# Patient Record
Sex: FEMALE | Race: WHITE | NOT HISPANIC OR LATINO | Employment: OTHER | URBAN - METROPOLITAN AREA
[De-identification: names, ages, dates, MRNs, and addresses within clinical notes are randomized per-mention and may not be internally consistent; named-entity substitution may affect disease eponyms.]

---

## 2017-03-30 ENCOUNTER — ALLSCRIPTS OFFICE VISIT (OUTPATIENT)
Dept: OTHER | Facility: OTHER | Age: 82
End: 2017-03-30

## 2017-07-20 ENCOUNTER — HOSPITAL ENCOUNTER (OUTPATIENT)
Dept: RADIOLOGY | Facility: HOSPITAL | Age: 82
Discharge: HOME | End: 2017-07-20
Attending: ORTHOPAEDIC SURGERY
Payer: MEDICARE

## 2017-07-20 ENCOUNTER — ALLSCRIPTS OFFICE VISIT (OUTPATIENT)
Dept: OTHER | Facility: OTHER | Age: 82
End: 2017-07-20

## 2017-07-20 DIAGNOSIS — M25.562 PAIN IN LEFT KNEE: ICD-10-CM

## 2017-07-20 DIAGNOSIS — I10 ESSENTIAL (PRIMARY) HYPERTENSION: ICD-10-CM

## 2017-07-20 PROCEDURE — 73562 X-RAY EXAM OF KNEE 3: CPT

## 2017-08-03 ENCOUNTER — ALLSCRIPTS OFFICE VISIT (OUTPATIENT)
Dept: OTHER | Facility: OTHER | Age: 82
End: 2017-08-03

## 2017-10-24 ENCOUNTER — ALLSCRIPTS OFFICE VISIT (OUTPATIENT)
Dept: OTHER | Facility: OTHER | Age: 82
End: 2017-10-24

## 2017-10-25 NOTE — PROGRESS NOTES
Assessment  1  Primary osteoarthritis of left knee (715 16) (M17 12)    Osteoarthritis left knee with instability  This patient has been managed quite well with a hinged knee brace allows for flexion-extension, but gives her medial lateral or do  She'll continue use of the brace for ambulation  I did welcome the opportunity see back in the office should problems arise     Plan  Primary osteoarthritis of left knee    · Follow-up PRN Evaluation and Treatment  Follow-up  Status: Complete  Done:  [id number] 09:56AM    History of Present Illness  HPI: 20-year-old female who is undergone bracing for a valgus left knee with soft tissue compromise medially and arthritis  She hasn't fallen see she had a new brace on, she has increased ability to a stand and descend stairs  She continues to walk utilizing a single-point cane for support  She and her son are happy with the outcome of bracing for her left knee      Review of Systems    Constitutional: No fever, no chills, feels well, no tiredness, no recent weight gain or loss  Eyes: No complaints of eyesight problems, no red eyes  ENT: no loss of hearing, no nosebleeds, no sore throat  Cardiovascular: No complaints of chest pain, no palpitations, no leg claudication or lower extremity edema  Respiratory: no compliants of shortness of breath, no wheezing, no cough  Gastrointestinal: no complaints of abdominal pain, no constipation, no nausea or diarrhea, no vomiting, no bloody stools  Genitourinary: no complaints of dysuria, no incontinence  Musculoskeletal: as noted in HPI  Integumentary: no complaints of skin rash or lesion, no itching or dry skin, no skin wounds  Neurological: no complaints of headache, no confusion, no numbness or tingling, no dizziness  Endocrine: No complaints of muscle weakness, no feelings of weakness, no frequent urination, no excessive thirst    Psychiatric: No suicidal thoughts, no anxiety, no feelings of depression        Active Problems  1  Bursitis (727 3) (M71 9)   2  Cataract (366 9) (H26 9)   3  Chest pain (786 50) (R07 9)   4  Closed fracture of nasal bone, sequela (905 0) (S02 2XXS)   5  Cough (786 2) (R05)   6  Fracture Of Sacrum (805 6)   7  Generalized osteoarthritis of multiple sites (715 09) (M15 9)   8  Hyperlipidemia (272 4) (E78 5)   9  Hypertension (401 9) (I10)   10  Iliotibial band syndrome (728 89) (M76 30)   11  Left knee pain (719 46) (M25 562)   12  Limb pain (729 5) (M79 609)   13  Lower back pain (724 2) (M54 5)   14  Lumbago With Sciatica (724 3)   15  Lumbar canal stenosis (724 02) (M48 061)   16  Lumbar radiculopathy (724 4) (M54 16)   17  Need for pneumococcal vaccination (V03 82) (Z23)   18  Need for prophylactic vaccination and inoculation against influenza (V04 81) (Z23)   19  Orthostatic hypotension (458 0) (I95 1)   20  Osteoarthrosis (715 90) (M19 90)   21  Osteoporosis (733 00) (M81 0)   22  Primary osteoarthritis of left knee (715 16) (M17 12)   23  PVC's (premature ventricular contractions) (427 69) (I49 3)   24  Syncope (780 2) (R55)   25  Unstable knee, left (718 86) (M25 362)   26  URI (upper respiratory infection) (465 9) (J06 9)   27  Vasovagal syncope (780 2) (R55)   28  Visit for screening mammogram (V76 12) (Z12 31)    Past Medical History   · History of Coronary Artery Disease (V12 59)   · History of hypertension (V12 59) (Z86 79)   · Need for prophylactic vaccination and inoculation against influenza (V04 81) (Z23)   · History of Osteoarthritis (V13 4)   · History of Osteoporosis (733 00) (M81 0)   · History of Vitamin D deficiency (268 9) (E55 9)    The active problems and past medical history were reviewed and updated today  Surgical History   · History of Appendectomy   · History of Hysterectomy   · History of Rotator Cuff Repair    The surgical history was reviewed and updated today         Family History  Father    · Family history of Type 2 Diabetes Mellitus  Brother    · Family history of Heart Disease (V17 49)    Social History   · Daily Coffee Consumption (4  Cups/Day)   · Former smoker (X93 63) (T53 756)    Current Meds   1  Aspirin 81 MG TABS; Take 1 tablet daily Recorded   2  Calcitonin ([place]) 200 UNIT/ACT Nasal Solution; USE 1 SPRAY DAILY ALTERNATING   NOSTRILS AS DIRECTED; Therapy: 43WBB9338 to (Evaluate:14Nov2017)  Requested for: 63FMC4135; Last   Rx:11Lvh5098 Ordered   3  Daily Multi Oral Tablet; TAKE 1 TABLET DAILY; Therapy: (Recorded:19Oct2015) to Recorded   4  Dorzolamide HCl SOLN; INSTILL 1 DROP INTO BOTH EYES 2 TIMES DAILY Recorded   5  Fenofibrate 160 MG Oral Tablet; TAKE 1 TABLET DAILY; Therapy: 11RZB7866 to (Amrik Riojas)  Requested for: 12Qba6833; Last   Rx:11Knr5517 Ordered   6  Glucosamine CAPS; take 1 capsule daily; Therapy: (Recorded:19Oct2015) to Recorded   7  Latanoprost 0 005 % Ophthalmic Solution; one drop each eye daily  Requested for:   28BED5705; Last Rx:09Rvu0001 Ordered   8  Metoprolol Tartrate 25 MG Oral Tablet; take 1 tablet by mouth twice a day; Therapy: 77SRA2532 to (Evaluate:07Jan2018)  Requested for: 07XKO2400; Last   Rx:44Cgy2318 Ordered   9  Metoprolol Tartrate 50 MG Oral Tablet; Take 1 tablet twice daily; Therapy: 88Gua8390 to (Evaluate:30Jan2018)  Requested for: 24Hhg9901; Last   Rx:43Tro8062 Ordered   10  One Day At A Time Pill Remind MISC; USE AS DIRECTED Recorded   11  Vitamin B12 TABS; Take 1 tablet daily as directed Recorded   12  Vitamin B6 TABS; Take 1 tablet daily as directed Recorded    Allergies  1  No Known Drug Allergies    Vitals  Signs   Heart Rate: 62  Systolic: 127  Diastolic: 67  Height: 5 ft   Weight: 122 lb 6 oz  BMI Calculated: 23 9  BSA Calculated: 1 51    Physical Exam  Gait pattern is antalgic despite use of a brace and a cane  Left knee is in valgus  She is wearing her brace allows for flexion extension  There is no deficit skin integrity of the proximal distal limb of the brace    The foot toes strongly hemanth          Future Appointments    Date/Time Provider Specialty Site   12/05/2017 01:30 PM Lg Sahu DO Internal Medicine [facility] INTERNAL MED     Signatures   Electronically signed by : TRACIE Anthony ; Oct 24 2017  9:56AM EST                       (Author)

## 2017-11-24 ENCOUNTER — GENERIC CONVERSION - ENCOUNTER (OUTPATIENT)
Dept: OTHER | Facility: OTHER | Age: 82
End: 2017-11-24

## 2017-11-24 LAB
A/G RATIO (HISTORICAL): 2 (ref 1.2–2.2)
ALBUMIN SERPL BCP-MCNC: 4.4 G/DL (ref 3.2–4.6)
ALP SERPL-CCNC: 61 IU/L (ref 39–117)
ALT SERPL W P-5'-P-CCNC: 16 IU/L (ref 0–32)
AST SERPL W P-5'-P-CCNC: 30 IU/L (ref 0–40)
BASOPHILS # BLD AUTO: 0.1 X10E3/UL (ref 0–0.2)
BASOPHILS # BLD AUTO: 1 %
BILIRUB SERPL-MCNC: 0.2 MG/DL (ref 0–1.2)
BUN SERPL-MCNC: 16 MG/DL (ref 10–36)
BUN/CREA RATIO (HISTORICAL): 17 (ref 12–28)
CALCIUM SERPL-MCNC: 9.4 MG/DL (ref 8.7–10.3)
CHLORIDE SERPL-SCNC: 98 MMOL/L (ref 96–106)
CHOLEST SERPL-MCNC: 153 MG/DL (ref 100–199)
CO2 SERPL-SCNC: 21 MMOL/L (ref 18–29)
CREAT SERPL-MCNC: 0.93 MG/DL (ref 0.57–1)
DEPRECATED RDW RBC AUTO: 13.7 % (ref 12.3–15.4)
EGFR AFRICAN AMERICAN (HISTORICAL): 62 ML/MIN/1.73
EGFR-AMERICAN CALC (HISTORICAL): 54 ML/MIN/1.73
EOSINOPHIL # BLD AUTO: 0.5 X10E3/UL (ref 0–0.4)
EOSINOPHIL # BLD AUTO: 5 %
GLUCOSE SERPL-MCNC: 98 MG/DL (ref 65–99)
HCT VFR BLD AUTO: 37.1 % (ref 34–46.6)
HDLC SERPL-MCNC: 65 MG/DL
HGB BLD-MCNC: 12.3 G/DL (ref 11.1–15.9)
IMM.GRANULOCYTES (CD4/8) (HISTORICAL): 0 %
IMM.GRANULOCYTES (CD4/8) (HISTORICAL): 0 X10E3/UL (ref 0–0.1)
LDLC SERPL CALC-MCNC: 72 MG/DL (ref 0–99)
LYMPHOCYTES # BLD AUTO: 2.5 X10E3/UL (ref 0.7–3.1)
LYMPHOCYTES # BLD AUTO: 29 %
MCH RBC QN AUTO: 30.1 PG (ref 26.6–33)
MCHC RBC AUTO-ENTMCNC: 33.2 G/DL (ref 31.5–35.7)
MCV RBC AUTO: 91 FL (ref 79–97)
MONOCYTES # BLD AUTO: 1.1 X10E3/UL (ref 0.1–0.9)
MONOCYTES (HISTORICAL): 13 %
NEUTROPHILS # BLD AUTO: 4.5 X10E3/UL (ref 1.4–7)
NEUTROPHILS # BLD AUTO: 52 %
PLATELET # BLD AUTO: 269 X10E3/UL (ref 150–379)
POTASSIUM SERPL-SCNC: 4 MMOL/L (ref 3.5–5.2)
RBC (HISTORICAL): 4.08 X10E6/UL (ref 3.77–5.28)
SODIUM SERPL-SCNC: 135 MMOL/L (ref 134–144)
TOT. GLOBULIN, SERUM (HISTORICAL): 2.2 G/DL (ref 1.5–4.5)
TOTAL PROTEIN (HISTORICAL): 6.6 G/DL (ref 6–8.5)
TRIGL SERPL-MCNC: 82 MG/DL (ref 0–149)
VLDLC SERPL CALC-MCNC: 16 MG/DL (ref 5–40)
WBC # BLD AUTO: 8.7 X10E3/UL (ref 3.4–10.8)

## 2017-11-26 LAB
BACTERIA UR QL AUTO: ABNORMAL
BILIRUB UR QL STRIP: NEGATIVE
COLOR UR: YELLOW
COMMENT (HISTORICAL): CLEAR
CULTURE RESULT (HISTORICAL): ABNORMAL
FECAL OCCULT BLOOD DIAGNOSTIC (HISTORICAL): NEGATIVE
GLUCOSE (HISTORICAL): NEGATIVE
KETONES UR STRIP-MCNC: NEGATIVE MG/DL
LEUKOCYTE ESTERASE UR QL STRIP: ABNORMAL
MICROSCOPIC EXAMINATION (HISTORICAL): ABNORMAL
MISCELLANEOUS LAB TEST RESULT (HISTORICAL): ABNORMAL
MUCUS THREADS (HISTORICAL): PRESENT
NITRITE UR QL STRIP: NEGATIVE
NON-SQ EPI CELLS URNS QL MICRO: ABNORMAL /HPF
PH UR STRIP.AUTO: 6 [PH] (ref 5–7.5)
PROT UR STRIP-MCNC: NEGATIVE MG/DL
RBC (HISTORICAL): ABNORMAL /HPF
SP GR UR STRIP.AUTO: 1.01 (ref 1–1.03)
T4 FREE SERPL-MCNC: 1.29 NG/DL (ref 0.82–1.77)
TSH SERPL DL<=0.05 MIU/L-ACNC: 5.99 UIU/ML (ref 0.45–4.5)
URINALYSIS (UA) (HISTORICAL): ABNORMAL
UROBILINOGEN UR QL STRIP.AUTO: 0.2 EU/DL (ref 0.2–1)
WBC # BLD AUTO: ABNORMAL /HPF

## 2017-12-05 ENCOUNTER — ALLSCRIPTS OFFICE VISIT (OUTPATIENT)
Dept: OTHER | Facility: OTHER | Age: 82
End: 2017-12-05

## 2017-12-05 DIAGNOSIS — M81.0 AGE-RELATED OSTEOPOROSIS WITHOUT CURRENT PATHOLOGICAL FRACTURE: ICD-10-CM

## 2017-12-07 ENCOUNTER — GENERIC CONVERSION - ENCOUNTER (OUTPATIENT)
Dept: INTERNAL MEDICINE CLINIC | Facility: CLINIC | Age: 82
End: 2017-12-07

## 2018-01-12 VITALS
SYSTOLIC BLOOD PRESSURE: 137 MMHG | HEART RATE: 62 BPM | HEIGHT: 60 IN | BODY MASS INDEX: 24.03 KG/M2 | WEIGHT: 122.38 LBS | DIASTOLIC BLOOD PRESSURE: 67 MMHG

## 2018-01-13 VITALS — WEIGHT: 123.38 LBS | HEIGHT: 60 IN | BODY MASS INDEX: 24.22 KG/M2

## 2018-01-13 VITALS
SYSTOLIC BLOOD PRESSURE: 180 MMHG | HEIGHT: 60 IN | BODY MASS INDEX: 23.75 KG/M2 | HEART RATE: 66 BPM | DIASTOLIC BLOOD PRESSURE: 76 MMHG | TEMPERATURE: 99 F | OXYGEN SATURATION: 98 % | WEIGHT: 121 LBS

## 2018-01-14 VITALS
DIASTOLIC BLOOD PRESSURE: 80 MMHG | HEART RATE: 63 BPM | TEMPERATURE: 98.2 F | HEIGHT: 60 IN | WEIGHT: 124.5 LBS | BODY MASS INDEX: 24.44 KG/M2 | OXYGEN SATURATION: 94 % | SYSTOLIC BLOOD PRESSURE: 140 MMHG

## 2018-01-23 VITALS
WEIGHT: 120 LBS | HEIGHT: 60 IN | HEART RATE: 75 BPM | BODY MASS INDEX: 23.56 KG/M2 | TEMPERATURE: 98.7 F | OXYGEN SATURATION: 98 % | SYSTOLIC BLOOD PRESSURE: 160 MMHG | DIASTOLIC BLOOD PRESSURE: 72 MMHG

## 2018-01-23 NOTE — PROGRESS NOTES
Assessment    1  Osteoporosis (733 00) (M81 0)   2  Generalized osteoarthritis of multiple sites (715 09) (M15 9)   3  Hypertension (401 9) (I10)   4  Hyperlipidemia (272 4) (E78 5)   5  Encounter for preventive health examination (V70 0) (Z00 00)    Plan  Health Maintenance    · Follow-up visit in 4 Months Evaluation and Treatment  Follow-up  Status: Hold For -  Scheduling  Requested for: 84GOG8425  Osteoporosis    · * DXA BONE DENSITY SPINE HIP AND PELVIS; Status:Hold For - Scheduling;  Requested for:78Lrb6140;     Discussion/Summary    1  Hypertension  As noted patient's blood pressure is mildly elevated today and we do have a problem with this patient where she has had episodes of orthostasis and syncope and collapse with accidental injury  At this point I would prefer not to change her medication and we will continue surveillance as well as her cardiologist     2  Osteoporosis  We have checked the records and she has not recently had a DEXA scan and we have ordered this to have done in the near future and we will call and discuss results  3  Generalized DJD  Patient states that she is doing well with brace to the left knee and his walking now with a wheeled walker which helps for stability and no recent falls or injuries  Patient is watching very closely by her son  4  Hyperlipidemia  We did tell the patient that her cholesterol is under good control and we will continue to monitor this  5  Health maintenance  Unless patient was having any GI abnormality she no longer will be going for colon rectal screening because of her age  Patient also is no longer getting gynecologic care but she is getting routine mammograms  Patient otherwise is up-to-date with all studies and evaluations and she will be seen again in the office in 4 months but she was told if there is any problems in between to please call     Impression: Subsequent Annual Wellness Visit, with preventive exam as well as age and risk appropriate counseling completed  Cardiovascular screening and counseling: the risks and benefits of screening were discussed and screening is current  Diabetes screening and counseling: the risks and benefits of screening were discussed and screening is current  Colorectal cancer screening and counseling: the risks and benefits of screening were discussed, the patient declines screening and screening not indicated  Breast cancer screening and counseling: the risks and benefits of screening were discussed and screening is current  Cervical cancer screening and counseling: the risks and benefits of screening were discussed, the patient declines screening and screening not indicated  Osteoporosis screening and counseling: the risks and benefits of screening were discussed, counseling was given on obtaining adequate amounts of calcium and vitamin D on a daily basis, counseling was given on the importance of regular weightbearing exercise and due for DXA axial skeleton  Abdominal aortic aneurysm screening and counseling: the risks and benefits of screening were discussed and screening not indicated  Glaucoma screening and counseling: the risks and benefits of screening were discussed and screening is current  HIV screening and counseling: screening not indicated  Hepatitis C Screening: the patient was counseled on Hepatitis C screening  The patient declines Hepatitis C screening   Immunizations: the risks and benefits of influenza vaccination were discussed with the patient, influenza vaccine is up to date this year, the risks and benefits of pneumococcal vaccination were discussed with the patient, the lifetime pneumococcal vaccine has been completed, hepatitis B vaccination series is not indicated at this time due to the patient's low risk of mallory the disease, the risks and benefits of the Zostavax vaccine were discussed with the patient, the patient declines the Zostavax vaccine, the risks and benefits of the Td vaccine were discussed with the patient and Td vaccination status is unknown  Advance Directive Planning: complete and up to date  Patient Discussion: plan discussed with the patient, follow-up visit needed in Four months  Possible side effects of new medications were reviewed with the patient/guardian today  The treatment plan was reviewed with the patient/guardian  The patient/guardian understands and agrees with the treatment plan      Chief Complaint  Patient is here today for an annual wellness exam      History of Present Illness  HPI: Patient is a 70-year-old female with a history of hypertension, hyperlipidemia, diffuse DJD  Patient is here today for Medicare wellness visit  Patient states that she is feeling well and she has no new complaints  She is wearing a brace on her left knee which she states helps with stability and she reports that she has had no recent falls or injuries  She did have lab tests performed prior to the visit today and we did discuss the results  GFR is stable at 54 and the rest of her comprehensive metabolic profile was normal, her cholesterol showing good control and her CBC was essentially normal also  She continues with a slight elevation in her TSH but states she is asymptomatic and with her concerns over her weight loss we have decided not to treat this time but continue to follow  Welcome to [facility] and Wellness Visits: The patient is being seen for the subsequent annual wellness visit  Co-Managers and Medical Equipment/Suppliers: See Patient Care Team   Reviewed Updated [facility]:   Last Medicare Wellness Visit Information was reviewed, patient interviewed, no change since last AWV  Preventive Quality Program 65 and Older: Falls Risk: The patient fell 0 times in the past 12 months  Symptoms Include: impaired balance  Associated symptoms:  impaired mobility and impaired ability to live independently   The patient is currently experiencing urinary symptoms  Urinary Incontinence Symptoms includes: urinary urgency and nocturia    Date of last glaucoma screen was Patient states she has glaucoma screening every 6 months      Patient Care Team    Care Team Member Role Specialty Office Number   Meenakshi Paredes   Orthopedic Surgery [phone]   Joanne LOVE  Cardiology [phone]     Review of Systems    Constitutional: negative  Head and Face: negative  Eyes: negative  ENT: negative  Cardiovascular: no chest pain, no palpitations, the heart is not racing, no lightheadedness and no lower extremity edema  Respiratory: negative  Gastrointestinal: negative  Musculoskeletal: diffuse joint pain, joint stiffness and limping, but no generalized muscle aches, no back pain, no joint swelling, no back muscle spasm and no pain in other joints  Integumentary and Breasts: negative  Neurological: Syncope, but no headache, no confusion, no dizziness, no fainting, no paresthesias, no saddle paresthesia, no leg numbness, no leg weakness, no tingling and no difficulty walking  Psychiatric: negative  Endocrine: negative  Hematologic and Lymphatic: negative  Active Problems    1  Bursitis (727 3) (M71 9)   2  Cataract (366 9) (H26 9)   3  Chest pain (786 50) (R07 9)   4  Closed fracture of nasal bone, sequela (905 0) (S02 2XXS)   5  Cough (786 2) (R05)   6  Fracture Of Sacrum (805 6)   7  Generalized osteoarthritis of multiple sites (715 09) (M15 9)   8  Hyperlipidemia (272 4) (E78 5)   9  Hypertension (401 9) (I10)   10  Iliotibial band syndrome (728 89) (M76 30)   11  Left knee pain (719 46) (M25 562)   12  Limb pain (729 5) (M79 609)   13  Lower back pain (724 2) (M54 5)   14  Lumbago With Sciatica (724 3)   15  Lumbar canal stenosis (724 02) (M48 061)   16  Lumbar radiculopathy (724 4) (M54 16)   17  Need for pneumococcal vaccination (V03 82) (Z23)   18   Need for prophylactic vaccination and inoculation against influenza (V04 81) (Z23)   19  Orthostatic hypotension (458 0) (I95 1)   20  Osteoarthrosis (715 90) (M19 90)   21  Osteoporosis (733 00) (M81 0)   22  Primary osteoarthritis of left knee (715 16) (M17 12)   23  PVC's (premature ventricular contractions) (427 69) (I49 3)   24  Syncope (780 2) (R55)   25  Unstable knee, left (718 86) (M25 362)   26  URI (upper respiratory infection) (465 9) (J06 9)   27  Vasovagal syncope (780 2) (R55)   28  Visit for screening mammogram (V76 12) (Z12 31)    Past Medical History    · History of Coronary Artery Disease (V12 59)   · History of hypertension (V12 59) (Z86 79)   · Need for prophylactic vaccination and inoculation against influenza (V04 81) (Z23)   · History of Osteoarthritis (V13 4)   · History of Osteoporosis (733 00) (M81 0)   · History of Vitamin D deficiency (268 9) (E55 9)    The active problems and past medical history were reviewed and updated today  Surgical History    · History of Appendectomy   · History of Hysterectomy   · History of Rotator Cuff Repair    The surgical history was reviewed and updated today  Family History  Father    · Family history of Type 2 Diabetes Mellitus  Brother    · Family history of Heart Disease (V17 49)    The family history was reviewed and updated today  Social History    · Daily Coffee Consumption (4  Cups/Day)   · Former smoker (N15 84) (U94 236)  The social history was reviewed and updated today  The social history was reviewed and is unchanged  Current Meds   1  Aspirin 81 MG TABS; Take 1 tablet daily Recorded   2  Calcitonin ([place]) 200 UNIT/ACT Nasal Solution; USE 1 SPRAY DAILY ALTERNATING   NOSTRILS AS DIRECTED; Therapy: 43UBY0676 to (Evaluate:80Uao1033)  Requested for: 54WNU2768; Last   Rx:27Nov2017 Ordered   3  Daily Multi Oral Tablet; TAKE 1 TABLET DAILY; Therapy: (Recorded:19Oct2015) to Recorded   4  Dorzolamide HCl SOLN; INSTILL 1 DROP INTO BOTH EYES 2 TIMES DAILY Recorded   5   Fenofibrate 160 MG Oral Tablet; TAKE 1 TABLET DAILY; Therapy: 42JHM8708 to (Dinora Rivas)  Requested for: 92Sgf8056; Last   Rx:12Lsa4645 Ordered   6  Glucosamine CAPS; take 1 capsule daily; Therapy: (Recorded:19Oct2015) to Recorded   7  Latanoprost 0 005 % Ophthalmic Solution; one drop each eye daily  Requested for:   61JDS2972; Last Rx:35Vws9640 Ordered   8  Metoprolol Tartrate 25 MG Oral Tablet; take 1 tablet by mouth twice a day; Therapy: 36FEY8941 to (Evaluate:07Jan2018)  Requested for: 28KDP6416; Last   Rx:84Nyl9059 Ordered   9  Metoprolol Tartrate 50 MG Oral Tablet; Take 1 tablet twice daily; Therapy: 11Pqv1153 to (Evaluate:30Jan2018)  Requested for: 51Hlp9587; Last   Rx:29Nun2906 Ordered   10  One Day At A Time Pill Remind MISC; USE AS DIRECTED Recorded   11  Vitamin B12 TABS; Take 1 tablet daily as directed Recorded   12  Vitamin B6 TABS; Take 1 tablet daily as directed Recorded    The medication list was reviewed and updated today  Allergies    1  No Known Drug Allergies    Immunizations   ** Printed in Appendix #1 below  Vitals  Signs    Temperature: 98 7 F  Heart Rate: 75  Systolic: 948  Diastolic: 72  Height: 5 ft   Weight: 120 lb   BMI Calculated: 23 44  BSA Calculated: 1 5  O2 Saturation: 98    Physical Exam    Constitutional Frail cheerful 80year-old female who is awake alert in no acute distress oriented x3  Head and Face   Head and face: Normal     Palpation of the face and sinuses: No sinus tenderness  Eyes   Conjunctiva and lids: No swelling, erythema or discharge  Pupils and irises: Equal, round, reactive to light  Ophthalmoscopic examination: Normal fundi and optic discs  Unable to see fundi clearly to do a thorough examination  Patient sees her ophthalmologist every 3-4 months and is begun to develop some macular degeneration  Ears, Nose, Mouth, and Throat   External inspection of ears and nose: Normal     Otoscopic examination: Tympanic membranes translucent with normal light reflex  Canals patent without erythema  Hearing: Normal     Nasal mucosa, septum, and turbinates: Normal without edema or erythema  Lips, teeth, and gums: Abnormal   Full upper and lower dentures  Oropharynx: Normal with no erythema, edema, exudate or lesions  Neck   Neck: Supple, symmetric, trachea midline, no masses  Thyroid: Normal, no thyromegaly  Pulmonary   Respiratory effort: No increased work of breathing or signs of respiratory distress  Percussion of chest: Normal     Palpation of chest: Normal     Auscultation of lungs: Clear to auscultation  Cardiovascular   Palpation of heart: Normal PMI, no thrills  Auscultation of heart: Normal rate and rhythm, normal S1 and S2, no murmurs  Carotid pulses: 2+ bilaterally  Abdominal aorta: Normal     Femoral pulses: 2+ bilaterally  Pedal pulses: 2+ bilaterally  Peripheral vascular exam: Normal     Examination of extremities for edema and/or varicosities: Normal     Chest   Breasts: Abnormal   Patient defers examination and I told her this is all right secondary to a age and that we do not have to get regular mammograms anymore  Abdomen   Abdomen: Non-tender, no masses  Liver and spleen: No hepatomegaly or splenomegaly  Examination for hernias: No hernia appreciated  Patient deferred rectal examination and states she still needs to complete the Hemoccult slides  Genitourinary Patient defers examination  Lymphatic   Palpation of lymph nodes in neck: No lymphadenopathy  Palpation of lymph nodes in axillae: No lymphadenopathy  Palpation of lymph nodes in groin: No lymphadenopathy  Palpation of lymph nodes in other areas: No lymphadenopathy  Musculoskeletal   Gait and station: Abnormal   Slightly wide-based gait and patient walks with a cane for stability and help prevent falls  Digits and nails: Abnormal   Diffuse arthritic changes to the hands and digit  Diffuse degenerative changes to the hands feet and ankles  Range of motion: Normal     Stability: Normal   Patient has better stability with her knee brace on the left  Muscle strength/tone: Normal     Skin   Skin and subcutaneous tissue: Normal without rashes or lesions  Palpation of skin and subcutaneous tissue: Normal turgor  Neurologic   Cranial nerves: Cranial nerves II-XII intact  Cortical function: Normal mental status  Reflexes: 2+ and symmetric  Sensation: No sensory loss  Coordination: Normal finger to nose and heel to shin  Psychiatric   Judgment and insight: Normal     Orientation to person, place, and time: Normal     Recent and remote memory: Intact  Mood and affect: Normal        Health Management  Visit for screening mammogram   Digital Bilateral Screening Mammogram With CAD; every 1 year; Last 09Xxq3797; Next  Due: 2015;  Overdue    Future Appointments    Date/Time Provider Specialty Site   04/10/2018 09:30 AM Porfirio Holley DO Internal Medicine [facility] INTERNAL MED     Signatures   Electronically signed by : Sloane Phoenix DO; Dec  5 2017  2:43PM EST                       (Author)    Appendix #1     Patient: Lisa Whalen ; : 1925; MRN: [id number]      1 2 3 4 5 6    Influenza  09-Sep-2011 23-Aug-2012 29-Oct-2013 29-Oct-2013 10-Nov-2014 10-Sep-2015    PCV  10-Nov-2014         PPSV  2000

## 2018-01-24 NOTE — PROGRESS NOTES
Assessment   1  Hypertension (401 9) (I10)  2  Hyperlipidemia (272 4) (E78 5)  3  Osteoporosis (733 00) (M81 0)  4  Osteoarthrosis (715 90) (M19 90)    Plan  Osteoarthrosis    · Follow-up visit in 4 Months Evaluation and Treatment  Follow-up  Status: Hold For -  Scheduling  Requested for: [id number]    Discussion/Summary    #1  Hypertension  Patient's blood pressure showing adequate control and I would not increase her medication or change medication and lesser specific indication being as that she has had episodes of orthostasis in the past  Patient was told to call if any difficulties with lightheadedness, dizziness  #2  Hyperlipidemia  Patient's cholesterol is showing adequate control with present treatment  Patient states that she watches her diet is not always perfect  #3  History of osteoporosis  Patient will continue with present treatment  We will discuss with the patient when she returns to the office getting her up-to-date with a DEXA scan to make sure she has some stability  #4  Diffuse DJD  Patient is having problems with worsening arthritis to both knees  Patient does walk with a cane for stability and safety  We may consider changing her to a walker to continue to make sure she is safe  Patient does not wish to have any procedure such as a total knee replacement which would be indicated  Impression: Subsequent Annual Wellness Visit, with preventive exam as well as age and risk appropriate counseling completed  Cardiovascular screening and counseling: the risks and benefits of screening were discussed and screening is current  Diabetes screening and counseling: the risks and benefits of screening were discussed and screening is current  Colorectal cancer screening and counseling: the patient declines screening and screening not indicated  Breast cancer screening and counseling: the risks and benefits of screening were discussed and screening is current     Cervical cancer screening and counseling: screening not indicated  Osteoporosis screening and counseling: the risks and benefits of screening were discussed and screening is current  Abdominal aortic aneurysm screening and counseling: screening not indicated  Glaucoma screening and counseling: the risks and benefits of screening were discussed and screening is current  HIV screening and counseling: screening not indicated  Immunizations: the risks and benefits of influenza vaccination were discussed with the patient, influenza vaccine is up to date this year, influenza vaccine is due today, the risks and benefits of pneumococcal vaccination were discussed with the patient, the lifetime pneumococcal vaccine has been completed, hepatitis B vaccination series is not indicated at this time due to the patient's low risk of mallory the disease, the risks and benefits of the Zostavax vaccine were discussed with the patient, the patient declines the Zostavax vaccine, the risks and benefits of the Td vaccine were discussed with the patient and Td vaccination status is unknown  Advance Directive Planning: not complete, she was encouraged to follow-up with me to discuss her questions and/or decisions  Patient Discussion: plan discussed with the patient, follow-up visit needed in 4 months  Chief Complaint  Patient is here today for an annual wellness exam with labs      Advance Directives  Advance Directive [place]:   NO - Patient does not have an advance health care directive  Capacity/Competence: This patient has full decision making capacity for discussion of advance care planning and This patient has full decision making competency for discussion of advance care planning  Summary of Advance Directive Conversation  Patient states she may have filled out a living will in the past but does not recall where it may be   I did suggest that one of her family members download information and she fill out a new living will as to her specific desires and directives  The provider spent minutes discussing Advance Directives  History of Present Illness  HPI: Patient is a 70-year-old female with a history of hypertension, hyperlipidemia, DJD, osteoporosis  Patient is here today for a routine Medicare wellness exam  She did have routine labs completed prior to the visit today and we're happy to report to the patient that they were all normal and acceptable  Patient states that she is moving a little bit slower than she used to secondary to her severe arthritis to her lower extremities  She denies any chest pain or pressure and no increasing shortness of breath with exertion  Patient states she still gets [employer] on Sunday  As noted patient's lost a total of 1 pounds since March of this year  Welcome to [employer] and Wellness Visits: The patient is being seen for the subsequent annual wellness visit  Medicare Screening and Risk Factors   Hospitalizations: she has been previously hospitalizied  Once per lifetime medicare screening tests: ECG  Medicare Screening Tests Risk Questions   Abdominal aortic aneurysm risk assessment: none indicated  Osteoporosis risk assessment: , female gender and over 48years of age  HIV risk assessment: none indicated  Drug and Alcohol Use: The patient is a former cigarette smoker  She has smoked for Patient states approximately 10 pack years year(s)  The patient reports never drinking alcohol  She has never used illicit drugs  Diet and Physical Activity: Current diet includes well balanced meals and limited junk food  She exercises infrequently  Exercise: walking  Mood Disorder and Cognitive Impairment Screening: Geriatric Depression Scale   Depression screening score was Total of 3 on the geriatric depression scale   no significant symptoms  She denies feeling down, depressed, or hopeless over the past two weeks   She denies feeling little interest or pleasure in doing things over the past two weeks  Cognitive impairment screening: denies difficulty learning/retaining new information, denies difficulty handling complex tasks, denies difficulty with reasoning, denies difficulty with spatial ability and orientation, denies difficulty with language, denies difficulty with behavior and Total of 28 on the Mini-Mental Status Examination  Patient is to some short-term memory loss  Functional Ability/Level of Safety: Hearing is normal bilaterally  The patient is currently able to do activities of daily living with limitations, able to do instrumental activities of daily living with limitations, able to participate in social activities with limitations and unable to drive  Activities of daily living details: transportation help needed, but does not need help using the phone, does not need help shopping, no meal preparation help needed, does not need help doing housework, does not need help managing medications and does not need help managing money  Fall risk factors:  mobility impairment, antihypertensive use and previous fall, but The patient fell 1 times in the past 12 months , no polypharmacy, no alcohol use, no antidepressant use, no deconditioning, no postural hypotension, no sedative use, no visual impairment, no urinary incontinence and no cognitive impairment  Home safety risk factors:  no unfamiliar surroundings, no loose rugs, no poor household lighting, no uneven floors, no household clutter, grab bars in the bathroom and handrails on the stairs  Advance Directives: Advance directives: no living will, no durable power of  for health care directives and no advance directives  end of life decisions were reviewed with the patient and I agree with the patient's decisions  Co-Managers and Medical Equipment/Suppliers: See Patient Care Team   Reviewed Updated Geraline Dire:   Last Medicare Wellness Visit Information was reviewed, patient interviewed and updates made to the record today  Preventive Quality Program 65 and Older: Falls Risk: The patient fell 1 times in the past 12 months  Symptoms Include: impaired balance and leg weakness, but no confusion, no lightheadedness, no vertigo, no dizziness, no syncope, no visual problems, no recurring falls and no recent fall  Associated symptoms:  impaired mobility, but no impaired ability to live independently  The patient currently has no urinary incontinence symptoms  Date of last glaucoma screen was Patient states she sees the ophthalmologist every 4 months and gets glaucoma testing at that time      Patient Care Team    Care Team Member Role Specialty Office Number   Rogelio Paul [facility]  Orthopedic Surgery [phone]   Suresh LOVE  Cardiology [phone]     Review of Systems    Constitutional: negative  Head and Face: negative  Eyes: negative  ENT: negative  Cardiovascular: lower extremity edema and Trace chronic edema bilaterally which has not changed, but no chest pain, no palpitations, the heart is not racing and no lightheadedness  Respiratory: negative  Gastrointestinal: negative  Musculoskeletal: diffuse joint pain, joint stiffness and limping, but no generalized muscle aches, no back pain, no joint swelling, no back muscle spasm and no pain in other joints  Integumentary and Breasts: negative  Neurological: Syncope, but no headache, no confusion, no dizziness, no fainting, no paresthesias, no saddle paresthesia, no leg numbness, no leg weakness, no tingling and no difficulty walking  Psychiatric: negative  Endocrine: negative  Hematologic and Lymphatic: negative  Score 3   Normal 0 - 9, Mild Depression 10 - 19, Severe Depression 20 - 30      Active Problems   1  Bursitis (727 3) (M71 9)  2  Cataract (366 9) (H26 9)  3  Chest pain (786 50) (R07 9)  4  Closed fracture of nasal bone, sequela (802 0) (S02 2XXS)  5  Cough (786 2) (R05)  6   Fracture Of Sacrum (805 6)  7  Generalized osteoarthritis of multiple sites (715 09) (M15 9)  8  Hyperlipidemia (272 4) (E78 5)  9  Hypertension (401 9) (I10)  10  Iliotibial band syndrome (728 89) (M76 30)  11  Limb pain (729 5) (M79 609)  12  Lower back pain (724 2) (M54 5)  13  Lumbago With Sciatica (724 3)  14  Lumbar canal stenosis (724 02) (M48 06)  15  Lumbar radiculopathy (724 4) (M54 16)  16  Need for pneumococcal vaccination (V03 82) (Z23)  17  Need for prophylactic vaccination and inoculation against influenza (V04 81) (Z23)  18  Orthostatic hypotension (458 0) (I95 1)  19  Osteoarthrosis (715 90) (M19 90)  20  Osteoporosis (733 00) (M81 0)  21  PVC's (premature ventricular contractions) (427 69) (I49 3)  22  Syncope (780 2) (R55)  23  URI (upper respiratory infection) (465 9) (J06 9)  24  Vasovagal syncope (780 2) (R55)  25  Visit for screening mammogram (V76 12) (Z12 31)    Past Medical History    · History of Coronary Artery Disease (V12 59)   · History of hypertension (V12 59) (Z86 79)   · Need for prophylactic vaccination and inoculation against influenza (V04 81) (Z23)   · History of Osteoarthritis (V13 4)   · History of Osteoporosis (733 00) (M81 0)   · History of Vitamin D deficiency (268 9) (E55 9)    The active problems and past medical history were reviewed and updated today  Surgical History    · History of Appendectomy   · History of Hysterectomy   · History of Rotator Cuff Repair    The surgical history was reviewed and updated today  Family History  Father    · Family history of Type 2 Diabetes Mellitus  Brother    · Family history of Heart Disease (V17 49)    The family history was reviewed and updated today  Social History    · Daily Coffee Consumption (4  Cups/Day)   · Former smoker (C57 62) (D86 222)  The social history was reviewed and updated today  The social history was reviewed and is unchanged  Current Meds  1  Aspirin 81 MG TABS; Take 1 tablet daily Recorded  2   Calcitonin ([place]) 200 UNIT/ACT Nasal Solution; USE 1 SPRAY DAILY ALTERNATING   NOSTRILS AS DIRECTED; Therapy: 32EHZ5212 to (Evaluate:15Jan2017)  Requested for: 50POP1546; Last   Rx:[id number] Ordered  3  Daily Multi Oral Tablet; TAKE 1 TABLET DAILY; Therapy: (Recorded:19Oct2015) to Recorded  4  Fenofibrate 160 MG Oral Tablet; TAKE 1 TABLET DAILY; Therapy: 57ZKJ6898 to (Evaluate:04Apr2017)  Requested for: 65MSO2663; Last   Rx:06Oct2016 Ordered  5  Glucosamine CAPS; take 1 capsule daily; Therapy: (Recorded:19Oct2015) to Recorded  6  Latanoprost 0 005 % Ophthalmic Solution; one drop each eye daily  Requested for:   89QYI3870; Last Rx:[id number] Ordered  7  Metoprolol Tartrate 25 MG Oral Tablet; take 1 tablet by mouth twice a day; Therapy: 27HMA0051 to (Evaluate:07Sep2016)  Requested for: 62ZJW2428; Last   Rx:11Mar2016 Ordered  8  One Day At A Time Pill Remind MISC; USE AS DIRECTED Recorded  9  Vitamin B12 TABS; Take 1 tablet daily as directed Recorded  10  Vitamin B6 TABS; Take 1 tablet daily as directed Recorded    Allergies   1  No Known Drug Allergies    Immunizations   ** Printed in Appendix #1 below  Vitals  Signs    Temperature: 97 6 F  Heart Rate: 76  Systolic: 807  Diastolic: 74  Weight: 866 lb 6 08 oz  O2 Saturation: 98    Physical Exam    Constitutional Frail cheerful 80year-old female who is awake alert in no acute distress oriented x3  Head and Face   Head and face: Normal     Palpation of the face and sinuses: No sinus tenderness  Eyes   Conjunctiva and lids: No swelling, erythema or discharge  Pupils and irises: Equal, round, reactive to light  Ophthalmoscopic examination: Normal fundi and optic discs  Unable to see fundi clearly to do a thorough examination  Patient sees her ophthalmologist every 3-4 months and is begun to develop some macular degeneration     Ears, Nose, Mouth, and Throat   External inspection of ears and nose: Normal     Otoscopic examination: Tympanic membranes translucent with normal light reflex  Canals patent without erythema  Hearing: Normal     Nasal mucosa, septum, and turbinates: Normal without edema or erythema  Lips, teeth, and gums: Abnormal   Full upper and lower dentures  Oropharynx: Normal with no erythema, edema, exudate or lesions  Neck   Neck: Supple, symmetric, trachea midline, no masses  Thyroid: Normal, no thyromegaly  Pulmonary   Respiratory effort: No increased work of breathing or signs of respiratory distress  Percussion of chest: Normal     Palpation of chest: Normal     Auscultation of lungs: Clear to auscultation  Cardiovascular   Palpation of heart: Normal PMI, no thrills  Auscultation of heart: Normal rate and rhythm, normal S1 and S2, no murmurs  Carotid pulses: 2+ bilaterally  Abdominal aorta: Normal     Femoral pulses: 2+ bilaterally  Pedal pulses: 2+ bilaterally  Peripheral vascular exam: Normal   Trace edema bilateral lower extremity  Chest   Breasts: Abnormal   Patient defers examination and I told her this is all right secondary to a age and that we do not have to get regular mammograms anymore  Abdomen   Abdomen: Non-tender, no masses  Liver and spleen: No hepatomegaly or splenomegaly  Examination for hernias: No hernia appreciated  Patient deferred rectal examination and states she still needs to complete the Hemoccult slides  Genitourinary Patient defers examination  Lymphatic   Palpation of lymph nodes in neck: No lymphadenopathy  Palpation of lymph nodes in axillae: No lymphadenopathy  Palpation of lymph nodes in groin: No lymphadenopathy  Palpation of lymph nodes in other areas: No lymphadenopathy  Musculoskeletal   Gait and station: Abnormal   Slightly wide-based gait and patient walks with a cane for stability and help prevent falls  Digits and nails: Abnormal   Diffuse arthritic changes to the hands and digit   Diffuse degenerative changes to the hands feet and ankles  Range of motion: Normal     Stability: Normal     Muscle strength/tone: Normal     Skin   Skin and subcutaneous tissue: Normal without rashes or lesions  Palpation of skin and subcutaneous tissue: Normal turgor  Neurologic   Cranial nerves: Cranial nerves II-XII intact  Cortical function: Normal mental status  Reflexes: 2+ and symmetric  Sensation: No sensory loss  Coordination: Normal finger to nose and heel to shin  Psychiatric   Judgment and insight: Normal     Orientation to person, place, and time: Normal     Recent and remote memory: Intact  Mood and affect: Normal        Health Management  Visit for screening mammogram   Digital Bilateral Screening Mammogram With CAD; every 1 year; Last 2014; Next  Due: 2015;  Overdue    Signatures   Electronically signed by : Kenia Wallace DO; 2016  3:02PM EST                       (Author)    Appendix #1     Patient: Lila Otto ; : 1925; MRN: [id number]      1 2 3 4 5 6    Influenza  09-Sep-2011 23-Aug-2012 29-Oct-2013 29-Oct-2013 10-Nov-2014 10-Sep-2015    [facility]  10-Nov-2014         [facility]  2000

## 2018-03-27 DIAGNOSIS — E78.00 PURE HYPERCHOLESTEROLEMIA: Primary | ICD-10-CM

## 2018-03-27 RX ORDER — FENOFIBRATE 160 MG/1
TABLET ORAL
Qty: 30 TABLET | Refills: 5 | Status: SHIPPED | OUTPATIENT
Start: 2018-03-27 | End: 2018-09-09 | Stop reason: SDUPTHER

## 2018-04-10 ENCOUNTER — OFFICE VISIT (OUTPATIENT)
Dept: INTERNAL MEDICINE CLINIC | Facility: CLINIC | Age: 83
End: 2018-04-10
Payer: MEDICARE

## 2018-04-10 VITALS
HEART RATE: 68 BPM | WEIGHT: 118 LBS | TEMPERATURE: 97.7 F | DIASTOLIC BLOOD PRESSURE: 70 MMHG | SYSTOLIC BLOOD PRESSURE: 132 MMHG | OXYGEN SATURATION: 98 % | HEIGHT: 60 IN | BODY MASS INDEX: 23.16 KG/M2

## 2018-04-10 DIAGNOSIS — H61.23 IMPACTED CERUMEN OF BOTH EARS: ICD-10-CM

## 2018-04-10 DIAGNOSIS — M81.0 AGE-RELATED OSTEOPOROSIS WITHOUT CURRENT PATHOLOGICAL FRACTURE: ICD-10-CM

## 2018-04-10 DIAGNOSIS — E03.9 ACQUIRED HYPOTHYROIDISM: Primary | ICD-10-CM

## 2018-04-10 DIAGNOSIS — M19.91 PRIMARY OSTEOARTHRITIS, UNSPECIFIED SITE: ICD-10-CM

## 2018-04-10 DIAGNOSIS — I10 ESSENTIAL HYPERTENSION: ICD-10-CM

## 2018-04-10 DIAGNOSIS — E78.01 FAMILIAL HYPERCHOLESTEROLEMIA: ICD-10-CM

## 2018-04-10 DIAGNOSIS — M15.9 GENERALIZED OSTEOARTHRITIS OF MULTIPLE SITES: ICD-10-CM

## 2018-04-10 PROCEDURE — 99214 OFFICE O/P EST MOD 30 MIN: CPT | Performed by: INTERNAL MEDICINE

## 2018-04-10 RX ORDER — LATANOPROST 50 UG/ML
1 SOLUTION/ DROPS OPHTHALMIC
Refills: 1 | COMMUNITY
Start: 2018-02-19

## 2018-04-10 RX ORDER — LATANOPROST 50 UG/ML
1 SOLUTION/ DROPS OPHTHALMIC DAILY
COMMUNITY
End: 2018-07-30 | Stop reason: SDUPTHER

## 2018-04-10 RX ORDER — METOPROLOL TARTRATE 50 MG/1
1 TABLET, FILM COATED ORAL 2 TIMES DAILY
COMMUNITY
Start: 2017-08-03 | End: 2018-07-04 | Stop reason: SDUPTHER

## 2018-04-10 NOTE — ASSESSMENT & PLAN NOTE
Hypertension  Patient's blood pressure as noted showing good control  Patient states occasionally when she gets up quickly from a sitting position she feels a little lightheaded for a very brief moment and she was told to keep moving slowly and to take a moment to equilibrate prior to moving  Patient will continue present medication for her blood pressure and we will check a reading with her next visit

## 2018-04-10 NOTE — ASSESSMENT & PLAN NOTE
Hyperlipidemia with the patient having of history of coronary artery disease it is extremely important that we keep her cholesterol under control  Patient is unable to tolerate statin drugs and therefore is on fenofibrate  Patient will continue with present treatment and we will continue to monitor her cholesterol profile

## 2018-04-10 NOTE — ASSESSMENT & PLAN NOTE
Generalized arthritis  Patient's biggest problem at this point time is her left knee  Patient sees the orthopedic specialist and is wearing a brace to the knee  Patient will continue to follow up with Orthopedics  She is a candidate except for her age for total knee replacement

## 2018-04-10 NOTE — PROGRESS NOTES
Assessment/Plan:    Hypertension  Hypertension  Patient's blood pressure as noted showing good control  Patient states occasionally when she gets up quickly from a sitting position she feels a little lightheaded for a very brief moment and she was told to keep moving slowly and to take a moment to equilibrate prior to moving  Patient will continue present medication for her blood pressure and we will check a reading with her next visit  Impacted cerumen of both ears  Impacted cerumen bilaterally  Patient states that she is having problems with order auditory acuity  With valuation today she was found to have impacted cerumen  We attempted to use a curette to remove the cerumen but this was very difficult and she has very small canals  She was given a card to see in ears nose and throat specialist for removal of impacted cerumen and also patient will need a hearing evaluation  Generalized osteoarthritis of multiple sites  Generalized arthritis  Patient's biggest problem at this point time is her left knee  Patient sees the orthopedic specialist and is wearing a brace to the knee  Patient will continue to follow up with Orthopedics  She is a candidate except for her age for total knee replacement  Hyperlipidemia  Hyperlipidemia with the patient having of history of coronary artery disease it is extremely important that we keep her cholesterol under control  Patient is unable to tolerate statin drugs and therefore is on fenofibrate  Patient will continue with present treatment and we will continue to monitor her cholesterol profile  Diagnoses and all orders for this visit:    Acquired hypothyroidism  -     TSH, 3rd generation with T4 reflex;  Future    Generalized osteoarthritis of multiple sites    Primary osteoarthritis, unspecified site    Age-related osteoporosis without current pathological fracture    Familial hypercholesterolemia    Essential hypertension    Impacted cerumen of both ears    Other orders  -     aspirin 81 MG tablet; Take 1 tablet by mouth daily  -     Multiple Vitamins-Minerals (DAILY MULTI PO); Take 1 tablet by mouth daily  -     Pyridoxine HCl (VITAMIN B6 PO); Take 1 tablet by mouth daily  -     Cyanocobalamin (VITAMIN B12 PO); Take 1 tablet by mouth daily  -     metoprolol tartrate (LOPRESSOR) 50 mg tablet; Take 1 tablet by mouth 2 (two) times a day  -     latanoprost (XALATAN) 0 005 % ophthalmic solution; INSTILL 1 DROP IN THE AFFECTED EYE(S) IN THE EVENING  -     Glucosamine HCl (GLUCOSAMINE PO); Take 1 capsule by mouth daily  -     latanoprost (XALATAN) 0 005 % ophthalmic solution; Apply 1 drop to eye daily          Subjective:      Patient ID: Inder Landers is a 80 y o  female  Patient is a 80-year-old female with a history of hypertension, hyperlipidemia, coronary artery disease, diffuse DJD  Patient is here today for routine follow-up  Patient states that she is feeling well and she has no new complaints or problems  She states that she is having some difficulty with her hearing  She denies chest pain or pressure and no increasing shortness of breath with exertion  She states she moves very slowly secondary to her diffuse arthritis  The following portions of the patient's history were reviewed and updated as appropriate:   She  has a past medical history of CAD (coronary artery disease); Hypertension; Osteoarthritis; Osteoporosis; and Vitamin D deficiency  She   Patient Active Problem List    Diagnosis Date Noted    Impacted cerumen of both ears 04/10/2018    Generalized osteoarthritis of multiple sites 07/13/2015    Hyperlipidemia 08/23/2012    Hypertension 08/20/2012    Osteoarthrosis 08/20/2012    Osteoporosis 08/20/2012     She  has a past surgical history that includes Appendectomy; Hysterectomy; and Rotator cuff repair  Her family history includes Diabetes type II in her father; Heart disease in her brother    She  reports that she has quit smoking  She does not have any smokeless tobacco history on file  Her alcohol and drug histories are not on file  Current Outpatient Prescriptions   Medication Sig Dispense Refill    aspirin 81 MG tablet Take 1 tablet by mouth daily      Cyanocobalamin (VITAMIN B12 PO) Take 1 tablet by mouth daily      fenofibrate (TRIGLIDE) 160 MG tablet TAKE 1 TABLET DAILY  30 tablet 5    Glucosamine HCl (GLUCOSAMINE PO) Take 1 capsule by mouth daily      latanoprost (XALATAN) 0 005 % ophthalmic solution INSTILL 1 DROP IN THE AFFECTED EYE(S) IN THE EVENING  1    latanoprost (XALATAN) 0 005 % ophthalmic solution Apply 1 drop to eye daily      metoprolol tartrate (LOPRESSOR) 50 mg tablet Take 1 tablet by mouth 2 (two) times a day      Multiple Vitamins-Minerals (DAILY MULTI PO) Take 1 tablet by mouth daily      Pyridoxine HCl (VITAMIN B6 PO) Take 1 tablet by mouth daily       No current facility-administered medications for this visit  Current Outpatient Prescriptions on File Prior to Visit   Medication Sig    fenofibrate (TRIGLIDE) 160 MG tablet TAKE 1 TABLET DAILY  No current facility-administered medications on file prior to visit  She has No Known Allergies       Review of Systems   Constitutional: Positive for activity change (Patient states that she has reduction in her activity level because of her arthritis  She still does work around the house and states that she does cook most meals  )  Negative for appetite change, chills, diaphoresis, fatigue, fever and unexpected weight change  HENT: Positive for hearing loss  Negative for congestion, dental problem, drooling, ear discharge, ear pain, facial swelling, mouth sores, nosebleeds, postnasal drip, rhinorrhea, sinus pain, sinus pressure, sneezing, sore throat, tinnitus, trouble swallowing and voice change  Eyes: Negative for photophobia, pain, discharge, redness, itching and visual disturbance     Respiratory: Negative for apnea, cough, choking, chest tightness, shortness of breath, wheezing and stridor  Cardiovascular: Negative for chest pain, palpitations and leg swelling  Gastrointestinal: Negative for abdominal distention, abdominal pain, anal bleeding, blood in stool, constipation, diarrhea, nausea, rectal pain and vomiting  Endocrine: Negative for cold intolerance, heat intolerance, polydipsia, polyphagia and polyuria  Genitourinary: Negative for decreased urine volume, difficulty urinating, dyspareunia, dysuria, enuresis, flank pain, frequency, genital sores, hematuria, menstrual problem, pelvic pain, urgency, vaginal bleeding, vaginal discharge and vaginal pain  Musculoskeletal: Positive for arthralgias and gait problem  Negative for back pain, joint swelling, myalgias, neck pain and neck stiffness  Skin: Negative for color change, pallor and rash  Allergic/Immunologic: Negative for environmental allergies, food allergies and immunocompromised state  Neurological: Positive for light-headedness  Negative for dizziness, tremors, seizures, syncope, facial asymmetry, speech difficulty, weakness, numbness and headaches  Hematological: Negative for adenopathy  Does not bruise/bleed easily  Psychiatric/Behavioral: Negative for agitation, behavioral problems, confusion, decreased concentration, dysphoric mood, hallucinations, self-injury, sleep disturbance and suicidal ideas  The patient is not nervous/anxious and is not hyperactive  Objective:      /70   Pulse 68   Temp 97 7 °F (36 5 °C)   Ht 5' (1 524 m)   Wt 53 5 kg (118 lb)   SpO2 98%   BMI 23 05 kg/m²          Physical Exam   Constitutional: She is oriented to person, place, and time  She appears well-developed and well-nourished  No distress  Very pleasant 80-year-old female who is awake alert in no acute distress, walking with a walker, brace to the left knee   HENT:   Head: Normocephalic and atraumatic     Nose: Nose normal    Mouth/Throat: Oropharynx is clear and moist  No oropharyngeal exudate  Impacted cerumen bilaterally and unable to see the tympanic membranes  We attempted to remove the cerumen with curette and were unsuccessful  Patient will be seen by in ears nose and throat physician for further evaluation   Eyes: Conjunctivae and EOM are normal  Pupils are equal, round, and reactive to light  Right eye exhibits no discharge  Left eye exhibits no discharge  No scleral icterus  Neck: Normal range of motion  Neck supple  No JVD present  No tracheal deviation present  No thyromegaly present  Cardiovascular: Normal rate, regular rhythm, normal heart sounds and intact distal pulses  Exam reveals no gallop and no friction rub  No murmur heard  Pulmonary/Chest: Effort normal and breath sounds normal  No stridor  No respiratory distress  She has no wheezes  She has no rales  She exhibits no tenderness  Abdominal: Soft  Bowel sounds are normal  She exhibits no distension and no mass  There is no tenderness  There is no rebound and no guarding  Musculoskeletal: She exhibits deformity  She exhibits no edema or tenderness  Patient does have diffuse degenerative arthritis, some increased kyphosis to the dorsal spine, degenerative changes to the hands and digits  Patient has degenerative changes especially noted to the left knee which is in a brace for stability with valgus deformity   Lymphadenopathy:     She has no cervical adenopathy  Neurological: She is alert and oriented to person, place, and time  She has normal reflexes  She displays normal reflexes  No cranial nerve deficit  She exhibits normal muscle tone  Coordination normal    Skin: Skin is warm and dry  No rash noted  She is not diaphoretic  No erythema  No pallor  Psychiatric: She has a normal mood and affect  Her behavior is normal  Thought content normal    Nursing note and vitals reviewed

## 2018-04-10 NOTE — ASSESSMENT & PLAN NOTE
Impacted cerumen bilaterally  Patient states that she is having problems with order auditory acuity  With valuation today she was found to have impacted cerumen  We attempted to use a curette to remove the cerumen but this was very difficult and she has very small canals  She was given a card to see in ears nose and throat specialist for removal of impacted cerumen and also patient will need a hearing evaluation

## 2018-04-16 DIAGNOSIS — M81.0 AGE-RELATED OSTEOPOROSIS WITHOUT CURRENT PATHOLOGICAL FRACTURE: Primary | ICD-10-CM

## 2018-04-16 RX ORDER — CALCITONIN SALMON 200 [IU]/.09ML
1 SPRAY, METERED NASAL DAILY
Qty: 3.7 ML | Refills: 0 | Status: SHIPPED | OUTPATIENT
Start: 2018-04-16 | End: 2018-05-13 | Stop reason: SDUPTHER

## 2018-05-13 DIAGNOSIS — M81.0 AGE-RELATED OSTEOPOROSIS WITHOUT CURRENT PATHOLOGICAL FRACTURE: ICD-10-CM

## 2018-05-14 RX ORDER — CALCITONIN SALMON 200 [IU]/.09ML
1 SPRAY, METERED NASAL DAILY
Qty: 1 ACT | Refills: 5 | Status: SHIPPED | OUTPATIENT
Start: 2018-05-14 | End: 2018-05-29 | Stop reason: SDUPTHER

## 2018-05-15 ENCOUNTER — OFFICE VISIT (OUTPATIENT)
Dept: INTERNAL MEDICINE CLINIC | Facility: CLINIC | Age: 83
End: 2018-05-15
Payer: MEDICARE

## 2018-05-15 VITALS
HEART RATE: 71 BPM | DIASTOLIC BLOOD PRESSURE: 72 MMHG | TEMPERATURE: 99.9 F | RESPIRATION RATE: 12 BRPM | BODY MASS INDEX: 23.01 KG/M2 | HEIGHT: 60 IN | WEIGHT: 117.2 LBS | OXYGEN SATURATION: 97 % | SYSTOLIC BLOOD PRESSURE: 120 MMHG

## 2018-05-15 DIAGNOSIS — N30.01 ACUTE CYSTITIS WITH HEMATURIA: Primary | ICD-10-CM

## 2018-05-15 LAB
SL AMB  POCT GLUCOSE, UA: NORMAL
SL AMB LEUKOCYTE ESTERASE,UA: POSITIVE
SL AMB POCT BILIRUBIN,UA: NEGATIVE
SL AMB POCT BLOOD,UA: ABNORMAL
SL AMB POCT CLARITY,UA: ABNORMAL
SL AMB POCT COLOR,UA: YELLOW
SL AMB POCT KETONES,UA: NEGATIVE
SL AMB POCT NITRITE,UA: POSITIVE
SL AMB POCT PH,UA: 7
SL AMB POCT SPECIFIC GRAVITY,UA: 1
SL AMB POCT URINE PROTEIN: ABNORMAL
SL AMB POCT UROBILINOGEN: NORMAL

## 2018-05-15 PROCEDURE — 81002 URINALYSIS NONAUTO W/O SCOPE: CPT | Performed by: NURSE PRACTITIONER

## 2018-05-15 PROCEDURE — 99213 OFFICE O/P EST LOW 20 MIN: CPT | Performed by: NURSE PRACTITIONER

## 2018-05-15 RX ORDER — SULFAMETHOXAZOLE AND TRIMETHOPRIM 800; 160 MG/1; MG/1
1 TABLET ORAL EVERY 12 HOURS SCHEDULED
Qty: 6 TABLET | Refills: 0 | Status: SHIPPED | OUTPATIENT
Start: 2018-05-15 | End: 2018-05-18

## 2018-05-15 RX ORDER — PHENAZOPYRIDINE HYDROCHLORIDE 100 MG/1
100 TABLET, FILM COATED ORAL 3 TIMES DAILY PRN
Qty: 10 TABLET | Refills: 0 | Status: SHIPPED | OUTPATIENT
Start: 2018-05-15 | End: 2018-08-12

## 2018-05-15 NOTE — PROGRESS NOTES
Assessment/Plan:    Acute cystitis with hematuria  Urine dip in office ++ for leukocytes and 250 blood  Pt is prescribed a 3 day course of bactrim BID and pyridium PRN for symptom relief  Pt encouraged to stay hydrated  CrCl 32, no dosage adjustment indicated with bactrim  Pt instructed to call for reevaluation if sx worsen or persist          Diagnoses and all orders for this visit:    Acute cystitis with hematuria  -     sulfamethoxazole-trimethoprim (BACTRIM DS) 800-160 mg per tablet; Take 1 tablet by mouth every 12 (twelve) hours for 3 days  -     phenazopyridine (PYRIDIUM) 100 mg tablet; Take 1 tablet (100 mg total) by mouth 3 (three) times a day as needed for bladder spasms          Subjective:      Patient ID: Brooklynn Espinosa is a 80 y o  female  Pt is a 80 y o  y/o female who is seen today for evaluation of dysuria x 1 week with bladder pressure and burning  She reports that she is waking every 1 5 hours overnight to urinate  Pt denies abnormal odor to her urine, no abnormal discharge  She denies flank pain, fever/chills, and hematuria  Appetite is normal, no N/V/D  Urinary Tract Infection    This is a new problem  The current episode started in the past 7 days  The quality of the pain is described as burning  There has been no fever  There is no history of pyelonephritis  Associated symptoms include frequency and urgency  Pertinent negatives include no chills, discharge, flank pain, hematuria, nausea, sweats or vomiting  She has tried increased fluids for the symptoms  The treatment provided no relief  There is no history of recurrent UTIs  The following portions of the patient's history were reviewed and updated as appropriate: allergies, current medications, past family history, past medical history, past social history, past surgical history and problem list     Review of Systems   Constitutional: Negative for appetite change, chills and fever     Respiratory: Negative for shortness of breath  Cardiovascular: Negative for chest pain and palpitations  Gastrointestinal: Negative for abdominal pain, diarrhea, nausea and vomiting  Genitourinary: Positive for dysuria, frequency and urgency  Negative for decreased urine volume, difficulty urinating, flank pain, hematuria, pelvic pain and vaginal discharge  Musculoskeletal: Negative for back pain  Skin: Negative for rash  Neurological: Negative for dizziness and headaches  Psychiatric/Behavioral: Positive for sleep disturbance  Negative for confusion  Objective:      /72 (BP Location: Left arm, Patient Position: Sitting, Cuff Size: Standard)   Pulse 71   Temp 99 9 °F (37 7 °C)   Resp 12   Ht 5' (1 524 m)   Wt 53 2 kg (117 lb 3 2 oz)   SpO2 97%   BMI 22 89 kg/m²          Physical Exam   Constitutional: She is oriented to person, place, and time  Vital signs are normal  She appears well-developed and well-nourished  She is cooperative  HENT:   Right Ear: External ear normal    Left Ear: External ear normal    Cardiovascular: Normal rate, regular rhythm, normal heart sounds and intact distal pulses  Pulmonary/Chest: Effort normal and breath sounds normal    Abdominal: Soft  Normal appearance and bowel sounds are normal  There is no tenderness  There is no CVA tenderness  Genitourinary:   Genitourinary Comments: Urine dip in office with ++ leukocytes and 250 blood  Musculoskeletal: She exhibits no edema  Neurological: She is alert and oriented to person, place, and time  Skin: Skin is warm, dry and intact  Psychiatric: She has a normal mood and affect  Her speech is normal and behavior is normal  Judgment and thought content normal  Cognition and memory are normal    Vitals reviewed

## 2018-05-15 NOTE — ASSESSMENT & PLAN NOTE
Urine dip in office ++ for leukocytes and 250 blood  Pt is prescribed a 3 day course of bactrim BID and pyridium PRN for symptom relief  Pt encouraged to stay hydrated  CrCl 32, no dosage adjustment indicated with bactrim    Pt instructed to call for reevaluation if sx worsen or persist

## 2018-05-16 ENCOUNTER — APPOINTMENT (OUTPATIENT)
Dept: LAB | Facility: HOSPITAL | Age: 83
End: 2018-05-16
Payer: MEDICARE

## 2018-05-16 LAB
BACTERIA UR QL AUTO: ABNORMAL /HPF
BILIRUB UR QL STRIP: NEGATIVE
CLARITY UR: ABNORMAL
COLOR UR: YELLOW
GLUCOSE UR STRIP-MCNC: NEGATIVE MG/DL
HGB UR QL STRIP.AUTO: ABNORMAL
KETONES UR STRIP-MCNC: NEGATIVE MG/DL
LEUKOCYTE ESTERASE UR QL STRIP: ABNORMAL
NITRITE UR QL STRIP: POSITIVE
NON-SQ EPI CELLS URNS QL MICRO: ABNORMAL /HPF
PH UR STRIP.AUTO: 7 [PH] (ref 4.5–8)
PROT UR STRIP-MCNC: ABNORMAL MG/DL
RBC #/AREA URNS AUTO: ABNORMAL /HPF
SP GR UR STRIP.AUTO: 1.01 (ref 1–1.03)
UROBILINOGEN UR QL STRIP.AUTO: 0.2 E.U./DL
WBC #/AREA URNS AUTO: ABNORMAL /HPF

## 2018-05-16 PROCEDURE — 87077 CULTURE AEROBIC IDENTIFY: CPT | Performed by: NURSE PRACTITIONER

## 2018-05-16 PROCEDURE — 87086 URINE CULTURE/COLONY COUNT: CPT | Performed by: NURSE PRACTITIONER

## 2018-05-16 PROCEDURE — 81001 URINALYSIS AUTO W/SCOPE: CPT | Performed by: NURSE PRACTITIONER

## 2018-05-16 PROCEDURE — 87186 SC STD MICRODIL/AGAR DIL: CPT | Performed by: NURSE PRACTITIONER

## 2018-05-17 ENCOUNTER — TELEPHONE (OUTPATIENT)
Dept: INTERNAL MEDICINE CLINIC | Facility: CLINIC | Age: 83
End: 2018-05-17

## 2018-05-17 NOTE — TELEPHONE ENCOUNTER
Spoke with pt and her son  She is no longer having dysuria symptoms but is still getting up at night to urinate  She is taking pyridium  I suggested that she stop taking the pyridium and complete abx treatment tomorrow morning and see if her symptoms continue to improve    Pt instructed to call for reevaluation if sx worsen or persist

## 2018-05-18 ENCOUNTER — TELEPHONE (OUTPATIENT)
Dept: INTERNAL MEDICINE CLINIC | Facility: CLINIC | Age: 83
End: 2018-05-18

## 2018-05-18 LAB — BACTERIA UR CULT: ABNORMAL

## 2018-05-18 NOTE — TELEPHONE ENCOUNTER
Spoke with pt's son  Pt reporting that she feels better after completing bactrim x 3 days  Pt concerned that if symptoms recur over the weekend he will not have anything to treat her with  He is requesting another 3 day rx for bactrim  I did explain that if her syt'toms recur it would not be advisable to repeat treatment with bactrim and they will need to call over the weekend for instructions

## 2018-05-18 NOTE — TELEPHONE ENCOUNTER
Earnest Bermudez asked for a call back before the end of day regarding update on Pt as well as to discuss antibiotic/urine results [phone]

## 2018-05-29 DIAGNOSIS — M81.0 AGE-RELATED OSTEOPOROSIS WITHOUT CURRENT PATHOLOGICAL FRACTURE: ICD-10-CM

## 2018-05-29 RX ORDER — CALCITONIN SALMON 200 [IU]/.09ML
1 SPRAY, METERED NASAL DAILY
Qty: 3 ACT | Refills: 3 | Status: SHIPPED | OUTPATIENT
Start: 2018-05-29 | End: 2018-08-26 | Stop reason: SDUPTHER

## 2018-07-04 DIAGNOSIS — I10 ESSENTIAL HYPERTENSION: Primary | ICD-10-CM

## 2018-07-05 RX ORDER — METOPROLOL TARTRATE 50 MG/1
TABLET, FILM COATED ORAL
Qty: 60 TABLET | Refills: 5 | Status: SHIPPED | OUTPATIENT
Start: 2018-07-05 | End: 2019-01-12 | Stop reason: SDUPTHER

## 2018-07-26 LAB — TSH SERPL DL<=0.005 MIU/L-ACNC: 4.21 UIU/ML (ref 0.45–4.5)

## 2018-07-30 ENCOUNTER — OFFICE VISIT (OUTPATIENT)
Dept: INTERNAL MEDICINE CLINIC | Facility: CLINIC | Age: 83
End: 2018-07-30
Payer: MEDICARE

## 2018-07-30 VITALS
OXYGEN SATURATION: 99 % | TEMPERATURE: 98.8 F | DIASTOLIC BLOOD PRESSURE: 82 MMHG | BODY MASS INDEX: 23.56 KG/M2 | SYSTOLIC BLOOD PRESSURE: 158 MMHG | WEIGHT: 120 LBS | HEIGHT: 60 IN | HEART RATE: 61 BPM

## 2018-07-30 DIAGNOSIS — R09.89 BILATERAL CAROTID BRUITS: Primary | ICD-10-CM

## 2018-07-30 DIAGNOSIS — E78.01 FAMILIAL HYPERCHOLESTEROLEMIA: ICD-10-CM

## 2018-07-30 DIAGNOSIS — M81.0 AGE-RELATED OSTEOPOROSIS WITHOUT CURRENT PATHOLOGICAL FRACTURE: ICD-10-CM

## 2018-07-30 DIAGNOSIS — I10 ESSENTIAL HYPERTENSION: ICD-10-CM

## 2018-07-30 DIAGNOSIS — I25.83 CORONARY ARTERY DISEASE DUE TO LIPID RICH PLAQUE: ICD-10-CM

## 2018-07-30 DIAGNOSIS — M19.91 PRIMARY OSTEOARTHRITIS, UNSPECIFIED SITE: ICD-10-CM

## 2018-07-30 DIAGNOSIS — N30.01 ACUTE CYSTITIS WITH HEMATURIA: ICD-10-CM

## 2018-07-30 DIAGNOSIS — H61.23 IMPACTED CERUMEN OF BOTH EARS: ICD-10-CM

## 2018-07-30 DIAGNOSIS — H93.293 AUDITORY COMPLAINTS OF BOTH EARS: ICD-10-CM

## 2018-07-30 DIAGNOSIS — I25.10 CORONARY ARTERY DISEASE DUE TO LIPID RICH PLAQUE: ICD-10-CM

## 2018-07-30 DIAGNOSIS — M15.9 GENERALIZED OSTEOARTHRITIS OF MULTIPLE SITES: ICD-10-CM

## 2018-07-30 PROCEDURE — 99214 OFFICE O/P EST MOD 30 MIN: CPT | Performed by: INTERNAL MEDICINE

## 2018-07-30 RX ORDER — LOSARTAN POTASSIUM 25 MG/1
25 TABLET ORAL DAILY
Qty: 30 TABLET | Refills: 1 | Status: SHIPPED | OUTPATIENT
Start: 2018-07-30 | End: 2018-09-23 | Stop reason: SDUPTHER

## 2018-07-30 RX ORDER — DORZOLAMIDE HYDROCHLORIDE AND TIMOLOL MALEATE 20; 5 MG/ML; MG/ML
1 SOLUTION/ DROPS OPHTHALMIC DAILY
Refills: 2 | COMMUNITY
Start: 2018-07-02

## 2018-07-30 NOTE — ASSESSMENT & PLAN NOTE
Auditory complaints  Patient states that she is having sensation occasionally with her hearing or she feels as if it sounds as if order is running  She states that it coincides with her heart beat  She was seen by in ears nose and throat physician and no abnormalities were found on examination  Patient on examination today has a very soft bruit to bilateral carotids home and this may be the etiology of her auditory abnormality  Patient will be going for carotid studies for further evaluate the with  She states the sound is an irritating sound but not disabling  Patient will be seen in the office again after she has the procedures performed

## 2018-07-30 NOTE — ASSESSMENT & PLAN NOTE
Hypertension  As noted patient's blood pressure is moderately elevated today  Patient was placed on Cozaar 25 mg p o  daily in order to have and maintain better control of her blood pressure  We will check a blood pressure approximately 3 weeks when patient returns to the office to discuss the results of the carotid Dopplers

## 2018-07-30 NOTE — PROGRESS NOTES
Assessment/Plan:    Auditory complaints of both ears  Auditory complaints  Patient states that she is having sensation occasionally with her hearing or she feels as if it sounds as if order is running  She states that it coincides with her heart beat  She was seen by in ears nose and throat physician and no abnormalities were found on examination  Patient on examination today has a very soft bruit to bilateral carotids home and this may be the etiology of her auditory abnormality  Patient will be going for carotid studies for further evaluate the with  She states the sound is an irritating sound but not disabling  Patient will be seen in the office again after she has the procedures performed  Generalized osteoarthritis of multiple sites  Generalized osteoarthritis  Patient does have a knee brace on the left and she states that this is helping her with ambulation and better stability to the knee  Patient will continue to follow up with Orthopedics    Hypertension  Hypertension  As noted patient's blood pressure is moderately elevated today  Patient was placed on Cozaar 25 mg p o  daily in order to have and maintain better control of her blood pressure  We will check a blood pressure approximately 3 weeks when patient returns to the office to discuss the results of the carotid Dopplers  Diagnoses and all orders for this visit:    Bilateral carotid bruits  -     VAS carotid complete study; Future  -     CBC and differential; Future  -     Lipid panel; Future  -     TSH, 3rd generation; Future  -     Comprehensive metabolic panel; Future    Essential hypertension  -     CBC and differential; Future  -     Lipid panel; Future  -     TSH, 3rd generation; Future  -     Comprehensive metabolic panel; Future  -     losartan (COZAAR) 25 mg tablet;  Take 1 tablet (25 mg total) by mouth daily    Age-related osteoporosis without current pathological fracture  -     CBC and differential; Future    Coronary artery disease due to lipid rich plaque  -     CBC and differential; Future  -     Lipid panel; Future  -     TSH, 3rd generation; Future  -     Comprehensive metabolic panel; Future    Impacted cerumen of both ears    Generalized osteoarthritis of multiple sites    Primary osteoarthritis, unspecified site    Acute cystitis with hematuria    Familial hypercholesterolemia    Auditory complaints of both ears    Other orders  -     dorzolamide-timolol (COSOPT) 22 3-6 8 MG/ML ophthalmic solution; INSTILL 1 DROP INTO LEFT EYE TWICE A DAY APPROXIMATELY 12 HOURS APART          Subjective:      Patient ID: Yonatan Cates is a 80 y o  female  Patient is a 77-year-old female with a history of hypertension, hyperlipidemia, diffuse osteoarthritis, osteoporosis,  Patient is here today for routine follow-up  Apparently patient is been having some difficulty with her hearing and occasionally feels as if she hears water running in her ears  She was checked by in ears nose and throat physician and did have hearing testing showing no significant changes either on exam or with testing  Patient's otherwise states she is feeling around the same with no new complaints or problems  She is wearing a brace to her knee which she feels has been helpful        The following portions of the patient's history were reviewed and updated as appropriate:   She  has a past medical history of CAD (coronary artery disease); Hypertension; Osteoarthritis; Osteoporosis; and Vitamin D deficiency  She   Patient Active Problem List    Diagnosis Date Noted    Auditory complaints of both ears 07/30/2018    Acute cystitis with hematuria 05/15/2018    Impacted cerumen of both ears 04/10/2018    Generalized osteoarthritis of multiple sites 07/13/2015    Hyperlipidemia 08/23/2012    Hypertension 08/20/2012    Osteoarthrosis 08/20/2012    Osteoporosis 08/20/2012     She  has a past surgical history that includes Appendectomy;  Hysterectomy; and Rotator cuff repair  Her family history includes Diabetes type II in her father; Heart disease in her brother  She  reports that she has quit smoking  She has never used smokeless tobacco  She reports that she does not drink alcohol or use drugs  Current Outpatient Prescriptions   Medication Sig Dispense Refill    aspirin 81 MG tablet Take 1 tablet by mouth daily      calcitonin, salmon, (MIACALCIN) 200 units/act nasal spray 1 spray into each nostril daily 3 Act 3    Cyanocobalamin (VITAMIN B12 PO) Take 1 tablet by mouth daily      dorzolamide-timolol (COSOPT) 22 3-6 8 MG/ML ophthalmic solution INSTILL 1 DROP INTO LEFT EYE TWICE A DAY APPROXIMATELY 12 HOURS APART  2    fenofibrate (TRIGLIDE) 160 MG tablet TAKE 1 TABLET DAILY  30 tablet 5    Glucosamine HCl (GLUCOSAMINE PO) Take 1 capsule by mouth daily      latanoprost (XALATAN) 0 005 % ophthalmic solution INSTILL 1 DROP IN THE AFFECTED EYE(S) IN THE EVENING  1    metoprolol tartrate (LOPRESSOR) 50 mg tablet TAKE 1 TABLET TWICE DAILY 60 tablet 5    Multiple Vitamins-Minerals (DAILY MULTI PO) Take 1 tablet by mouth daily      phenazopyridine (PYRIDIUM) 100 mg tablet Take 1 tablet (100 mg total) by mouth 3 (three) times a day as needed for bladder spasms 10 tablet 0    Pyridoxine HCl (VITAMIN B6 PO) Take 1 tablet by mouth daily      losartan (COZAAR) 25 mg tablet Take 1 tablet (25 mg total) by mouth daily 30 tablet 1     No current facility-administered medications for this visit  Current Outpatient Prescriptions on File Prior to Visit   Medication Sig    aspirin 81 MG tablet Take 1 tablet by mouth daily    calcitonin, salmon, (MIACALCIN) 200 units/act nasal spray 1 spray into each nostril daily    Cyanocobalamin (VITAMIN B12 PO) Take 1 tablet by mouth daily    fenofibrate (TRIGLIDE) 160 MG tablet TAKE 1 TABLET DAILY      Glucosamine HCl (GLUCOSAMINE PO) Take 1 capsule by mouth daily    latanoprost (XALATAN) 0 005 % ophthalmic solution INSTILL 1 DROP IN THE AFFECTED EYE(S) IN THE EVENING    metoprolol tartrate (LOPRESSOR) 50 mg tablet TAKE 1 TABLET TWICE DAILY    Multiple Vitamins-Minerals (DAILY MULTI PO) Take 1 tablet by mouth daily    phenazopyridine (PYRIDIUM) 100 mg tablet Take 1 tablet (100 mg total) by mouth 3 (three) times a day as needed for bladder spasms    Pyridoxine HCl (VITAMIN B6 PO) Take 1 tablet by mouth daily    [DISCONTINUED] latanoprost (XALATAN) 0 005 % ophthalmic solution Apply 1 drop to eye daily     No current facility-administered medications on file prior to visit  She has No Known Allergies       Review of Systems   Constitutional: Positive for activity change (Patient states that she is more active now with a brace to the knee and feels greater stability)  Negative for appetite change, chills, diaphoresis, fatigue, fever and unexpected weight change  HENT: Negative for congestion, dental problem, drooling, ear discharge, ear pain, facial swelling, hearing loss, mouth sores, nosebleeds, postnasal drip, rhinorrhea, sinus pain, sinus pressure, sneezing, sore throat, tinnitus and trouble swallowing  Some auditory changes as noted   Eyes: Negative  Respiratory: Negative  Cardiovascular: Positive for leg swelling ( some chronic swelling to lower extremities especially at night without change)  Negative for chest pain and palpitations  Gastrointestinal: Negative  Endocrine: Negative  Genitourinary: Negative  Musculoskeletal: Positive for arthralgias and gait problem  Negative for back pain, joint swelling, myalgias and neck pain  Skin: Negative  Allergic/Immunologic: Negative  Hematological: Negative  Psychiatric/Behavioral: Negative  Objective:      /82   Pulse 61   Temp 98 8 °F (37 1 °C)   Ht 5' (1 524 m)   Wt 54 4 kg (120 lb)   SpO2 99%   BMI 23 44 kg/m²          Physical Exam   Constitutional: She is oriented to person, place, and time   She appears well-developed and well-nourished  No distress  Pleasant, cheerful 63-year-old female who is awake alert, walking with a walker for stability   HENT:   Head: Normocephalic and atraumatic  Right Ear: External ear normal    Left Ear: External ear normal    Nose: Nose normal    Mouth/Throat: Oropharynx is clear and moist  No oropharyngeal exudate  Eyes: Conjunctivae and EOM are normal  Pupils are equal, round, and reactive to light  Right eye exhibits no discharge  Left eye exhibits no discharge  No scleral icterus  Neck: Normal range of motion  Neck supple  No JVD present  No tracheal deviation present  No thyromegaly present  A very soft carotid bruits were heard bilaterally on auscultation   Cardiovascular: Normal rate, regular rhythm, normal heart sounds and intact distal pulses  Exam reveals no gallop and no friction rub  No murmur heard  Pulmonary/Chest: Effort normal and breath sounds normal  No stridor  No respiratory distress  She has no wheezes  She has no rales  She exhibits no tenderness  Abdominal: Soft  Bowel sounds are normal  She exhibits no distension and no mass  There is no tenderness  There is no rebound and no guarding  Musculoskeletal: She exhibits tenderness and deformity  She exhibits no edema  Diffuse degenerative changes to the hands and digits, bilaterally knees although patient has a brace on the left knee, no acute lesions on exam today  Trace edema bilateral lower extremities   Lymphadenopathy:     She has no cervical adenopathy  Neurological: She is alert and oriented to person, place, and time  She displays normal reflexes  No cranial nerve deficit  She exhibits normal muscle tone  Coordination normal    Skin: Skin is warm and dry  No rash noted  She is not diaphoretic  No erythema  No pallor  Psychiatric: She has a normal mood and affect  Her behavior is normal  Thought content normal    Nursing note and vitals reviewed

## 2018-07-30 NOTE — ASSESSMENT & PLAN NOTE
Generalized osteoarthritis  Patient does have a knee brace on the left and she states that this is helping her with ambulation and better stability to the knee    Patient will continue to follow up with Orthopedics

## 2018-08-02 ENCOUNTER — TELEPHONE (OUTPATIENT)
Dept: INTERNAL MEDICINE CLINIC | Facility: CLINIC | Age: 83
End: 2018-08-02

## 2018-08-02 ENCOUNTER — HOSPITAL ENCOUNTER (OUTPATIENT)
Dept: NON INVASIVE DIAGNOSTICS | Facility: CLINIC | Age: 83
Discharge: HOME | End: 2018-08-02
Payer: MEDICARE

## 2018-08-02 DIAGNOSIS — R09.89 BILATERAL CAROTID BRUITS: ICD-10-CM

## 2018-08-02 PROCEDURE — 93880 EXTRACRANIAL BILAT STUDY: CPT

## 2018-08-02 NOTE — PROGRESS NOTES
Patient's son called stating that when his mother was having the carotid Doppler readings her blood pressure was slightly elevated  I did tell the son to bring the patient into the office tomorrow or early next week for to check her blood pressure    We will be looking for the results of the carotid Dopplers

## 2018-08-06 PROCEDURE — 93880 EXTRACRANIAL BILAT STUDY: CPT | Performed by: SURGERY

## 2018-08-12 ENCOUNTER — APPOINTMENT (EMERGENCY)
Dept: RADIOLOGY | Facility: HOSPITAL | Age: 83
DRG: 470 | End: 2018-08-12
Payer: MEDICARE

## 2018-08-12 ENCOUNTER — HOSPITAL ENCOUNTER (INPATIENT)
Facility: HOSPITAL | Age: 83
LOS: 3 days | Discharge: NURSING, REHAB OR LONG-TERM CARE | DRG: 470 | End: 2018-08-15
Attending: EMERGENCY MEDICINE | Admitting: ORTHOPAEDIC SURGERY
Payer: MEDICARE

## 2018-08-12 DIAGNOSIS — Z96.649 S/P HIP HEMIARTHROPLASTY: ICD-10-CM

## 2018-08-12 DIAGNOSIS — S72.002A CLOSED FRACTURE OF NECK OF LEFT FEMUR, INITIAL ENCOUNTER (HCC): Primary | ICD-10-CM

## 2018-08-12 DIAGNOSIS — Z01.818 PRE-OPERATIVE CLEARANCE: ICD-10-CM

## 2018-08-12 PROBLEM — I16.0 HYPERTENSIVE URGENCY: Status: ACTIVE | Noted: 2018-08-12

## 2018-08-12 LAB
ABO GROUP BLD: NORMAL
ANION GAP SERPL CALCULATED.3IONS-SCNC: 8 MMOL/L (ref 4–13)
APTT PPP: 27 SECONDS (ref 24–36)
ATRIAL RATE: 97 BPM
BASOPHILS # BLD AUTO: 0.05 THOUSANDS/ΜL (ref 0–0.1)
BASOPHILS NFR BLD AUTO: 0 % (ref 0–1)
BLD GP AB SCN SERPL QL: NEGATIVE
BUN SERPL-MCNC: 14 MG/DL (ref 5–25)
CALCIUM SERPL-MCNC: 9.3 MG/DL (ref 8.3–10.1)
CHLORIDE SERPL-SCNC: 105 MMOL/L (ref 100–108)
CO2 SERPL-SCNC: 23 MMOL/L (ref 21–32)
CREAT SERPL-MCNC: 0.89 MG/DL (ref 0.6–1.3)
EOSINOPHIL # BLD AUTO: 0.05 THOUSAND/ΜL (ref 0–0.61)
EOSINOPHIL NFR BLD AUTO: 0 % (ref 0–6)
ERYTHROCYTE [DISTWIDTH] IN BLOOD BY AUTOMATED COUNT: 13.4 % (ref 11.6–15.1)
GFR SERPL CREATININE-BSD FRML MDRD: 56 ML/MIN/1.73SQ M
GLUCOSE SERPL-MCNC: 104 MG/DL (ref 65–140)
HCT VFR BLD AUTO: 38.9 % (ref 34.8–46.1)
HGB BLD-MCNC: 12.8 G/DL (ref 11.5–15.4)
IMM GRANULOCYTES # BLD AUTO: 0.07 THOUSAND/UL (ref 0–0.2)
IMM GRANULOCYTES NFR BLD AUTO: 1 % (ref 0–2)
INR PPP: 1.05 (ref 0.86–1.17)
LYMPHOCYTES # BLD AUTO: 1.97 THOUSANDS/ΜL (ref 0.6–4.47)
LYMPHOCYTES NFR BLD AUTO: 17 % (ref 14–44)
MCH RBC QN AUTO: 30.3 PG (ref 26.8–34.3)
MCHC RBC AUTO-ENTMCNC: 32.9 G/DL (ref 31.4–37.4)
MCV RBC AUTO: 92 FL (ref 82–98)
MONOCYTES # BLD AUTO: 0.97 THOUSAND/ΜL (ref 0.17–1.22)
MONOCYTES NFR BLD AUTO: 9 % (ref 4–12)
NEUTROPHILS # BLD AUTO: 8.26 THOUSANDS/ΜL (ref 1.85–7.62)
NEUTS SEG NFR BLD AUTO: 73 % (ref 43–75)
NRBC BLD AUTO-RTO: 0 /100 WBCS
P AXIS: 59 DEGREES
PLATELET # BLD AUTO: 234 THOUSANDS/UL (ref 149–390)
PMV BLD AUTO: 9.9 FL (ref 8.9–12.7)
POTASSIUM SERPL-SCNC: 3.7 MMOL/L (ref 3.5–5.3)
PR INTERVAL: 176 MS
PROTHROMBIN TIME: 13.8 SECONDS (ref 11.8–14.2)
QRS AXIS: 42 DEGREES
QRSD INTERVAL: 86 MS
QT INTERVAL: 332 MS
QTC INTERVAL: 421 MS
RBC # BLD AUTO: 4.23 MILLION/UL (ref 3.81–5.12)
RH BLD: POSITIVE
SODIUM SERPL-SCNC: 136 MMOL/L (ref 136–145)
SPECIMEN EXPIRATION DATE: NORMAL
T WAVE AXIS: 50 DEGREES
VENTRICULAR RATE: 97 BPM
WBC # BLD AUTO: 11.37 THOUSAND/UL (ref 4.31–10.16)

## 2018-08-12 PROCEDURE — 80048 BASIC METABOLIC PNL TOTAL CA: CPT | Performed by: EMERGENCY MEDICINE

## 2018-08-12 PROCEDURE — 73552 X-RAY EXAM OF FEMUR 2/>: CPT

## 2018-08-12 PROCEDURE — 99285 EMERGENCY DEPT VISIT HI MDM: CPT

## 2018-08-12 PROCEDURE — 70450 CT HEAD/BRAIN W/O DYE: CPT

## 2018-08-12 PROCEDURE — 73502 X-RAY EXAM HIP UNI 2-3 VIEWS: CPT

## 2018-08-12 PROCEDURE — 93005 ELECTROCARDIOGRAM TRACING: CPT

## 2018-08-12 PROCEDURE — 86850 RBC ANTIBODY SCREEN: CPT | Performed by: EMERGENCY MEDICINE

## 2018-08-12 PROCEDURE — 85730 THROMBOPLASTIN TIME PARTIAL: CPT | Performed by: EMERGENCY MEDICINE

## 2018-08-12 PROCEDURE — 86900 BLOOD TYPING SEROLOGIC ABO: CPT | Performed by: EMERGENCY MEDICINE

## 2018-08-12 PROCEDURE — 99232 SBSQ HOSP IP/OBS MODERATE 35: CPT | Performed by: HOSPITALIST

## 2018-08-12 PROCEDURE — 86901 BLOOD TYPING SEROLOGIC RH(D): CPT | Performed by: EMERGENCY MEDICINE

## 2018-08-12 PROCEDURE — 73700 CT LOWER EXTREMITY W/O DYE: CPT

## 2018-08-12 PROCEDURE — 71045 X-RAY EXAM CHEST 1 VIEW: CPT

## 2018-08-12 PROCEDURE — 93010 ELECTROCARDIOGRAM REPORT: CPT | Performed by: INTERNAL MEDICINE

## 2018-08-12 PROCEDURE — 36415 COLL VENOUS BLD VENIPUNCTURE: CPT | Performed by: EMERGENCY MEDICINE

## 2018-08-12 PROCEDURE — 72125 CT NECK SPINE W/O DYE: CPT

## 2018-08-12 PROCEDURE — 85610 PROTHROMBIN TIME: CPT | Performed by: EMERGENCY MEDICINE

## 2018-08-12 PROCEDURE — 85025 COMPLETE CBC W/AUTO DIFF WBC: CPT | Performed by: EMERGENCY MEDICINE

## 2018-08-12 RX ORDER — CALCIUM CARBONATE 200(500)MG
1000 TABLET,CHEWABLE ORAL DAILY PRN
Status: DISCONTINUED | OUTPATIENT
Start: 2018-08-12 | End: 2018-08-15 | Stop reason: HOSPADM

## 2018-08-12 RX ORDER — GABAPENTIN 100 MG/1
100 CAPSULE ORAL 3 TIMES DAILY
Status: DISCONTINUED | OUTPATIENT
Start: 2018-08-12 | End: 2018-08-15 | Stop reason: HOSPADM

## 2018-08-12 RX ORDER — METHOCARBAMOL 500 MG/1
500 TABLET, FILM COATED ORAL EVERY 6 HOURS PRN
Status: DISCONTINUED | OUTPATIENT
Start: 2018-08-12 | End: 2018-08-15 | Stop reason: HOSPADM

## 2018-08-12 RX ORDER — HYDRALAZINE HYDROCHLORIDE 20 MG/ML
5 INJECTION INTRAMUSCULAR; INTRAVENOUS EVERY 6 HOURS PRN
Status: DISCONTINUED | OUTPATIENT
Start: 2018-08-12 | End: 2018-08-15 | Stop reason: HOSPADM

## 2018-08-12 RX ORDER — FENOFIBRATE 145 MG/1
145 TABLET, COATED ORAL DAILY
Status: DISCONTINUED | OUTPATIENT
Start: 2018-08-12 | End: 2018-08-15 | Stop reason: HOSPADM

## 2018-08-12 RX ORDER — ONDANSETRON 2 MG/ML
4 INJECTION INTRAMUSCULAR; INTRAVENOUS EVERY 6 HOURS PRN
Status: DISCONTINUED | OUTPATIENT
Start: 2018-08-12 | End: 2018-08-15 | Stop reason: HOSPADM

## 2018-08-12 RX ORDER — LOSARTAN POTASSIUM 25 MG/1
25 TABLET ORAL DAILY
Status: DISCONTINUED | OUTPATIENT
Start: 2018-08-12 | End: 2018-08-15 | Stop reason: HOSPADM

## 2018-08-12 RX ORDER — METOPROLOL TARTRATE 50 MG/1
50 TABLET, FILM COATED ORAL 2 TIMES DAILY
Status: DISCONTINUED | OUTPATIENT
Start: 2018-08-12 | End: 2018-08-15 | Stop reason: HOSPADM

## 2018-08-12 RX ORDER — ACETAMINOPHEN 325 MG/1
650 TABLET ORAL EVERY 6 HOURS PRN
Status: DISCONTINUED | OUTPATIENT
Start: 2018-08-12 | End: 2018-08-15 | Stop reason: HOSPADM

## 2018-08-12 RX ORDER — OXYCODONE HYDROCHLORIDE 5 MG/1
5 TABLET ORAL EVERY 4 HOURS PRN
Status: DISCONTINUED | OUTPATIENT
Start: 2018-08-12 | End: 2018-08-15 | Stop reason: HOSPADM

## 2018-08-12 RX ORDER — SODIUM CHLORIDE 9 MG/ML
50 INJECTION, SOLUTION INTRAVENOUS CONTINUOUS
Status: DISCONTINUED | OUTPATIENT
Start: 2018-08-13 | End: 2018-08-15 | Stop reason: HOSPADM

## 2018-08-12 RX ORDER — OXYCODONE HYDROCHLORIDE 5 MG/1
2.5 TABLET ORAL EVERY 4 HOURS PRN
Status: DISCONTINUED | OUTPATIENT
Start: 2018-08-12 | End: 2018-08-15 | Stop reason: HOSPADM

## 2018-08-12 RX ORDER — SENNOSIDES 8.6 MG
1 TABLET ORAL
Status: DISCONTINUED | OUTPATIENT
Start: 2018-08-12 | End: 2018-08-15 | Stop reason: HOSPADM

## 2018-08-12 RX ADMIN — HYDRALAZINE HYDROCHLORIDE 5 MG: 20 INJECTION INTRAMUSCULAR; INTRAVENOUS at 18:35

## 2018-08-12 RX ADMIN — SODIUM CHLORIDE 50 ML/HR: 0.9 INJECTION, SOLUTION INTRAVENOUS at 23:02

## 2018-08-12 RX ADMIN — GABAPENTIN 100 MG: 100 CAPSULE ORAL at 16:13

## 2018-08-12 RX ADMIN — FENOFIBRATE 145 MG: 145 TABLET ORAL at 18:36

## 2018-08-12 RX ADMIN — OXYCODONE HYDROCHLORIDE 5 MG: 5 TABLET ORAL at 16:14

## 2018-08-12 RX ADMIN — GABAPENTIN 100 MG: 100 CAPSULE ORAL at 23:02

## 2018-08-12 RX ADMIN — CEFAZOLIN SODIUM 2000 MG: 2 SOLUTION INTRAVENOUS at 18:36

## 2018-08-12 RX ADMIN — SODIUM CHLORIDE 50 ML/HR: 0.9 INJECTION, SOLUTION INTRAVENOUS at 13:13

## 2018-08-12 RX ADMIN — METOPROLOL TARTRATE 50 MG: 50 TABLET ORAL at 18:36

## 2018-08-12 RX ADMIN — LOSARTAN POTASSIUM 25 MG: 25 TABLET, FILM COATED ORAL at 16:15

## 2018-08-12 NOTE — ED NOTES
Ortho indicated that the pt will possibly going to the OR yet not sure on time yet        Renaldo Wang, EDU  08/12/18 [id number]

## 2018-08-12 NOTE — CONSULTS
Consult- Inder Landers 9/11/1925, 80 y o  female MRN: [id number]    Unit/Bed#: CW3 340-01 Encounter: [id number]    Primary Care Provider: Alise Patiño DO   Date and time admitted to hospital: 8/12/2018  8:12 AM      Inpatient consult to Internal Medicine  Consult performed by: Mata Crowell ordered by: Paulette Bustos          Preoperative clearance   Assessment & Plan    · Patient without any active cardiac symptoms or CP or SOB or palpitations  Pt without baseline CP or dyspena on exertion  No known cardiac history of MI/CAD or CHF, no h/o CVA, no h/o DM or CKD  previous cardio note mentioned about PVCs in holter when she presented with syncope  RCRI is low  She is unable to ambulate close to 4 METS  With her age, h/o HTN she is low to intermediate risk for cardiac events for intermediate risk surgery  · Ok to proceed for OR  · Continue perioperative metoprolol  · Echo in 2015 showed LVEF 55% with Grade 1 DD  · EKG today NSR  CXR appears stable, report pending  · Start home aspirin when ok with ortho        Hypertensive urgency   Assessment & Plan    · 2/2 pain & missing home meds today  · Restart home meds - received losartan 25 mg already, give metoprolol 50 mg BID now   Placed prn hydralazine  · control pain - currently stable  · Losartan was recently added as OP 1 5 week ago  · If despite home regimen BP high - consider increasing dosages        * Closed fracture of neck of left femur (HCC)   Assessment & Plan    · For left hip total arthroplasty tomorrow per notes  · NPO after MN            [facility]    Patient Information: Inder Landers 80 y o  female MRN: [id number]  Unit/Bed#: CW3 340-01 Encounter: [id number]  PCP: Alise Patiño DO  Date of Admission:  8/12/2018  Date of Consultation: 08/12/18  Requesting Physician: Jaziel Mcpherson MD    Reason For Consultation:   Medical Mx & pre-op clearance    VTE Prophylaxis: RX contraindicated due to: OR, per surgical team  / sequential compression device     Recommendations for Discharge:  · Pending course    Counseling / Coordination of Care Time: 45 minutes  Greater than 50% of total time spent on patient counseling and coordination of care  Collaboration of Care: Were Recommendations Directly Discussed with Primary Treatment Team? - No     History of Present Illness:    Akbar Aburto is a 80 y o  female who is originally admitted to the orthopedic service on 8/12/2018 due to fall & left hip femoral neck fracture  We are consulted for medical management & preop clearance  Patient lives alone at home with son coming daily helps with bills shopping etc, daughter comes 3 times a week to help shower etc  Pt normally uses walker to get around in house  Today am when she was trying to go around her bed, she had to go in narrow space between bed & radiator wihtout walker  When she turned to get the phone from bed she just collapsed down, no LOC, no hitting head  She was unable ot get up after  No dizziness or CP or palpitations preceding fall  H/o last fall 3 yrs ago  At baseline no CP or SOB on exertion, no dizziness or palpitations  No known cardiac history, never had stress test before  No CVA  Currently no pain until she moves left leg    Review of Systems:    Review of Systems   Constitutional: Negative for activity change, appetite change, chills and fever  HENT: Negative for congestion and rhinorrhea  Respiratory: Negative for cough and shortness of breath  Cardiovascular: Negative for chest pain, palpitations and leg swelling  Gastrointestinal: Negative for abdominal pain, diarrhea, nausea and vomiting  Neurological: Negative for seizures, syncope and light-headedness  All other systems reviewed and are negative        Past Medical and Surgical History:     Past Medical History:   Diagnosis Date    CAD (coronary artery disease)     Hypertension     Osteoarthritis     Osteoporosis     Vitamin D deficiency        Past Surgical History:   Procedure Laterality Date    APPENDECTOMY      HYSTERECTOMY      ROTATOR CUFF REPAIR         Meds/Allergies:    all medications and allergies reviewed    Allergies: No Known Allergies    Social History:     Marital Status:     Substance Use History:   History   Alcohol Use No     History   Smoking Status    Former Smoker   Smokeless Tobacco    Never Used     History   Drug Use No       Family History:    non-contributory    Physical Exam:     Vitals:   Blood Pressure: (!) 180/72 (08/12/18 1835)  Pulse: 93 (08/12/18 1620)  Temperature: 97 9 °F (36 6 °C) (08/12/18 1620)  Temp Source: Oral (08/12/18 1620)  Respirations: 16 (08/12/18 1620)  Weight - Scale: 54 4 kg (119 lb 14 9 oz) (08/12/18 0814)  SpO2: 95 % (08/12/18 1620)    Physical Exam   Constitutional: She is oriented to person, place, and time  She appears well-developed and well-nourished  HENT:   Head: Normocephalic  Eyes: Conjunctivae are normal  No scleral icterus  Cardiovascular: Normal rate, regular rhythm and normal heart sounds  Exam reveals no gallop and no friction rub  No murmur heard  Pulmonary/Chest: Effort normal and breath sounds normal  No respiratory distress  She has no wheezes  Abdominal: Soft  She exhibits no distension  There is no tenderness  Musculoskeletal: She exhibits no edema  Neurological: She is alert and oriented to person, place, and time  Vitals reviewed  Additional Data:     Lab Results: I have personally reviewed pertinent reports          Results from last 7 days  Lab Units 08/12/18  1137   WBC Thousand/uL 11 37*   HEMOGLOBIN g/dL 12 8   HEMATOCRIT % 38 9   PLATELETS Thousands/uL 234   NEUTROS PCT % 73   LYMPHS PCT % 17   MONOS PCT % 9   EOS PCT % 0       Results from last 7 days  Lab Units 08/12/18  1137   SODIUM mmol/L 136   POTASSIUM mmol/L 3 7   CHLORIDE mmol/L 105   CO2 mmol/L 23   BUN mg/dL 14   CREATININE mg/dL 0 89 CALCIUM mg/dL 9 3   GLUCOSE RANDOM mg/dL 104       Results from last 7 days  Lab Units 08/12/18  1137   INR  1 05       Imaging: I have personally reviewed pertinent reports  Xr Hip/pelv 2-3 Vws Left If Performed    Result Date: 8/12/2018  Narrative: LEFT HIP INDICATION:   Fall  COMPARISON:  None VIEWS:  XR HIP/PELV 2-3 VWS LEFT  W PELVIS IF PERFORMED FINDINGS: There is a nondisplaced femoral neck fracture  Femoral head remains normally located in the glenoid fossa  Degenerative changes within the left hip are noted  No lytic or blastic osseous lesions  Soft tissues are unremarkable  Degenerative changes are seen within the lower lumbar spine  There is loss of superior endplate height of the L3 vertebral body  Impression: Nondisplaced femoral neck fracture  Compression deformity superior endplate of L3 age of which is indeterminate  The study was marked in [facility] for immediate notification  Workstation performed: [id number]     Xr Femur 2 Views Left    Result Date: 8/12/2018  Narrative: LEFT FEMUR INDICATION:   Fall  COMPARISON:  None VIEWS:  XR FEMUR 2 VW LEFT FINDINGS: Femoral neck fracture is noted  Femoral head remains normally located in the glenoid fossa  No additional fractures are seen in the pelvis or femur  Advanced degenerative changes are seen within the lateral compartment of the knee  No lytic or blastic lesions are seen  Soft tissues are unremarkable  Impression: Nondisplaced femoral neck fracture  Workstation performed: [id number]     Ct Head Without Contrast    Result Date: 8/12/2018  Narrative: CT BRAIN - WITHOUT CONTRAST INDICATION:   Fall  COMPARISON:  CT 9/3/2015 TECHNIQUE:  CT examination of the brain was performed  In addition to axial images, coronal 2D reformatted images were created and submitted for interpretation  Radiation dose length product (DLP) for this visit:  944 67 mGy-cm     This examination, like all CT scans performed in the [facility], was performed utilizing techniques to minimize radiation dose exposure, including the use of iterative  reconstruction and automated exposure control  IMAGE QUALITY:  Diagnostic  FINDINGS: PARENCHYMA:  No intracranial mass, mass effect or midline shift  No CT signs of acute infarction  No acute parenchymal hemorrhage  Age-appropriate volume loss mild degree of chronic small vessel disease, vascular calcifications  VENTRICLES AND EXTRA-AXIAL SPACES:  Normal for the patient's age  VISUALIZED ORBITS AND PARANASAL SINUSES:  Unremarkable  CALVARIUM AND EXTRACRANIAL SOFT TISSUES:  No acute fracture     Impression: No acute intracranial abnormality  Workstation performed: [id number]     Ct Cervical Spine Without Contrast    Result Date: 8/12/2018  Narrative: CT CERVICAL SPINE - WITHOUT CONTRAST INDICATION:   fall with distracting injury  COMPARISON:  CT 9/3/2015 TECHNIQUE:  CT examination of the cervical spine was performed without intravenous contrast   Contiguous axial images were obtained  Sagittal and coronal reconstructions were performed  Radiation dose length product (DLP) for this visit:  328 1 mGy-cm   This examination, like all CT scans performed in the [facility], was performed utilizing techniques to minimize radiation dose exposure, including the use of iterative reconstruction and automated exposure control  IMAGE QUALITY:  Diagnostic  FINDINGS: ALIGNMENT:  Straightening of normal cervical lordosis, minor right convex cervical scoliosis  VERTEBRAL BODIES:  No fracture  DEGENERATIVE CHANGES:  Multilevel spondylosis and osteoarthritis, marginal osteophytes chronic endplate changes vascular calcifications  PREVERTEBRAL AND PARASPINAL SOFT TISSUES:  Vascular calcifications  THORACIC INLET:  Apical pleural thickening     Impression: No cervical spine fracture or traumatic malalignment    Workstation performed: [id number]     Ct Hip Left Without Contrast    Result Date: 8/12/2018  Narrative: CT left hip with IV contrast INDICATION: Fracture, hip COMPARISON: None plain film dated 8/12/2018  TECHNIQUE: CT examination of the left hip was performed  This examination, like all CT scans performed in the [facility], was performed utilizing techniques to minimize radiation dose exposure, including the use of iterative reconstruction and automated exposure control software  Sagittal and coronal two dimensional reconstructed images were also submitted for interpretation  IV Contrast: None Rad dose  261 3 mGy-cm FINDINGS: OSSEOUS STRUCTURES:  Comminuted impacted left femoral neck fracture, subcapital in distribution, with associated soft tissue hematoma  There is superior and anterior angulation at the fracture site and superior displacement of distal fracture fragments  VISUALIZED MUSCULATURE:  Unremarkable  SOFT TISSUES:  Unremarkable  OTHER PERTINENT FINDINGS:  None  Impression: Comminuted impacted left subcapital hip fracture with angulation and associated hematoma formation as above  No additional fracture  Workstation performed: [id number]     Vas Carotid Complete Study    Result Date: 8/6/2018  Narrative:  [facility] REPORT CLINICAL: Indications:  Bruit [R09 89]  Pt c/o thumping in Lt ear when she is resting  No prior carotid duplex preformed  Risk Factors The patient has history of HTN, hyperlipidemia and previous smoking (quit >10yrs ago)  She has no history of Obesity or Diabetes  Clinical Right Pressure:  192/72 mm Hg, Left Pressure:  180/70 mm Hg  FINDINGS:  Right        Impression  PSV  EDV (cm/s)  Direction of Flow  Dist  ICA                100          21                     Mid  ICA                  96          19                     Prox   ICA    1 - 49%      66          13                     Dist CCA                  82          13                     Mid CCA                   91          17                     Prox CCA                  82          15 Ext Carotid               83           0                     Prox Vert                 52          10  Antegrade          Subclavian               104           0                      Left         Impression  PSV  EDV (cm/s)  Direction of Flow  Dist  ICA                117          21                     Mid  ICA                 105          21                     Prox  ICA    1 - 49%      65          12                     Dist CCA                  91          14                     Mid CCA                   89          14                     Prox CCA                  83          16                     Ext Carotid              126           0                     Prox Vert                 53           8  Antegrade          Subclavian               146           0                        CONCLUSION:  Impression RIGHT: There is <50% stenosis noted in the internal carotid artery  Plaque is heterogenous and irregular  Vertebral artery flow is antegrade  There is no significant subclavian artery disease  LEFT: There is <50% stenosis noted in the internal carotid artery  Plaque is heterogenous and irregular  Vertebral artery flow is antegrade  There is no significant subclavian artery disease  Internal carotid artery stenosis determination by consensus criteria from: Cal Ariza et al  Carotid Artery Stenosis: Gray-Scale and Doppler US Diagnosis - Society of Radiologists in [address] [place] [address], Radiology 2003; [phone]  SIGNATURE: Electronically Signed by: Flaquita Fenton on 2018-08-06 05:33:08 PM      EKG, Pathology, and Other Studies Reviewed on Admission:   · EKG: NSR    ** Please Note: This note has been constructed using a voice recognition system   **

## 2018-08-12 NOTE — ED ATTENDING ATTESTATION
Jaleel Sterling DO, saw and evaluated the patient  I have discussed the patient with the resident/non-physician practitioner and agree with the resident's/non-physician practitioner's findings, Plan of Care, and MDM as documented in the resident's/non-physician practitioner's note, except where noted  All available labs and Radiology studies were reviewed  At this point I agree with the current assessment done in the Emergency Department  I have conducted an independent evaluation of this patient a history and physical is as follows:    80 yof with mechanical fall this morning  No preceding sxs  She reports left hip and lower extremity pain and could not ambulate at the time  Past Medical History:   Diagnosis Date    CAD (coronary artery disease)     Hypertension     Osteoarthritis     Osteoporosis     Vitamin D deficiency      Past Surgical History:   Procedure Laterality Date    APPENDECTOMY      HYSTERECTOMY      ROTATOR CUFF REPAIR         BP (!) 184/77 (BP Location: Left arm)   Pulse 84   Temp 98 2 °F (36 8 °C) (Oral)   Resp 18   Wt 54 4 kg (119 lb 14 9 oz)   SpO2 99%   BMI 23 42 kg/m²   Head atraumatic  C-spine NT  CTA  RRR  Ab NT  Pelvis NT, stable  Left midshaft femur and femoral head tender  Pulses intact    Ct head neg  XR + for intertroch fx   Added C-spine CT re distracting injury (inability to ambulate)    Dx  Left intertrochanteric fx      Critical Care Time  CritCare Time    Procedures

## 2018-08-12 NOTE — ED PROVIDER NOTES
History  Chief Complaint   Patient presents with    Fall     patient fell this morning  Patient slipped while attempting to get to her phone  Patient complaining of left hip pain  60-year-old woman presents for evaluation after a fall  Patient reports a mechanical fall earlier this morning while getting out of bed when she slipped on the floor  She fell onto her left side bracing herself with her left arm on the radiator  No preceding symptoms  No head trauma or LOC  She takes a baby aspirin daily without other blood thinners  Patient was not down for a prolonged period of time  On arrival, she is afebrile and hypertensive with otherwise normal vital signs  She is currently complaining only of left lower extremity pain  On exam, patient has a nonfocal neurologic exam with no external signs of trauma or midline cervical spine tenderness  She has tenderness to palpation of the left femoral head and mid shaft femur  She is neurovascularly intact without skin changes  Will perform trauma evaluation with CT head and x-rays of left hip/pelvis  Prior to Admission Medications   Prescriptions Last Dose Informant Patient Reported? Taking?    Cyanocobalamin (VITAMIN B12 PO) 2018 at Unknown time  Yes Yes   Sig: Take 1 tablet by mouth daily   Glucosamine HCl (GLUCOSAMINE PO) 2018 at Unknown time  Yes Yes   Sig: Take 1 capsule by mouth daily   Multiple Vitamins-Minerals (DAILY MULTI PO) 2018 at Unknown time  Yes Yes   Sig: Take 1 tablet by mouth daily   Pyridoxine HCl (VITAMIN B6 PO) 2018 at Unknown time  Yes Yes   Sig: Take 1 tablet by mouth daily   aspirin 81 MG tablet 2018 at Unknown time  Yes Yes   Sig: Take 1 tablet by mouth daily   calcitonin, salmon, (MIACALCIN) 200 units/act nasal spray 2018 at Unknown time  No Yes   Si spray into each nostril daily   dorzolamide-timolol (COSOPT) 22 3-6 8 MG/ML ophthalmic solution 2018 at Unknown time  Yes Yes   Sig: INSTILL 1 DROP INTO LEFT EYE TWICE A DAY APPROXIMATELY 12 HOURS APART   fenofibrate (TRIGLIDE) 160 MG tablet 8/11/2018 at Unknown time  No Yes   Sig: TAKE 1 TABLET DAILY  latanoprost (XALATAN) 0 005 % ophthalmic solution 8/11/2018 at Unknown time  Yes Yes   Sig: INSTILL 1 DROP IN THE AFFECTED EYE(S) IN THE EVENING   losartan (COZAAR) 25 mg tablet 8/11/2018 at Unknown time  No Yes   Sig: Take 1 tablet (25 mg total) by mouth daily   metoprolol tartrate (LOPRESSOR) 50 mg tablet 8/11/2018 at Unknown time  No Yes   Sig: TAKE 1 TABLET TWICE DAILY      Facility-Administered Medications: None       Past Medical History:   Diagnosis Date    CAD (coronary artery disease)     Hypertension     Osteoarthritis     Osteoporosis     Vitamin D deficiency        Past Surgical History:   Procedure Laterality Date    APPENDECTOMY      HYSTERECTOMY      ROTATOR CUFF REPAIR         Family History   Problem Relation Age of Onset    Diabetes type II Father     Heart disease Brother      I have reviewed and agree with the history as documented  Social History   Substance Use Topics    Smoking status: Former Smoker    Smokeless tobacco: Never Used    Alcohol use No        Review of Systems   Constitutional: Negative for chills and fever  HENT: Negative for rhinorrhea and sore throat  Eyes: Negative for photophobia and visual disturbance  Respiratory: Negative for cough and shortness of breath  Cardiovascular: Negative for chest pain and leg swelling  Gastrointestinal: Negative for abdominal pain, diarrhea, nausea and vomiting  Genitourinary: Negative for dysuria, frequency and hematuria  Musculoskeletal: Positive for arthralgias and gait problem  Negative for back pain and neck pain  Skin: Negative for rash and wound  Neurological: Negative for syncope, light-headedness and headaches         Physical Exam  ED Triage Vitals [08/12/18 0814]   Temperature Pulse Respirations Blood Pressure SpO2   98 2 °F (36 8 °C) 91 18 (!) 223/102 98 %      Temp Source Heart Rate Source Patient Position - Orthostatic VS BP Location FiO2 (%)   Oral Monitor Lying Left arm --      Pain Score       Worst Possible Pain           Orthostatic Vital Signs  Vitals:    08/12/18 1512 08/12/18 1620 08/12/18 1835 08/12/18 2003   BP: 149/63 (!) 177/92 (!) 180/72 123/59   Pulse: 94 93  95   Patient Position - Orthostatic VS: Lying Lying  Sitting       Physical Exam   Constitutional: She is oriented to person, place, and time  She appears well-developed and well-nourished  No distress  HENT:   Head: Normocephalic and atraumatic  No signs of basilar skull fracture   Eyes: Conjunctivae and EOM are normal  Pupils are equal, round, and reactive to light  No scleral icterus  Neck: Neck supple  No JVD present  No midline tenderness   Cardiovascular: Normal rate, regular rhythm and normal heart sounds  Exam reveals no gallop and no friction rub  No murmur heard  Pulmonary/Chest: Effort normal and breath sounds normal  No respiratory distress  She has no wheezes  She has no rales  Abdominal: Soft  She exhibits no distension  There is no tenderness  There is no rebound and no guarding  Musculoskeletal: She exhibits tenderness (Left femoral head and mid shaft femur, no abnormal rotation or shortening, extremity neurovascularly intact, no skin changes, unable to lift leg)  She exhibits no edema  Neurological: She is alert and oriented to person, place, and time  No cranial nerve deficit  No gross motor sensory deficits, no abnormalities with cerebellar testing   Skin: Skin is warm and dry  She is not diaphoretic  Psychiatric: She has a normal mood and affect  Her behavior is normal  Thought content normal    Vitals reviewed        ED Medications  Medications   sodium chloride 0 9 % infusion (50 mL/hr Intravenous New Bag 8/12/18 1313)   acetaminophen (TYLENOL) tablet 650 mg (not administered)   gabapentin (NEURONTIN) capsule 100 mg (100 mg Oral Not Given 8/12/18 1845)   HYDROmorphone (DILAUDID) injection 0 2 mg (not administered)   methocarbamol (ROBAXIN) tablet 500 mg (not administered)   oxyCODONE (ROXICODONE) IR tablet 2 5 mg (not administered)   oxyCODONE (ROXICODONE) IR tablet 5 mg (5 mg Oral Given 8/12/18 1614)   fenofibrate (TRICOR) tablet 145 mg (145 mg Oral Given 8/12/18 1836)   losartan (COZAAR) tablet 25 mg (25 mg Oral Given 8/12/18 1615)   metoprolol tartrate (LOPRESSOR) tablet 50 mg (50 mg Oral Given 8/12/18 1836)   senna (SENOKOT) tablet 8 6 mg (not administered)   ondansetron (ZOFRAN) injection 4 mg (not administered)   calcium carbonate (TUMS) chewable tablet 1,000 mg (not administered)   hydrALAZINE (APRESOLINE) injection 5 mg (5 mg Intravenous Given 8/12/18 1835)   ceFAZolin (ANCEF) IVPB (premix) 2,000 mg (2,000 mg Intravenous New Bag 8/12/18 1836)       Diagnostic Studies  Results Reviewed     Procedure Component Value Units Date/Time    Basic metabolic panel [[id number]] Collected:  08/12/18 1137    Lab Status:  Final result Specimen:  Blood from Arm, Right Updated:  08/12/18 1209     Sodium 136 mmol/L      Potassium 3 7 mmol/L      Chloride 105 mmol/L      CO2 23 mmol/L      Anion Gap 8 mmol/L      BUN 14 mg/dL      Creatinine 0 89 mg/dL      Glucose 104 mg/dL      Calcium 9 3 mg/dL      eGFR 56 ml/min/1 73sq m     Narrative:         National Kidney Disease Education Program recommendations are as follows:  GFR calculation is accurate only with a steady state creatinine  Chronic Kidney disease less than 60 ml/min/1 73 sq  meters  Kidney failure less than 15 ml/min/1 73 sq  meters      Protime-INR [[id number]]  (Normal) Collected:  08/12/18 1137    Lab Status:  Final result Specimen:  Blood from Arm, Right Updated:  08/12/18 1156     Protime 13 8 seconds      INR 1 05    APTT [[id number]]  (Normal) Collected:  08/12/18 1137    Lab Status:  Final result Specimen:  Blood from Arm, Right Updated:  08/12/18 1156     PTT 27 seconds     CBC and differential [[id number]]  (Abnormal) Collected:  08/12/18 1137    Lab Status:  Final result Specimen:  Blood from Arm, Right Updated:  08/12/18 1149     WBC 11 37 (H) Thousand/uL      RBC 4 23 Million/uL      Hemoglobin 12 8 g/dL      Hematocrit 38 9 %      MCV 92 fL      MCH 30 3 pg      MCHC 32 9 g/dL      RDW 13 4 %      MPV 9 9 fL      Platelets 978 Thousands/uL      nRBC 0 /100 WBCs      Neutrophils Relative 73 %      Immat GRANS % 1 %      Lymphocytes Relative 17 %      Monocytes Relative 9 %      Eosinophils Relative 0 %      Basophils Relative 0 %      Neutrophils Absolute 8 26 (H) Thousands/µL      Immature Grans Absolute 0 07 Thousand/uL      Lymphocytes Absolute 1 97 Thousands/µL      Monocytes Absolute 0 97 Thousand/µL      Eosinophils Absolute 0 05 Thousand/µL      Basophils Absolute 0 05 Thousands/µL                  CT cervical spine without contrast   Final Result by Jenny Aleman MD (08/12 1048)      No cervical spine fracture or traumatic malalignment  Workstation performed: [id number]         CT hip left without contrast   Final Result by Mikki Otto MD (08/12 1034)   Comminuted impacted left subcapital hip fracture with angulation and associated hematoma formation as above  No additional fracture  Workstation performed: [id number]         XR hip/pelv 2-3 vws left if performed   Final Result by Milad Casarez MD (08/12 1017)      Nondisplaced femoral neck fracture  Compression deformity superior endplate of L3 age of which is indeterminate  The study was marked in [facility] for immediate notification  Workstation performed: [id number]         XR femur 2 views LEFT   ED Interpretation by Chrissy Tan DO (08/12 0941)   Abnormal   intertroch fx      Final Result by Milad Casarez MD (08/12 1016)      Nondisplaced femoral neck fracture              Workstation performed: [id number]         CT head without contrast   Final Result by Jason Tabares Meseret Penny MD (08/12 0057)      No acute intracranial abnormality  Workstation performed: [id number]         XR chest 1 view    (Results Pending)         Procedures  Procedures      Phone Consults  ED Phone Contact    ED Course           Identification of Seniors at Risk      Most Recent Value   (ISAR) Identification of Seniors at Risk   Before the illness or injury that brought you to the Emergency, did you need someone to help you on a regular basis? 0 Filed at: 08/12/2018 0815   In the last 24 hours, have you needed more help than usual?  0 Filed at: 08/12/2018 4148   Have you been hospitalized for one or more nights during the past 6 months? 0 Filed at: 08/12/2018 0815   In general, do you see well?  0 Filed at: 08/12/2018 0815   In general, do you have serious problems with your memory? 0 Filed at: 08/12/2018 9459   Do you take more than three different medications every day? 1 Filed at: 08/12/2018 0815   ISAR Score  1 Filed at: 08/12/2018 1277                          MDM  Number of Diagnoses or Management Options  Diagnosis management comments: CT head/c-spine negative for acute traumatic injury  Left hip xray and subsequent CT showed a comminuted subcapital femoral neck fracture  Extremity neurovascularly intact and no analgesia required by patient  Orthopedic surgery was consulted and admitted the patient for operative repair      CritCare Time    Disposition  Final diagnoses:   Closed fracture of neck of left femur, initial encounter ([facility] )   Pre-operative clearance     Time reflects when diagnosis was documented in both MDM as applicable and the Disposition within this note     Time User Action Codes Description Comment    8/12/2018 10:46 AM Srini Norman Add [[id number]] Closed fracture of neck of left femur, initial encounter ([facility] )     8/12/2018  1:09 PM Srini Norman [[id number]] Pre-operative clearance       ED Disposition     None      Follow-up Information    None         Current Discharge Medication List      CONTINUE these medications which have NOT CHANGED    Details   aspirin 81 MG tablet Take 1 tablet by mouth daily      calcitonin, salmon, (MIACALCIN) 200 units/act nasal spray 1 spray into each nostril daily  Qty: 3 Act, Refills: 3    Associated Diagnoses: Age-related osteoporosis without current pathological fracture      Cyanocobalamin (VITAMIN B12 PO) Take 1 tablet by mouth daily      dorzolamide-timolol (COSOPT) 22 3-6 8 MG/ML ophthalmic solution INSTILL 1 DROP INTO LEFT EYE TWICE A DAY APPROXIMATELY 12 HOURS APART  Refills: 2      fenofibrate (TRIGLIDE) 160 MG tablet TAKE 1 TABLET DAILY  Qty: 30 tablet, Refills: 5    Associated Diagnoses: Pure hypercholesterolemia      Glucosamine HCl (GLUCOSAMINE PO) Take 1 capsule by mouth daily      latanoprost (XALATAN) 0 005 % ophthalmic solution INSTILL 1 DROP IN THE AFFECTED EYE(S) IN THE EVENING  Refills: 1      losartan (COZAAR) 25 mg tablet Take 1 tablet (25 mg total) by mouth daily  Qty: 30 tablet, Refills: 1    Associated Diagnoses: Essential hypertension      metoprolol tartrate (LOPRESSOR) 50 mg tablet TAKE 1 TABLET TWICE DAILY  Qty: 60 tablet, Refills: 5    Associated Diagnoses: Essential hypertension      Multiple Vitamins-Minerals (DAILY MULTI PO) Take 1 tablet by mouth daily      Pyridoxine HCl (VITAMIN B6 PO) Take 1 tablet by mouth daily           No discharge procedures on file  ED Provider  Attending physically available and evaluated Alayna Haro I managed the patient along with the ED Attending      Electronically Signed by         Jaden Madden MD  08/12/18 6815

## 2018-08-12 NOTE — CONSULTS
Orthopedics   Brooklynn Espinosa 80 y o  female MRN: [id number]  Unit/Bed#: Cat Scan      Chief Complaint:   left hip pain    HPI:   80 y  o female ambulates with walker status post fall complaining of left hip pain and inability to bear weight  She notes turning without her walker and losing her balance and falling  Pain is well localized to the hip and is made worse with motion or contact to the area  She lives at home alone and is independent with her ADLs  She does not have any pre-existing history of hip pain or hip injuries    Review Of Systems:   · Skin: Normal  · Neuro: See HPI  · Musculoskeletal: See HPI  · 14 point review of systems negative except as stated above     Past Medical History:   Past Medical History:   Diagnosis Date    CAD (coronary artery disease)     Hypertension     Osteoarthritis     Osteoporosis     Vitamin D deficiency        Past Surgical History:   Past Surgical History:   Procedure Laterality Date    APPENDECTOMY      HYSTERECTOMY      ROTATOR CUFF REPAIR         Family History:  Family history reviewed and non-contributory  Family History   Problem Relation Age of Onset    Diabetes type II Father     Heart disease Brother        Social History:  Social History     Social History    Marital status:       Spouse name: N/A    Number of children: N/A    Years of education: N/A     Social History Main Topics    Smoking status: Former Smoker    Smokeless tobacco: Never Used    Alcohol use No    Drug use: No    Sexual activity: Not Asked     Other Topics Concern    None     Social History Narrative    Daily coffee consumption - 4 cups       Allergies:   No Known Allergies        Labs:    0  Lab Value Date/Time   HCT 37 1 11/24/2017 0812   HCT 37 0 11/14/2016 0736   HCT 34 3 (L) 09/07/2015 0600   HGB 12 3 11/24/2017 0812   HGB 12 3 11/14/2016 0736   HGB 11 2 (L) 09/07/2015 0600   WBC 0-5 11/24/2017 0812   WBC 8 7 11/24/2017 0812   WBC 6 1 11/14/2016 0736       Meds:  No current facility-administered medications for this encounter  Current Outpatient Prescriptions:     aspirin 81 MG tablet, Take 1 tablet by mouth daily, Disp: , Rfl:     calcitonin, salmon, (MIACALCIN) 200 units/act nasal spray, 1 spray into each nostril daily, Disp: 3 Act, Rfl: 3    Cyanocobalamin (VITAMIN B12 PO), Take 1 tablet by mouth daily, Disp: , Rfl:     dorzolamide-timolol (COSOPT) 22 3-6 8 MG/ML ophthalmic solution, INSTILL 1 DROP INTO LEFT EYE TWICE A DAY APPROXIMATELY 12 HOURS APART, Disp: , Rfl: 2    fenofibrate (TRIGLIDE) 160 MG tablet, TAKE 1 TABLET DAILY  , Disp: 30 tablet, Rfl: 5    latanoprost (XALATAN) 0 005 % ophthalmic solution, INSTILL 1 DROP IN THE AFFECTED EYE(S) IN THE EVENING, Disp: , Rfl: 1    losartan (COZAAR) 25 mg tablet, Take 1 tablet (25 mg total) by mouth daily, Disp: 30 tablet, Rfl: 1    metoprolol tartrate (LOPRESSOR) 50 mg tablet, TAKE 1 TABLET TWICE DAILY, Disp: 60 tablet, Rfl: 5    Multiple Vitamins-Minerals (DAILY MULTI PO), Take 1 tablet by mouth daily, Disp: , Rfl:     Pyridoxine HCl (VITAMIN B6 PO), Take 1 tablet by mouth daily, Disp: , Rfl:     Glucosamine HCl (GLUCOSAMINE PO), Take 1 capsule by mouth daily, Disp: , Rfl:     Blood Culture:   No results found for: BLOODCX    Wound Culture:   No results found for: WOUNDCULT    Ins and Outs:  No intake/output data recorded  Physical Exam:   BP (!) 209/91 (BP Location: Left arm)   Pulse 84   Temp 98 2 °F (36 8 °C) (Oral)   Resp 18   Wt 54 4 kg (119 lb 14 9 oz)   SpO2 97%   BMI 23 42 kg/m²   Gen: Alert and oriented to person, place, time  HEENT: EOMI, eyes clear, moist mucus membranes, hearing intact  Respiratory: Bilateral chest rise   No audible wheezing found  Cardiovascular: Regular Rate and Rhythm  Abdomen: soft nontender/nondistended  Musculoskeletal: left lower extremity  · Skin intact  · Tender to palpation over hip  · Positive log roll  · Sensation intact L2-S1  · Positive ankle dorsi/plantar flexion, EHL/FHL  · 2+ DP pulse    Radiology:   I personally reviewed the films  X-rays left hip shows femoral neck fracture    Assessment:  80 y  o female status post fall with left femoral neck fracture    Plan:   · Non weight bearing left lower extremity  · Analgesics for pain  · Informed consent obtained  · Pre op labs in ED  · NPO at midnight  · Chandler Catheter insertion  · Medicine consult for all medical management and preoperative risk stratification  · To OR for Posterior total hip arthroplasty  · Dispo: Ortho will follow    Go Medellin MD

## 2018-08-12 NOTE — ASSESSMENT & PLAN NOTE
· 2/2 pain & missing home meds today  · Restart home meds - received losartan 25 mg already, give metoprolol 50 mg BID now   Placed prn hydralazine  · control pain - currently stable  · Losartan was recently added as OP 1 5 week ago  · If despite home regimen BP high - consider increasing dosages

## 2018-08-12 NOTE — ASSESSMENT & PLAN NOTE
· Patient without any active cardiac symptoms or CP or SOB or palpitations  Pt without baseline CP or dyspena on exertion  No known cardiac history of MI/CAD or CHF, no h/o CVA, no h/o DM or CKD  previous cardio note mentioned about PVCs in holter when she presented with syncope  RCRI is low  She is unable to ambulate close to 4 METS  With her age, h/o HTN she is low to intermediate risk for cardiac events for intermediate risk surgery  · Ok to proceed for OR  · Continue perioperative metoprolol  · Echo in 2015 showed LVEF 55% with Grade 1 DD  · EKG today NSR   CXR appears stable, report pending  · Start home aspirin when ok with ortho

## 2018-08-12 NOTE — ED NOTES
Updated family at the bedside regarding the pt's current plan of care status  Pt is also aware that they are both waiting to find out about the OR and for a bed upstairs        Kory Nayak RN  08/12/18 2403

## 2018-08-13 ENCOUNTER — ANESTHESIA EVENT (INPATIENT)
Dept: PERIOP | Facility: HOSPITAL | Age: 83
DRG: 470 | End: 2018-08-13
Payer: MEDICARE

## 2018-08-13 ENCOUNTER — ANESTHESIA (INPATIENT)
Dept: PERIOP | Facility: HOSPITAL | Age: 83
DRG: 470 | End: 2018-08-13
Payer: MEDICARE

## 2018-08-13 PROCEDURE — 0SRS0JA REPLACEMENT OF LEFT HIP JOINT, FEMORAL SURFACE WITH SYNTHETIC SUBSTITUTE, UNCEMENTED, OPEN APPROACH: ICD-10-PCS | Performed by: ORTHOPAEDIC SURGERY

## 2018-08-13 PROCEDURE — C1776 JOINT DEVICE (IMPLANTABLE): HCPCS | Performed by: ORTHOPAEDIC SURGERY

## 2018-08-13 PROCEDURE — 99222 1ST HOSP IP/OBS MODERATE 55: CPT | Performed by: ORTHOPAEDIC SURGERY

## 2018-08-13 PROCEDURE — 27236 TREAT THIGH FRACTURE: CPT | Performed by: ORTHOPAEDIC SURGERY

## 2018-08-13 DEVICE — ARTICUL/EZE FEMORAL HEAD DIAMETER 28MM +1.5 12/14 TAPER
Type: IMPLANTABLE DEVICE | Site: HIP | Status: FUNCTIONAL
Brand: ARTICUL/EZE

## 2018-08-13 DEVICE — SELF CENTERING BI-POLAR HEAD 28MM ID 45MM OD
Type: IMPLANTABLE DEVICE | Site: HIP | Status: FUNCTIONAL
Brand: SELF CENTERING

## 2018-08-13 DEVICE — CORAIL HIP SYSTEM CEMENTLESS FEMORAL STEM 12/14 AMT 135 DEGREES KS SIZE 11 HA COATED STANDARD NO COLLAR
Type: IMPLANTABLE DEVICE | Site: HIP | Status: FUNCTIONAL
Brand: CORAIL

## 2018-08-13 RX ORDER — PROPOFOL 10 MG/ML
INJECTION, EMULSION INTRAVENOUS AS NEEDED
Status: DISCONTINUED | OUTPATIENT
Start: 2018-08-13 | End: 2018-08-13 | Stop reason: SURG

## 2018-08-13 RX ORDER — FENTANYL CITRATE 50 UG/ML
INJECTION, SOLUTION INTRAMUSCULAR; INTRAVENOUS AS NEEDED
Status: DISCONTINUED | OUTPATIENT
Start: 2018-08-13 | End: 2018-08-13 | Stop reason: SURG

## 2018-08-13 RX ORDER — LIDOCAINE HYDROCHLORIDE 10 MG/ML
INJECTION, SOLUTION INFILTRATION; PERINEURAL AS NEEDED
Status: DISCONTINUED | OUTPATIENT
Start: 2018-08-13 | End: 2018-08-13 | Stop reason: SURG

## 2018-08-13 RX ORDER — SENNOSIDES 8.6 MG
1 TABLET ORAL DAILY
Status: DISCONTINUED | OUTPATIENT
Start: 2018-08-13 | End: 2018-08-15 | Stop reason: HOSPADM

## 2018-08-13 RX ORDER — FENTANYL CITRATE/PF 50 MCG/ML
25 SYRINGE (ML) INJECTION
Status: DISCONTINUED | OUTPATIENT
Start: 2018-08-13 | End: 2018-08-13 | Stop reason: HOSPADM

## 2018-08-13 RX ORDER — PROPOFOL 10 MG/ML
INJECTION, EMULSION INTRAVENOUS CONTINUOUS PRN
Status: DISCONTINUED | OUTPATIENT
Start: 2018-08-13 | End: 2018-08-13 | Stop reason: SURG

## 2018-08-13 RX ORDER — ONDANSETRON 2 MG/ML
4 INJECTION INTRAMUSCULAR; INTRAVENOUS ONCE AS NEEDED
Status: DISCONTINUED | OUTPATIENT
Start: 2018-08-13 | End: 2018-08-13 | Stop reason: HOSPADM

## 2018-08-13 RX ORDER — DOCUSATE SODIUM 100 MG/1
100 CAPSULE, LIQUID FILLED ORAL 2 TIMES DAILY
Status: DISCONTINUED | OUTPATIENT
Start: 2018-08-13 | End: 2018-08-15 | Stop reason: HOSPADM

## 2018-08-13 RX ORDER — ROCURONIUM BROMIDE 10 MG/ML
INJECTION, SOLUTION INTRAVENOUS AS NEEDED
Status: DISCONTINUED | OUTPATIENT
Start: 2018-08-13 | End: 2018-08-13 | Stop reason: SURG

## 2018-08-13 RX ORDER — OXYCODONE HYDROCHLORIDE 5 MG/1
TABLET ORAL
Qty: 20 TABLET | Refills: 0 | Status: SHIPPED | OUTPATIENT
Start: 2018-08-13 | End: 2018-09-12 | Stop reason: HOSPADM

## 2018-08-13 RX ORDER — GLYCOPYRROLATE 0.2 MG/ML
INJECTION INTRAMUSCULAR; INTRAVENOUS AS NEEDED
Status: DISCONTINUED | OUTPATIENT
Start: 2018-08-13 | End: 2018-08-13 | Stop reason: SURG

## 2018-08-13 RX ORDER — SODIUM CHLORIDE 9 MG/ML
INJECTION, SOLUTION INTRAVENOUS CONTINUOUS PRN
Status: DISCONTINUED | OUTPATIENT
Start: 2018-08-13 | End: 2018-08-13 | Stop reason: SURG

## 2018-08-13 RX ORDER — SODIUM CHLORIDE, SODIUM LACTATE, POTASSIUM CHLORIDE, CALCIUM CHLORIDE 600; 310; 30; 20 MG/100ML; MG/100ML; MG/100ML; MG/100ML
125 INJECTION, SOLUTION INTRAVENOUS CONTINUOUS
Status: DISCONTINUED | OUTPATIENT
Start: 2018-08-13 | End: 2018-08-15 | Stop reason: HOSPADM

## 2018-08-13 RX ORDER — METOCLOPRAMIDE HYDROCHLORIDE 5 MG/ML
10 INJECTION INTRAMUSCULAR; INTRAVENOUS ONCE AS NEEDED
Status: DISCONTINUED | OUTPATIENT
Start: 2018-08-13 | End: 2018-08-13 | Stop reason: HOSPADM

## 2018-08-13 RX ORDER — ONDANSETRON 2 MG/ML
4 INJECTION INTRAMUSCULAR; INTRAVENOUS EVERY 6 HOURS PRN
Status: DISCONTINUED | OUTPATIENT
Start: 2018-08-13 | End: 2018-08-15 | Stop reason: HOSPADM

## 2018-08-13 RX ORDER — MAGNESIUM HYDROXIDE 1200 MG/15ML
LIQUID ORAL AS NEEDED
Status: DISCONTINUED | OUTPATIENT
Start: 2018-08-13 | End: 2018-08-13 | Stop reason: HOSPADM

## 2018-08-13 RX ORDER — ONDANSETRON 2 MG/ML
INJECTION INTRAMUSCULAR; INTRAVENOUS AS NEEDED
Status: DISCONTINUED | OUTPATIENT
Start: 2018-08-13 | End: 2018-08-13 | Stop reason: SURG

## 2018-08-13 RX ORDER — ALBUMIN, HUMAN INJ 5% 5 %
SOLUTION INTRAVENOUS CONTINUOUS PRN
Status: DISCONTINUED | OUTPATIENT
Start: 2018-08-13 | End: 2018-08-13 | Stop reason: SURG

## 2018-08-13 RX ADMIN — ONDANSETRON 4 MG: 2 INJECTION, SOLUTION INTRAMUSCULAR; INTRAVENOUS at 08:43

## 2018-08-13 RX ADMIN — NEOSTIGMINE METHYLSULFATE 2 MG: 1 INJECTION, SOLUTION INTRAMUSCULAR; INTRAVENOUS; SUBCUTANEOUS at 13:00

## 2018-08-13 RX ADMIN — SODIUM CHLORIDE, SODIUM LACTATE, POTASSIUM CHLORIDE, AND CALCIUM CHLORIDE 125 ML/HR: .6; .31; .03; .02 INJECTION, SOLUTION INTRAVENOUS at 14:32

## 2018-08-13 RX ADMIN — FENOFIBRATE 145 MG: 145 TABLET ORAL at 08:29

## 2018-08-13 RX ADMIN — CEFAZOLIN SODIUM 2000 MG: 1 SOLUTION INTRAVENOUS at 11:41

## 2018-08-13 RX ADMIN — SENNOSIDES 8.6 MG: 8.6 TABLET, FILM COATED ORAL at 16:41

## 2018-08-13 RX ADMIN — NEOSTIGMINE METHYLSULFATE 1 MG: 1 INJECTION, SOLUTION INTRAMUSCULAR; INTRAVENOUS; SUBCUTANEOUS at 12:51

## 2018-08-13 RX ADMIN — FENTANYL CITRATE 50 MCG: 50 INJECTION, SOLUTION INTRAMUSCULAR; INTRAVENOUS at 12:32

## 2018-08-13 RX ADMIN — METOPROLOL TARTRATE 50 MG: 50 TABLET ORAL at 08:25

## 2018-08-13 RX ADMIN — LIDOCAINE HYDROCHLORIDE 50 MG: 10 INJECTION, SOLUTION INFILTRATION; PERINEURAL at 11:53

## 2018-08-13 RX ADMIN — OXYCODONE HYDROCHLORIDE 5 MG: 5 TABLET ORAL at 08:25

## 2018-08-13 RX ADMIN — METOPROLOL TARTRATE 50 MG: 50 TABLET ORAL at 16:43

## 2018-08-13 RX ADMIN — PROPOFOL 70 MG: 10 INJECTION, EMULSION INTRAVENOUS at 11:53

## 2018-08-13 RX ADMIN — SODIUM CHLORIDE: 0.9 INJECTION, SOLUTION INTRAVENOUS at 11:57

## 2018-08-13 RX ADMIN — CEFAZOLIN SODIUM 2000 MG: 2 SOLUTION INTRAVENOUS at 21:21

## 2018-08-13 RX ADMIN — LOSARTAN POTASSIUM 25 MG: 25 TABLET, FILM COATED ORAL at 08:25

## 2018-08-13 RX ADMIN — FENTANYL CITRATE 25 MCG: 50 INJECTION, SOLUTION INTRAMUSCULAR; INTRAVENOUS at 11:53

## 2018-08-13 RX ADMIN — ROCURONIUM BROMIDE 40 MG: 10 INJECTION INTRAVENOUS at 11:53

## 2018-08-13 RX ADMIN — GABAPENTIN 100 MG: 100 CAPSULE ORAL at 08:25

## 2018-08-13 RX ADMIN — GABAPENTIN 100 MG: 100 CAPSULE ORAL at 16:41

## 2018-08-13 RX ADMIN — GABAPENTIN 100 MG: 100 CAPSULE ORAL at 21:21

## 2018-08-13 RX ADMIN — ONDANSETRON 4 MG: 2 INJECTION INTRAMUSCULAR; INTRAVENOUS at 12:51

## 2018-08-13 RX ADMIN — GLYCOPYRROLATE 0.2 MG: 0.2 INJECTION, SOLUTION INTRAMUSCULAR; INTRAVENOUS at 12:51

## 2018-08-13 RX ADMIN — DOCUSATE SODIUM 100 MG: 100 CAPSULE, LIQUID FILLED ORAL at 17:09

## 2018-08-13 RX ADMIN — FENTANYL CITRATE 25 MCG: 50 INJECTION, SOLUTION INTRAMUSCULAR; INTRAVENOUS at 12:27

## 2018-08-13 RX ADMIN — ALBUMIN (HUMAN): 12.5 SOLUTION INTRAVENOUS at 12:43

## 2018-08-13 RX ADMIN — DEXAMETHASONE SODIUM PHOSPHATE 5 MG: 10 INJECTION INTRAMUSCULAR; INTRAVENOUS at 12:51

## 2018-08-13 RX ADMIN — OXYCODONE HYDROCHLORIDE 5 MG: 5 TABLET ORAL at 14:30

## 2018-08-13 RX ADMIN — PROPOFOL 100 MCG/KG/MIN: 10 INJECTION, EMULSION INTRAVENOUS at 11:55

## 2018-08-13 RX ADMIN — GLYCOPYRROLATE 0.4 MG: 0.2 INJECTION, SOLUTION INTRAMUSCULAR; INTRAVENOUS at 13:00

## 2018-08-13 NOTE — ANESTHESIA PREPROCEDURE EVALUATION
Review of Systems/Medical History  Patient summary reviewed  Chart reviewed      Cardiovascular  Exercise tolerance (METS): <4,  Hyperlipidemia, Hypertension , CAD ,   Comment: Echo 2015  LEFT VENTRICLE: Size was normal  Systolic function was normal  Ejection   fraction was estimated to be 55 %  There was possible hypokinesis of the basal   inferior wall(s)  Wall thickness was normal  No evidence of apical thrombus  ,  Pulmonary       GI/Hepatic  Negative GI/hepatic ROS          Negative  ROS        Endo/Other  Negative endo/other ROS      GYN  Negative gynecology ROS          Hematology   Musculoskeletal    Arthritis     Neurology  Negative neurology ROS      Psychology   Negative psychology ROS              Physical Exam    Airway    Mallampati score: I  TM Distance: >3 FB  Neck ROM: full     Dental   Comment: edentulous,     Cardiovascular      Pulmonary      Other Findings        Anesthesia Plan  ASA Score- 3     Anesthesia Type- spinal with ASA Monitors  Additional Monitors:   Airway Plan: ETT  Plan Factors-Patient not instructed to abstain from smoking on day of procedure  Patient did not smoke on day of surgery  Induction- intravenous  Postoperative Plan- Plan for postoperative opioid use  Planned trial extubation    Informed Consent- Anesthetic plan and risks discussed with patient  I personally reviewed this patient with the CRNA  Discussed and agreed on the Anesthesia Plan with the CRNA  Danisha Mazariegos

## 2018-08-13 NOTE — DISCHARGE INSTRUCTIONS
Discharge Instructions - Orthopedics  Yonatan Cates 80 y o  female MRN: [id number]  Unit/Bed#: PACU 08    Weight Bearing Status:                                           Weight Bearing as tolerated to the left lower extremity  DVT prophylaxis:  Complete course of Lovenox as directed    Pain:  Continue analgesics as directed    Showering Instructions:   Do not shower until postop day 3    Dressing Instructions:   Keep dressing clean, dry and intact until follow up appointment  Driving Instructions:  No driving until cleared by Orthopaedic Surgery  PT/OT:  Continue PT/OT on outpatient basis,  Or at your facility,   as directed    Appt Instructions:    If you do not have your appointment, please call the clinic at [phone]  Otherwise followup as scheduled below:  2 weeks post op

## 2018-08-13 NOTE — OP NOTE
OPERATIVE REPORT  PATIENT NAME: Michael Feng    :  1925  MRN: [id number]  Pt Location: [facility] OR ROOM 04    SURGERY DATE: 2018    Surgeon(s) and Role:     * Sophia Montenegro MD - Primary     * Rut Marks PA-C - Assisting     * Filiberto Trevino - Assisting    Preop Diagnosis:  Closed fracture of neck of left femur, initial encounter ([place] [id number] ) Trish Reddy    Post-Op Diagnosis Codes:     * Closed fracture of neck of left femur, initial encounter ([place] [address] ) [S72 002A]    Procedure(s) (LRB):  HEMIARTHROPLASTY HIP (BIPOLAR) (Left)    Specimen(s):  * No specimens in log *    Estimated Blood Loss:   Minimal    Drains:  Urethral Catheter Latex 16 Fr  (Active)   Number of days: 0       Anesthesia Type:   General    Operative Indications:  Closed fracture of neck of left femur, initial encounter ([place] [address] ) [S72 002A]      Operative Findings:  depuy   45mm outer shell   28 + 1 5 mm intter head   [id number]    Complications:   None    Procedure and Technique: Following induction of adequate level of general anesthesia, Chandler catheters and sterilely introduced into this patient's bladder  Antibiotics were administered  She was then placed in the right leg is, left side up position  An axillary roll was placed underneath the right axilla  The left hip and lateral thigh were then prepped draped sterilely  A posterior-lateral approach was created or gain access to the hip  Full-thickness flaps raised get the tensor fascia  This was split, expose the deep layer the hip  With the hip in internal rotation, the piriformis tendon was identified, transected, and retracted in a posterior fashion  The remainder of the short external rotators were sectioned as well  A T-type capsulotomy was used open the hip  The femoral head was then delivered posteriorly  Utilizing the femoral neck osteotomy guide, the proper femoral neck cut was then made  The femoral head was then removed the acetabulum utilize the corkscrew device    The femoral head was sized, and trialing was performed with a 44 mm outer shell, as well as 45 mm outer shell  The 45 mm outer shell gave better stability  The proximal femur was then prepared for insertion of Press-Fit component  The box osteotome was used of the proximal femur  The canal sequentially broached  With a 12 , and a 28+ 1 5 inner head and a 45 mm outer shell, the hip was located, taken through range of motion, found to be quite stable  The trial components removed if the hip was carefully dislocated  The insert components were assembled and introduced the patient's left hip in standard fashion  The hip was once again located, taken through range of motion, and found to be quite stable  Satisfied with the extent of surgery, the wounds then flushed with saline and closed  A Betadine soak was initiated  The capsule was closed number Vicryl suture  The piriformis tendon was reapproximated to the greater trochanter number Vicryl suture  The tensor fascia was closed number Vicryl suture  The subcu tissue closed with a mixture 1  For deep layer, 2 O Vicryl for the subcutaneous tissues, and skin staples the skin  Sterile dressings applied  An abduction pillow placed   She was then awakened from general anesthesia, taken recovery room in stable condition with plans to include physical therapy weight-bearing to tolerance, she will require DVT prophylaxis with Lovenox   I was present for the entire procedure    Patient Disposition:  PACU     SIGNATURE: Max Rogers MD  DATE: August 13, 2018  TIME: 1:02 PM

## 2018-08-13 NOTE — PROGRESS NOTES
Patient is a delightful 66-year-old female who ambulates with a walker  She has unsteady gait due to arthritic left knee which is in valgus and has an incompetent soft tissue sleeve medially  She presents following a fall during which she suffered a fracture of her left hip  She is admitted [facility] in anticipation of surgery for left hip problem  She complains of pain left hip, and inability ambulate  Examination just now finds her daughter at the bedside  The patient is awake alert  Her left lower extremity has intact soft tissue envelope overlying the hip  The left leg is short and externally rotated  She is unable perform straight leg raise due to groin pain  Left thighs nonswollen nontender  Left knee is in valgus  There is no effusion  Her calf compartments are soft and supple  Her toes are warm, sensate, mobile  Have reviewed imaging studies personally and my review of the x-rays left hip and CT scan is as follows:  Image of the left hip shows a fracture of the left femoral neck which is subcapital and shows modest displacement  This is confirmed with the CT scan  Assessment/plan:  66-year-old female who has an arthritic left knee into ambulates with a walker  She has a fracture of the left hip which is acute and traumatic in nature  Arthroplasty would be the treatment of choice, and I think hemiarthroplasty see this patient's needs well  After review of the risks and benefits, consent was established for hemiarthroplasty for displaced left femoral neck fracture  Consent was confirmed at the bedside    This patient can undergo left hip hemiarthroplasty today as she is medically cleared and OR time has become available

## 2018-08-13 NOTE — OCCUPATIONAL THERAPY NOTE
Occupational Therapy         Patient Name: Miki Baumgarten  ELLEN Date: 8/13/2018    OT [address]- PT PENDING OR FOR REPAIR OF HIP FX - WILL DEFER    Diamond Quinteros OT

## 2018-08-13 NOTE — PROGRESS NOTES
Subjective: amanda lea   Has continued left hip pain    Objective:  Lab Results   Component Value Date/Time    WBC 11 37 (H) 08/12/2018 11:37 AM    WBC 0-5 11/24/2017 08:12 AM    WBC 8 7 11/24/2017 08:12 AM    HGB 12 8 08/12/2018 11:37 AM    HGB 12 3 11/24/2017 08:12 AM       Vitals:    08/12/18 2343   BP: 160/70   Pulse: 77   Resp:    Temp: 98 1 °F (36 7 °C)   SpO2: 96%     Left lower extremity  LLE shortened and externally rotated  Motor & sensation grossly intact  Palpable DP pulse    Assessment: left femoral neck fracture    Plan:  NWB LLE  To OR today for left hip DEBORAH  Cleared by medicine  Pain control  DVT ppx  PT postop  Dispo

## 2018-08-13 NOTE — PHYSICAL THERAPY NOTE
PT Screen  PT eval order noted, pt currently scheduled for sx fixation of L fem neck fx; Pls reconsult post op

## 2018-08-13 NOTE — CASE MANAGEMENT
Initial Clinical Review    Admission: Date/Time/Statement: 8/12/18 @ 1313     Orders Placed This Encounter   Procedures    Inpatient Admission     Standing Status:   Standing     Number of Occurrences:   1     Order Specific Question:   Admitting Physician     Answer:   Daquan Mann     Order Specific Question:   Level of Care     Answer:   Med Surg [16]     Order Specific Question:   Estimated length of stay     Answer:   Inpatient Only Surgery       ED: Date/Time/Mode of Arrival:   ED Arrival Information     Expected Arrival Acuity Means of Arrival Escorted By Service Admission Type    - 8/12/2018 08:12 Urgent Ambulance [facility] Orthopedic Surgery Urgent    Arrival Complaint    FALL          Chief Complaint:   Chief Complaint   Patient presents with    Fall     patient fell this morning  Patient slipped while attempting to get to her phone  Patient complaining of left hip pain  History of Illness: 80 y  o female ambulates with walker status post fall complaining of left hip pain and inability to bear weight  She notes turning without her walker and losing her balance and falling  Pain is well localized to the hip and is made worse with motion or contact to the area  She lives at home alone and is independent with her ADLs   She does not have any pre-existing history of hip pain or hip injuries    ED Vital Signs:   ED Triage Vitals [08/12/18 0814]   Temperature Pulse Respirations Blood Pressure SpO2   98 2 °F (36 8 °C) 91 18 (!) 223/102 98 %      Temp Source Heart Rate Source Patient Position - Orthostatic VS BP Location FiO2 (%)   Oral Monitor Lying Left arm --      Pain Score       Worst Possible Pain        Wt Readings from Last 1 Encounters:   08/12/18 54 4 kg (119 lb 14 9 oz)       Vital Signs (abnormal):   08/12/18 1835  --  --  --   180/72  --  --  --   08/12/18 1633  --  --  --  --  --  None (Room air)  --   08/12/18 1620  97 9 °F (36 6 °C)  93  16   177/92  95 % None (Room air)  Lying     08/12/18 1127  --  98  18   235/181  99 %  None (Room air)  Lying   08/12/18 1030  --  86  18   184/81  97 %  None (Room air)  Lying   08/12/18 1004  --  84  18   209/91  97 %  None (Room air)  Lying     Abnormal Labs:    08/12/18 1137    WBC 4 31 - 10 16 Thousand/uL 11 37         Diagnostic Test Results: CT Head - No acute intracranial abnormality    CT Left Hip - Comminuted impacted left subcapital hip fracture with angulation and associated hematoma formation as above  No additional fracture      ED Treatment:   Medication Administration from 08/12/2018 0811 to 08/12/2018 1555       Date/Time Order Dose Route Action     08/12/2018 1313 sodium chloride 0 9 % infusion 50 mL/hr Intravenous New Bag          Past Medical/Surgical History: Active Ambulatory Problems     Diagnosis Date Noted    Generalized osteoarthritis of multiple sites 07/13/2015    Hyperlipidemia 08/23/2012    Hypertension 08/20/2012    Osteoarthrosis 08/20/2012    Osteoporosis 08/20/2012    Impacted cerumen of both ears 04/10/2018    Acute cystitis with hematuria 05/15/2018    Auditory complaints of both ears 07/30/2018     Resolved Ambulatory Problems     Diagnosis Date Noted    No Resolved Ambulatory Problems     Past Medical History:   Diagnosis Date    CAD (coronary artery disease)     Hypertension     Osteoarthritis     Osteoporosis     Vitamin D deficiency        Admitting Diagnosis: Injury [T14 90XA]  Pre-operative clearance [Z01 818]  Closed [facility] of left femur[facility] [S72 002A]    Age/Sex: 80 y o  female    Assessment:  80 y  o female status post fall with left femoral neck fracture     Plan:   · Non weight bearing left lower extremity  · Analgesics for pain  · Informed consent obtained  · Pre op labs in ED  · NPO at midnight  · Chandler Catheter insertion  · Medicine consult for all medical management and preoperative risk stratification  · To OR for Posterior total hip arthroplasty  · Dispo: Ortho will follow      Admission Orders:  NPO; Sips with meds  8/13 OR - HEMIARTHROPLASTY HIP (BIPOLAR) (Left)    Orthopedic Surgery cons  Internal Medicine cons  PT/OT eval and treat    Scheduled Meds:   Current Facility-Administered Medications:  fenofibrate 145 mg Oral Daily   gabapentin 100 mg Oral TID   losartan 25 mg Oral Daily   metoprolol tartrate 50 mg Oral BID     Continuous Infusions:   Lactated Ringers 125 mL/hr      PRN Meds:   acetaminophen    calcium carbonate    hydrALAZINE    HYDROmorphone    methocarbamol    ondansetron    oxyCODONE po x2    senna

## 2018-08-13 NOTE — ANESTHESIA POSTPROCEDURE EVALUATION
Post-Op Assessment Note      CV Status:  Stable    Mental Status:  Awake and somnolent    Hydration Status:  Euvolemic and stable    PONV Controlled:  None    Airway Patency:  Patent    Post Op Vitals Reviewed: Yes          Staff: CRNA           /59 (08/13/18 1315)    Temp 98 1 °F (36 7 °C) (08/13/18 1315)    Pulse 61 (08/13/18 1315)   Resp 13 (08/13/18 1315)    SpO2 100 % (08/13/18 1315)

## 2018-08-14 ENCOUNTER — TELEPHONE (OUTPATIENT)
Dept: INTERNAL MEDICINE CLINIC | Facility: CLINIC | Age: 83
End: 2018-08-14

## 2018-08-14 PROBLEM — Z96.649 S/P HIP HEMIARTHROPLASTY: Status: ACTIVE | Noted: 2018-08-14

## 2018-08-14 PROBLEM — S72.002A CLOSED FRACTURE OF NECK OF LEFT FEMUR (HCC): Status: RESOLVED | Noted: 2018-08-12 | Resolved: 2018-08-14

## 2018-08-14 PROBLEM — D62 ACUTE BLOOD LOSS ANEMIA: Status: ACTIVE | Noted: 2018-08-14

## 2018-08-14 LAB
ANION GAP SERPL CALCULATED.3IONS-SCNC: 8 MMOL/L (ref 4–13)
BUN SERPL-MCNC: 9 MG/DL (ref 5–25)
CALCIUM SERPL-MCNC: 8 MG/DL (ref 8.3–10.1)
CHLORIDE SERPL-SCNC: 101 MMOL/L (ref 100–108)
CO2 SERPL-SCNC: 24 MMOL/L (ref 21–32)
CREAT SERPL-MCNC: 0.6 MG/DL (ref 0.6–1.3)
ERYTHROCYTE [DISTWIDTH] IN BLOOD BY AUTOMATED COUNT: 13.6 % (ref 11.6–15.1)
GFR SERPL CREATININE-BSD FRML MDRD: 79 ML/MIN/1.73SQ M
GLUCOSE SERPL-MCNC: 95 MG/DL (ref 65–140)
HCT VFR BLD AUTO: 22.9 % (ref 34.8–46.1)
HGB BLD-MCNC: 7.3 G/DL (ref 11.5–15.4)
MCH RBC QN AUTO: 30 PG (ref 26.8–34.3)
MCHC RBC AUTO-ENTMCNC: 31.9 G/DL (ref 31.4–37.4)
MCV RBC AUTO: 94 FL (ref 82–98)
PLATELET # BLD AUTO: 116 THOUSANDS/UL (ref 149–390)
PMV BLD AUTO: 11 FL (ref 8.9–12.7)
POTASSIUM SERPL-SCNC: 4 MMOL/L (ref 3.5–5.3)
RBC # BLD AUTO: 2.43 MILLION/UL (ref 3.81–5.12)
SODIUM SERPL-SCNC: 133 MMOL/L (ref 136–145)
WBC # BLD AUTO: 10.15 THOUSAND/UL (ref 4.31–10.16)

## 2018-08-14 PROCEDURE — G8988 SELF CARE GOAL STATUS: HCPCS

## 2018-08-14 PROCEDURE — G8979 MOBILITY GOAL STATUS: HCPCS

## 2018-08-14 PROCEDURE — G8987 SELF CARE CURRENT STATUS: HCPCS

## 2018-08-14 PROCEDURE — 99231 SBSQ HOSP IP/OBS SF/LOW 25: CPT | Performed by: NURSE PRACTITIONER

## 2018-08-14 PROCEDURE — 85027 COMPLETE CBC AUTOMATED: CPT | Performed by: ORTHOPAEDIC SURGERY

## 2018-08-14 PROCEDURE — 97167 OT EVAL HIGH COMPLEX 60 MIN: CPT

## 2018-08-14 PROCEDURE — 80048 BASIC METABOLIC PNL TOTAL CA: CPT | Performed by: ORTHOPAEDIC SURGERY

## 2018-08-14 PROCEDURE — G8978 MOBILITY CURRENT STATUS: HCPCS

## 2018-08-14 PROCEDURE — 97163 PT EVAL HIGH COMPLEX 45 MIN: CPT

## 2018-08-14 RX ADMIN — CEFAZOLIN SODIUM 2000 MG: 2 SOLUTION INTRAVENOUS at 05:19

## 2018-08-14 RX ADMIN — GABAPENTIN 100 MG: 100 CAPSULE ORAL at 08:46

## 2018-08-14 RX ADMIN — ONDANSETRON 4 MG: 2 INJECTION, SOLUTION INTRAMUSCULAR; INTRAVENOUS at 15:40

## 2018-08-14 RX ADMIN — HYDRALAZINE HYDROCHLORIDE 5 MG: 20 INJECTION INTRAMUSCULAR; INTRAVENOUS at 00:06

## 2018-08-14 RX ADMIN — SODIUM CHLORIDE, SODIUM LACTATE, POTASSIUM CHLORIDE, AND CALCIUM CHLORIDE 125 ML/HR: .6; .31; .03; .02 INJECTION, SOLUTION INTRAVENOUS at 00:11

## 2018-08-14 RX ADMIN — ENOXAPARIN SODIUM 30 MG: 30 INJECTION SUBCUTANEOUS at 08:47

## 2018-08-14 RX ADMIN — DOCUSATE SODIUM 100 MG: 100 CAPSULE, LIQUID FILLED ORAL at 08:46

## 2018-08-14 RX ADMIN — OXYCODONE HYDROCHLORIDE 5 MG: 5 TABLET ORAL at 08:47

## 2018-08-14 RX ADMIN — LOSARTAN POTASSIUM 25 MG: 25 TABLET, FILM COATED ORAL at 08:46

## 2018-08-14 RX ADMIN — GABAPENTIN 100 MG: 100 CAPSULE ORAL at 15:44

## 2018-08-14 RX ADMIN — DOCUSATE SODIUM 100 MG: 100 CAPSULE, LIQUID FILLED ORAL at 17:22

## 2018-08-14 RX ADMIN — METOPROLOL TARTRATE 50 MG: 50 TABLET ORAL at 08:46

## 2018-08-14 RX ADMIN — SENNOSIDES 8.6 MG: 8.6 TABLET, FILM COATED ORAL at 08:49

## 2018-08-14 RX ADMIN — METOPROLOL TARTRATE 50 MG: 50 TABLET ORAL at 17:22

## 2018-08-14 NOTE — ASSESSMENT & PLAN NOTE
· Secondary to surgery  · Patient is hemodynamically stable  · Consider transfusion if hemoglobin less than 7

## 2018-08-14 NOTE — PROGRESS NOTES
Subjective: amanda lea       Objective:  Lab Results   Component Value Date/Time    WBC 11 37 (H) 08/12/2018 11:37 AM    WBC 0-5 11/24/2017 08:12 AM    WBC 8 7 11/24/2017 08:12 AM    HGB 12 8 08/12/2018 11:37 AM    HGB 12 3 11/24/2017 08:12 AM       Vitals:    08/14/18 0333   BP: 131/60   Pulse: 74   Resp: 16   Temp: 97 5 °F (36 4 °C)   SpO2: 100%     Left lower extremity  Dressing c/d/i  Motor & sensation grossly intact  Toes warm and well perfused    Assessment: Post op from left hip hemiarthroplasty    Plan:  WBAT  Posterior hip precautions  Pain control  DVT ppx  PT  Dispo

## 2018-08-14 NOTE — PLAN OF CARE
Problem: OCCUPATIONAL THERAPY ADULT  Goal: Performs self-care activities at highest level of function for planned discharge setting  See evaluation for individualized goals  Treatment Interventions: ADL retraining, Functional transfer training, Endurance training, Patient/family training, Equipment evaluation/education, Compensatory technique education, Continued evaluation, Energy conservation, Activityengagement          See flowsheet documentation for full assessment, interventions and recommendations  Limitation: Decreased ADL status, Decreased endurance, Decreased self-care trans, Decreased high-level ADLs  Prognosis: Good  Assessment: Pt is a 79 y/o female seen for OT eval s/p adm to [facility] s/p fall w/  Closed fracture of neck of left femur receiving HEMIARTHROPLASTY HIP (BIPOLAR) (Left) on 8/13/18  Comorbidities include hypertensive urgency and a h/o CAD, HTN, OA, osteoporosis, and prior R rotator cuff repair  Pt with active OT orders, WBAT L LE and activity as tolerated orders  Pt lives alone in a single story home  Pt was mostly I w/ ADLS and IADLS however has some A from son and daughter, & required use of rw PTA  Pt is currently demonstrating the following occupational deficits: S UB ADLS, max A LB ADLS, mod A functional transfers and mod A functional mobility using rw w/ chair follow  Pt with deficits and limitations in all baseline areas of occupation 2*  significant pain, decreased endurance/activity tolerance, decreased functional forward reach, decreased ADL status, impaired balance, decreased mobility status, WBS, THP, orthopedic restrictions, [place] and decreased caregiver support  The following Occupational Performance Areas to address include: bathing/shower, dressing, functional mobility, community mobility, clothing management, meal prep and household maintenance  Pt scored overall 50/100 on the Barthel Index   Based on the aforementioned OT evaluation, functional performance deficits, and assessments, pt has been identified as a high complexity evaluation  Recommend STR upon D/C   Pt to continue to benefit from acute immediate OT services to address the following goals 3-5x/week to  w/in 7-10 days:      OT Discharge Recommendation: Short Term Rehab  OT - OK to Discharge: Yes (to STR)      Jaye Fierro MS, OTR/L

## 2018-08-14 NOTE — OCCUPATIONAL THERAPY NOTE
[address]  Evaluation     Patient Name: Jack Garcia  PHWSB'E Date: 8/14/2018  Problem List  Patient Active Problem List   Diagnosis    Generalized osteoarthritis of multiple sites    Hyperlipidemia    Hypertension    Osteoarthrosis    Osteoporosis    Impacted cerumen of both ears    Acute cystitis with hematuria    Auditory complaints of both ears    Preoperative clearance    Hypertensive urgency    S/P hip hemiarthroplasty     Past Medical History  Past Medical History:   Diagnosis Date    CAD (coronary artery disease)     Hypertension     Osteoarthritis     Osteoporosis     Vitamin D deficiency      Past Surgical History  Past Surgical History:   Procedure Laterality Date    APPENDECTOMY      HYSTERECTOMY      OH PARTIAL HIP REPLACEMENT Left 8/13/2018    Procedure: HEMIARTHROPLASTY HIP (BIPOLAR); Surgeon: Max Rogers MD;  [facility]: [facility];  Service: Orthopedics    ROTATOR CUFF REPAIR           08/14/18 1329   Note Type   Note type Eval/Treat   Restrictions/Precautions   Weight Bearing Precautions Per Order Yes   LLE Weight Bearing Per Order WBAT   Braces or Orthoses (pt has a L knee brace however not present at hospital )   Other Precautions WBS;THR;Fall Risk;Pain;Hard of hearing   Pain Assessment   Pain Assessment 0-10   Pain Score 8   Pain Type Acute pain;Surgical pain   Pain Location Hip   Pain Orientation Left   Hospital Pain Intervention(s) Ambulation/increased activity;Repositioned   Response to Interventions increased to 9/10 pain    Home Living   Type of [address] One level   Bathroom Shower/Tub Tub/shower unit   Bathroom Toilet Standard   Bathroom Equipment Shower chair   Home Equipment Walker   Additional Comments Pt lives alone in a single story home  Prior Function   Level of [place] Independent with ADLs and functional mobility; Needs assistance with IADLs   Lives With Alone   Receives Help From Family   ADL Assistance Independent IADLs Needs assistance   Falls in the last 6 months 1 to 4   Vocational Retired   Comments Pt was mostly I w/ ADLS and IADLS however has some A from son and daughter, & required use of rw PTA  Lifestyle   Autonomy Pt was mostly I w/ ADLS/IADLS PTA  Pt reports I dressing but A for bathing from her daughter  Pt reports son and daughter provide A for transportation and IADLs  Pt has a cleaning lady   Reciprocal Relationships Pt lives alone but has supportive son and daughter, however they do live a distance away from pt    Service to Others Pt is retired   Intrinsic Gratification Pt enjoys going to [employer] and playing [employer]   Psychosocial   Psychosocial (WDL) WDL   ADL   Eating Assistance 7  Independent   Grooming Assistance 7  [facility] 5  Supervision/Setup   LB [address] 2  Maximal Assistance   [address] 5  Supervision/Setup    [facility] 2  Maximal [address]  2  Maximal Assistance   Bed Mobility   Supine to Sit Unable to assess   Sit to Supine Unable to assess   Additional Comments Pt OOB in chair upon arrival and return to chair at end of therapy session    Transfers   Sit to Stand 3  Moderate assistance   Additional items Assist x 1; Increased time required;Verbal cues   Stand to Sit 3  Moderate assistance   Additional items Assist x 1; Increased time required;Verbal cues   Functional Mobility   Functional Mobility 3  Moderate assistance   Additional Comments for very short in room distances w/ chair follow of 2nd person    Additional items Rolling walker   Balance   Static Sitting Fair   Static Standing Fair -   Dynamic Standing Poor +   Ambulatory Poor   Activity Tolerance   Activity Tolerance Patient limited by fatigue;Patient limited by pain   Medical Staff Made Aware PT Jaret Ny CM Crystal    Nurse Made Aware yes   RUE Assessment   RUE Assessment WFL   LUE Assessment   LUE Assessment WFL   Hand Function   Gross Motor Coordination Functional   Fine Motor Coordination Functional   Cognition   Overall Cognitive Status WFL   Arousal/Participation Alert; Responsive; Cooperative   Attention Within functional limits   Memory Within functional limits   Following Commands Follows one step commands without difficulty   Assessment   Limitation Decreased ADL status; Decreased endurance;Decreased self-care trans;Decreased high-level ADLs   Prognosis Good   Assessment Pt is a 81 y/o female seen for OT eval s/p adm to [facility] s/p fall w/  Closed fracture of neck of left femur receiving HEMIARTHROPLASTY HIP (BIPOLAR) (Left) on 18  Comorbidities include hypertensive urgency and a h/o CAD, HTN, OA, osteoporosis, and prior R rotator cuff repair  Pt with active OT orders, WBAT L LE and activity as tolerated orders  Pt lives alone in a single story home  Pt was mostly I w/ ADLS and IADLS however has some A from son and daughter, & required use of rw PTA  Pt is currently demonstrating the following occupational deficits: S UB ADLS, max A LB ADLS, mod A functional transfers and mod A functional mobility using rw w/ chair follow  Pt with deficits and limitations in all baseline areas of occupation 2*  significant pain, decreased endurance/activity tolerance, decreased functional forward reach, decreased ADL status, impaired balance, decreased mobility status, WBS, THP, orthopedic restrictions, [place] and decreased caregiver support  The following Occupational Performance Areas to address include: bathing/shower, dressing, functional mobility, community mobility, clothing management, meal prep and household maintenance  Pt scored overall 50/100 on the Barthel Index  Based on the aforementioned OT evaluation, functional performance deficits, and assessments, pt has been identified as a high complexity evaluation  Recommend STR upon D/C   Pt to continue to benefit from acute immediate OT services to address the following goals 3-5x/week to  w/in 7-10 days:    Goals   Patient Goals none expressed   Plan   Treatment Interventions ADL retraining;Functional transfer training; Endurance training;Patient/family training;Equipment evaluation/education; Compensatory technique education;Continued evaluation; Energy conservation; Activityengagement   Goal Expiration Date 08/24/18   OT Frequency 3-5x/wk   Recommendation   OT Discharge Recommendation Short Term Rehab   OT - OK to Discharge Yes  (to STR)   Barthel Index   Feeding 10   Bathing 0   Grooming Score 5   Dressing Score 5   Bladder Score 10   Bowels Score 10   Toilet Use Score 5   Transfers (Bed/Chair) Score 5   Mobility (Level Surface) Score 0   Stairs Score 0   Barthel Index Score 50   Modified Sampson Scale   Modified Sampson Scale 4       GOALS:  1) Pt will improve activity tolerance to G for min 30 min txment sessions to increase participation in ADLs  2) Pt will complete ADLs/self care w/ S using adaptive equipment and DME as needed w/ G hyiene/thoroughness w/ min cues fro cog support  3) Pt will complete toileting w/ S w/ G hygiene/thoroughness using DME as needed  4) Pt will improve functional transfers on/off all surfaces using DME as needed w/ G balance/safety including toileting w/ S  5) Pt will improve functional mobility during ADL/IADL/leisure tasks using DME as needed w/ g balance/safety w/ S  6) Pt will demonstrate G carryover of pt/caregiver education and training as appropriate w/ mod I w/o cues w/ G tolerance  7) Pt will demonstrate 100% carryover of learned E C  techniques s/p skilled education w/o cues t/o functional ADL/ IADL/leisure interest tasks w/ mod I  8) Pt will demonstrate 100% carryover of precautions s/p review w/o cues w/ mod I w/ G tolerance/participation t/o functional ADL/IADL/leisure tasks  9) Pt will demonstrate G high level balance and tolerance t/o fx'l I/ADL/leisure tasks w/ mod I w/ DME PRN w/ G balance/safety w/o cues  10) Pt will demonstrate 100% carryover of LHAE for LB ADLs/self care and leisure s/p skilled education w/ mod I and G participation          Nolberto Roland MS, OTR/L

## 2018-08-14 NOTE — TELEPHONE ENCOUNTER
Pt son, Arya Willingham, wanted to know the results of Pt VAS carotid study  He can be reached at [phone]  Pt had to cancel upcoming appt due to surgery

## 2018-08-14 NOTE — PLAN OF CARE
Problem: PHYSICAL THERAPY ADULT  Goal: Performs mobility at highest level of function for planned discharge setting  See evaluation for individualized goals  Treatment/Interventions: Functional transfer training, LE strengthening/ROM, Elevations, Endurance training, Therapeutic exercise, Cognitive reorientation, Patient/family training, Equipment eval/education, Bed mobility, Gait training, Continued evaluation, Compensatory technique education, Spoke to nursing  Equipment Recommended: Melissa Soto       See flowsheet documentation for full assessment, interventions and recommendations     Prognosis: Good  Problem List: Decreased strength, Decreased range of motion, Decreased endurance, Impaired balance, Decreased mobility, Decreased coordination, Decreased cognition, Impaired judgement, Decreased safety awareness, Impaired hearing, Pain, Orthopedic restrictions, Decreased skin integrity  Assessment: Pt is a 80 y o  female admitted to [facility] on 8/12/2018 s/p fall w/ L fem neck fx is  S/P hip hemiarthroplasty; WBAT LLE w poster DEBORAH precautions Pt exhibits significant impairments with weakness, decreased ROM, impaired balance, decreased endurance, impaired coordination, gait deviations, pain, decreased activity tolerance, decreased functional mobility tolerance, decreased safety awareness, impaired judgement, fall risk, orthopedic restrictions and decreased skin integrity; these impact independence with mobility, ADLs, and IADLs; requires mod assist w transf and amb using chair follow 6 ft using RW LLE WBAT- gait pattern antalgic decreased foot clearance, step length step to pattern walker reliant + falls risk; objective measures on the Barthel Index also reveal limitations;  therapy prognosis is impacted by relevant co morbidities as noted in evaluation; clinical presentation is currently unstable/unpredictable - pt is on cont pulse oximetry, presents w complex history and phys impairments as noted- a regression from baseline; extensive d/w pt's dtr and son -educ on mob safety and rehab; pt has a L knee brace for valgus deformity- son to bring in; PTA, pt was Independent with mobility lives alone, long term plan TBD skilled PT is indicated to to optimize functional independence and discharge planning; pending functional progress, PT recommendation at discharge is IP rehab         Recommendation: Post acute IP rehab          See flowsheet documentation for full assessment

## 2018-08-14 NOTE — ASSESSMENT & PLAN NOTE
· Continue home meds - losartan 25 mg already,  metoprolol 50 mg BID   Placed on prn hydralazine  · control pain - currently stable  · Losartan was recently added as OP 1 5 week ago  · If despite home regimen BP high - consider increasing dosages  · Will follow blood pressure closely; improved today

## 2018-08-14 NOTE — SOCIAL WORK
CM met with patient and explained cm role  Pt alert and oriented  Pt reports she lives in a 3 story home alone with 1st floor setup, w/3-4 rita  Pt reports DME: rw, cane, and w/c, prev VNA w/[facility], prev rehab w/[place]  Pt reports being independent PTA, reports good support at home, she does not drive, transports to appointments via family, and will transport home for d/c with family  Pts pharmacy is [facility] [address] in [place]  POA is son Jason Lambert  Pt denies hx/admission for drug/etoh and psych/mental health  CM reviewed d/c planning process including the following: identifying help at home, patient preference for d/c planning needs, Discharge Lounge, [facility] Meds to Bed program, availability of treatment team to discuss questions or concerns patient and/or family may have regarding understanding medications and recognizing signs and symptoms once discharged  CM also encouraged patient to follow up with all recommended appointments after discharge  Patient advised of importance for patient and family to participate in managing patients medical well being

## 2018-08-14 NOTE — SOCIAL WORK
PT recommendation for rehab  CM met with patient and her son Nanci Collier, discussed PT recommendations, presented SNF list and ask that they make 3 choices  Per pts son, they will look over list and give it to cm tomorrow

## 2018-08-14 NOTE — PROGRESS NOTES
Progress Note - Jack Garcia 1925, 80 y o  female MRN: [id number]    Unit/Bed#: 3 340-01 Encounter: [id number]    Primary Care Provider: Filippo Kennedy DO   Date and time admitted to hospital: 2018  8:12 AM        * S/P hip hemiarthroplasty   Assessment & Plan    · S/p repair  · POD #1  · Management per Ortho        Hypertensive urgency   Assessment & Plan      · Continue home meds - losartan 25 mg already,  metoprolol 50 mg BID  Placed on prn hydralazine  · control pain - currently stable  · Losartan was recently added as OP 1 5 week ago  · If despite home regimen BP high - consider increasing dosages  · Will follow blood pressure closely; improved today        Acute blood loss anemia   Assessment & Plan    · Secondary to surgery  · Patient is hemodynamically stable  · Consider transfusion if hemoglobin less than 7            VTE Pharmacologic Prophylaxis:   Pharmacologic: Enoxaparin (Lovenox)  Mechanical VTE Prophylaxis in Place: Yes    Patient Centered Rounds: I have performed bedside rounds with nursing staff today  Discussions with Specialists or Other Care Team Provider:  Primary RN    Education and Discussions with Family / Patient:  Patient    Time Spent for Care: 15 minutes  More than 50% of total time spent on counseling and coordination of care as described above  Current Length of Stay: 2 day(s)    Current Patient Status: Inpatient       Discharge Plan / Estimated Discharge Date:  Per primary service      Code Status: Level 3 - DNAR and DNI      Subjective:   Pain is acceptable at this time  Still sore  No nausea or vomiting  No chest pain  No overnight events reported by the nurse  Objective:     Vitals:   Temp (24hrs), Av 4 °F (36 3 °C), Min:96 3 °F (35 7 °C), Max:97 8 °F (36 6 °C)    HR:  [57-77] 71  Resp:  [16-18] 18  BP: (117-178)/(53-76) 117/53  SpO2:  [97 %-100 %] 97 %  Body mass index is 23 42 kg/m²       Input and Output Summary (last 24 hours): Intake/Output Summary (Last 24 hours) at 08/14/18 1518  Last data filed at 08/14/18 1402   Gross per 24 hour   Intake          1701 67 ml   Output             1500 ml   Net           201 67 ml       Physical Exam:     Physical Exam   Constitutional: She is oriented to person, place, and time  She appears well-nourished  No distress  OOB to chair    HENT:   Head: Normocephalic  Eyes: Pupils are equal, round, and reactive to light  Neck: Normal range of motion  Neck supple  Cardiovascular: Normal rate and regular rhythm  Pulmonary/Chest: Effort normal and breath sounds normal    Abdominal: Soft  Bowel sounds are normal    Neurological: She is alert and oriented to person, place, and time  Skin: Skin is warm  There is pallor  Psychiatric: She has a normal mood and affect  Nursing note reviewed  Additional Data:     Labs:      Results from last 7 days  Lab Units 08/14/18  0519 08/12/18  1137   WBC Thousand/uL 10 15 11 37*   HEMOGLOBIN g/dL 7 3* 12 8   HEMATOCRIT % 22 9* 38 9   PLATELETS Thousands/uL 116* 234   NEUTROS PCT %  --  73   LYMPHS PCT %  --  17   MONOS PCT %  --  9   EOS PCT %  --  0       Results from last 7 days  Lab Units 08/14/18  0519   SODIUM mmol/L 133*   POTASSIUM mmol/L 4 0   CHLORIDE mmol/L 101   CO2 mmol/L 24   BUN mg/dL 9   CREATININE mg/dL 0 60   CALCIUM mg/dL 8 0*   GLUCOSE RANDOM mg/dL 95       Results from last 7 days  Lab Units 08/12/18  1137   INR  1 05       * I Have Reviewed All Lab Data Listed Above      Recent Cultures (last 7 days):           Last 24 Hours Medication List:     Current Facility-Administered Medications:  acetaminophen 650 mg Oral Q6H PRN Yamilet Srinivasan MD    calcium carbonate 1,000 mg Oral Daily PRN Yamilet Srinivasan MD    docusate sodium 100 mg Oral BID Ashu Enciso    enoxaparin 30 mg Subcutaneous Daily Ashu Enciso    fenofibrate 145 mg Oral Daily Yamilet Srinivasan MD    gabapentin 100 mg Oral TID Yamilet Srinivasan MD    hydrALAZINE 5 mg Intravenous Q6H PRN Gary Gilliland MD    HYDROmorphone 0 2 mg Intravenous Q3H PRN Lyric Adame MD    lactated ringers 125 mL/hr Intravenous Continuous Raul Sneddon Last Rate: 125 mL/hr (08/14/18 0011)   losartan 25 mg Oral Daily Lyric Adame MD    methocarbamol 500 mg Oral Q6H PRN Lyric Adame MD    metoprolol tartrate 50 mg Oral BID Lyric Adame MD    ondansetron 4 mg Intravenous Q6H PRN Lyric Adame MD    ondansetron 4 mg Intravenous Q6H PRN Raul Sneddon    oxyCODONE 2 5 mg Oral Q4H PRN Lyric Adame MD    oxyCODONE 5 mg Oral Q4H PRN Lyric Adame MD    senna 1 tablet Oral HS PRN Lyric Adame MD    senna 1 tablet Oral Daily Ashu Enciso    sodium chloride 50 mL/hr Intravenous Continuous Lyric Adame MD Last Rate: 50 mL/hr (08/12/18 2302)        Today, Patient Was Seen By: ENID Shah    ** Please Note: Dragon 360 Dictation voice to text software may have been used in the creation of this document   **

## 2018-08-14 NOTE — DISCHARGE SUMMARY
Discharge Summary - Orthopedics   Jerardo Turner 80 y o  female MRN: [id number]  Unit/Bed#: CW3 340-01    Attending Physician: Lisa Mckeon    Admitting diagnosis: Injury [T14 90XA]  Pre-operative clearance [Z01 818]  Closed fracture of neck of left femur, initial encounter [facility]) [S72 002A]    Discharge diagnosis: Injury [T14 90XA]  Pre-operative clearance [Z01 818]  Closed fracture of neck of left femur, initial encounter ([facility] ) [S72 002A]    Date of admission: 8/12/2018    Date of discharge: 8/15/2018    Procedure: Left Hip Hemiarthroplasty      HPI & Hospital course:  80 y o  female who presented to the ED after a ffs sustaining a left femoral neck fracture   Pt was admitted to the orthopaedic service and taken to the OR on 8/13 for a left hip hemiarthroplasty  Prior to surgery the risks and benefits of surgery were explained and informed consent was obtained  Surgery went without complications and pt was discharged to the PACU in a stable condition and was transferred to the floor  On discharge date pt was cleared by PT and the medicine team and determined to be safe for discharge  Daily discussion was had with the patient, nursing staff, orthopaedic team, and family members if present  All questions were answered to the patients satisifaction  0  Lab Value Date/Time   HGB 12 8 08/12/2018 1137   HGB 12 3 11/24/2017 0812   HGB 12 3 11/14/2016 0736   HGB 11 2 (L) 09/07/2015 0600   HGB 10 4 (L) 09/05/2015 0607   HGB 11 9 09/03/2015 2246         Discharge Instructions:   · WBAT LLE  · Keep dressings clean and dry at all times   · Complete DVT prophylaxis as prescribed   · Physical therapy  · Maintain Posterior Hip Precautions  · Follow-up as scheduled, otherwise call for appt  Discharge Medications: For the complete list of discharge medications, please refer to the patient's medication reconciliation

## 2018-08-14 NOTE — PHYSICAL THERAPY NOTE
Physical Therapy Evaluation    Patient's Name:Selena Whittington    SFYXP'I Date:08/14/18    Patient Active Problem List   Diagnosis    Generalized osteoarthritis of multiple sites    Hyperlipidemia    Hypertension    Osteoarthrosis    Osteoporosis    Impacted cerumen of both ears    Acute cystitis with hematuria    Auditory complaints of both ears    Preoperative clearance    Hypertensive urgency    S/P hip hemiarthroplasty    Acute blood loss anemia       Past Medical History:   Diagnosis Date    CAD (coronary artery disease)     Hypertension     Osteoarthritis     Osteoporosis     Vitamin D deficiency        Past Surgical History:   Procedure Laterality Date    APPENDECTOMY      HYSTERECTOMY      OH PARTIAL HIP REPLACEMENT Left 8/13/2018    Procedure: HEMIARTHROPLASTY HIP (BIPOLAR); Surgeon: Marium Fontanez MD;  [facility];  Service: Orthopedics    ROTATOR CUFF REPAIR          08/14/18 1320   Pain Assessment   Pain Assessment FLACC   Pain Score No Pain   Hospital Pain Intervention(s) Repositioned; Ambulation/increased activity   Response to Interventions increased pain w mob   Pain Rating: FLACC (Rest) - Face 0   Pain Rating: FLACC (Rest) - Legs 0   Pain Rating: FLACC (Rest) - Activity 0   Pain Rating: FLACC (Rest) - Cry 0   Pain Rating: FLACC (Rest) - Consolability 0   Score: FLACC (Rest) 0   Home Living   Type of Home House   Home Layout One level;Stairs to enter with rails   Prior Function   Level of [place] Independent with ADLs and functional mobility   Lives With Alone   Receives Help From Family  (limited support)   Restrictions/Precautions   LLE Weight Bearing Per Order WBAT   Braces or Orthoses (has L knee brace for valgus deformity)   Other Precautions Pain; Fall Risk;Hard of hearing;WBS;THR   General   Family/Caregiver Present Yes   Cognition   Overall Cognitive Status WFL   Arousal/Participation Alert   Attention Within functional limits   Orientation Level Oriented to person;Oriented to place   Following Commands Follows one step commands with increased time or repetition   RUE Assessment   RUE Assessment WFL   LUE Assessment   LUE Assessment WFL   RLE Assessment   RLE Assessment X   Strength RLE   RLE Overall Strength 3+/5   LLE Assessment   LLE Assessment X   Strength LLE   LLE Overall Strength (hip fl 1/5 knee ext 2+/5 )   Coordination   Movements are Fluid and Coordinated 0   Coordination and Movement Description antalgic LE instability   Bed Mobility   Additional Comments bed mob NT - pt in chair on PT arrival   Transfers   Sit to Stand 3  Moderate assistance   Additional items Increased time required;Verbal cues   Stand to Sit 3  Moderate assistance   Additional items Increased time required;Verbal cues   Stand pivot 4  Minimal assistance   Additional items Increased time required;Verbal cues  (RW + assist w chair follow)   Ambulation/Elevation   Gait pattern Improper Weight shift; Antalgic;Decreased foot clearance;Decreased R stance; Short stride; Step to;Excessively slow   Gait Assistance 3  Moderate assist   Additional items Verbal cues; Tactile cues   Assistive Device Rolling walker   Distance 6 ft   Balance   Static Standing Fair -  (RW)   Dynamic Standing Poor +  (RW)   Endurance Deficit   Endurance Deficit Yes   Endurance Deficit Description antalgic LE instability   Activity Tolerance   Activity Tolerance Patient limited by pain; Patient limited by fatigue  (LE instability)   Nurse Made Aware yes   Assessment   Prognosis Good   Problem List Decreased strength;Decreased range of motion;Decreased endurance; Impaired balance;Decreased mobility; Decreased coordination;Decreased cognition; Impaired judgement;Decreased safety awareness; Impaired hearing;Pain;Orthopedic restrictions;Decreased skin integrity   Assessment Pt is a 80 y o  female admitted to [facility] on 8[facility]12/2018 s/p fall w/ L fem neck fx is  S/P hip hemiarthroplasty; WBAT LLE w poster DEBORAH precautions Pt exhibits significant impairments with weakness, decreased ROM, impaired balance, decreased endurance, impaired coordination, gait deviations, pain, decreased activity tolerance, decreased functional mobility tolerance, decreased safety awareness, impaired judgement, fall risk, orthopedic restrictions and decreased skin integrity; these impact independence with mobility, ADLs, and IADLs; requires mod assist w transf and amb using chair follow 6 ft using RW LLE WBAT- gait pattern antalgic decreased foot clearance, step length step to pattern walker reliant + falls risk; objective measures on the Barthel Index also reveal limitations;  therapy prognosis is impacted by relevant co morbidities as noted in evaluation; clinical presentation is currently unstable/unpredictable - pt is on cont pulse oximetry, presents w complex history and phys impairments as noted- a regression from baseline; extensive d/w pt's dtr and son -educ on mob safety and rehab; pt has a L knee brace for valgus deformity- son to bring in; PTA, pt was Independent with mobility lives alone, long term plan TBD skilled PT is indicated to to optimize functional independence and discharge planning; pending functional progress, PT recommendation at discharge is IP rehab    Goals   Patient Goals go home   STG Expiration Date 08/24/18   Short Term Goal #1  1  Modified Independent with Bed Mobility Rolling Right and Left     2  Supervision with Bed Mobility Supine-Sit     3  Minimum assist with Transfer Bed-Chair     4  Increase Dynamic Sitting Balance at least 1 Grade for improved stability with functional reach activities     5  Increase Dynamic Standing Balance at least 1 Grade for improved ease with Activities of Daily Living     6  Increase Lower Extremity Strength at least 1 Grade for improved ease mobility tasks     7   Minimum assist with  Ambulation 100 feet using RW LLE WBAT to facilitate home and community mobility   Plan   Treatment/Interventions Functional transfer training;LE strengthening/ROM; Elevations; Endurance training; Therapeutic exercise;Cognitive reorientation;Patient/family training;Equipment eval/education; Bed mobility;Gait training;Continued evaluation; Compensatory technique education;Spoke to nursing   PT Frequency (3-6x/wk)   Recommendation   Recommendation Post acute IP rehab   Equipment Recommended Walker   Barthel Index   Feeding 10   Bathing 0   Grooming Score 0   Dressing Score 5   Bladder Score 5   Bowels Score 10   Toilet Use Score 5   Transfers (Bed/Chair) Score 10   Mobility (Level Surface) Score 0   Stairs Score 0   Barthel Index Score 45

## 2018-08-15 VITALS
DIASTOLIC BLOOD PRESSURE: 52 MMHG | TEMPERATURE: 98.4 F | HEART RATE: 102 BPM | WEIGHT: 139.33 LBS | RESPIRATION RATE: 16 BRPM | BODY MASS INDEX: 27.21 KG/M2 | SYSTOLIC BLOOD PRESSURE: 140 MMHG | OXYGEN SATURATION: 98 %

## 2018-08-15 LAB
ANION GAP SERPL CALCULATED.3IONS-SCNC: 9 MMOL/L (ref 4–13)
BUN SERPL-MCNC: 14 MG/DL (ref 5–25)
CALCIUM SERPL-MCNC: 8.3 MG/DL (ref 8.3–10.1)
CHLORIDE SERPL-SCNC: 95 MMOL/L (ref 100–108)
CO2 SERPL-SCNC: 23 MMOL/L (ref 21–32)
CREAT SERPL-MCNC: 0.67 MG/DL (ref 0.6–1.3)
ERYTHROCYTE [DISTWIDTH] IN BLOOD BY AUTOMATED COUNT: 13.6 % (ref 11.6–15.1)
GFR SERPL CREATININE-BSD FRML MDRD: 77 ML/MIN/1.73SQ M
GLUCOSE SERPL-MCNC: 113 MG/DL (ref 65–140)
HCT VFR BLD AUTO: 23.4 % (ref 34.8–46.1)
HGB BLD-MCNC: 7.8 G/DL (ref 11.5–15.4)
MCH RBC QN AUTO: 30.7 PG (ref 26.8–34.3)
MCHC RBC AUTO-ENTMCNC: 33.3 G/DL (ref 31.4–37.4)
MCV RBC AUTO: 92 FL (ref 82–98)
PLATELET # BLD AUTO: 120 THOUSANDS/UL (ref 149–390)
PMV BLD AUTO: 10.8 FL (ref 8.9–12.7)
POTASSIUM SERPL-SCNC: 3.4 MMOL/L (ref 3.5–5.3)
RBC # BLD AUTO: 2.54 MILLION/UL (ref 3.81–5.12)
SODIUM SERPL-SCNC: 127 MMOL/L (ref 136–145)
WBC # BLD AUTO: 12.84 THOUSAND/UL (ref 4.31–10.16)

## 2018-08-15 PROCEDURE — 80048 BASIC METABOLIC PNL TOTAL CA: CPT | Performed by: ORTHOPAEDIC SURGERY

## 2018-08-15 PROCEDURE — 85027 COMPLETE CBC AUTOMATED: CPT | Performed by: ORTHOPAEDIC SURGERY

## 2018-08-15 RX ADMIN — FENOFIBRATE 145 MG: 145 TABLET ORAL at 09:23

## 2018-08-15 RX ADMIN — SENNOSIDES 8.6 MG: 8.6 TABLET, FILM COATED ORAL at 09:23

## 2018-08-15 RX ADMIN — ENOXAPARIN SODIUM 30 MG: 30 INJECTION SUBCUTANEOUS at 09:23

## 2018-08-15 RX ADMIN — GABAPENTIN 100 MG: 100 CAPSULE ORAL at 09:23

## 2018-08-15 RX ADMIN — DOCUSATE SODIUM 100 MG: 100 CAPSULE, LIQUID FILLED ORAL at 09:23

## 2018-08-15 RX ADMIN — LOSARTAN POTASSIUM 25 MG: 25 TABLET, FILM COATED ORAL at 09:23

## 2018-08-15 RX ADMIN — METOPROLOL TARTRATE 50 MG: 50 TABLET ORAL at 09:23

## 2018-08-15 NOTE — PLAN OF CARE
DISCHARGE PLANNING     Discharge to home or other facility with appropriate resources Adequate for Discharge        DISCHARGE PLANNING - CARE MANAGEMENT     Discharge to post-acute care or home with appropriate resources Adequate for Discharge        INFECTION - ADULT     Absence or prevention of progression during hospitalization Adequate for Discharge     Absence of fever/infection during neutropenic period Adequate for Discharge        Knowledge Deficit     Patient/family/caregiver demonstrates understanding of disease process, treatment plan, medications, and discharge instructions Adequate for Discharge        Nutrition/Hydration-ADULT     Nutrient/Hydration intake appropriate for improving, restoring or maintaining nutritional needs Adequate for Discharge        PAIN - ADULT     Verbalizes/displays adequate comfort level or baseline comfort level Adequate for Discharge        Potential for Falls     Patient will remain free of falls Adequate for Discharge        Prexisting or High Potential for Compromised Skin Integrity     Skin integrity is maintained or improved Adequate for Discharge        SAFETY ADULT     Maintain or return to baseline ADL function Adequate for Discharge     Maintain or return mobility status to optimal level Adequate for Discharge

## 2018-08-15 NOTE — SOCIAL WORK
PRAVEEN called and spoke to Delta at [facility], they will pick the patient up at 2:30pm, via [facility]  PRAVEEN notified patient, pt daughter, physician, facility and RN  Report# [phone], Fax# [phone]

## 2018-08-15 NOTE — SOCIAL WORK
[facility] able to accept for rehab  CM called pts son Iftikhar Winston, left message for callback regarding rehab

## 2018-08-15 NOTE — PROGRESS NOTES
Subjective: amanda lea       Objective:  Lab Results   Component Value Date/Time    WBC 12 84 (H) 08/15/2018 05:23 AM    WBC 0-5 11/24/2017 08:12 AM    WBC 8 7 11/24/2017 08:12 AM    HGB 7 8 (L) 08/15/2018 05:23 AM    HGB 12 3 11/24/2017 08:12 AM       Vitals:    08/15/18 0238   BP: 131/60   Pulse: 71   Resp: 18   Temp: 98 3 °F (36 8 °C)   SpO2: 97%     Left lower extremity  Dressing c/d/i  Motor & sensation grossly intact  Palpable DP pulse    Assessment: Post op from left hip hemiarthroplasty    Plan:  WBAT LLE  Posterior hip precautions  Pain control  DVT ppx  PT  Dispo

## 2018-08-26 DIAGNOSIS — M81.0 AGE-RELATED OSTEOPOROSIS WITHOUT CURRENT PATHOLOGICAL FRACTURE: ICD-10-CM

## 2018-08-27 RX ORDER — CALCITONIN SALMON 200 [IU]/.09ML
SPRAY, METERED NASAL
Qty: 1 ACT | Refills: 5 | Status: SHIPPED | OUTPATIENT
Start: 2018-08-27 | End: 2019-02-04 | Stop reason: SDUPTHER

## 2018-08-28 ENCOUNTER — OFFICE VISIT (OUTPATIENT)
Dept: OBGYN CLINIC | Facility: HOSPITAL | Age: 83
End: 2018-08-28

## 2018-08-28 ENCOUNTER — HOSPITAL ENCOUNTER (OUTPATIENT)
Dept: RADIOLOGY | Facility: HOSPITAL | Age: 83
Discharge: HOME | End: 2018-08-28
Attending: ORTHOPAEDIC SURGERY
Payer: COMMERCIAL

## 2018-08-28 VITALS
HEART RATE: 71 BPM | DIASTOLIC BLOOD PRESSURE: 64 MMHG | HEIGHT: 60 IN | SYSTOLIC BLOOD PRESSURE: 143 MMHG | BODY MASS INDEX: 27.29 KG/M2 | WEIGHT: 139 LBS

## 2018-08-28 DIAGNOSIS — Z09 FRACTURE FOLLOW-UP: ICD-10-CM

## 2018-08-28 DIAGNOSIS — Z09 FRACTURE FOLLOW-UP: Primary | ICD-10-CM

## 2018-08-28 PROCEDURE — 73502 X-RAY EXAM HIP UNI 2-3 VIEWS: CPT

## 2018-08-28 PROCEDURE — 99024 POSTOP FOLLOW-UP VISIT: CPT | Performed by: ORTHOPAEDIC SURGERY

## 2018-08-28 NOTE — PROGRESS NOTES
Subjective;    51-year-old adult female  2 weeks status post hemiarthroplasty left hip  She is accompanied by family at today's visit  She reports minimal if no pain from the left hip  She has been weight-bearing since her surgery left lower extremity  X-rays were obtained of the left hip on arrival to the office    Past Medical History:   Diagnosis Date    CAD (coronary artery disease)     Hypertension     Osteoarthritis     Osteoporosis     Vitamin D deficiency        Past Surgical History:   Procedure Laterality Date    APPENDECTOMY      HYSTERECTOMY      OH PARTIAL HIP REPLACEMENT Left 8/13/2018    Procedure: HEMIARTHROPLASTY HIP (BIPOLAR); Surgeon: Miguel Ellis MD;  [facility];  Service: Orthopedics    ROTATOR CUFF REPAIR         Family History   Problem Relation Age of Onset    Diabetes type II Father     Heart disease Brother        Social History   Substance Use Topics    Smoking status: Former Smoker    Smokeless tobacco: Never Used    Alcohol use No     Exam;    Her exam was performed standing with a female standby  She was able to maintain her balance with out buckling or fatigue of her hips and knees  She had a clean dry and intact staple line the staples were removed at this time and Steri-Strips applied  She has the absence of any gross discoloration she had no calf pain left lower extremity    X-rays; left hip hemiarthroplasty endoprosthetic replacement in excellent position  Impression;     First postoperative visit status post left hip hemiarthroplasty    Plan;    Staples removed today  Weight-bearing as tolerated left lower extremity  Physical therapy for ambulation and transfers  Next follow-up in 6 weeks    Also no need for dental antibiotic prophylaxis for this procedure    Her exam was supervised by me plan formulated by the attending surgeon, it was my privilege to assist him in its delivery

## 2018-09-05 ENCOUNTER — TRANSITIONAL CARE MANAGEMENT (OUTPATIENT)
Dept: INTERNAL MEDICINE CLINIC | Facility: CLINIC | Age: 83
End: 2018-09-05

## 2018-09-09 DIAGNOSIS — E78.00 PURE HYPERCHOLESTEROLEMIA: ICD-10-CM

## 2018-09-10 ENCOUNTER — TELEPHONE (OUTPATIENT)
Dept: INTERNAL MEDICINE CLINIC | Facility: CLINIC | Age: 83
End: 2018-09-10

## 2018-09-10 RX ORDER — FENOFIBRATE 160 MG/1
TABLET ORAL
Qty: 30 TABLET | Refills: 5 | Status: SHIPPED | OUTPATIENT
Start: 2018-09-10 | End: 2018-11-26 | Stop reason: SDUPTHER

## 2018-09-10 NOTE — TELEPHONE ENCOUNTER
Anthony Galvin called from [facility]  Patient was discharged from [facility] to personal care  Patient's son would like his mother discharged 9/11/18  Anthony Galvin would like a written discharge order from you, she states that the Personal care does not have a physician, and the patient's pcp has to ok a discharge for the patient

## 2018-09-12 ENCOUNTER — OFFICE VISIT (OUTPATIENT)
Dept: INTERNAL MEDICINE CLINIC | Facility: CLINIC | Age: 83
End: 2018-09-12
Payer: MEDICARE

## 2018-09-12 ENCOUNTER — PATIENT OUTREACH (OUTPATIENT)
Dept: INTERNAL MEDICINE CLINIC | Facility: CLINIC | Age: 83
End: 2018-09-12

## 2018-09-12 VITALS
HEIGHT: 60 IN | BODY MASS INDEX: 23.56 KG/M2 | OXYGEN SATURATION: 98 % | DIASTOLIC BLOOD PRESSURE: 58 MMHG | TEMPERATURE: 98.8 F | WEIGHT: 120 LBS | SYSTOLIC BLOOD PRESSURE: 114 MMHG | HEART RATE: 65 BPM

## 2018-09-12 DIAGNOSIS — Z09 FRACTURE FOLLOW-UP: ICD-10-CM

## 2018-09-12 DIAGNOSIS — I10 ESSENTIAL HYPERTENSION: ICD-10-CM

## 2018-09-12 DIAGNOSIS — Z87.81 S/P LEFT HIP FRACTURE: Primary | ICD-10-CM

## 2018-09-12 DIAGNOSIS — Z01.818 PREOPERATIVE CLEARANCE: ICD-10-CM

## 2018-09-12 DIAGNOSIS — D62 ACUTE BLOOD LOSS ANEMIA: ICD-10-CM

## 2018-09-12 DIAGNOSIS — D50.8 IRON DEFICIENCY ANEMIA SECONDARY TO INADEQUATE DIETARY IRON INTAKE: ICD-10-CM

## 2018-09-12 DIAGNOSIS — D50.8 OTHER IRON DEFICIENCY ANEMIA: ICD-10-CM

## 2018-09-12 DIAGNOSIS — E87.1 HYPONATREMIA: ICD-10-CM

## 2018-09-12 DIAGNOSIS — M15.9 GENERALIZED OSTEOARTHRITIS OF MULTIPLE SITES: ICD-10-CM

## 2018-09-12 PROCEDURE — 99495 TRANSJ CARE MGMT MOD F2F 14D: CPT | Performed by: INTERNAL MEDICINE

## 2018-09-12 RX ORDER — FERROUS SULFATE TAB EC 324 MG (65 MG FE EQUIVALENT) 324 (65 FE) MG
324 TABLET DELAYED RESPONSE ORAL
Qty: 30 TABLET | Refills: 4 | Status: SHIPPED | OUTPATIENT
Start: 2018-09-12

## 2018-09-12 NOTE — PROGRESS NOTES
Assessment/Plan:  Date and time hospital follow up call was made:  9/4/2018  4:03 PM  Hospital care reviewed:  Records reviewed  Patient was hopsitalized at: Other (comment)  Date of admission:  8/15/18  Date of discharge:  9/3/18  Diagnosis:  Left Femur fracture  Disposition:  Home  Scheduled for follow up?:  Yes  I have advised the patient to call PCP with any new or worsening symptoms (please type in name along with any credentials): Oliva Butler   Counseling:  Caregiver  Comments:  Patient appointment scheduled for 9/7/18         S/p left hip fracture   Patient is here today for transition of care visit  Patient recently sustained fracture to the left hip status post ORIF and admission to rehabilitation facility  Postoperative course was complicated by a postoperative anemia which is slowly normalizing, very slight hyponatremia  Patient did have intensive physical therapy, occupational therapy prior to discharge  Patient states in general she is feeling well and has minimal pain in the greater trochanter on the left occasionally  She is compromised in her ability to ambulate secondary to severe degenerative arthritis to the left knee and patient is continuing to walk with a walker for safety and stability and wears a brace to the left knee  Patient was transferred to home care but initiation of referral with the [facility] [employer] has not been made as of yet and patient will need that to continue physical therapy at home, occupational therapy and most likely will need a  also involved in her care  Concerns are with safety at home with her limited ability to ambulate secondary to the fracture but also the severe degenerative arthritis to the left knee  Patient's appetite is good and she states she is having no bowel problems  We did discuss continued medication as previously    She was placed on Fosamax in the nursing home but she has a history of not being able to tolerate this medication and has been on salmon  Calcitonin to help her with her osteoporosis  Patient will need to restart iron and prescription was sent to the pharmacy  Patient also will need again as noted referral to the [facility] for further treatment and evaluation in-home  We will be checking a CBC and the basic metabolic profile in the near future  Patient will return to the office in 3 weeks and her family was told if any new concerns or problems to please call  Also to call if they are not contacted by the [facility] by Friday morning her intake    Iron deficiency anemia secondary to inadequate dietary iron intake   Patient has not had her iron, CBC checked in a over 2 weeks  Her iron  Supplements were not continued  Patient will restart with [facility] daily and we will check a CBC in the near future and continue to monitor till patient has had normalization of her hemoglobin and hematocrit  Hypertension    Hypertension  This point time patient is to continue with present medication including losartan and metoprolol  The [facility] will be following her blood pressure readings and will call if any paroxysms of hyper or hypotension  Generalized osteoarthritis of multiple sites    Again as noted patient is status post left hip fracture but also has severe degenerative arthritis to the left knee which does impede her ambulation and but should higher risk for recurrent falls  I do have questions about the ability of the patient to live independently and we will discuss with the family again in the future making arrangements for further care in a personal care facility rather than at home  Diagnoses and all orders for this visit:    S/p left hip fracture  -     Ambulatory Referral to  [facility]; Future    Other iron deficiency anemia  -     ferrous sulfate 324 (65 Fe) mg;  Take 1 tablet (324 mg total) by mouth daily before breakfast  -     CBC and differential; Future    Hyponatremia  -     Basic metabolic panel; Future    Iron deficiency anemia secondary to inadequate dietary iron intake    Essential hypertension    Generalized osteoarthritis of multiple sites    Acute blood loss anemia    Fracture follow-up    Preoperative clearance          Subjective:      Patient ID: Pam Mazariegos is a 80 y o  female  Patient is a 25-year-old female with a history of multiple medical problems as outlined previously  Patient is here today for transition of care visit  Patient was recently hospitalized after fall at home, left hip fracture status post ORIF  Patient was in a  Skilled nursing facility for rehab and now has been transferred to home        The following portions of the patient's history were reviewed and updated as appropriate:   She  has a past medical history of CAD (coronary artery disease); Hypertension; Osteoarthritis; Osteoporosis; and Vitamin D deficiency  She   Patient Active Problem List    Diagnosis Date Noted    S/p left hip fracture 09/12/2018    Iron deficiency anemia secondary to inadequate dietary iron intake 09/12/2018    Fracture follow-up 08/28/2018    S/P hip hemiarthroplasty 08/14/2018    Acute blood loss anemia 08/14/2018    Preoperative clearance 08/12/2018    Hypertensive urgency 08/12/2018    Auditory complaints of both ears 07/30/2018    Acute cystitis with hematuria 05/15/2018    Impacted cerumen of both ears 04/10/2018    Generalized osteoarthritis of multiple sites 07/13/2015    Hyperlipidemia 08/23/2012    Hypertension 08/20/2012    Osteoarthrosis 08/20/2012    Osteoporosis 08/20/2012     She  has a past surgical history that includes Appendectomy; Hysterectomy; Rotator cuff repair; and pr partial hip replacement (Left, 8/13/2018)  Her family history includes Diabetes type II in her father; Heart disease in her brother  She  reports that she has quit smoking   She has never used smokeless tobacco  She reports that she does not drink alcohol or use drugs  Current Outpatient Prescriptions   Medication Sig Dispense Refill    aspirin 81 MG tablet Take 1 tablet by mouth daily      calcitonin, salmon, (MIACALCIN) 200 units/act nasal spray SPRAY 1 SPRAY INTO EACH NOSTRIL EVERY DAY 1 Act 5    Cyanocobalamin (VITAMIN B12 PO) Take 1 tablet by mouth daily      dorzolamide-timolol (COSOPT) 22 3-6 8 MG/ML ophthalmic solution INSTILL 1 DROP INTO LEFT EYE TWICE A DAY APPROXIMATELY 12 HOURS APART  2    fenofibrate (TRIGLIDE) 160 MG tablet TAKE 1 TABLET DAILY  30 tablet 5    Glucosamine HCl (GLUCOSAMINE PO) Take 1 capsule by mouth daily      latanoprost (XALATAN) 0 005 % ophthalmic solution INSTILL 1 DROP IN THE AFFECTED EYE(S) IN THE EVENING  1    losartan (COZAAR) 25 mg tablet Take 1 tablet (25 mg total) by mouth daily 30 tablet 1    metoprolol tartrate (LOPRESSOR) 50 mg tablet TAKE 1 TABLET TWICE DAILY 60 tablet 5    Multiple Vitamins-Minerals (DAILY MULTI PO) Take 1 tablet by mouth daily      Pyridoxine HCl (VITAMIN B6 PO) Take 1 tablet by mouth daily      ferrous sulfate 324 (65 Fe) mg Take 1 tablet (324 mg total) by mouth daily before breakfast 30 tablet 4     No current facility-administered medications for this visit  Current Outpatient Prescriptions on File Prior to Visit   Medication Sig    aspirin 81 MG tablet Take 1 tablet by mouth daily    calcitonin, salmon, (MIACALCIN) 200 units/act nasal spray SPRAY 1 SPRAY INTO EACH NOSTRIL EVERY DAY    Cyanocobalamin (VITAMIN B12 PO) Take 1 tablet by mouth daily    dorzolamide-timolol (COSOPT) 22 3-6 8 MG/ML ophthalmic solution INSTILL 1 DROP INTO LEFT EYE TWICE A DAY APPROXIMATELY 12 HOURS APART    fenofibrate (TRIGLIDE) 160 MG tablet TAKE 1 TABLET DAILY      Glucosamine HCl (GLUCOSAMINE PO) Take 1 capsule by mouth daily    latanoprost (XALATAN) 0 005 % ophthalmic solution INSTILL 1 DROP IN THE AFFECTED EYE(S) IN THE EVENING    losartan (COZAAR) 25 mg tablet Take 1 tablet (25 mg total) by mouth daily    metoprolol tartrate (LOPRESSOR) 50 mg tablet TAKE 1 TABLET TWICE DAILY    Multiple Vitamins-Minerals (DAILY MULTI PO) Take 1 tablet by mouth daily    Pyridoxine HCl (VITAMIN B6 PO) Take 1 tablet by mouth daily    [DISCONTINUED] oxyCODONE (ROXICODONE) 5 mg immediate release tablet Take 1 pill p o  Q 6 Hrs prn     No current facility-administered medications on file prior to visit  She has No Known Allergies       Review of Systems   Constitutional: Positive for activity change (  Patient is restricted in her activity level secondary to her arthritis both in the left hip and knee)  Negative for appetite change, chills, diaphoresis, fatigue, fever and unexpected weight change  HENT: Negative  Eyes: Negative  Respiratory: Negative  Cardiovascular: Positive for leg swelling ( some slight swellingTo the left lower extremity but not severe)  Negative for chest pain and palpitations  Gastrointestinal: Negative  Musculoskeletal: Positive for arthralgias and gait problem  Negative for back pain  Patient states that she does have some discomfort to the area of the greater trochanter on the left   Skin: Positive for wound ( patientHas good healing to the operative site to the left hip )  Negative for color change, pallor and rash  Allergic/Immunologic: Negative  Hematological: Negative  Psychiatric/Behavioral: Negative  Objective:      /58   Pulse 65   Temp 98 8 °F (37 1 °C)   Ht 5' (1 524 m)   Wt 54 4 kg (120 lb)   SpO2 98%   BMI 23 44 kg/m²          Physical Exam   Constitutional: She is oriented to person, place, and time  She appears well-developed and well-nourished  No distress  Very pleasant 80-year-old female who is awake alert in no acute distress and oriented x3  Severely antalgic gait walking with a walker with severe deformity to the left knee instability   HENT:   Head: Normocephalic and atraumatic  Right Ear: External ear normal    Left Ear: External ear normal    Nose: Nose normal    Mouth/Throat: Oropharynx is clear and moist  No oropharyngeal exudate  Eyes: Conjunctivae and EOM are normal  Pupils are equal, round, and reactive to light  Right eye exhibits no discharge  Left eye exhibits no discharge  No scleral icterus  Neck: Normal range of motion  Neck supple  No JVD present  No tracheal deviation present  No thyromegaly present  Cardiovascular: Normal rate, regular rhythm, normal heart sounds and intact distal pulses  Exam reveals no gallop and no friction rub  No murmur heard  Pulmonary/Chest: Effort normal and breath sounds normal  No stridor  No respiratory distress  She has no wheezes  She has no rales  She exhibits no tenderness  Abdominal: Soft  Bowel sounds are normal  She exhibits no distension and no mass  There is no tenderness  There is no rebound and no guarding  Musculoskeletal: She exhibits edema, tenderness and deformity  Patient does have some decreased range of motion with maneuvers to her left hip but only minimal and most pain with movement and ambulation  Patient has severe degenerative changes to the left knee with valgus deformity which is severe and instability wearing a knee brace  Patient has a trace edema to left lower extremity  Patient has diffuse degenerative changes to the hands and digits  Lymphadenopathy:     She has no cervical adenopathy  Neurological: She is alert and oriented to person, place, and time  No cranial nerve deficit  Coordination abnormal    Skin: Skin is warm and dry  No rash noted  She is not diaphoretic  No erythema  No pallor  We did check the incisional site to the left hip which is clean and dry with no evidence of infection  Patient does have some excoriation to the area of the buttocks but no decubitus   Psychiatric: She has a normal mood and affect   Her behavior is normal  Judgment and thought content normal    Nursing note and vitals reviewed

## 2018-09-12 NOTE — PROGRESS NOTES
Outreach TC to patient for Care Management HPI  Patient reports that she saw PCP this AM  Patient reports that her discomfort from L hip fx is minimal but patient continues to have difficulty w/ ambulation secondary to knee arthritis  Patient uses RW  Educated on BPCI/CM role/CM contact information  Agrees to additional calls

## 2018-09-12 NOTE — ASSESSMENT & PLAN NOTE
Hypertension  This point time patient is to continue with present medication including losartan and metoprolol  [facility] will be following her blood pressure readings and will call if any paroxysms of hyper or hypotension

## 2018-09-12 NOTE — ASSESSMENT & PLAN NOTE
Again as noted patient is status post left hip fracture but also has severe degenerative arthritis to the left knee which does impede her ambulation and but should higher risk for recurrent falls  I do have questions about the ability of the patient to live independently and we will discuss with the family again in the future making arrangements for further care in a personal care facility rather than at home

## 2018-09-12 NOTE — ASSESSMENT & PLAN NOTE
Patient has not had her iron, CBC checked in a over 2 weeks  Her iron  Supplements were not continued  Patient will restart with [facility] daily and we will check a CBC in the near future and continue to monitor till patient has had normalization of her hemoglobin and hematocrit

## 2018-09-12 NOTE — ASSESSMENT & PLAN NOTE
Patient is here today for transition of care visit  Patient recently sustained fracture to the left hip status post ORIF and admission to rehabilitation facility  Postoperative course was complicated by a postoperative anemia which is slowly normalizing, very slight hyponatremia  Patient did have intensive physical therapy, occupational therapy prior to discharge  Patient states in general she is feeling well and has minimal pain in the greater trochanter on the left occasionally  She is compromised in her ability to ambulate secondary to severe degenerative arthritis to the left knee and patient is continuing to walk with a walker for safety and stability and wears a brace to the left knee  Patient was transferred to home care but initiation of referral with the [facility] [employer] has not been made as of yet and patient will need that to continue physical therapy at home, occupational therapy and most likely will need a  also involved in her care  Concerns are with safety at home with her limited ability to ambulate secondary to the fracture but also the severe degenerative arthritis to the left knee  Patient's appetite is good and she states she is having no bowel problems  We did discuss continued medication as previously  She was placed on Fosamax in the nursing home but she has a history of not being able to tolerate this medication and has been on salmon  Calcitonin to help her with her osteoporosis  Patient will need to restart iron and prescription was sent to the pharmacy  Patient also will need again as noted referral to the [facility] for further treatment and evaluation in-home  We will be checking a CBC and the basic metabolic profile in the near future  Patient will return to the office in 3 weeks and her family was told if any new concerns or problems to please call    Also to call if they are not contacted by the [facility] by Friday morning her intake

## 2018-09-16 ENCOUNTER — LAB REQUISITION (OUTPATIENT)
Dept: LAB | Facility: HOSPITAL | Age: 83
End: 2018-09-16
Payer: MEDICARE

## 2018-09-16 DIAGNOSIS — D50.8 OTHER IRON DEFICIENCY ANEMIAS: ICD-10-CM

## 2018-09-16 DIAGNOSIS — E87.1 HYPO-OSMOLALITY AND HYPONATREMIA: ICD-10-CM

## 2018-09-16 LAB
ANION GAP SERPL CALCULATED.3IONS-SCNC: 6 MMOL/L (ref 4–13)
BASOPHILS # BLD AUTO: 0.09 THOUSANDS/ΜL (ref 0–0.1)
BASOPHILS NFR BLD AUTO: 1 % (ref 0–1)
BUN SERPL-MCNC: 25 MG/DL (ref 5–25)
CALCIUM SERPL-MCNC: 9.5 MG/DL (ref 8.3–10.1)
CHLORIDE SERPL-SCNC: 102 MMOL/L (ref 100–108)
CO2 SERPL-SCNC: 25 MMOL/L (ref 21–32)
CREAT SERPL-MCNC: 0.77 MG/DL (ref 0.6–1.3)
EOSINOPHIL # BLD AUTO: 0.22 THOUSAND/ΜL (ref 0–0.61)
EOSINOPHIL NFR BLD AUTO: 3 % (ref 0–6)
ERYTHROCYTE [DISTWIDTH] IN BLOOD BY AUTOMATED COUNT: 15.2 % (ref 11.6–15.1)
GFR SERPL CREATININE-BSD FRML MDRD: 67 ML/MIN/1.73SQ M
GLUCOSE P FAST SERPL-MCNC: 80 MG/DL (ref 65–99)
HCT VFR BLD AUTO: 33.6 % (ref 34.8–46.1)
HGB BLD-MCNC: 10.4 G/DL (ref 11.5–15.4)
IMM GRANULOCYTES # BLD AUTO: 0.04 THOUSAND/UL (ref 0–0.2)
IMM GRANULOCYTES NFR BLD AUTO: 1 % (ref 0–2)
LYMPHOCYTES # BLD AUTO: 1.9 THOUSANDS/ΜL (ref 0.6–4.47)
LYMPHOCYTES NFR BLD AUTO: 26 % (ref 14–44)
MCH RBC QN AUTO: 31.3 PG (ref 26.8–34.3)
MCHC RBC AUTO-ENTMCNC: 31 G/DL (ref 31.4–37.4)
MCV RBC AUTO: 101 FL (ref 82–98)
MONOCYTES # BLD AUTO: 1 THOUSAND/ΜL (ref 0.17–1.22)
MONOCYTES NFR BLD AUTO: 14 % (ref 4–12)
NEUTROPHILS # BLD AUTO: 3.95 THOUSANDS/ΜL (ref 1.85–7.62)
NEUTS SEG NFR BLD AUTO: 55 % (ref 43–75)
NRBC BLD AUTO-RTO: 0 /100 WBCS
PLATELET # BLD AUTO: 367 THOUSANDS/UL (ref 149–390)
PMV BLD AUTO: 11 FL (ref 8.9–12.7)
POTASSIUM SERPL-SCNC: 3.6 MMOL/L (ref 3.5–5.3)
RBC # BLD AUTO: 3.32 MILLION/UL (ref 3.81–5.12)
SODIUM SERPL-SCNC: 133 MMOL/L (ref 136–145)
WBC # BLD AUTO: 7.2 THOUSAND/UL (ref 4.31–10.16)

## 2018-09-16 PROCEDURE — 80048 BASIC METABOLIC PNL TOTAL CA: CPT | Performed by: INTERNAL MEDICINE

## 2018-09-16 PROCEDURE — 85025 COMPLETE CBC W/AUTO DIFF WBC: CPT | Performed by: INTERNAL MEDICINE

## 2018-09-23 DIAGNOSIS — I10 ESSENTIAL HYPERTENSION: ICD-10-CM

## 2018-09-24 RX ORDER — LOSARTAN POTASSIUM 25 MG/1
TABLET ORAL
Qty: 30 TABLET | Refills: 1 | Status: SHIPPED | OUTPATIENT
Start: 2018-09-24 | End: 2018-11-17 | Stop reason: SDUPTHER

## 2018-09-28 ENCOUNTER — PATIENT OUTREACH (OUTPATIENT)
Dept: INTERNAL MEDICINE CLINIC | Facility: CLINIC | Age: 83
End: 2018-09-28

## 2018-09-28 NOTE — PROGRESS NOTES
Outreach TC to patient's CG, son, Carmel Son  Of note this is the only contact number noted in the EMR  CG reported that he saw patient at her home after lunch today & patient was doing well  Patient has PT/OT in her home since D/C from inpatient rehab  Patient is ambulatory with a RW  Patient denies any pain, falls, numbness or tingling of LE  Patient uses a pre-filled med box for medication & has not had any difficulty w/ this  Patient has good appetite & denies any GI/ issues  Patient does reside alone but has family support for transportation, errands, shopping, and other requests as needed  Patient is AAO in all spheres  Patient denies any SOB, weakness, extreme fatigue  Patient has a f/u with PCP on 10/3  Educated CG on BPCI program, role of CM, contact information for CM  Agrees to additional calls  Educated on s/sx to report to provider  reviewed medications, future appointments  Verbalized understanding

## 2018-10-03 ENCOUNTER — OFFICE VISIT (OUTPATIENT)
Dept: INTERNAL MEDICINE CLINIC | Facility: CLINIC | Age: 83
End: 2018-10-03
Payer: MEDICARE

## 2018-10-03 VITALS
HEIGHT: 60 IN | WEIGHT: 118 LBS | SYSTOLIC BLOOD PRESSURE: 154 MMHG | HEART RATE: 64 BPM | TEMPERATURE: 98.8 F | OXYGEN SATURATION: 98 % | BODY MASS INDEX: 23.16 KG/M2 | DIASTOLIC BLOOD PRESSURE: 58 MMHG

## 2018-10-03 DIAGNOSIS — E78.01 FAMILIAL HYPERCHOLESTEROLEMIA: ICD-10-CM

## 2018-10-03 DIAGNOSIS — Z23 NEED FOR INFLUENZA VACCINATION: Primary | ICD-10-CM

## 2018-10-03 DIAGNOSIS — Z96.649 S/P HIP HEMIARTHROPLASTY: ICD-10-CM

## 2018-10-03 DIAGNOSIS — I10 ESSENTIAL HYPERTENSION: ICD-10-CM

## 2018-10-03 DIAGNOSIS — M81.0 AGE-RELATED OSTEOPOROSIS WITHOUT CURRENT PATHOLOGICAL FRACTURE: ICD-10-CM

## 2018-10-03 PROCEDURE — 90662 IIV NO PRSV INCREASED AG IM: CPT | Performed by: INTERNAL MEDICINE

## 2018-10-03 PROCEDURE — G0008 ADMIN INFLUENZA VIRUS VAC: HCPCS | Performed by: INTERNAL MEDICINE

## 2018-10-03 PROCEDURE — 99214 OFFICE O/P EST MOD 30 MIN: CPT | Performed by: INTERNAL MEDICINE

## 2018-10-03 NOTE — ASSESSMENT & PLAN NOTE
Patient has a slight increased blood pressure reading today in the office  We do worry because she does have a sensitivity the medication and as noted 1 month ago blood pressure was actually low  At this point patient will continue present treatment and we will continue to monitor blood pressure and make adjustments to medication as needed

## 2018-10-03 NOTE — PROGRESS NOTES
Assessment/Plan:    S/P hip hemiarthroplasty  Patient is status post hip hemiarthroplasty for fracture after accidental fall of the left hip  Patient apparently is still going to physical therapy after hospital stay and rehab  Slowly she is improving but 1 of the because limitations she has at this point time is severe degenerative arthritis to the knee  Patient will be seeing Orthopedics in the near future for re-evaluation  Of her knee and reassessment of her hip  Patient has minimal pain in the hip with ambulation and physical therapy    Hypertension  Patient has a slight increased blood pressure reading today in the office  We do worry because she does have a sensitivity the medication and as noted 1 month ago blood pressure was actually low  At this point patient will continue present treatment and we will continue to monitor blood pressure and make adjustments to medication as needed  Hyperlipidemia  Patient has a history of hyperlipidemia  Patient has been intolerant to medications in the past other than the fenofibrate for her elevated cholesterol levels  We will continue to monitor her cholesterol and make adjustments to treatment is needed  She states that she does not always follow her diet correctly    Osteoporosis  Patient has a longstanding history of osteoporosis  We have tried many different treatments in the past and patient did have side effects from all medication except for the Miacalcin nasal spray    We will discuss with the patient with her next visit whether she would be acceptable to trying injections of Prolia       Diagnoses and all orders for this visit:    Need for influenza vaccination  -     influenza vaccine, 4356-1222, high-dose, PF 0 5 mL, for patients 72 yr+ (FLUZONE HIGH-DOSE)    S/P hip hemiarthroplasty    Essential hypertension    Familial hypercholesterolemia    Age-related osteoporosis without current pathological fracture          Subjective:      Patient ID: Lisa Dueñas is a 80 y o  female  Patient is a 80-year-old female with history of multiple medical problems in the past   Patient recently had a fall fracture of her left hip status post ORIF and nursing home evaluation and rehab  Patient is here today for follow-up after a transition care visit 3 weeks ago  Patient states she is slowly getting stronger her appetite is back to normal   She still having problems with her instability to her knee which inhibits her ambulation  She has minimal pain in the hip        The following portions of the patient's history were reviewed and updated as appropriate:   She  has a past medical history of CAD (coronary artery disease); Hypertension; Osteoarthritis; Osteoporosis; and Vitamin D deficiency  She   Patient Active Problem List    Diagnosis Date Noted    S/p left hip fracture 09/12/2018    Iron deficiency anemia secondary to inadequate dietary iron intake 09/12/2018    Fracture follow-up 08/28/2018    S/P hip hemiarthroplasty 08/14/2018    Acute blood loss anemia 08/14/2018    Preoperative clearance 08/12/2018    Hypertensive urgency 08/12/2018    Auditory complaints of both ears 07/30/2018    Acute cystitis with hematuria 05/15/2018    Impacted cerumen of both ears 04/10/2018    Generalized osteoarthritis of multiple sites 07/13/2015    Hyperlipidemia 08/23/2012    Hypertension 08/20/2012    Osteoarthrosis 08/20/2012    Osteoporosis 08/20/2012     She  has a past surgical history that includes Appendectomy; Hysterectomy; Rotator cuff repair; and pr partial hip replacement (Left, 8/13/2018)  Her family history includes Diabetes type II in her father; Heart disease in her brother  She  reports that she has quit smoking  She has never used smokeless tobacco  She reports that she does not drink alcohol or use drugs    Current Outpatient Prescriptions   Medication Sig Dispense Refill    aspirin 81 MG tablet Take 1 tablet by mouth daily      calcitonin, salmon, (MIACALCIN) 200 units/act nasal spray SPRAY 1 SPRAY INTO EACH NOSTRIL EVERY DAY 1 Act 5    Cyanocobalamin (VITAMIN B12 PO) Take 1 tablet by mouth daily      dorzolamide-timolol (COSOPT) 22 3-6 8 MG/ML ophthalmic solution INSTILL 1 DROP INTO LEFT EYE TWICE A DAY APPROXIMATELY 12 HOURS APART  2    fenofibrate (TRIGLIDE) 160 MG tablet TAKE 1 TABLET DAILY  30 tablet 5    ferrous sulfate 324 (65 Fe) mg Take 1 tablet (324 mg total) by mouth daily before breakfast 30 tablet 4    Glucosamine HCl (GLUCOSAMINE PO) Take 1 capsule by mouth daily      latanoprost (XALATAN) 0 005 % ophthalmic solution INSTILL 1 DROP IN THE AFFECTED EYE(S) IN THE EVENING  1    losartan (COZAAR) 25 mg tablet TAKE 1 TABLET BY MOUTH EVERY DAY 30 tablet 1    metoprolol tartrate (LOPRESSOR) 50 mg tablet TAKE 1 TABLET TWICE DAILY 60 tablet 5    Multiple Vitamins-Minerals (DAILY MULTI PO) Take 1 tablet by mouth daily      Pyridoxine HCl (VITAMIN B6 PO) Take 1 tablet by mouth daily       No current facility-administered medications for this visit  Current Outpatient Prescriptions on File Prior to Visit   Medication Sig    aspirin 81 MG tablet Take 1 tablet by mouth daily    calcitonin, salmon, (MIACALCIN) 200 units/act nasal spray SPRAY 1 SPRAY INTO EACH NOSTRIL EVERY DAY    Cyanocobalamin (VITAMIN B12 PO) Take 1 tablet by mouth daily    dorzolamide-timolol (COSOPT) 22 3-6 8 MG/ML ophthalmic solution INSTILL 1 DROP INTO LEFT EYE TWICE A DAY APPROXIMATELY 12 HOURS APART    fenofibrate (TRIGLIDE) 160 MG tablet TAKE 1 TABLET DAILY      ferrous sulfate 324 (65 Fe) mg Take 1 tablet (324 mg total) by mouth daily before breakfast    Glucosamine HCl (GLUCOSAMINE PO) Take 1 capsule by mouth daily    latanoprost (XALATAN) 0 005 % ophthalmic solution INSTILL 1 DROP IN THE AFFECTED EYE(S) IN THE EVENING    losartan (COZAAR) 25 mg tablet TAKE 1 TABLET BY MOUTH EVERY DAY    metoprolol tartrate (LOPRESSOR) 50 mg tablet TAKE 1 TABLET TWICE DAILY    Multiple Vitamins-Minerals (DAILY MULTI PO) Take 1 tablet by mouth daily    Pyridoxine HCl (VITAMIN B6 PO) Take 1 tablet by mouth daily     No current facility-administered medications on file prior to visit  She has No Known Allergies       Review of Systems   Constitutional: Positive for activity change (Patient is trying slowly to increase her activity level which is difficult secondary to ambulatory dysfunction)  Negative for appetite change, chills, diaphoresis, fatigue, fever and unexpected weight change  HENT: Negative  Eyes: Negative  Respiratory: Negative  Cardiovascular: Negative  Gastrointestinal: Negative  Endocrine: Negative  Genitourinary: Negative  Musculoskeletal: Positive for arthralgias and gait problem  Negative for back pain, joint swelling, myalgias, neck pain and neck stiffness  Skin: Negative  Allergic/Immunologic: Negative  Neurological: Negative for dizziness, tremors, seizures, syncope, facial asymmetry, speech difficulty, weakness, light-headedness, numbness and headaches  Hematological: Negative  Psychiatric/Behavioral: Negative  Objective:      /58   Pulse 64   Temp 98 8 °F (37 1 °C)   Ht 5' (1 524 m)   Wt 53 5 kg (118 lb)   SpO2 98%   BMI 23 05 kg/m²          Physical Exam   Constitutional: She is oriented to person, place, and time  She appears well-developed and well-nourished  No distress  Pleasant, cheerful debilitated 49-year-old female who is awake alert walking with a walker, brace on her right knee   HENT:   Head: Normocephalic and atraumatic  Right Ear: External ear normal    Left Ear: External ear normal    Nose: Nose normal    Mouth/Throat: Oropharynx is clear and moist  No oropharyngeal exudate  Eyes: Pupils are equal, round, and reactive to light  Conjunctivae and EOM are normal  Right eye exhibits no discharge  Left eye exhibits no discharge  No scleral icterus  Neck: Normal range of motion  Neck supple  No JVD present  No tracheal deviation present  No thyromegaly present  Cardiovascular: Normal rate, regular rhythm, normal heart sounds and intact distal pulses  Exam reveals no gallop and no friction rub  No murmur heard  Pulmonary/Chest: Effort normal and breath sounds normal  No stridor  No respiratory distress  She has no wheezes  She has no rales  She exhibits no tenderness  Abdominal: Soft  Bowel sounds are normal  She exhibits no distension and no mass  There is no tenderness  There is no rebound and no guarding  Musculoskeletal: She exhibits tenderness and deformity  She exhibits no edema  Severe degenerative changes especially noted to the knee which is in a brace  Patient has degenerative changes to the hands and digits and no new acute changes noted from previous exam    Lymphadenopathy:     She has no cervical adenopathy  Neurological: She is alert and oriented to person, place, and time  No cranial nerve deficit  Skin: Skin is warm and dry  She is not diaphoretic  Psychiatric: She has a normal mood and affect  Her behavior is normal  Judgment and thought content normal    Nursing note and vitals reviewed

## 2018-10-03 NOTE — ASSESSMENT & PLAN NOTE
Patient has a history of hyperlipidemia  Patient has been intolerant to medications in the past other than the fenofibrate for her elevated cholesterol levels  We will continue to monitor her cholesterol and make adjustments to treatment is needed    She states that she does not always follow her diet correctly

## 2018-10-03 NOTE — ASSESSMENT & PLAN NOTE
Patient has a longstanding history of osteoporosis  We have tried many different treatments in the past and patient did have side effects from all medication except for the Miacalcin nasal spray    We will discuss with the patient with her next visit whether she would be acceptable to trying injections of Prolia

## 2018-10-16 ENCOUNTER — OFFICE VISIT (OUTPATIENT)
Dept: OBGYN CLINIC | Facility: HOSPITAL | Age: 83
End: 2018-10-16
Payer: MEDICARE

## 2018-10-16 VITALS
BODY MASS INDEX: 23.16 KG/M2 | HEIGHT: 60 IN | HEART RATE: 69 BPM | WEIGHT: 118 LBS | SYSTOLIC BLOOD PRESSURE: 139 MMHG | DIASTOLIC BLOOD PRESSURE: 74 MMHG

## 2018-10-16 DIAGNOSIS — M25.362 UNSTABLE KNEE, LEFT: ICD-10-CM

## 2018-10-16 DIAGNOSIS — M65.331 TRIGGER MIDDLE FINGER OF RIGHT HAND: ICD-10-CM

## 2018-10-16 DIAGNOSIS — Z96.642 HISTORY OF LEFT HIP HEMIARTHROPLASTY: Primary | ICD-10-CM

## 2018-10-16 PROCEDURE — 99212 OFFICE O/P EST SF 10 MIN: CPT | Performed by: ORTHOPAEDIC SURGERY

## 2018-10-16 PROCEDURE — 20550 NJX 1 TENDON SHEATH/LIGAMENT: CPT | Performed by: ORTHOPAEDIC SURGERY

## 2018-10-16 RX ORDER — BETAMETHASONE SODIUM PHOSPHATE AND BETAMETHASONE ACETATE 3; 3 MG/ML; MG/ML
3 INJECTION, SUSPENSION INTRA-ARTICULAR; INTRALESIONAL; INTRAMUSCULAR; SOFT TISSUE
Status: COMPLETED | OUTPATIENT
Start: 2018-10-16 | End: 2018-10-16

## 2018-10-16 RX ORDER — LIDOCAINE HYDROCHLORIDE 10 MG/ML
0.5 INJECTION, SOLUTION INFILTRATION; PERINEURAL
Status: COMPLETED | OUTPATIENT
Start: 2018-10-16 | End: 2018-10-16

## 2018-10-16 RX ADMIN — BETAMETHASONE SODIUM PHOSPHATE AND BETAMETHASONE ACETATE 3 MG: 3; 3 INJECTION, SUSPENSION INTRA-ARTICULAR; INTRALESIONAL; INTRAMUSCULAR; SOFT TISSUE at 09:07

## 2018-10-16 RX ADMIN — LIDOCAINE HYDROCHLORIDE 0.5 ML: 10 INJECTION, SOLUTION INFILTRATION; PERINEURAL at 09:07

## 2018-10-16 NOTE — PROGRESS NOTES
Assessment:  1  History of left hip hemiarthroplasty      8/13/18   2  Trigger middle finger of right hand  Hand/upper extremity injection       Plan:  Doing well 2 months s/p left hip hemiarthroplasty and doing well in regards to her hip recovery  Her left knee valgus deformity with gross laxity recommend a custom knee brace  WBAT with use of walker  activities as tolerated  Compression stockings as needed but not mandated  Can begin to ease on her hip precautions  Right long (3rd) trigger finger injected with cortisone today  This patient has tried off-the-shelf hinged knee braces without any significant success  She is very slender leg in the brace tends to slide beyond her knee  On the basis of this, a custom knee brace for valgus stability with incorporation of the foot is ordered on her behalf    To do next visit:  Return in about 6 weeks (around 11/27/2018) for re-check  Scribe Attestation    I,:   Sindhu Johnson am acting as a scribe while in the presence of the attending physician :        I,:   Saad Bennett MD personally performed the services described in this documentation    as scribed in my presence :              Subjective:   Esthela Graves is a 80 y o  female who presents 2 months s/p left hip hemiarthroplasty due to fracture  Her hip recovery is doing well however continues to have instability at her left knee  She has been wearing a hinged knee brace however it is not fitting well  She presents using a walker  This patient has considerable weight-bearing pain in the left knee in addition to associated giving way  She has been getting home therapy  Her son states that they called the office a few weeks ago for a noose prescription for hinged knee brace and never got a phone call back from the [facility]  Also she has a right long trigger finger  She has to pry her finger open at times         Review of systems negative unless otherwise specified in HPI    Past Medical History: Diagnosis Date    CAD (coronary artery disease)     Hypertension     Osteoarthritis     Osteoporosis     Vitamin D deficiency        Past Surgical History:   Procedure Laterality Date    APPENDECTOMY      HYSTERECTOMY      MA PARTIAL HIP REPLACEMENT Left 8/13/2018    Procedure: HEMIARTHROPLASTY HIP (BIPOLAR); Surgeon: Malik Marques MD;  Location: [facility];  Service: Orthopedics    ROTATOR CUFF REPAIR         Family History   Problem Relation Age of Onset    Diabetes type II Father     Heart disease Brother        Social History     Occupational History    Not on file  Social History Main Topics    Smoking status: Former Smoker    Smokeless tobacco: Never Used    Alcohol use No    Drug use: No    Sexual activity: Not on file         Current Outpatient Prescriptions:     aspirin 81 MG tablet, Take 1 tablet by mouth daily, Disp: , Rfl:     calcitonin, salmon, (MIACALCIN) 200 units/act nasal spray, SPRAY 1 SPRAY INTO EACH NOSTRIL EVERY DAY, Disp: 1 Act, Rfl: 5    Cyanocobalamin (VITAMIN B12 PO), Take 1 tablet by mouth daily, Disp: , Rfl:     dorzolamide-timolol (COSOPT) 22 3-6 8 MG/ML ophthalmic solution, INSTILL 1 DROP INTO LEFT EYE TWICE A DAY APPROXIMATELY 12 HOURS APART, Disp: , Rfl: 2    fenofibrate (TRIGLIDE) 160 MG tablet, TAKE 1 TABLET DAILY  , Disp: 30 tablet, Rfl: 5    ferrous sulfate 324 (65 Fe) mg, Take 1 tablet (324 mg total) by mouth daily before breakfast, Disp: 30 tablet, Rfl: 4    Glucosamine HCl (GLUCOSAMINE PO), Take 1 capsule by mouth daily, Disp: , Rfl:     latanoprost (XALATAN) 0 005 % ophthalmic solution, INSTILL 1 DROP IN THE AFFECTED EYE(S) IN THE EVENING, Disp: , Rfl: 1    losartan (COZAAR) 25 mg tablet, TAKE 1 TABLET BY MOUTH EVERY DAY, Disp: 30 tablet, Rfl: 1    metoprolol tartrate (LOPRESSOR) 50 mg tablet, TAKE 1 TABLET TWICE DAILY, Disp: 60 tablet, Rfl: 5    Multiple Vitamins-Minerals (DAILY MULTI PO), Take 1 tablet by mouth daily, Disp: , Rfl:    Pyridoxine HCl (VITAMIN B6 PO), Take 1 tablet by mouth daily, Disp: , Rfl:     No Known Allergies         Vitals:    10/16/18 0847   BP: 139/74   Pulse: 69       Objective:          Physical Exam                    Left Knee Exam     Tenderness   The patient is experiencing tenderness in the lateral joint line  Range of Motion   The patient has normal left knee ROM  Muscle Strength     The patient has normal left knee strength  Other   Sensation: normal  Swelling: mild  Effusion: no effusion present    Comments:    Valgus deformity with gross laxity that can be passively corrected to near neutral           Left Hip Exam     Tenderness   The patient is experiencing no tenderness  Other   Erythema: absent  Sensation: normal    Comments:  Healed incision  Good PROM in all planes without pain  Fairly good hip strength      Right Hand Exam     Comments:    Skin intact, good sensation to light touch  Tenderness to palpation over the A1 pulley of the long finger  Triggering, nodule and crepitus with range of motion of her long finger              Diagnostics, reviewed and taken today if performed as documented:    None performed          Procedures, if performed today:  Hand/upper extremity injection  Date/Time: 10/16/2018 9:07 AM  Consent given by: patient  Site marked: site marked  Supporting Documentation  Indications: diagnostic, pain and tendon swelling   Procedure Details  Condition:trigger finger Location: long finger - R long A1   Preparation: Patient was prepped and draped in the usual sterile fashion  Needle size: 25 G  Ultrasound guidance: no  Approach: volar  Medications administered: 0 5 mL lidocaine 1 %; 3 mg betamethasone acetate-betamethasone sodium phosphate 6 (3-3) mg/mL  Patient tolerance: patient tolerated the procedure well with no immediate complications  Dressing:  Sterile dressing applied              Portions of the record may have been created with voice recognition software   Occasional wrong word or "sound a like" substitutions may have occurred due to the inherent limitations of voice recognition software   Read the chart carefully and recognize, using context, where substitutions have occurred

## 2018-10-22 ENCOUNTER — OFFICE VISIT (OUTPATIENT)
Dept: INTERNAL MEDICINE CLINIC | Facility: CLINIC | Age: 83
End: 2018-10-22
Payer: MEDICARE

## 2018-10-22 DIAGNOSIS — N39.0 URINARY TRACT INFECTION WITHOUT HEMATURIA, SITE UNSPECIFIED: ICD-10-CM

## 2018-10-22 DIAGNOSIS — N30.01 ACUTE CYSTITIS WITH HEMATURIA: ICD-10-CM

## 2018-10-22 LAB
SL AMB  POCT GLUCOSE, UA: NORMAL
SL AMB LEUKOCYTE ESTERASE,UA: POSITIVE
SL AMB POCT BILIRUBIN,UA: NEGATIVE
SL AMB POCT BLOOD,UA: ABNORMAL
SL AMB POCT CLARITY,UA: ABNORMAL
SL AMB POCT COLOR,UA: YELLOW
SL AMB POCT KETONES,UA: NEGATIVE
SL AMB POCT NITRITE,UA: POSITIVE
SL AMB POCT PH,UA: 6
SL AMB POCT SPECIFIC GRAVITY,UA: 1.01
SL AMB POCT URINE PROTEIN: ABNORMAL
SL AMB POCT UROBILINOGEN: NORMAL

## 2018-10-22 PROCEDURE — 81002 URINALYSIS NONAUTO W/O SCOPE: CPT | Performed by: INTERNAL MEDICINE

## 2018-10-22 PROCEDURE — 99213 OFFICE O/P EST LOW 20 MIN: CPT | Performed by: INTERNAL MEDICINE

## 2018-10-22 RX ORDER — SULFAMETHOXAZOLE AND TRIMETHOPRIM 800; 160 MG/1; MG/1
1 TABLET ORAL EVERY 12 HOURS SCHEDULED
Qty: 6 TABLET | Refills: 0 | Status: SHIPPED | OUTPATIENT
Start: 2018-10-22 | End: 2018-10-25

## 2018-10-22 RX ORDER — PHENAZOPYRIDINE HYDROCHLORIDE 100 MG/1
100 TABLET, FILM COATED ORAL 3 TIMES DAILY PRN
Qty: 10 TABLET | Refills: 0 | Status: SHIPPED | OUTPATIENT
Start: 2018-10-22 | End: 2018-10-24

## 2018-10-22 NOTE — PROGRESS NOTES
Assessment/Plan:    #UTI  -reports 2 days of urinary frequency and burning along with pelvic pressure  -denies fevers, N/V, lower back pain  -UA positive for nitrites, leukocytes, hematuria  -will start on bactrim and pyridium for 3 days  -patient to call back if fever develops or symptoms worsen    #Left Hip Replacement  -currently using a walker to ambulate  -reports participating with PT without issues      For Comprehensive medical record please refer to progress note from 10/03/2018  No problem-specific Assessment & Plan notes found for this encounter  Diagnoses and all orders for this visit:    Urinary tract infection without hematuria, site unspecified  -     POCT urine dip  -     phenazopyridine (PYRIDIUM) 100 mg tablet; Take 1 tablet (100 mg total) by mouth 3 (three) times a day as needed for bladder spasms for up to 2 days  -     sulfamethoxazole-trimethoprim (BACTRIM DS) 800-160 mg per tablet; Take 1 tablet by mouth every 12 (twelve) hours for 3 days    Acute cystitis with hematuria            Current Outpatient Prescriptions:     aspirin 81 MG tablet, Take 1 tablet by mouth daily, Disp: , Rfl:     calcitonin, salmon, (MIACALCIN) 200 units/act nasal spray, SPRAY 1 SPRAY INTO EACH NOSTRIL EVERY DAY, Disp: 1 Act, Rfl: 5    Cyanocobalamin (VITAMIN B12 PO), Take 1 tablet by mouth daily, Disp: , Rfl:     dorzolamide-timolol (COSOPT) 22 3-6 8 MG/ML ophthalmic solution, INSTILL 1 DROP INTO LEFT EYE TWICE A DAY APPROXIMATELY 12 HOURS APART, Disp: , Rfl: 2    fenofibrate (TRIGLIDE) 160 MG tablet, TAKE 1 TABLET DAILY  , Disp: 30 tablet, Rfl: 5    ferrous sulfate 324 (65 Fe) mg, Take 1 tablet (324 mg total) by mouth daily before breakfast, Disp: 30 tablet, Rfl: 4    Glucosamine HCl (GLUCOSAMINE PO), Take 1 capsule by mouth daily, Disp: , Rfl:     latanoprost (XALATAN) 0 005 % ophthalmic solution, INSTILL 1 DROP IN THE AFFECTED EYE(S) IN THE EVENING, Disp: , Rfl: 1    losartan (COZAAR) 25 mg tablet, TAKE 1 TABLET BY MOUTH EVERY DAY, Disp: 30 tablet, Rfl: 1    metoprolol tartrate (LOPRESSOR) 50 mg tablet, TAKE 1 TABLET TWICE DAILY, Disp: 60 tablet, Rfl: 5    Multiple Vitamins-Minerals (DAILY MULTI PO), Take 1 tablet by mouth daily, Disp: , Rfl:     phenazopyridine (PYRIDIUM) 100 mg tablet, Take 1 tablet (100 mg total) by mouth 3 (three) times a day as needed for bladder spasms for up to 2 days, Disp: 10 tablet, Rfl: 0    Pyridoxine HCl (VITAMIN B6 PO), Take 1 tablet by mouth daily, Disp: , Rfl:     sulfamethoxazole-trimethoprim (BACTRIM DS) 800-160 mg per tablet, Take 1 tablet by mouth every 12 (twelve) hours for 3 days, Disp: 6 tablet, Rfl: 0    Subjective:      Patient ID: Lisa Dueñas is a 80 y o  female  HPI     Patient presents as an acute visit  Reports that she has 2 days of burning urination with frequency  She states that she had similar symptoms in the past in August 2018  She was given 3 days of Bactrim and 3 days of Pyridium for urinary spasms which helped  We will start her on the same regimen at this time  Her UA reveals that she has positive leukocytes as well as nitrates with mild hematuria  Patient denies any fevers, denies any nausea, vomiting, bowel issues  She reports that she recently underwent a left partial hip replacement and is currently participating with physical therapy without any issues  She is using a walker for ambulation and for support  Patient denies any further symptoms and states she feels well except for the urinary frequency and burning at this time  The following portions of the patient's history were reviewed and updated as appropriate: allergies, current medications, past family history, past medical history, past social history, past surgical history and problem list     Review of Systems   Constitutional: Negative for activity change, appetite change, chills, fatigue and fever     HENT: Negative for congestion, ear pain, facial swelling, hearing loss, sore throat, tinnitus and trouble swallowing  Eyes: Negative for photophobia and visual disturbance  Respiratory: Negative for cough, shortness of breath and wheezing  Cardiovascular: Negative for chest pain and leg swelling  Gastrointestinal: Negative for abdominal distention, abdominal pain, blood in stool, nausea and vomiting  Genitourinary: Positive for dysuria, pelvic pain and urgency  Negative for difficulty urinating, flank pain and hematuria  Musculoskeletal: Positive for gait problem (ambulate with walker)  Negative for arthralgias and joint swelling  Skin: Negative for rash and wound  Neurological: Negative for dizziness, tremors, light-headedness and headaches  Objective: There were no vitals taken for this visit  Physical Exam   Constitutional: She is oriented to person, place, and time  She appears well-developed and well-nourished  HENT:   Head: Normocephalic and atraumatic  Right Ear: External ear normal    Left Ear: External ear normal    Nose: Nose normal    Mouth/Throat: Oropharynx is clear and moist    Eyes: Pupils are equal, round, and reactive to light  Conjunctivae and EOM are normal    Neck: Normal range of motion  Neck supple  No JVD present  No thyromegaly present  Cardiovascular: Normal rate, regular rhythm, normal heart sounds and intact distal pulses  No murmur heard  Pulmonary/Chest: Effort normal and breath sounds normal  No stridor  No respiratory distress  She has no wheezes  Abdominal: Soft  Bowel sounds are normal  She exhibits no distension  There is no tenderness  There is no rebound and no guarding  Musculoskeletal: She exhibits no edema or tenderness  Ambulate with walker  Left knee in brace due to osteoarthritis  Left hip mildly tender post surgery   Lymphadenopathy:     She has no cervical adenopathy  Neurological: She is alert and oriented to person, place, and time  She has normal reflexes     Skin: Skin is warm  No rash noted  No erythema  Vitals reviewed

## 2018-11-12 ENCOUNTER — PATIENT OUTREACH (OUTPATIENT)
Dept: INTERNAL MEDICINE CLINIC | Facility: CLINIC | Age: 83
End: 2018-11-12

## 2018-11-12 NOTE — PROGRESS NOTES
Outreach Tc to patient for CM assessment  Spoke with patient's Cg, son, Jeanne Tenorio  Patient has done well post hip surgery  Patient continues to have difficulty with a weak L knee and is now using a knee brace  Patient also continues to ambulate with rolling walker for increased safety and security  Patient has not had any falls in the last 90 days  Patient saw Fay Wong about 2 weeks ago for c/o UTI  Patient did take prescribed antibiotics with resolution of symptoms  No other exacerbation of disease process noted  Educated on end of BPCI  Cg states that he feels that no further CM is needed  Patient has all services in place and CG denies any need for transportation, community services, medications or DME   Will close case

## 2018-11-17 DIAGNOSIS — I10 ESSENTIAL HYPERTENSION: ICD-10-CM

## 2018-11-19 RX ORDER — LOSARTAN POTASSIUM 25 MG/1
TABLET ORAL
Qty: 30 TABLET | Refills: 1 | Status: SHIPPED | OUTPATIENT
Start: 2018-11-19 | End: 2019-01-16 | Stop reason: SDUPTHER

## 2018-11-26 DIAGNOSIS — E78.00 PURE HYPERCHOLESTEROLEMIA: ICD-10-CM

## 2018-11-26 RX ORDER — FENOFIBRATE 160 MG/1
160 TABLET ORAL DAILY
Qty: 30 TABLET | Refills: 3 | Status: SHIPPED | OUTPATIENT
Start: 2018-11-26 | End: 2019-04-13 | Stop reason: SDUPTHER

## 2019-01-01 NOTE — ED NOTES
Patient returned from [facility]        Demetrio Rivas RN  08/12/18 6361 Detailed exam

## 2019-01-12 DIAGNOSIS — I10 ESSENTIAL HYPERTENSION: ICD-10-CM

## 2019-01-14 RX ORDER — METOPROLOL TARTRATE 50 MG/1
TABLET, FILM COATED ORAL
Qty: 60 TABLET | Refills: 5 | Status: SHIPPED | OUTPATIENT
Start: 2019-01-14 | End: 2021-09-09

## 2019-01-16 DIAGNOSIS — I10 ESSENTIAL HYPERTENSION: ICD-10-CM

## 2019-01-16 RX ORDER — LOSARTAN POTASSIUM 25 MG/1
TABLET ORAL
Qty: 30 TABLET | Refills: 1 | Status: SHIPPED | OUTPATIENT
Start: 2019-01-16 | End: 2019-04-13 | Stop reason: SDUPTHER

## 2019-02-04 DIAGNOSIS — M81.0 AGE-RELATED OSTEOPOROSIS WITHOUT CURRENT PATHOLOGICAL FRACTURE: ICD-10-CM

## 2019-02-04 RX ORDER — CALCITONIN SALMON 200 [IU]/.09ML
SPRAY, METERED NASAL
Qty: 1 ACT | Refills: 5 | Status: SHIPPED | OUTPATIENT
Start: 2019-02-04

## 2019-02-07 ENCOUNTER — OFFICE VISIT (OUTPATIENT)
Dept: INTERNAL MEDICINE CLINIC | Facility: CLINIC | Age: 84
End: 2019-02-07
Payer: MEDICARE

## 2019-02-07 VITALS
DIASTOLIC BLOOD PRESSURE: 70 MMHG | TEMPERATURE: 99.3 F | BODY MASS INDEX: 23.75 KG/M2 | SYSTOLIC BLOOD PRESSURE: 140 MMHG | OXYGEN SATURATION: 98 % | HEIGHT: 60 IN | HEART RATE: 66 BPM | WEIGHT: 121 LBS

## 2019-02-07 DIAGNOSIS — M15.9 GENERALIZED OSTEOARTHRITIS OF MULTIPLE SITES: ICD-10-CM

## 2019-02-07 DIAGNOSIS — Z87.81 S/P LEFT HIP FRACTURE: ICD-10-CM

## 2019-02-07 DIAGNOSIS — E78.01 FAMILIAL HYPERCHOLESTEROLEMIA: ICD-10-CM

## 2019-02-07 DIAGNOSIS — I10 ESSENTIAL HYPERTENSION: Primary | ICD-10-CM

## 2019-02-07 PROCEDURE — 99214 OFFICE O/P EST MOD 30 MIN: CPT | Performed by: INTERNAL MEDICINE

## 2019-02-07 NOTE — ASSESSMENT & PLAN NOTE
Patient has a history of diffuse arthritis, difficulties with ambulation is walking with a walker, family is arrange for the patient to be now living in independent living and they will be helping to take care of her needs  She denies any arthritic pain but does have some instability especially with gait

## 2019-02-07 NOTE — ASSESSMENT & PLAN NOTE
As noted patient's blood pressure showing adequate control with present treatment  Patient will continue with present medications surveillance  We will be checking on her renal function also with her next visit  Patient is compliant taking her medication as directed

## 2019-02-07 NOTE — ASSESSMENT & PLAN NOTE
Patient is status post traumatic fracture of the left hip after fall at home  Patient has completed rehab  She states that she only has minimal discomfort ambulation but again she has diffuse arthritis, also severe arthritis to her left knee with instability

## 2019-02-07 NOTE — ASSESSMENT & PLAN NOTE
Patient has a history of hyperlipidemia  She is intolerant to statin drugs but remains on fenofibrate 160 mg daily in order to help control her cholesterol  She will have a lipid profile performed prior to next visit    Because she is now moved to independent living where she will be getting regular meals we are interested to see whether there is any change in her weight and or cholesterol and this will be checked with her next visit

## 2019-02-07 NOTE — PROGRESS NOTES
Assessment/Plan:    Hypertension  As noted patient's blood pressure showing adequate control with present treatment  Patient will continue with present medications surveillance  We will be checking on her renal function also with her next visit  Patient is compliant taking her medication as directed  Generalized osteoarthritis of multiple sites  Patient has a history of diffuse arthritis, difficulties with ambulation is walking with a walker, family is arrange for the patient to be now living in independent living and they will be helping to take care of her needs  She denies any arthritic pain but does have some instability especially with gait  Hyperlipidemia  Patient has a history of hyperlipidemia  She is intolerant to statin drugs but remains on fenofibrate 160 mg daily in order to help control her cholesterol  She will have a lipid profile performed prior to next visit  Because she is now moved to independent living where she will be getting regular meals we are interested to see whether there is any change in her weight and or cholesterol and this will be checked with her next visit    S/p left hip fracture  Patient is status post traumatic fracture of the left hip after fall at home  Patient has completed rehab  She states that she only has minimal discomfort ambulation but again she has diffuse arthritis, also severe arthritis to her left knee with instability  Diagnoses and all orders for this visit:    Essential hypertension  -     Comprehensive metabolic panel; Future  -     CBC and differential; Future  -     Lipid panel; Future  -     TSH, 3rd generation with Free T4 reflex; Future  -     Urinalysis with reflex to microscopic; Future    Generalized osteoarthritis of multiple sites  -     Comprehensive metabolic panel; Future  -     CBC and differential; Future    Familial hypercholesterolemia  -     Comprehensive metabolic panel;  Future  -     CBC and differential; Future  - TSH, 3rd generation with Free T4 reflex; Future    S/p left hip fracture          Subjective:      Patient ID: Larry Howe is a 80 y o  female  Patient is a pleasant 25-year-old female with a history of multiple medical problems as outlined previously  Patient is here today for routine follow-up  He patient has just moved into an apartment with independent living as of 2 days ago  Patient states there is some adjustments but she is helped by her very supportive family  A lot of different programs are available to her and support services including meals  The following portions of the patient's history were reviewed and updated as appropriate:   She  has a past medical history of CAD (coronary artery disease); Hypertension; Osteoarthritis; Osteoporosis; and Vitamin D deficiency  She   Patient Active Problem List    Diagnosis Date Noted    History of left hip hemiarthroplasty 10/16/2018    Trigger middle finger of right hand 10/16/2018    Unstable knee, left 10/16/2018    S/p left hip fracture 09/12/2018    Iron deficiency anemia secondary to inadequate dietary iron intake 09/12/2018    Fracture follow-up 08/28/2018    S/P hip hemiarthroplasty 08/14/2018    Acute blood loss anemia 08/14/2018    Preoperative clearance 08/12/2018    Hypertensive urgency 08/12/2018    Auditory complaints of both ears 07/30/2018    Acute cystitis with hematuria 05/15/2018    Impacted cerumen of both ears 04/10/2018    Generalized osteoarthritis of multiple sites 07/13/2015    Hyperlipidemia 08/23/2012    Hypertension 08/20/2012    Osteoarthrosis 08/20/2012    Osteoporosis 08/20/2012     She  has a past surgical history that includes Appendectomy; Hysterectomy; Rotator cuff repair; and pr partial hip replacement (Left, 8/13/2018)  Her family history includes Diabetes type II in her father; Heart disease in her brother  She  reports that she has quit smoking   She has never used smokeless tobacco  She reports that she does not drink alcohol or use drugs  Current Outpatient Prescriptions   Medication Sig Dispense Refill    aspirin 81 MG tablet Take 1 tablet by mouth daily      calcitonin, salmon, (MIACALCIN) 200 units/act nasal spray 1 spray and to alternate nostril q day 1 Act 5    Cyanocobalamin (VITAMIN B12 PO) Take 1 tablet by mouth daily      dorzolamide-timolol (COSOPT) 22 3-6 8 MG/ML ophthalmic solution INSTILL 1 DROP INTO LEFT EYE TWICE A DAY APPROXIMATELY 12 HOURS APART  2    fenofibrate (TRIGLIDE) 160 MG tablet Take 1 tablet (160 mg total) by mouth daily 30 tablet 3    ferrous sulfate 324 (65 Fe) mg Take 1 tablet (324 mg total) by mouth daily before breakfast 30 tablet 4    Glucosamine HCl (GLUCOSAMINE PO) Take 1 capsule by mouth daily      latanoprost (XALATAN) 0 005 % ophthalmic solution INSTILL 1 DROP IN THE AFFECTED EYE(S) IN THE EVENING  1    losartan (COZAAR) 25 mg tablet TAKE 1 TABLET BY MOUTH EVERY DAY 30 tablet 1    metoprolol tartrate (LOPRESSOR) 50 mg tablet TAKE 1 TABLET TWICE DAILY 60 tablet 5    Multiple Vitamins-Minerals (DAILY MULTI PO) Take 1 tablet by mouth daily      Pyridoxine HCl (VITAMIN B6 PO) Take 1 tablet by mouth daily       No current facility-administered medications for this visit        Current Outpatient Prescriptions on File Prior to Visit   Medication Sig    aspirin 81 MG tablet Take 1 tablet by mouth daily    calcitonin, salmon, (MIACALCIN) 200 units/act nasal spray 1 spray and to alternate nostril q day    Cyanocobalamin (VITAMIN B12 PO) Take 1 tablet by mouth daily    dorzolamide-timolol (COSOPT) 22 3-6 8 MG/ML ophthalmic solution INSTILL 1 DROP INTO LEFT EYE TWICE A DAY APPROXIMATELY 12 HOURS APART    fenofibrate (TRIGLIDE) 160 MG tablet Take 1 tablet (160 mg total) by mouth daily    ferrous sulfate 324 (65 Fe) mg Take 1 tablet (324 mg total) by mouth daily before breakfast    Glucosamine HCl (GLUCOSAMINE PO) Take 1 capsule by mouth daily    latanoprost (XALATAN) 0 005 % ophthalmic solution INSTILL 1 DROP IN THE AFFECTED EYE(S) IN THE EVENING    losartan (COZAAR) 25 mg tablet TAKE 1 TABLET BY MOUTH EVERY DAY    metoprolol tartrate (LOPRESSOR) 50 mg tablet TAKE 1 TABLET TWICE DAILY    Multiple Vitamins-Minerals (DAILY MULTI PO) Take 1 tablet by mouth daily    Pyridoxine HCl (VITAMIN B6 PO) Take 1 tablet by mouth daily     No current facility-administered medications on file prior to visit  She has No Known Allergies       Review of Systems   Constitutional: Positive for activity change (Restricted physical activity level secondary to her arthritis)  Negative for appetite change, chills, diaphoresis, fatigue, fever and unexpected weight change  HENT: Positive for hearing loss ( does complain of some chronic auditory decrease)  Negative for congestion, dental problem, drooling, ear discharge, ear pain, facial swelling, mouth sores, nosebleeds, postnasal drip, rhinorrhea, sinus pain, sinus pressure, sneezing, sore throat, tinnitus and trouble swallowing  Eyes: Negative  Respiratory: Negative  Cardiovascular: Positive for leg swelling ( some chronic edema to bilateral lower extremities without change)  Negative for chest pain and palpitations  Gastrointestinal: Negative  Endocrine: Negative  Genitourinary: Negative  Musculoskeletal: Positive for arthralgias and gait problem  Negative for back pain, joint swelling, myalgias, neck pain and neck stiffness  Skin: Negative  Allergic/Immunologic: Negative  Neurological: Negative for dizziness, tremors, seizures, syncope, facial asymmetry, speech difficulty, weakness, light-headedness, numbness and headaches  Hematological: Negative  Psychiatric/Behavioral: Negative           Patient admits that there is some emotional adjustment that needs to take place with her recent move a         Objective:      /70   Pulse 66   Temp 99 3 °F (37 4 °C)   Ht 5' (1 524 m)   Wt 54 9 kg (121 lb)   SpO2 98%   BMI 23 63 kg/m²          Physical Exam   Constitutional: She is oriented to person, place, and time  She appears well-developed and well-nourished  No distress  Pleasant, debilitated 80-year-old female who is awake alert, cheerful, walking with a walker, difficulty with transfers secondary to instability   HENT:   Head: Normocephalic and atraumatic  Right Ear: External ear normal    Left Ear: External ear normal    Nose: Nose normal    Mouth/Throat: Oropharynx is clear and moist  No oropharyngeal exudate  Eyes: Pupils are equal, round, and reactive to light  Conjunctivae and EOM are normal  Right eye exhibits no discharge  Left eye exhibits no discharge  No scleral icterus  Neck: No JVD present  No tracheal deviation present  No thyromegaly present  Decreased range of motion both passively and actively but no pain with movement, increased curvature to the cervical spine compensatory for an increased kyphosis to the dorsal spine   Cardiovascular: Normal rate, regular rhythm, normal heart sounds and intact distal pulses  Exam reveals no gallop and no friction rub  No murmur heard  Pulmonary/Chest: Effort normal and breath sounds normal  No stridor  No respiratory distress  She has no wheezes  She has no rales  She exhibits no tenderness  Abdominal: Soft  Bowel sounds are normal  She exhibits no distension and no mass  There is no tenderness  There is no rebound and no guarding  Mildly obese   Musculoskeletal: She exhibits edema and deformity  She exhibits no tenderness     Patient has diffuse arthritis, severe degenerative arthritis especially noted to the right knee with valgus deformity, some instability with gait ambulating with a walker, difficulty with transfer, increased kyphosis to the dorsal spine, flattening to lumbar curve, no acute tenderness, trace edema bilateral lower extremities worse on left than the right   Lymphadenopathy:     She has no cervical adenopathy  Neurological: She is oriented to person, place, and time  A cranial nerve deficit is present  She exhibits abnormal muscle tone (Decreased the tone strength to musculature to lower extremities)  Coordination abnormal    Skin: Skin is warm and dry  No rash noted  She is not diaphoretic  No erythema  No pallor  Psychiatric: She has a normal mood and affect  Her behavior is normal  Judgment and thought content normal    Nursing note and vitals reviewed

## 2019-02-12 ENCOUNTER — APPOINTMENT (EMERGENCY)
Dept: RADIOLOGY | Facility: HOSPITAL | Age: 84
DRG: 281 | End: 2019-02-12
Payer: MEDICARE

## 2019-02-12 ENCOUNTER — TELEPHONE (OUTPATIENT)
Dept: INTERNAL MEDICINE CLINIC | Facility: CLINIC | Age: 84
End: 2019-02-12

## 2019-02-12 ENCOUNTER — HOSPITAL ENCOUNTER (INPATIENT)
Facility: HOSPITAL | Age: 84
LOS: 1 days | Discharge: NURSING, REHAB OR LONG-TERM CARE | DRG: 281 | End: 2019-02-15
Attending: EMERGENCY MEDICINE | Admitting: INTERNAL MEDICINE
Payer: MEDICARE

## 2019-02-12 DIAGNOSIS — M25.561 RIGHT KNEE PAIN: ICD-10-CM

## 2019-02-12 DIAGNOSIS — R42 DIZZINESS: ICD-10-CM
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
## 2019-02-12 DIAGNOSIS — S83.8X1A ACUTE LATERAL MENISCAL INJURY OF RIGHT KNEE, INITIAL ENCOUNTER: ICD-10-CM

## 2019-02-12 DIAGNOSIS — I10 ESSENTIAL HYPERTENSION: ICD-10-CM

## 2019-02-12 DIAGNOSIS — R77.8 ELEVATED TROPONIN: ICD-10-CM

## 2019-02-12 DIAGNOSIS — R26.2 AMBULATORY DYSFUNCTION: ICD-10-CM

## 2019-02-12 DIAGNOSIS — M19.91 PRIMARY OSTEOARTHRITIS, UNSPECIFIED SITE: ICD-10-CM

## 2019-02-12 PROBLEM — E86.0 DEHYDRATION, MILD: Status: ACTIVE | Noted: 2019-02-12

## 2019-02-12 PROBLEM — R82.81 STERILE PYURIA: Status: ACTIVE | Noted: 2018-05-15

## 2019-02-12 LAB
ALBUMIN SERPL BCP-MCNC: 3.9 G/DL (ref 3.5–5)
ALP SERPL-CCNC: 54 U/L (ref 46–116)
ALT SERPL W P-5'-P-CCNC: 24 U/L (ref 12–78)
ANION GAP SERPL CALCULATED.3IONS-SCNC: 6 MMOL/L (ref 4–13)
AST SERPL W P-5'-P-CCNC: 30 U/L (ref 5–45)
ATRIAL RATE: 100 BPM
BACTERIA UR QL AUTO: ABNORMAL /HPF
BASOPHILS # BLD AUTO: 0.08 THOUSANDS/ΜL (ref 0–0.1)
BASOPHILS NFR BLD AUTO: 1 % (ref 0–1)
BILIRUB SERPL-MCNC: 0.32 MG/DL (ref 0.2–1)
BILIRUB UR QL STRIP: NEGATIVE
BUN SERPL-MCNC: 30 MG/DL (ref 5–25)
CALCIUM SERPL-MCNC: 9.6 MG/DL (ref 8.3–10.1)
CHLORIDE SERPL-SCNC: 106 MMOL/L (ref 100–108)
CK SERPL-CCNC: 92 U/L (ref 26–192)
CLARITY UR: CLEAR
CLARITY, POC: CLEAR
CO2 SERPL-SCNC: 25 MMOL/L (ref 21–32)
COLOR UR: YELLOW
COLOR, POC: YELLOW
CREAT SERPL-MCNC: 1.49 MG/DL (ref 0.6–1.3)
EOSINOPHIL # BLD AUTO: 0.14 THOUSAND/ΜL (ref 0–0.61)
EOSINOPHIL NFR BLD AUTO: 1 % (ref 0–6)
ERYTHROCYTE [DISTWIDTH] IN BLOOD BY AUTOMATED COUNT: 14.1 % (ref 11.6–15.1)
GFR SERPL CREATININE-BSD FRML MDRD: 30 ML/MIN/1.73SQ M
GLUCOSE SERPL-MCNC: 116 MG/DL (ref 65–140)
GLUCOSE UR STRIP-MCNC: NEGATIVE MG/DL
HCT VFR BLD AUTO: 36.9 % (ref 34.8–46.1)
HGB BLD-MCNC: 11.7 G/DL (ref 11.5–15.4)
HGB UR QL STRIP.AUTO: NEGATIVE
HYALINE CASTS #/AREA URNS LPF: ABNORMAL /LPF
IMM GRANULOCYTES # BLD AUTO: 0.03 THOUSAND/UL (ref 0–0.2)
IMM GRANULOCYTES NFR BLD AUTO: 0 % (ref 0–2)
KETONES UR STRIP-MCNC: NEGATIVE MG/DL
LEUKOCYTE ESTERASE UR QL STRIP: ABNORMAL
LYMPHOCYTES # BLD AUTO: 1.64 THOUSANDS/ΜL (ref 0.6–4.47)
LYMPHOCYTES NFR BLD AUTO: 16 % (ref 14–44)
MCH RBC QN AUTO: 29.9 PG (ref 26.8–34.3)
MCHC RBC AUTO-ENTMCNC: 31.7 G/DL (ref 31.4–37.4)
MCV RBC AUTO: 94 FL (ref 82–98)
MONOCYTES # BLD AUTO: 1.19 THOUSAND/ΜL (ref 0.17–1.22)
MONOCYTES NFR BLD AUTO: 12 % (ref 4–12)
NEUTROPHILS # BLD AUTO: 7 THOUSANDS/ΜL (ref 1.85–7.62)
NEUTS SEG NFR BLD AUTO: 70 % (ref 43–75)
NITRITE UR QL STRIP: NEGATIVE
NON-SQ EPI CELLS URNS QL MICRO: ABNORMAL /HPF
NRBC BLD AUTO-RTO: 0 /100 WBCS
P AXIS: 67 DEGREES
PH UR STRIP.AUTO: 7 [PH] (ref 4.5–8)
PLATELET # BLD AUTO: 221 THOUSANDS/UL (ref 149–390)
PLATELET # BLD AUTO: 225 THOUSANDS/UL (ref 149–390)
PMV BLD AUTO: 10.2 FL (ref 8.9–12.7)
PMV BLD AUTO: 10.7 FL (ref 8.9–12.7)
POTASSIUM SERPL-SCNC: 3.7 MMOL/L (ref 3.5–5.3)
PR INTERVAL: 170 MS
PROT SERPL-MCNC: 7.5 G/DL (ref 6.4–8.2)
PROT UR STRIP-MCNC: ABNORMAL MG/DL
QRS AXIS: 71 DEGREES
QRSD INTERVAL: 78 MS
QT INTERVAL: 340 MS
QTC INTERVAL: 438 MS
RBC # BLD AUTO: 3.91 MILLION/UL (ref 3.81–5.12)
RBC #/AREA URNS AUTO: ABNORMAL /HPF
SODIUM SERPL-SCNC: 137 MMOL/L (ref 136–145)
SP GR UR STRIP.AUTO: 1.02 (ref 1–1.03)
T WAVE AXIS: 60 DEGREES
TROPONIN I SERPL-MCNC: 0.48 NG/ML
TROPONIN I SERPL-MCNC: 1.24 NG/ML
TSH SERPL DL<=0.05 MIU/L-ACNC: 3.57 UIU/ML (ref 0.36–3.74)
UROBILINOGEN UR QL STRIP.AUTO: 0.2 E.U./DL
VENTRICULAR RATE: 100 BPM
WBC # BLD AUTO: 10.08 THOUSAND/UL (ref 4.31–10.16)
WBC #/AREA URNS AUTO: ABNORMAL /HPF

## 2019-02-12 PROCEDURE — 87086 URINE CULTURE/COLONY COUNT: CPT

## 2019-02-12 PROCEDURE — 85025 COMPLETE CBC W/AUTO DIFF WBC: CPT | Performed by: EMERGENCY MEDICINE

## 2019-02-12 PROCEDURE — 71046 X-RAY EXAM CHEST 2 VIEWS: CPT

## 2019-02-12 PROCEDURE — 85049 AUTOMATED PLATELET COUNT: CPT | Performed by: INTERNAL MEDICINE

## 2019-02-12 PROCEDURE — 84484 ASSAY OF TROPONIN QUANT: CPT | Performed by: EMERGENCY MEDICINE

## 2019-02-12 PROCEDURE — 1123F ACP DISCUSS/DSCN MKR DOCD: CPT | Performed by: INTERNAL MEDICINE

## 2019-02-12 PROCEDURE — 81003 URINALYSIS AUTO W/O SCOPE: CPT

## 2019-02-12 PROCEDURE — 93005 ELECTROCARDIOGRAM TRACING: CPT

## 2019-02-12 PROCEDURE — 72125 CT NECK SPINE W/O DYE: CPT

## 2019-02-12 PROCEDURE — 84484 ASSAY OF TROPONIN QUANT: CPT | Performed by: INTERNAL MEDICINE

## 2019-02-12 PROCEDURE — 84443 ASSAY THYROID STIM HORMONE: CPT | Performed by: INTERNAL MEDICINE

## 2019-02-12 PROCEDURE — 36415 COLL VENOUS BLD VENIPUNCTURE: CPT

## 2019-02-12 PROCEDURE — 82550 ASSAY OF CK (CPK): CPT | Performed by: INTERNAL MEDICINE

## 2019-02-12 PROCEDURE — 99220 PR INITIAL OBSERVATION CARE/DAY 70 MINUTES: CPT | Performed by: INTERNAL MEDICINE

## 2019-02-12 PROCEDURE — 70450 CT HEAD/BRAIN W/O DYE: CPT

## 2019-02-12 PROCEDURE — 80053 COMPREHEN METABOLIC PANEL: CPT | Performed by: EMERGENCY MEDICINE

## 2019-02-12 PROCEDURE — 73564 X-RAY EXAM KNEE 4 OR MORE: CPT

## 2019-02-12 PROCEDURE — 93010 ELECTROCARDIOGRAM REPORT: CPT | Performed by: INTERNAL MEDICINE

## 2019-02-12 PROCEDURE — 99285 EMERGENCY DEPT VISIT HI MDM: CPT

## 2019-02-12 PROCEDURE — 81001 URINALYSIS AUTO W/SCOPE: CPT

## 2019-02-12 RX ORDER — MAGNESIUM HYDROXIDE/ALUMINUM HYDROXICE/SIMETHICONE 120; 1200; 1200 MG/30ML; MG/30ML; MG/30ML
15 SUSPENSION ORAL EVERY 6 HOURS PRN
Status: DISCONTINUED | OUTPATIENT
Start: 2019-02-12 | End: 2019-02-15 | Stop reason: HOSPADM

## 2019-02-12 RX ORDER — SODIUM CHLORIDE 9 MG/ML
75 INJECTION, SOLUTION INTRAVENOUS CONTINUOUS
Status: DISCONTINUED | OUTPATIENT
Start: 2019-02-12 | End: 2019-02-14

## 2019-02-12 RX ORDER — ONDANSETRON 2 MG/ML
4 INJECTION INTRAMUSCULAR; INTRAVENOUS EVERY 6 HOURS PRN
Status: DISCONTINUED | OUTPATIENT
Start: 2019-02-12 | End: 2019-02-15 | Stop reason: HOSPADM

## 2019-02-12 RX ORDER — SODIUM PHOSPHATE,MONO-DIBASIC 19G-7G/118
500 ENEMA (ML) RECTAL DAILY
Status: DISCONTINUED | OUTPATIENT
Start: 2019-02-13 | End: 2019-02-15 | Stop reason: HOSPADM

## 2019-02-12 RX ORDER — ASPIRIN 81 MG/1
81 TABLET, CHEWABLE ORAL DAILY
Status: DISCONTINUED | OUTPATIENT
Start: 2019-02-13 | End: 2019-02-15 | Stop reason: HOSPADM

## 2019-02-12 RX ORDER — FERROUS SULFATE 325(65) MG
325 TABLET ORAL
Status: DISCONTINUED | OUTPATIENT
Start: 2019-02-13 | End: 2019-02-15 | Stop reason: HOSPADM

## 2019-02-12 RX ORDER — ACETAMINOPHEN 325 MG/1
975 TABLET ORAL EVERY 8 HOURS PRN
Status: DISCONTINUED | OUTPATIENT
Start: 2019-02-12 | End: 2019-02-15 | Stop reason: HOSPADM

## 2019-02-12 RX ORDER — MULTIVITAMIN WITH IRON
200 TABLET ORAL DAILY
Status: DISCONTINUED | OUTPATIENT
Start: 2019-02-13 | End: 2019-02-15 | Stop reason: HOSPADM

## 2019-02-12 RX ORDER — DOCUSATE SODIUM 100 MG/1
100 CAPSULE, LIQUID FILLED ORAL 2 TIMES DAILY PRN
Status: DISCONTINUED | OUTPATIENT
Start: 2019-02-12 | End: 2019-02-15 | Stop reason: HOSPADM

## 2019-02-12 RX ORDER — DORZOLAMIDE HYDROCHLORIDE AND TIMOLOL MALEATE 20; 5 MG/ML; MG/ML
1 SOLUTION/ DROPS OPHTHALMIC 2 TIMES DAILY
Status: DISCONTINUED | OUTPATIENT
Start: 2019-02-12 | End: 2019-02-15 | Stop reason: HOSPADM

## 2019-02-12 RX ORDER — LATANOPROST 50 UG/ML
1 SOLUTION/ DROPS OPHTHALMIC
Status: DISCONTINUED | OUTPATIENT
Start: 2019-02-12 | End: 2019-02-15 | Stop reason: HOSPADM

## 2019-02-12 RX ORDER — HEPARIN SODIUM 5000 [USP'U]/ML
5000 INJECTION, SOLUTION INTRAVENOUS; SUBCUTANEOUS EVERY 8 HOURS SCHEDULED
Status: DISCONTINUED | OUTPATIENT
Start: 2019-02-12 | End: 2019-02-15 | Stop reason: HOSPADM

## 2019-02-12 RX ORDER — UBIDECARENONE 75 MG
100 CAPSULE ORAL DAILY
Status: DISCONTINUED | OUTPATIENT
Start: 2019-02-13 | End: 2019-02-15 | Stop reason: HOSPADM

## 2019-02-12 RX ORDER — CALCITONIN SALMON 200 [IU]/.09ML
1 SPRAY, METERED NASAL DAILY
Status: DISCONTINUED | OUTPATIENT
Start: 2019-02-13 | End: 2019-02-15 | Stop reason: HOSPADM

## 2019-02-12 RX ORDER — LOSARTAN POTASSIUM 25 MG/1
25 TABLET ORAL DAILY
Status: DISCONTINUED | OUTPATIENT
Start: 2019-02-13 | End: 2019-02-15 | Stop reason: HOSPADM

## 2019-02-12 RX ORDER — METOPROLOL TARTRATE 50 MG/1
50 TABLET, FILM COATED ORAL 2 TIMES DAILY
Status: DISCONTINUED | OUTPATIENT
Start: 2019-02-12 | End: 2019-02-13

## 2019-02-12 RX ADMIN — METOPROLOL TARTRATE 50 MG: 25 TABLET, FILM COATED ORAL at 22:19

## 2019-02-12 RX ADMIN — DORZOLAMIDE HYDROCHLORIDE AND TIMOLOL MALEATE 1 DROP: 20; 5 SOLUTION/ DROPS OPHTHALMIC at 22:20

## 2019-02-12 RX ADMIN — SODIUM CHLORIDE 75 ML/HR: 0.9 INJECTION, SOLUTION INTRAVENOUS at 22:27

## 2019-02-12 RX ADMIN — SODIUM CHLORIDE 500 ML: 0.9 INJECTION, SOLUTION INTRAVENOUS at 20:03

## 2019-02-12 RX ADMIN — HEPARIN SODIUM 5000 UNITS: 5000 INJECTION INTRAVENOUS; SUBCUTANEOUS at 22:19

## 2019-02-12 NOTE — ED PROVIDER NOTES
History  Chief Complaint   Patient presents with    Fall     pt was walking with walker and reports dizziness and then fell onto her R knee  pt reports she doesn't recall all the events   Knee Injury     80year-old female presenting to the emergency department for evaluation after a fall at the nursing home  Patient was standing walking the microwave to retrieve coffee when she became acutely lightheaded and dizzy falling and striking her left temple on an arm chair and somehow injuring her right knee  Patient did not lose consciousness was answering questions appropriately needed helped up and since that time has not been complaining of any pain except for in her right knee with standing and ambulation  At this time patient denies any chest pain or shortness of breath no nausea or vomiting she denies any knee pain at rest however on exam has right medial knee pain  She has no CT or L-spine tenderness  She is on a baby aspirin daily  Her remaining ROS is negative  History provided by:  Relative   used: No    Fall   Mechanism of injury: fall    Injury location:  Head/neck  Head/neck injury location:  Head  Incident location:  Nursing home  Arrived directly from scene: yes    Fall:     Fall occurred:  Standing    Entrapped after fall: no    Suspicion of alcohol use: no    Suspicion of drug use: no    Tetanus status:  Unknown  Prior to arrival data:     Bystander interventions:  None    Patient ambulatory at scene: no      Blood loss:  None    Responsiveness at scene:  Alert    Orientation at scene:  Person, place, situation and time    Loss of consciousness: no      Amnesic to event: no      Airway interventions:  None    Breathing interventions:  None    IV access status:  None  Associated symptoms: no abdominal pain, no chest pain, no nausea and no vomiting        Prior to Admission Medications   Prescriptions Last Dose Informant Patient Reported? Taking?    Cyanocobalamin (VITAMIN B12 PO)   Yes Yes   Sig: Take 1 tablet by mouth daily   Glucosamine HCl (GLUCOSAMINE PO)   Yes Yes   Sig: Take 1 capsule by mouth daily   Multiple Vitamins-Minerals (DAILY MULTI PO)   Yes Yes   Sig: Take 1 tablet by mouth daily   Pyridoxine HCl (VITAMIN B6 PO)   Yes Yes   Sig: Take 1 tablet by mouth daily   aspirin 81 MG tablet   Yes Yes   Sig: Take 1 tablet by mouth daily   calcitonin, salmon, (MIACALCIN) 200 units/act nasal spray   No Yes   Si spray and to alternate nostril q day   dorzolamide-timolol (COSOPT) 22 3-6 8 MG/ML ophthalmic solution   Yes Yes   Sig: INSTILL 1 DROP INTO LEFT EYE TWICE A DAY APPROXIMATELY 12 HOURS APART   fenofibrate (TRIGLIDE) 160 MG tablet   No Yes   Sig: Take 1 tablet (160 mg total) by mouth daily   ferrous sulfate 324 (65 Fe) mg   No Yes   Sig: Take 1 tablet (324 mg total) by mouth daily before breakfast   latanoprost (XALATAN) 0 005 % ophthalmic solution   Yes Yes   Sig: INSTILL 1 DROP IN THE AFFECTED EYE(S) IN THE EVENING   losartan (COZAAR) 25 mg tablet   No Yes   Sig: TAKE 1 TABLET BY MOUTH EVERY DAY   metoprolol tartrate (LOPRESSOR) 50 mg tablet   No Yes   Sig: TAKE 1 TABLET TWICE DAILY      Facility-Administered Medications: None       Past Medical History:   Diagnosis Date    CAD (coronary artery disease)     Hypertension     Osteoarthritis     Osteoporosis     Vitamin D deficiency        Past Surgical History:   Procedure Laterality Date    APPENDECTOMY      HYSTERECTOMY      ND PARTIAL HIP REPLACEMENT Left 2018    Procedure: HEMIARTHROPLASTY HIP (BIPOLAR); Surgeon: Janelle Abreu MD;  Location: [facility];  Service: Orthopedics    ROTATOR CUFF REPAIR         Family History   Problem Relation Age of Onset    Diabetes type II Father     Heart disease Brother      I have reviewed and agree with the history as documented      Social History     Tobacco Use    Smoking status: Former Smoker    Smokeless tobacco: Never Used   Substance Use Topics    Alcohol use: No    Drug use: No        Review of Systems   Constitutional: Negative for chills and fever  HENT: Negative for sore throat  Eyes: Negative for visual disturbance  Respiratory: Negative for chest tightness, shortness of breath and wheezing  Cardiovascular: Negative for chest pain  Gastrointestinal: Negative for abdominal pain, constipation, diarrhea, nausea and vomiting  Genitourinary: Negative for dysuria and hematuria  Musculoskeletal: Positive for arthralgias  Negative for myalgias  Skin: Negative for color change  Neurological: Positive for light-headedness  Hematological: Negative for adenopathy  Psychiatric/Behavioral: Negative for agitation and behavioral problems  All other systems reviewed and are negative  Physical Exam  ED Triage Vitals [02/12/19 1755]   Temperature Pulse Respirations Blood Pressure SpO2   98 5 °F (36 9 °C) 92 18 (!) 201/80 99 %      Temp Source Heart Rate Source Patient Position - Orthostatic VS BP Location FiO2 (%)   Oral Monitor Lying Right arm --      Pain Score       --           Orthostatic Vital Signs  Vitals:    02/12/19 2126 02/12/19 2127 02/12/19 2138 02/12/19 2145   BP: (!) 215/91 (!) 183/96 (!) 199/103 (!) 199/103   Pulse:    101   Patient Position - Orthostatic VS: Lying - Orthostatic VS Sitting - Orthostatic VS Standing - Orthostatic VS Standing - Orthostatic VS       Physical Exam   Constitutional: She is oriented to person, place, and time  She appears well-developed and well-nourished  No distress  HENT:   Head: Normocephalic and atraumatic  Eyes: Conjunctivae and EOM are normal  No scleral icterus  Neck: Normal range of motion  Neck supple  Cardiovascular: Normal rate, regular rhythm and normal heart sounds  No murmur heard  Pulmonary/Chest: Effort normal and breath sounds normal  No respiratory distress  Abdominal: Soft  Bowel sounds are normal  There is no tenderness  Musculoskeletal: Normal range of motion  She exhibits tenderness  Neurological: She is alert and oriented to person, place, and time  Skin: Skin is warm and dry  Psychiatric: She has a normal mood and affect  Her behavior is normal    Nursing note and vitals reviewed        ED Medications  Medications   aspirin chewable tablet 81 mg (has no administration in time range)   calcitonin (salmon) (MIACALCIN) 200 units/act nasal spray 1 spray (has no administration in time range)   cyanocobalamin (VITAMIN B-12) tablet 100 mcg (has no administration in time range)   dorzolamide-timolol (COSOPT) 22 3-6 8 MG/ML ophthalmic solution 1 drop (has no administration in time range)   ferrous sulfate tablet 325 mg (has no administration in time range)   Glucosamine TABS 750 mg (has no administration in time range)   latanoprost (XALATAN) 0 005 % ophthalmic solution 1 drop (has no administration in time range)   losartan (COZAAR) tablet 25 mg (has no administration in time range)   metoprolol tartrate (LOPRESSOR) tablet 50 mg (has no administration in time range)   multivitamin-minerals (CENTRUM) tablet 1 tablet (has no administration in time range)   Vitamin B6 TABS (has no administration in time range)   sodium chloride 0 9 % infusion (has no administration in time range)   docusate sodium (COLACE) capsule 100 mg (has no administration in time range)   ondansetron (ZOFRAN) injection 4 mg (has no administration in time range)   aluminum-magnesium hydroxide-simethicone (MYLANTA) 200-200-20 mg/5 mL oral suspension 15 mL (has no administration in time range)   heparin (porcine) subcutaneous injection 5,000 Units (has no administration in time range)   acetaminophen (TYLENOL) tablet 975 mg (has no administration in time range)   sodium chloride 0 9 % bolus 500 mL (0 mL Intravenous Stopped 2/12/19 2128)       Diagnostic Studies  Results Reviewed     Procedure Component Value Units Date/Time    CK (with reflex to MB) [[id number]]  (Normal) Collected:  02/12/19 2126    Lab Status:  Final result Specimen:  Blood from Arm, Right Updated:  02/12/19 2212     Total CK 92 U/L     Platelet count [[id number]]  (Normal) Collected:  02/12/19 2126    Lab Status:  Final result Specimen:  Blood from Arm, Right Updated:  02/12/19 2145     Platelets 534 Thousands/uL      MPV 10 7 fL     TSH, 3rd generation [[id number]] Collected:  02/12/19 2126    Lab Status: In process Specimen:  Blood from Arm, Right Updated:  02/12/19 2141    Troponin I [[id number]] Collected:  02/12/19 2126    Lab Status: In process Specimen:  Blood from Arm, Right Updated:  02/12/19 2141    Urine Microscopic [[id number]]  (Abnormal) Collected:  02/12/19 1906    Lab Status:  Final result Specimen:  Urine, Clean Catch Updated:  02/12/19 1913     RBC, UA 2-4 /hpf      WBC, UA 30-50 /hpf      Epithelial Cells None Seen /hpf      Bacteria, UA None Seen /hpf      Hyaline Casts, UA None Seen /lpf     Urine culture [[id number]] Collected:  02/12/19 1906    Lab Status:   In process Specimen:  Urine, Clean Catch Updated:  02/12/19 1913    POCT urinalysis dipstick [[id number]]  (Normal) Resulted:  02/12/19 1908    Lab Status:  Final result Specimen:  Urine Updated:  02/12/19 1908     Color, UA Yellow     Clarity, UA Clear    ED Urine Macroscopic [[id number]]  (Abnormal) Collected:  02/12/19 1906    Lab Status:  Final result Specimen:  Urine Updated:  02/12/19 1903     Color, UA Yellow     Clarity, UA Clear     pH, UA 7 0     Leukocytes, UA Moderate     Nitrite, UA Negative     Protein, UA 30 (1+) mg/dl      Glucose, UA Negative mg/dl      Ketones, UA Negative mg/dl      Urobilinogen, UA 0 2 E U /dl      Bilirubin, UA Negative     Blood, UA Negative     Specific Gravity, UA 1 020    Narrative:       CLINITEK RESULT    Troponin I [[id number]]  (Abnormal) Collected:  02/12/19 1801    Lab Status:  Final result Specimen:  Blood from Arm, Left Updated:  02/12/19 1823     Troponin I 0 48 ng/mL     Comprehensive metabolic panel [[id number]]  (Abnormal) Collected:  02/12/19 1801    Lab Status:  Final result Specimen:  Blood from Arm, Left Updated:  02/12/19 1822     Sodium 137 mmol/L      Potassium 3 7 mmol/L      Chloride 106 mmol/L      CO2 25 mmol/L      ANION GAP 6 mmol/L      BUN 30 mg/dL      Creatinine 1 49 mg/dL      Glucose 116 mg/dL      Calcium 9 6 mg/dL      AST 30 U/L      ALT 24 U/L      Alkaline Phosphatase 54 U/L      Total Protein 7 5 g/dL      Albumin 3 9 g/dL      Total Bilirubin 0 32 mg/dL      eGFR 30 ml/min/1 73sq m     Narrative:       National Kidney Disease Education Program recommendations are as follows:  GFR calculation is accurate only with a steady state creatinine  Chronic Kidney disease less than 60 ml/min/1 73 sq  meters  Kidney failure less than 15 ml/min/1 73 sq  meters  CBC and differential [[id number]] Collected:  02/12/19 1801    Lab Status:  Final result Specimen:  Blood from Arm, Left Updated:  02/12/19 1808     WBC 10 08 Thousand/uL      RBC 3 91 Million/uL      Hemoglobin 11 7 g/dL      Hematocrit 36 9 %      MCV 94 fL      MCH 29 9 pg      MCHC 31 7 g/dL      RDW 14 1 %      MPV 10 2 fL      Platelets 374 Thousands/uL      nRBC 0 /100 WBCs      Neutrophils Relative 70 %      Immat GRANS % 0 %      Lymphocytes Relative 16 %      Monocytes Relative 12 %      Eosinophils Relative 1 %      Basophils Relative 1 %      Neutrophils Absolute 7 00 Thousands/µL      Immature Grans Absolute 0 03 Thousand/uL      Lymphocytes Absolute 1 64 Thousands/µL      Monocytes Absolute 1 19 Thousand/µL      Eosinophils Absolute 0 14 Thousand/µL      Basophils Absolute 0 08 Thousands/µL                  CT head without contrast   Final Result by Emma Zaman MD (02/12 1904)      No acute intracranial abnormality  Workstation performed: [id number]         CT cervical spine without contrast   Final Result by Emma Zaman MD (02/12 1912)      No acute cervical spine fracture or traumatic malalignment  Workstation performed: [id number]         X-ray chest 2 views   ED Interpretation by Nannette Feng MD (02/12 1908)   Unchanged chest x-ray from previous  Final Result by Fara Giron MD (02/12 2001)      No acute cardiopulmonary disease  Workstation performed: [id number]         XR knee 4+ vw right injury   ED Interpretation by Nannette Feng MD (02/12 1911)   Chronic degenerative changes with osteophyte formation however no acute fractures  Final Result by Ric Curtis MD (02/12 2002)      No acute fracture or dislocation  Osteopenia/osteoporosis and tricompartment osteoarthritis  Workstation performed: [id number]               Procedures  ECG 12 Lead Documentation  Date/Time: 2/12/2019 6:19 PM  Performed by: Nannette Feng MD  Authorized by: Nannette Feng MD     ECG reviewed by me, the ED Provider: yes    Patient location:  ED  Previous ECG:     Previous ECG:  Compared to current    Similarity:  No change    Comparison to cardiac monitor: Yes    Interpretation:     Interpretation: normal    Rate:     ECG rate assessment: normal    Rhythm:     Rhythm: sinus rhythm    Ectopy:     Ectopy: none    QRS:     QRS axis:  Normal  Conduction:     Conduction: normal    ST segments:     ST segments:  Normal  T waves:     T waves: normal            Phone Consults  ED Phone Contact    ED Course  ED Course as of Feb 12 2212   Tue Feb 12, 2019   1833 Chronic troponinLeak  Troponin I(!): 0 48   1833 Near doubling of creatinine from baseline  Creatinine(!): 1 49           Identification of Seniors at Risk      Most Recent Value   (ISAR) Identification of Seniors at Risk   Before the illness or injury that brought you to the Emergency, did you need someone to help you on a regular basis?   0 Filed at: 02/12/2019 1757   In the last 24 hours, have you needed more help than usual?  0 Filed at: 02/12/2019 1757   Have you been hospitalized for one or more nights during the past 6 months? 0 Filed at: 02/12/2019 1757   In general, do you see well?  0 Filed at: 02/12/2019 1757   In general, do you have serious problems with your memory? 0 Filed at: 02/12/2019 1757   Do you take more than three different medications every day? 1 Filed at: 02/12/2019 1757   ISAR Score  1 Filed at: 02/12/2019 1757                          [facility]  Number of Diagnoses or Management Options  Dizziness: new and requires workup  Fall, initial encounter: new and requires workup  Right knee pain: new and requires workup  Diagnosis management comments: 75-year-old female presenting after a fall, will obtain cardiac laboratories as well as CT head and x-ray of the knee  Imaging studies were negative and laboratories were unremarkable besides a troponin leak which is chronic for her  Amount and/or Complexity of Data Reviewed  Clinical lab tests: ordered and reviewed  Tests in the radiology section of CPT®: ordered and reviewed  Tests in the medicine section of CPT®: ordered and reviewed  Review and summarize past medical records: yes  Discuss the patient with other providers: yes        Disposition  Final diagnoses:   Fall, initial encounter   Right knee pain   Dizziness     Time reflects when diagnosis was documented in both MDM as applicable and the Disposition within this note     Time User Action Codes Description Comment    2/12/2019  8:27 PM Delorse Hood Add [R74 8] Elevated troponin     2/12/2019  8:27 PM Delorse Hood Modify [R74 8] Elevated troponin     2/12/2019  8:29 PM Delorse Hood Add [M19 91] Primary osteoarthritis, unspecified site     2/12/2019  8:29 PM Delorse Hood Add [R26 2] Ambulatory dysfunction     2/12/2019  8:29 PM Delorse Hood Add [S83  8X1A] Acute lateral meniscal injury of right knee, initial encounter     2/12/2019 10:11 PM Elise Pattee Add [W90  THBD] PJYU, initial encounter     2/12/2019 10:11 PM Elise Pattee Add [M25 561] Right knee pain 2/12/2019 10:11 PM Halle Look Add [R42] Dizziness     2/12/2019 10:11 PM Halle Look Modify [R74 8] Elevated troponin     2/12/2019 10:11 PM Halle Look Modify [H41  XXXA] Fall, initial encounter       ED Disposition     ED Disposition Condition Date/Time Comment    Admit Stable Tue Feb 12, 2019 10:11 PM Case was discussed with CARROLL and the patient's admission status was agreed to be Admission Status: observation status to the service of Dr Misha Cortes   Follow-up Information    None         Patient's Medications   Discharge Prescriptions    No medications on file     No discharge procedures on file  ED Provider  Attending physically available and evaluated [place] I managed the patient along with the ED Attending      Electronically Signed by         Yehuda Steward MD  02/12/19 7582

## 2019-02-12 NOTE — ED ATTENDING ATTESTATION
Jamin Nieto MD, saw and evaluated the patient  I have discussed the patient with the resident/non-physician practitioner and agree with the resident's/non-physician practitioner's findings, Plan of Care, and MDM as documented in the resident's/non-physician practitioner's note, except where noted  All available labs and Radiology studies were reviewed  At this point I agree with the current assessment done in the Emergency Department  I have conducted an independent evaluation of this patient a history and physical is as follows:    80year-old woman with multiple medical problems presents after she became dizzy and fell onto her right knee  Does not recall events  Complaining of right knee pain but no other complaints at this time  Has right knee tenderness, neurovascular intact  Heart regular rate rhythm, no murmurs rubs or gallops  Lungs clear to auscultation bilaterally  Nonfocal neuro  Plan labs, imaging, admission        Critical Care Time  Procedures

## 2019-02-12 NOTE — TELEPHONE ENCOUNTER
The patient son called stating she is experiencing knee pain he has given her 1000mg of acetaminophen  He would like to know if there is some she should be prescribed for pain

## 2019-02-13 PROBLEM — N17.9 ACUTE KIDNEY INJURY (HCC): Status: RESOLVED | Noted: 2019-02-12 | Resolved: 2019-02-13

## 2019-02-13 PROBLEM — N17.9 ACUTE KIDNEY INJURY (HCC): Status: ACTIVE | Noted: 2019-02-12

## 2019-02-13 LAB
ALBUMIN SERPL BCP-MCNC: 3.4 G/DL (ref 3.5–5)
ALP SERPL-CCNC: 37 U/L (ref 46–116)
ALT SERPL W P-5'-P-CCNC: 20 U/L (ref 12–78)
ANION GAP SERPL CALCULATED.3IONS-SCNC: 8 MMOL/L (ref 4–13)
AST SERPL W P-5'-P-CCNC: 26 U/L (ref 5–45)
BACTERIA UR CULT: NORMAL
BASOPHILS # BLD AUTO: 0.05 THOUSANDS/ΜL (ref 0–0.1)
BASOPHILS NFR BLD AUTO: 1 % (ref 0–1)
BILIRUB SERPL-MCNC: 0.55 MG/DL (ref 0.2–1)
BUN SERPL-MCNC: 18 MG/DL (ref 5–25)
CALCIUM SERPL-MCNC: 8.8 MG/DL (ref 8.3–10.1)
CHLORIDE SERPL-SCNC: 105 MMOL/L (ref 100–108)
CHOLEST SERPL-MCNC: 136 MG/DL (ref 50–200)
CO2 SERPL-SCNC: 22 MMOL/L (ref 21–32)
CREAT SERPL-MCNC: 0.73 MG/DL (ref 0.6–1.3)
EOSINOPHIL # BLD AUTO: 0.03 THOUSAND/ΜL (ref 0–0.61)
EOSINOPHIL NFR BLD AUTO: 0 % (ref 0–6)
ERYTHROCYTE [DISTWIDTH] IN BLOOD BY AUTOMATED COUNT: 14.1 % (ref 11.6–15.1)
GFR SERPL CREATININE-BSD FRML MDRD: 71 ML/MIN/1.73SQ M
GLUCOSE SERPL-MCNC: 114 MG/DL (ref 65–140)
HCT VFR BLD AUTO: 36.1 % (ref 34.8–46.1)
HDLC SERPL-MCNC: 59 MG/DL (ref 40–60)
HGB BLD-MCNC: 11.8 G/DL (ref 11.5–15.4)
IMM GRANULOCYTES # BLD AUTO: 0.04 THOUSAND/UL (ref 0–0.2)
IMM GRANULOCYTES NFR BLD AUTO: 0 % (ref 0–2)
LDLC SERPL CALC-MCNC: 58 MG/DL (ref 0–100)
LYMPHOCYTES # BLD AUTO: 1.2 THOUSANDS/ΜL (ref 0.6–4.47)
LYMPHOCYTES NFR BLD AUTO: 13 % (ref 14–44)
MAGNESIUM SERPL-MCNC: 1.7 MG/DL (ref 1.6–2.6)
MCH RBC QN AUTO: 30.4 PG (ref 26.8–34.3)
MCHC RBC AUTO-ENTMCNC: 32.7 G/DL (ref 31.4–37.4)
MCV RBC AUTO: 93 FL (ref 82–98)
MONOCYTES # BLD AUTO: 0.73 THOUSAND/ΜL (ref 0.17–1.22)
MONOCYTES NFR BLD AUTO: 8 % (ref 4–12)
NEUTROPHILS # BLD AUTO: 6.89 THOUSANDS/ΜL (ref 1.85–7.62)
NEUTS SEG NFR BLD AUTO: 78 % (ref 43–75)
NONHDLC SERPL-MCNC: 77 MG/DL
NRBC BLD AUTO-RTO: 0 /100 WBCS
PLATELET # BLD AUTO: 207 THOUSANDS/UL (ref 149–390)
PMV BLD AUTO: 10.6 FL (ref 8.9–12.7)
POTASSIUM SERPL-SCNC: 3.8 MMOL/L (ref 3.5–5.3)
PROT SERPL-MCNC: 6.7 G/DL (ref 6.4–8.2)
RBC # BLD AUTO: 3.88 MILLION/UL (ref 3.81–5.12)
SODIUM SERPL-SCNC: 135 MMOL/L (ref 136–145)
TRIGL SERPL-MCNC: 93 MG/DL
TROPONIN I SERPL-MCNC: 0.89 NG/ML
WBC # BLD AUTO: 8.94 THOUSAND/UL (ref 4.31–10.16)

## 2019-02-13 PROCEDURE — 80053 COMPREHEN METABOLIC PANEL: CPT | Performed by: INTERNAL MEDICINE

## 2019-02-13 PROCEDURE — 97167 OT EVAL HIGH COMPLEX 60 MIN: CPT

## 2019-02-13 PROCEDURE — 3E0U3BZ INTRODUCTION OF ANESTHETIC AGENT INTO JOINTS, PERCUTANEOUS APPROACH: ICD-10-PCS | Performed by: ORTHOPAEDIC SURGERY

## 2019-02-13 PROCEDURE — 85025 COMPLETE CBC W/AUTO DIFF WBC: CPT | Performed by: INTERNAL MEDICINE

## 2019-02-13 PROCEDURE — G8987 SELF CARE CURRENT STATUS: HCPCS

## 2019-02-13 PROCEDURE — 99225 PR SBSQ OBSERVATION CARE/DAY 25 MINUTES: CPT | Performed by: PHYSICIAN ASSISTANT

## 2019-02-13 PROCEDURE — 97163 PT EVAL HIGH COMPLEX 45 MIN: CPT

## 2019-02-13 PROCEDURE — G8979 MOBILITY GOAL STATUS: HCPCS

## 2019-02-13 PROCEDURE — 83735 ASSAY OF MAGNESIUM: CPT | Performed by: INTERNAL MEDICINE

## 2019-02-13 PROCEDURE — G8988 SELF CARE GOAL STATUS: HCPCS

## 2019-02-13 PROCEDURE — 80061 LIPID PANEL: CPT | Performed by: INTERNAL MEDICINE

## 2019-02-13 PROCEDURE — 99213 OFFICE O/P EST LOW 20 MIN: CPT | Performed by: INTERNAL MEDICINE

## 2019-02-13 PROCEDURE — 3E0U33Z INTRODUCTION OF ANTI-INFLAMMATORY INTO JOINTS, PERCUTANEOUS APPROACH: ICD-10-PCS | Performed by: ORTHOPAEDIC SURGERY

## 2019-02-13 PROCEDURE — 97535 SELF CARE MNGMENT TRAINING: CPT

## 2019-02-13 PROCEDURE — G8978 MOBILITY CURRENT STATUS: HCPCS

## 2019-02-13 RX ADMIN — Medication 500 MG: at 08:54

## 2019-02-13 RX ADMIN — Medication 200 MG: at 08:54

## 2019-02-13 RX ADMIN — DORZOLAMIDE HYDROCHLORIDE AND TIMOLOL MALEATE 1 DROP: 20; 5 SOLUTION/ DROPS OPHTHALMIC at 17:11

## 2019-02-13 RX ADMIN — CALCITONIN SALMON 1 SPRAY: 200 SPRAY, METERED NASAL at 08:54

## 2019-02-13 RX ADMIN — HEPARIN SODIUM 5000 UNITS: 5000 INJECTION INTRAVENOUS; SUBCUTANEOUS at 14:30

## 2019-02-13 RX ADMIN — METOPROLOL TARTRATE 50 MG: 25 TABLET, FILM COATED ORAL at 08:54

## 2019-02-13 RX ADMIN — LATANOPROST 1 DROP: 50 SOLUTION/ DROPS OPHTHALMIC at 21:50

## 2019-02-13 RX ADMIN — Medication 1 TABLET: at 08:54

## 2019-02-13 RX ADMIN — METOPROLOL TARTRATE 37.5 MG: 25 TABLET ORAL at 17:07

## 2019-02-13 RX ADMIN — ASPIRIN 81 MG 81 MG: 81 TABLET ORAL at 08:54

## 2019-02-13 RX ADMIN — METOPROLOL TARTRATE 12.5 MG: 25 TABLET ORAL at 10:41

## 2019-02-13 RX ADMIN — HEPARIN SODIUM 5000 UNITS: 5000 INJECTION INTRAVENOUS; SUBCUTANEOUS at 21:39

## 2019-02-13 RX ADMIN — HEPARIN SODIUM 5000 UNITS: 5000 INJECTION INTRAVENOUS; SUBCUTANEOUS at 06:18

## 2019-02-13 RX ADMIN — VITAM B12 100 MCG: 100 TAB at 08:54

## 2019-02-13 RX ADMIN — SODIUM CHLORIDE 75 ML/HR: 0.9 INJECTION, SOLUTION INTRAVENOUS at 16:43

## 2019-02-13 RX ADMIN — FERROUS SULFATE TAB 325 MG (65 MG ELEMENTAL FE) 325 MG: 325 (65 FE) TAB at 06:19

## 2019-02-13 RX ADMIN — LOSARTAN POTASSIUM 25 MG: 25 TABLET, FILM COATED ORAL at 08:54

## 2019-02-13 RX ADMIN — DORZOLAMIDE HYDROCHLORIDE AND TIMOLOL MALEATE 1 DROP: 20; 5 SOLUTION/ DROPS OPHTHALMIC at 08:54

## 2019-02-13 NOTE — ASSESSMENT & PLAN NOTE
Mild hydration; check metabolic profile with CBC  Orthostatic blood pressures  Although does not claim to losing consciousness, she was dizzy; will also check telemetry  Will place possibility of syncope

## 2019-02-13 NOTE — ASSESSMENT & PLAN NOTE
Orthopedic evaluation  Also cold compresses with mild pain management  Occupational therapy input regarding safety and ambulatory functions

## 2019-02-13 NOTE — ASSESSMENT & PLAN NOTE
· No complaints of chest pain and EKG looks normal   Patient had previous determinations of troponin which were also mildly elevated  May be a variant  · Troponin has peaked and is now improving  · No further workup per Cardiology

## 2019-02-13 NOTE — OCCUPATIONAL THERAPY NOTE
[address]  Evaluation     Patient Name: Madiha Romano  [id number] Date: 2/13/2019  Problem List  Patient Active Problem List   Diagnosis    Generalized osteoarthritis of multiple sites    Hyperlipidemia    Hypertension    Osteoarthrosis    Osteoporosis    Impacted cerumen of both ears    Sterile pyuria    Auditory complaints of both ears    Preoperative clearance    Hypertensive urgency    S/P hip hemiarthroplasty    Acute blood loss anemia    Fracture follow-up    S/p left hip fracture    Iron deficiency anemia secondary to inadequate dietary iron intake    History of left hip hemiarthroplasty    Trigger middle finger of right hand    Unstable knee, left    Acute lateral meniscal injury of right knee    Ambulatory dysfunction    Dehydration, mild    Elevated troponin     Past Medical History  Past Medical History:   Diagnosis Date    CAD (coronary artery disease)     Hypertension     Osteoarthritis     Osteoporosis     Vitamin D deficiency      Past Surgical History  Past Surgical History:   Procedure Laterality Date    APPENDECTOMY      HYSTERECTOMY      PA PARTIAL HIP REPLACEMENT Left 8/13/2018    Procedure: HEMIARTHROPLASTY HIP (BIPOLAR); Surgeon: Ruddy Cowan MD;  [facility];  Service: Orthopedics    ROTATOR CUFF REPAIR           02/13/19 1248   Note Type   Note type Eval/Treat   Restrictions/Precautions   Weight Bearing Precautions Per Order Yes   RLE Weight Bearing Per Order WBAT   Other Precautions Cognitive;Telemetry; Fall Risk;Pain   Pain Assessment   Pain Assessment FLACC   Pain Rating: FLACC (Rest) - Face 0   Pain Rating: FLACC (Rest) - Legs 0   Pain Rating: FLACC (Rest) - Activity 0   Pain Rating: FLACC (Rest) - Cry 0   Pain Rating: FLACC (Rest) - Consolability 0   Score: FLACC (Rest) 0   Pain Rating: FLACC (Activity) - Face 1   Pain Rating: FLACC (Activity) - Legs 0   Pain Rating: FLACC (Activity) - Activity 0   Pain Rating: FLACC (Activity) - Cry 0   Pain Rating: FLACC (Activity) - Consolability 0   Score: FLACC (Activity) 1   Home Living   Type of Home   (Independent living)   Home Layout Elevator   Bathroom Toilet   (raised commode with arms over toilet)   [address] [place] [address] chair   Home Equipment Walker;Sock aid;Reacher   Additional Comments Pt resides at [facility] in independent living  Prior Function   Level of [place] Needs assistance with ADLs and functional mobility; Needs assistance with IADLs   Lives With Facility staff   Receives Help From Home health   ADL Assistance Needs assistance  (A with bathing, I with dressing)   IADLs Needs assistance   Falls in the last 6 months 1 to 4  (pt reports 1 fall)   Vocational Retired   Comments Pt required assistance with bathing, was I with dressing, required assistance with IADLs and I with functional mobility with use of rw PTA  Pt has an aide that comes to assist with bathing and IADLs 1x/wk  Lifestyle   Autonomy Pt required assistance with bathing, was I with dressing, required assistance with IADLs and I with functional mobility with use of rw PTA  Reciprocal Relationships 4 kids   Service to Others retired   Intrinsic Gratification enjoys playing cards and doing puzzles   Psychosocial   Psychosocial (WDL) [address] 5  Supervision/Setup   Grooming Assistance 5  Supervision/Setup   UB [address] 4  Minimal Assistance   LB [address] 3  Moderate Assistance   [address] 4  [address] [facility] [address] 3  Moderate [address]  3  Moderate Assistance   Toileting Deficit Perineal hygiene  (pt incontinent of urine upon standing)   Bed Mobility   Additional Comments pt seated in chair upon therapist entry   Transfers   Sit to Stand 3  Moderate assistance   Additional items Assist x 1; Increased time required  (stand for joaquin hygiene s/p incontinent of urine)   Stand to Sit 3  Moderate assistance Additional items Assist x 1; Increased time required   Balance   Static Sitting Fair   Dynamic Sitting Fair -   Static Standing Poor   Activity Tolerance   Activity Tolerance Patient limited by fatigue;Patient limited by pain   Medical Staff Made Aware PT Jl Zambrano, PT student Libby   Nurse Made Aware Per RN, pt okay to see for OT and OOB mobility   RUE Assessment   RUE Assessment WFL  H/o RTC repair, however ROM WFL   LUE Assessment   LUE Assessment WFL   Hand Function   Gross Motor Coordination Functional   Fine Motor Coordination Functional   Vision-Basic Assessment   Current Vision Wears glasses all the time   Cognition   Overall Cognitive Status Impaired   Arousal/Participation Alert; Cooperative;Responsive   Attention Attends with cues to redirect   Orientation Level Oriented X4   Memory Decreased recall of precautions;Decreased recall of recent events;Decreased short term memory;Decreased recall of biographical information;Decreased long term memory   Following Commands Follows one step commands with increased time or repetition   Cognition Assessment Tools MOCA   Score 12   Assessment   Limitation Decreased ADL status; Decreased cognition;Decreased endurance;Decreased Safe judgement during ADL;Decreased self-care trans;Decreased high-level ADLs   Prognosis Fair   Assessment Pt is a 80year old female seen for OT evaluation s/p admission to [facility] 2/12/19 after being found on the floor  Pt dx'd with acute lateral meniscal injury of R knee  Pt with active OT orders and WBAT orders  Comorbidities include a PMHx of: ambulatory dysfunction, osteoarthritis, osteoporosis, HTN, HLD, L hip fx s/p hip hemiarthroplasty, and trigger middler finger of R hand  Pt resides at [facility]  Pt required assistance with bathing, was I with dressing, required assistance for IADLs and I with functional mobility with use of rw PTA    Pt is currently demonstrating the following occupational deficits: UB ADLs with Gamaliel, LB ADLs with modA, and functional transfers with modA  Factors currently impacting pt's independence with these occupations include: pain, functional mobility, balance, endurance, cognitive deficits, and unsupportive home environment (ILF)  Overall, pt scored 35/100 on the Barthel Index  From an OT standpoint, recommend STR upon d/c  Pt is to continue to benefit from skilled occupational therapy services while in the hospital to maximize functioning and independence with daily activities  See below for OT goals to be addressed 3-5x/wk  Goals   Patient Goals to eat lunch   Plan   Treatment Interventions ADL retraining;Functional transfer training; Endurance training;Cognitive reorientation;Patient/family training;Equipment evaluation/education; Compensatory technique education;Continued evaluation; Activityengagement   Goal Expiration Date 02/23/19   Treatment Day 1   OT Frequency 3-5x/wk   Additional Treatment Session   Start Time 4394   End Time 1248   Treatment Assessment Pt is seen for skilled occupational therapy session immediately following initial evaluation for further cognitive assessment  Pt completed the [facility] Cognitive Assessment and scored 12/30, implying moderate cognitive deficits  See below for further details  Additional Treatment Day 1   Recommendation   OT Discharge Recommendation Short Term Rehab   OT - OK to Discharge Yes  (to rehab when medically stable)   Barthel Index   Feeding 10   Bathing 0   Grooming Score 5   Dressing Score 5   Bladder Score 0   Bowels Score 5   Toilet Use Score 5   Transfers (Bed/Chair) Score 5   Mobility (Level Surface) Score 0   Stairs Score 0   Barthel Index Score 35     PT SEEN FOR NGOC COGNITIVE ASSESSMENT  SCORED  12/30 INDICATING MODERATE COGNITIVE IMPAIRMENT FOR AGE/EDUCATION  SCORES ARE AS FOLLOWS:    VISUOSPATIAL/EXECUTIVE FUNCTION: 1/5  Pt was unable to complete trail  Pt was unable to copy cube   Pt was able to draw a clock, unable to accurately place the numbers, and unable to properly place the hands at ten past eleven  NAMIN/3  Pt able to name 2/3 animals  ATTENTION: 2/6  Pt was able to repeat sequence of numbers forwards but unable backwards  Pt able to attend to sequence of letters  Pt was unable to correctly subtract 7 from 100 5/5 times  LANGUAGE: 2/3  Pt was able to repeat 1/2 sentences back to therapist  Pt was able to produce N of words starting with the letter F in 1 minute  ABSTRACTION: 0/2  Pt was unable to identify the similarity of two items x2 trials  DELAYED RECALL: 0/5  Pt recalled 0/5 words without cues  Pt recalled 2/5 words with category cue  Pt recalled 1/5 words with multiple choice options  ORIENTATION:   Pt is oriented to date, month, year, place and city  Disoriented to day  Goals:  Pt will complete functional transfers on/off all surfaces with Jett using DME as appropriate and with good safety/balance  Pt will attend to a functional task for at least 10 min without cues/redirection  Pt will complete UB dressing with Jett  Pt will complete LB dressing with Jett with use of AD as appropriate      ELIJAH Copeland, OTR/L

## 2019-02-13 NOTE — PROGRESS NOTES
Procedure- Orthopedics   Jakob Chowdhury 80 y o  female MRN: [id number]  Unit/Bed#: CW2 213-02    Procedure: right knee aspiration and injection    After sterile preparation of the skin overlying the knee local anesthetic was provided with 5cc of 1% lidocaine  An 18 gauge needle was then  inserted via a superior lateral portal   Approx 15cc of bloody fluid was aspirated  The syringe was then exchanged and a mixture of 2cc 1% lidocaine, 2cc 0 25% Marcaine, 2cc Betamethasone was injected into the knee joint  Sterile dressing was then applied  Pt tolerated the procedure well and was neurovascularly intact both pre and post procedure      Antonio Styles MD

## 2019-02-13 NOTE — CONSULTS
Consultation - Cardiology Team One  Skyla Nielsen 80 y o  female MRN: [id number]  Unit/Bed#: [address] 292-71 Encounter: [id number]    Inpatient consult to Cardiology  Consult performed by: ENID Solis  Consult ordered by: Mora Vaz MD      Physician Requesting Consult: Melissa Garcia MD  Reason for Consult / Principal Problem: Elevated troponin    Assessment/ Plan    Type 2 myocardial infarction   In setting of ALBINO, fall, and hypertensive urgency  Troponin 0 48/1 24/0 89  Denies anginal symptoms    HTN- average since admit 188/88  Elevated since admission, just received home medications  Manual recheck at time of exam: 180/70  Increase metoprolol to 37 5 mg BID  BMP pending, if creatinine remains elevated, would hold off on losartan until improved  HLD- last LDL 72  On fenofibrate    ALBINO- creatinine 1 49 on admit  Baseline appears closer to 0 7  Pt admits to not drinking as much water as she normally does since moving to [facility] a week ago  IVF per primary team    History of Present Illness   HPI: Skyla Nielsen is a 80y o  year old female who has a history of hypertension, hyperlipidemia, orthostatic hypotension, and osteoporosis  In 2015 she had been hospitalized for a syncopal episode which was felt to be related to orthostatic hypotension  Echocardiogram at that time showed LVEF 55%, possible hypokinesis of the basal inferior wall, and grade 1 diastolic dysfunction with mild MR and mild TR  She also underwent Holter evaluation which demonstrated predominantly sinus rhythm with an average heart rate of 78 beats per minute  3500 PVCs noted on Holter monitor which correlated with the patient's symptoms of racing heartbeat  She saw Dr Odalis New a few months after hospitalization but has not followed up since 2016  She presented to [facility] after a fall at nursing home with complaints of continued right knee pain    She was walking to the [employer] to get coffee when she lost her balance causing her to fall and strike her left temple on an arm chair and also injure her right knee  The patient did not lose consciousness and was answering questions appropriately but did need help up from her fall  At the time of her presentation to the ED she denied any chest pain or shortness of breath as well as nausea and vomiting  EKG showed NSR without acute ischemic changes  CK checked, 92  Troponin 0 48 with peak of 1 24; now trended down to 0 89  ALBINO noted on initial labs, creatinine 1 49 when baseline appears closer to 0 7  /80 on admission with continued elevation noted since, most recent 172/89  CXR without acute cardiopulmonary disease, R knee xray and CT spine without fracture  EKG reviewed personally: NSR    Telemetry reviewed personally: NSR    Review of Systems   Constitution: Negative for malaise/fatigue and weight gain  Cardiovascular: Positive for palpitations  Negative for chest pain, dyspnea on exertion, irregular heartbeat, leg swelling, orthopnea, paroxysmal nocturnal dyspnea and syncope  Respiratory: Negative for cough and shortness of breath  Musculoskeletal: Positive for joint pain  R Knee   Gastrointestinal: Negative for nausea and vomiting  Genitourinary: Positive for frequency  Neurological: Negative for dizziness, light-headedness and weakness  Psychiatric/Behavioral: Negative for altered mental status  All other systems reviewed and are negative  Historical Information   Past Medical History:   Diagnosis Date    CAD (coronary artery disease)     Hypertension     Osteoarthritis     Osteoporosis     Vitamin D deficiency      Past Surgical History:   Procedure Laterality Date    APPENDECTOMY      HYSTERECTOMY      NV PARTIAL HIP REPLACEMENT Left 8/13/2018    Procedure: HEMIARTHROPLASTY HIP (BIPOLAR);   Surgeon: Roderick Madrigal MD;  [facility];  Service: Orthopedics    ROTATOR CUFF REPAIR       Social History Substance and Sexual Activity   Alcohol Use Not Currently     Social History     Substance and Sexual Activity   Drug Use No     Social History     Tobacco Use   Smoking Status Former Smoker   Smokeless Tobacco Never Used     Family History:   Family History   Problem Relation Age of Onset    Diabetes type II Father     Heart disease Brother        Meds/Allergies   all current active meds have been reviewed and current meds:   Current Facility-Administered Medications   Medication Dose Route Frequency    acetaminophen (TYLENOL) tablet 975 mg  975 mg Oral Q8H PRN    aluminum-magnesium hydroxide-simethicone (MYLANTA) 200-200-20 mg/5 mL oral suspension 15 mL  15 mL Oral Q6H PRN    aspirin chewable tablet 81 mg  81 mg Oral Daily    calcitonin (salmon) (MIACALCIN) 200 units/act nasal spray 1 spray  1 spray Alternating Nares Daily    cyanocobalamin (VITAMIN B-12) tablet 100 mcg  100 mcg Oral Daily    docusate sodium (COLACE) capsule 100 mg  100 mg Oral BID PRN    dorzolamide-timolol (COSOPT) 22 3-6 8 MG/ML ophthalmic solution 1 drop  1 drop Both Eyes BID    ferrous sulfate tablet 325 mg  325 mg Oral Daily Before Breakfast    glucosamine sulfate capsule 500 mg  500 mg Oral Daily    heparin (porcine) subcutaneous injection 5,000 Units  5,000 Units Subcutaneous Q8H [facility]    latanoprost (XALATAN) 0 005 % ophthalmic solution 1 drop  1 drop Both Eyes HS    losartan (COZAAR) tablet 25 mg  25 mg Oral Daily    metoprolol tartrate (LOPRESSOR) tablet 50 mg  50 mg Oral BID    multivitamin-minerals (CENTRUM) tablet 1 tablet  1 tablet Oral Daily    ondansetron (ZOFRAN) injection 4 mg  4 mg Intravenous Q6H PRN    pyridoxine (VITAMIN B6) tablet 200 mg  200 mg Oral Daily    sodium chloride 0 9 % infusion  75 mL/hr Intravenous Continuous       sodium chloride 75 mL/hr Last Rate: 75 mL/hr (02/12/19 2227)     No Known Allergies    Objective   Vitals: Blood pressure (!) 172/89, pulse 96, temperature 97 9 °F (36 6 °C), temperature source Oral, resp  rate 18, height 5' (1 524 m), weight 54 kg (119 lb), SpO2 100 %  ,     Body mass index is 23 24 kg/m²  ,     Systolic (49OIA), QIF:340 , Min:160 , ENL:698     Diastolic (66IWF), VBJ:53, Min:72, Max:103    Intake/Output Summary (Last 24 hours) at 2/13/2019 0841  Last data filed at 2/13/2019 0706  Gross per 24 hour   Intake 500 ml   Output    Net 500 ml     Weight (last 2 days)     Date/Time   Weight    02/13/19 0549   54 (119)    02/13/19 0500   54 9 (121)            Invasive Devices     Peripheral Intravenous Line            Peripheral IV 02/12/19 Left Forearm less than 1 day              Physical Exam   Constitutional: She is oriented to person, place, and time  She appears well-developed  No distress  Neck: Neck supple  No JVD present  Cardiovascular: Normal rate, regular rhythm, normal heart sounds and intact distal pulses  Exam reveals no gallop and no friction rub  No murmur heard  Pulmonary/Chest: Effort normal  No respiratory distress  She has no rales  Abdominal: Soft  Bowel sounds are normal  She exhibits no distension  Musculoskeletal: She exhibits no edema  Neurological: She is alert and oriented to person, place, and time  Skin: Skin is warm and dry  Psychiatric: She has a normal mood and affect       LABORATORY RESULTS:  Results from last 7 days   Lab Units 02/12/19  2344 02/12/19 2126 02/12/19  1801   CK TOTAL U/L  --  92  --    TROPONIN I ng/mL 0 89* 1 24* 0 48*     CBC with diff: Results from last 7 days   Lab Units 02/12/19 2126 02/12/19  1801   WBC Thousand/uL  --  10 08   HEMOGLOBIN g/dL  --  11 7   HEMATOCRIT %  --  36 9   MCV fL  --  94   PLATELETS Thousands/uL 221 225   MCH pg  --  29 9   MCHC g/dL  --  31 7   RDW %  --  14 1   MPV fL 10 7 10 2   NRBC AUTO /100 WBCs  --  0     CMP:  Results from last 7 days   Lab Units 02/12/19  1801   POTASSIUM mmol/L 3 7   CHLORIDE mmol/L 106   CO2 mmol/L 25   BUN mg/dL 30*   CREATININE mg/dL 1 49*   CALCIUM mg/dL 9 6   AST U/L 30   ALT U/L 24   ALK PHOS U/L 54   EGFR ml/min/1 73sq m 30     BMP:  Results from last 7 days   Lab Units 02/12/19  1801   POTASSIUM mmol/L 3 7   CHLORIDE mmol/L 106   CO2 mmol/L 25   BUN mg/dL 30*   CREATININE mg/dL 1 49*   CALCIUM mg/dL 9 6      Results from last 7 days   Lab Units 02/12/19  2126   TSH 3RD GENERATON uIU/mL 3 570       Lipid Profile:   Lab Results   Component Value Date    CHOL 153 11/24/2017    CHOL 175 11/14/2016    CHOL 172 10/23/2015     Lab Results   Component Value Date    HDL 65 11/24/2017    HDL 69 11/14/2016    HDL 78 10/23/2015     Lab Results   Component Value Date    LDLCALC 72 11/24/2017    LDLCALC 94 11/14/2016    LDLCALC 84 10/23/2015     Lab Results   Component Value Date    TRIG 82 11/24/2017    TRIG 58 11/14/2016    TRIG 52 10/23/2015     Imaging: I have personally reviewed pertinent reports  X-ray Chest 2 Views    Result Date: 2/12/2019  Narrative: CHEST INDICATION:   chest pain  COMPARISON:  8/12/2018 EXAM PERFORMED/VIEWS:  XR CHEST PA & LATERAL FINDINGS: Heart shadow appears unremarkable  Atherosclerotic vascular calcifications are noted  The lungs are clear  No pneumothorax or pleural effusion  Osseous structures appear within normal limits for patient age  Impression: No acute cardiopulmonary disease  Workstation performed: [id number]     Xr Knee 4+ Vw Right Injury    Result Date: 2/12/2019  Narrative: RIGHT KNEE INDICATION:   fall  COMPARISON:  None VIEWS:  XR KNEE 4+ VW RIGHT INJURY FINDINGS: There is no acute fracture or dislocation  There is no joint effusion  Tricompartment osteoarthritis most severe in the medial compartment  Severe osteopenia  No lytic or blastic lesions are seen  There are atherosclerotic calcifications  Soft tissues are otherwise unremarkable  Impression: No acute fracture or dislocation  Osteopenia/osteoporosis and tricompartment osteoarthritis   Workstation performed: [id number]     Ct Head Without Contrast    Result Date: 2/12/2019  Narrative: CT BRAIN - WITHOUT CONTRAST INDICATION:   Headache and head trauma  COMPARISON:  8/12/2018  TECHNIQUE:  CT examination of the brain was performed  In addition to axial images, coronal 2D reformatted images were created and submitted for interpretation  Radiation dose length product (DLP) for this visit:  865 3 mGy-cm   This examination, like all CT scans performed in the [facility], was performed utilizing techniques to minimize radiation dose exposure, including the use of iterative reconstruction and automated exposure control  IMAGE QUALITY:  Diagnostic  FINDINGS: PARENCHYMA:  Periventricular and subcortical hypoattenuating foci consistent with microangiopathic disease  No acute intracranial hemorrhage or mass effect  VENTRICLES AND EXTRA-AXIAL SPACES:  Prominence of the ventricles and sulci consistent with volume loss  No hydrocephalus or extra-axial collection  VISUALIZED ORBITS AND PARANASAL SINUSES:  Bilateral enophthalmos  No retrobulbar hematoma  No blood products in the paranasal sinuses or paranasal sinus disease  CALVARIUM AND EXTRACRANIAL SOFT TISSUES:  No acute fracture, lytic or blastic lesion or soft tissue hematoma  Impression: No acute intracranial abnormality  Workstation performed: [id number]     Ct Cervical Spine Without Contrast    Result Date: 2/12/2019  Narrative: CT CERVICAL SPINE - WITHOUT CONTRAST INDICATION:   Neck pain and trauma  COMPARISON:  8/12/2018  TECHNIQUE:  CT examination of the cervical spine was performed without intravenous contrast   Contiguous axial images were obtained  Sagittal and coronal reconstructions were performed  Radiation dose length product (DLP) for this visit:  195 24 mGy-cm     This examination, like all CT scans performed in the [facility], was performed utilizing techniques to minimize radiation dose exposure, including the use of iterative  reconstruction and automated exposure control  IMAGE QUALITY:  Diagnostic  FINDINGS: ALIGNMENT:  Stable mild dextroscoliosis  Diffuse osteopenia  Randalllyn Lauren VERTEBRAL BODIES:  No acute cervical spine fracture  DEGENERATIVE CHANGES:  Stable disc degenerative change and posterior disc osteophytes without significant central canal stenosis or neural foraminal narrowing  PREVERTEBRAL AND PARASPINAL SOFT TISSUES:  No prevertebral soft tissue swelling  THORACIC INLET:  Scarring in the lung apices  Impression: No acute cervical spine fracture or traumatic malalignment  Workstation performed: [id number]     Assessment  Principal Problem:    Ambulatory dysfunction  Active Problems:    Hyperlipidemia    Hypertension    Sterile pyuria    Acute lateral meniscal injury of right knee    Dehydration, mild    Elevated troponin    Thank you for allowing us to participate in this patient's care  This pt will follow up with Dr Suresh Simmons once discharged  Counseling / Coordination of Care  Total floor / unit time spent today 45 minutes  Greater than 50% of total time was spent with the patient and / or family counseling and / or coordination of care  A description of the counseling / coordination of care: Review of history, current assessment, development of a plan  Code Status: Level 1 - Full Code    ** Please Note: Dragon 360 Dictation voice to text software may have been used in the creation of this document   **

## 2019-02-13 NOTE — PHYSICAL THERAPY NOTE
Physical Therapy Evaluation    Patient's Name: Arturo Gallardo    Admitting Diagnosis  Dizziness [R42]  Multiple injuries [T07  XXXA]  Elevated troponin [R74 8]  Right knee pain [M25 561]  Injury, unspecified, initial encounter [T14 90XA]  Ambulatory dysfunction [R26 2]  Acute lateral meniscal injury of right knee, initial encounter [S83  8X1A]  Primary osteoarthritis, unspecified site [M19 91]    Problem List  Patient Active Problem List   Diagnosis    Generalized osteoarthritis of multiple sites    Hyperlipidemia    Hypertension    Osteoarthrosis    Osteoporosis    Impacted cerumen of both ears    Sterile pyuria    Auditory complaints of both ears    Preoperative clearance    Hypertensive urgency    S/P hip hemiarthroplasty    Acute blood loss anemia    Fracture follow-up    S/p left hip fracture    Iron deficiency anemia secondary to inadequate dietary iron intake    History of left hip hemiarthroplasty    Trigger middle finger of right hand    Unstable knee, left    Acute lateral meniscal injury of right knee    Ambulatory dysfunction    Dehydration, mild    Elevated troponin       Past Medical History  Past Medical History:   Diagnosis Date    CAD (coronary artery disease)     Hypertension     Osteoarthritis     Osteoporosis     Vitamin D deficiency        Past Surgical History  Past Surgical History:   Procedure Laterality Date    APPENDECTOMY      HYSTERECTOMY      NE PARTIAL HIP REPLACEMENT Left 8/13/2018    Procedure: HEMIARTHROPLASTY HIP (BIPOLAR);   Surgeon: Clau Meehan MD;  [facility];  Service: Orthopedics    ROTATOR CUFF REPAIR          02/13/19 1230   Note Type   Note type Eval only   Pain Assessment   Pain Assessment 0-10   Pain Score   (did not rate)   Pain Type Acute pain   Pain Location Knee   Pain Orientation Right   Patient's Stated Pain Goal No pain   Hospital Pain Intervention(s) Ambulation/increased activity;Repositioned   Response to Interventions unchanged   Home Living   Type of Home   (indep living)   Additional Comments Resides at [facility] indep living apts- there x approx 1 week  Indep self care, ambulates w/ RW to and from dining room   Prior Function   Falls in the last 6 months 1 to 4   Restrictions/Precautions   Weight Bearing Precautions Per Order Yes   RLE Weight Bearing Per Order WBAT   Other Precautions Cognitive; Chair Alarm; Bed Alarm;Multiple lines; Fall Risk;Fluid restriction   General   Family/Caregiver Present Yes  (dtr)   Cognition   Overall Cognitive Status Impaired   Arousal/Participation Responsive   Attention Attends with cues to redirect   Orientation Level Oriented X4   Memory Unable to assess   Following Commands Follows one step commands without difficulty   RLE Assessment   RLE Assessment   (strength grossly 4-/5)   LLE Assessment   LLE Assessment   (strength grossly 4/5)   Coordination   Movements are Fluid and Coordinated 1   Bed Mobility   Supine to Sit 4  Minimal assistance   Additional items Assist x 1   Transfers   Sit to Stand 3  Moderate assistance   Additional items Assist x 1   Stand to Sit 3  Moderate assistance   Additional items Assist x 1   Ambulation/Elevation   Gait pattern   (slow, antalgci, short step length)   Gait Assistance 3  Moderate assist   Additional items Assist x 1   Assistive Device Rolling walker   Distance 2-3'x1 from bed to chair   Balance   Static Sitting Good   Dynamic Sitting Fair -   Static Standing Poor +   Dynamic Standing Poor +   Ambulatory Poor   Endurance Deficit   Endurance Deficit Yes   Endurance Deficit Description fatigue, weakness, pain   Activity Tolerance   Activity Tolerance Patient limited by fatigue;Patient limited by pain;Treatment limited secondary to medical complications (Comment)   Nurse Made Aware yes   Assessment   Prognosis Good   Problem List Decreased strength;Decreased endurance; Impaired balance;Decreased mobility;Pain   Assessment Pt seen for high complexity physical therapy evaluation  Pt is a 81 y/o female w/ history/comorbidities of OP, HTN, HLD, orthostasis, R TSH, partial THR L who is now admitted s/p fall at home  c/o R knee pain p same  ortho consulted, knee injected, and is WBAT  Due to unclear cause of pain, fall risk, pain, note unstable clinical picture  PT consulted to assess mobility, d/c needs  Pt presents w/ decreased functional mob, standing balance, endurance, B LE strength   will benefit from skilled PT to correct for the above problems  recommend rehab at d/c   Goals   Patient Goals to have less pain   STG Expiration Date 02/27/19   Short Term Goal #1 1-2 wks: bed mob and transfers w/ indep, standing balance to good/normal w/ device, ambulate 200-300 ft w/ RW and S/mod I, increase B LE strength by 1/2 -1 grade   Treatment Day 0   Plan   Treatment/Interventions Functional transfer training;LE strengthening/ROM; Therapeutic exercise; Endurance training;Bed mobility;Gait training;Patient/family training;Equipment eval/education   PT Frequency   (3-5x/wk)   Recommendation   Recommendation   (recommend rehab at d/c)   Equipment Recommended Walker   PT - OK to Discharge Yes  (when stable to rehab)   Modified Freeborn Scale   Modified Freeborn Scale 4   Barthel Index   Feeding 10   Bathing 0   Grooming Score 5   Dressing Score 5   Bladder Score 0   Bowels Score 10   Toilet Use Score 5   Transfers (Bed/Chair) Score 5   Mobility (Level Surface) Score 0   Stairs Score 0   Barthel Index Score 40           Willie West PT, DPT, CSRS

## 2019-02-13 NOTE — PLAN OF CARE
Problem: PHYSICAL THERAPY ADULT  Goal: Performs mobility at highest level of function for planned discharge setting  See evaluation for individualized goals  Description  Treatment/Interventions: Functional transfer training, LE strengthening/ROM, Therapeutic exercise, Endurance training, Bed mobility, Gait training, Patient/family training, Equipment eval/education  Equipment Recommended: Ashu Camarena       See flowsheet documentation for full assessment, interventions and recommendations  Note:   Prognosis: Good  Problem List: Decreased strength, Decreased endurance, Impaired balance, Decreased mobility, Pain  Assessment: Pt seen for high complexity physical therapy evaluation  Pt is a 79 y/o female w/ history/comorbidities of OP, HTN, HLD, orthostasis, R TSH, partial THR L who is now admitted s/p fall at home  c/o R knee pain p same  ortho consulted, knee injected, and is WBAT  Due to unclear cause of pain, fall risk, pain, note unstable clinical picture  PT consulted to assess mobility, d/c needs  Pt presents w/ decreased functional mob, standing balance, endurance, B LE strength   will benefit from skilled PT to correct for the above problems  recommend rehab at d/c        Recommendation: (recommend rehab at d/c)     PT - OK to Discharge: (S) Yes(when stable to rehab)    See flowsheet documentation for full assessment

## 2019-02-13 NOTE — ASSESSMENT & PLAN NOTE
· S/p cortisone injection today by orthopedics  · WBAT  · Pain control  · PT/OT recommends rehab at discharge  Patient has been to [facility] in the past and is agreeable to return prior to going home to [address] [place] [address]

## 2019-02-13 NOTE — SOCIAL WORK
CM informed pt of PT/OT recommendations for IP Rehab and requested choices from pt  Pt informed CM that she is agreeable to IP Rehab and would like to go to [facility] where she is already a resident at the [place] [id number] or [place] [address]  CM gave pt a SNF list for additional choices if needed  CM placed the requested referrals in [facility]  CM will continue to follow

## 2019-02-13 NOTE — ASSESSMENT & PLAN NOTE
· BPs have been periodically high  · Will currently on losartan and metoprolol tartrate for now and use PRN metoprolol

## 2019-02-13 NOTE — UTILIZATION REVIEW
Initial Clinical Review  Admission: Date/Time/Statement: 02/12/2019 @ 1951  Orders Placed This Encounter   Procedures    Place in Observation     Standing Status:   Standing     Number of Occurrences:   1     Order Specific Question:   Admitting Physician     Answer:   Florencia Christian     Order Specific Question:   Level of Care     Answer:   Med Surg [16]     ED: Date/Time/Mode of Arrival:   ED Arrival Information     Expected Arrival Acuity Means of Arrival Escorted By Service Admission Type    - 2/12/2019 17:53 Urgent Ambulance [facility] Hospitalist Urgent    Arrival Complaint    Fall        Chief Complaint:   Chief Complaint   Patient presents with   [employer] Fall     pt was walking with walker and reports dizziness and then fell onto her R knee  pt reports she doesn't recall all the events   Knee Injury     History of Illness: Alan Morse is a 80 y o  female who lives in assisted living with assistance from staff thing; has a history of hypertension and hyperlipidemia with osteoarthritis and osteoarthrosis  She does have a baseline ambulatory dysfunction but otherwise is needs a walker to be able to get around  According to the son who knows her situation is that she usually sits and gets up claiming on to a walker  It is unknown whether she twisted her knee  She can give a fairly reliable history although circumstances wise she cannot recall  According to the son she will was found on the floor but not sure whether she has been there for quite some time  She is complaining however of a lateral right knee pain specifically located at the right lateral tibial prominence  Because of that she cannot flex her knee and pinpoint tenderness can be elicited    ED Vital Signs:   ED Triage Vitals   Temperature Pulse Respirations Blood Pressure SpO2   02/12/19 1755 02/12/19 1755 02/12/19 1755 02/12/19 1755 02/12/19 1755   98 5 °F (36 9 °C) 92 18 (!) 201/80 99 %      Temp Source Heart Rate Source Patient Position - Orthostatic VS BP Location FiO2 (%)   02/12/19 1755 02/12/19 1755 02/12/19 1755 02/12/19 1755 --   Oral Monitor Lying Right arm       Pain Score       02/13/19 0700       No Pain        Wt Readings from Last 1 Encounters:   02/13/19 54 kg (119 lb)     Pertinent Labs/Diagnostic Test Results:   WBC Thousand/uL 10 08   HEMOGLOBIN g/dL 11 7   HEMATOCRIT % 36 9   PLATELETS Thousands/uL 225   Sodium  POTASSIUM mmol/L 3 7   CHLORIDE mmol/L 106   CO2 mmol/L 25   BUN mg/dL 30*   CREATININE mg/dL 1 49*   CALCIUM mg/dL 9 6   ALK PHOS U/L 54   ALT U/L 24   AST U/L 30   Glucose  ECG: NSR  Chest X:  No acute cardiopulmonary disease  Right Knee X:  No acute fracture or dislocation  Osteopenia/osteoporosis and tricompartment osteoarthritis      CT head:  No acute intracranial abnormality  TROP  0 48, 1 24, 0 89  Color, UA Yellow     Clarity, UA Clear    pH, UA 7 0 4 5 - 8 0     Leukocytes, UA ModerateAbnormal  Negative     Nitrite, UA Negative Negative     Protein, UA 30 (1+)Abnormal  Negative mg/dl     Glucose, UA Negative Negative mg/dl     Ketones, UA Negative Negative mg/dl     Urobilinogen, UA 0 2 0 2, 1 0 E U /dl E U /dl     Bilirubin, UA Negative Negative     Blood, UA Negative Negative     Specific Gravity, UA 1 020 1 003 - 1 030      CK  92    ED Treatment:   Medication Administration from 02/12/2019 1753 to 02/13/2019 0424       Date/Time Order Dose Route Action Action by Comments     02/12/2019 2128 sodium chloride 0 9 % bolus 500 mL 0 mL Intravenous Stopped Patsy Wiley RN      02/12/2019 2003 sodium chloride 0 9 % bolus 500 mL 500 mL Intravenous [employer], RN      02/12/2019 2220 dorzolamide-timolol (COSOPT) 22 3-6 8 MG/ML ophthalmic solution 1 drop 1 drop Both Eyes Given Patsy Wiley RN      02/12/2019 2229 latanoprost (XALATAN) 0 005 % ophthalmic solution 1 drop 1 drop Both Eyes Not Given Patsy Wiley RN pt reports she takes this at 1200      02/12/2019 2219 metoprolol tartrate (LOPRESSOR) tablet 50 mg 50 mg Oral Given [employer] Shirley, RN      02/12/2019 2227 sodium chloride 0 9 % infusion 75 mL/hr Intravenous [employer], RN      02/12/2019 2219 heparin (porcine) subcutaneous injection 5,000 Units 5,000 Units Subcutaneous Given [employer] Shirley, RN         Past Medical/Surgical History: Active Ambulatory Problems     Diagnosis Date Noted    Generalized osteoarthritis of multiple sites 07/13/2015    Hyperlipidemia 08/23/2012    Hypertension 08/20/2012    Osteoarthrosis 08/20/2012    Osteoporosis 08/20/2012    Impacted cerumen of both ears 04/10/2018    Sterile pyuria 05/15/2018    Auditory complaints of both ears 07/30/2018    Preoperative clearance 08/12/2018    Hypertensive urgency 08/12/2018    S/P hip hemiarthroplasty 08/14/2018    Acute blood loss anemia 08/14/2018    Fracture follow-up 08/28/2018    S/p left hip fracture 09/12/2018    Iron deficiency anemia secondary to inadequate dietary iron intake 09/12/2018    History of left hip hemiarthroplasty 10/16/2018    Trigger middle finger of right hand 10/16/2018    Unstable knee, left 10/16/2018     Resolved Ambulatory Problems     Diagnosis Date Noted    Closed fracture of neck of left femur ([address] ) 08/12/2018     Past Medical History:   Diagnosis Date    CAD (coronary artery disease)     Hypertension     Osteoarthritis     Osteoporosis     Vitamin D deficiency      Admitting Diagnosis: Dizziness [R42]  Multiple injuries [T07  XXXA]  Elevated troponin [R74 8]  Right knee pain [M25 561]  Injury, unspecified, initial encounter [T14 90XA]  Ambulatory dysfunction [R26 2]  Acute lateral meniscal injury of right knee, initial encounter [S83  8X1A]  Primary osteoarthritis, unspecified site [M19 91]  Age/Sex: 80 y o  female  Assessment/Plan:   * Ambulatory dysfunction  Assessment & Plan  Orthopedic evaluation  Also cold compresses with mild pain management    Occupational therapy input regarding safety and ambulatory functions      Acute lateral meniscal injury of right knee  Assessment & Plan  Rest and ice  Pain control  Orthopedic and occupational therapy consult  Elevated troponin  Assessment & Plan  No complaints of chest pain and EKG looks normal   Patient had previous determinations of troponin which were also mildly elevated  May be a variant  Will also check CPK possibly leak from musculoskeletal trauma  Patient does not have any bruising      Dehydration, mild  Assessment & Plan  Mild hydration; check metabolic profile with CBC  Orthostatic blood pressures  Although does not claim to losing consciousness, she was dizzy; will also check telemetry  Will place possibility of syncope      Sterile pyuria  Assessment & Plan  Most likely is not infection  Patient does not have any fever  Will continue to watch vital signs      Hypertension  Assessment & Plan  Currently on losartan and metoprolol tartrate      Hyperlipidemia  Assessment & Plan  Continue fenofibrate  Will also check lipid profile as part of chest pain protocol       VTE Prophylaxis: Heparin  / sequential compression device   Anticipated Length of Stay:  Patient will be admitted on an Observation basis with an anticipated length of stay of  less than 2 midnights  Justification for Hospital Stay: Please see detailed plans noted above      Admission Orders:  Scheduled Meds:   Current Facility-Administered Medications:  acetaminophen 975 mg Oral Q8H PRN Florentino Horton MD    aluminum-magnesium hydroxide-simethicone 15 mL Oral Q6H PRN Florentino Horton MD    aspirin 81 mg Oral Daily Florentino Horton MD    calcitonin (salmon) 1 spray Alternating Nares Daily Florentino Horton MD    vitamin B-12 100 mcg Oral Daily Florentino Horton MD    docusate sodium 100 mg Oral BID PRN Florentino Horton MD    dorzolamide-timolol 1 drop Both Eyes BID Florentino Horton MD    ferrous sulfate 325 mg Oral Daily Before Breakfast Florentino Horton MD    glucosamine sulfate 500 mg Oral Daily Mary Piper MD    heparin (porcine) 5,000 Units Subcutaneous [facility] Mary Piper MD    latanoprost 1 drop Both Eyes HS Mary Piper MD    losartan 25 mg Oral Daily Mary Piper MD    metoprolol tartrate 50 mg Oral BID Mary Piper MD    multivitamin-minerals 1 tablet Oral Daily Mary Piper MD    ondansetron 4 mg Intravenous Q6H PRN Mary Piper MD    pyridoxine 200 mg Oral Daily Mary Piper MD    sodium chloride 75 mL/hr Intravenous Continuous Mary Piper MD Last Rate: 75 mL/hr (02/12/19 2227)     Continuous Infusions:   sodium chloride 75 mL/hr Last Rate: 75 mL/hr (02/12/19 2227)     CV diet  Up with assistance  TELM  Consult OT  Mendel SCDs

## 2019-02-13 NOTE — CONSULTS
Orthopedics   Ebenezer Ta 80 y o  female MRN: [id number]  Unit/Bed#: OM6 213-02      Chief Complaint:   right knee pain    HPI:   80 y  o female walker ambulator complaining of right knee pain  Patient denies any recent trauma to the knee, though she does have an establiched diagnosis of osteoarthritis at baseline  Pain is well localized to the lateral aspect of the knee, worse with palpation and ambulation and improves with rest  She denies any numbness or tingling to the lower extremity and has no other complaints at this time  Review Of Systems:   · Skin: Normal  · Neuro: See HPI  · Musculoskeletal: See HPI  · 14 point review of systems negative except as stated above     Past Medical History:   Past Medical History:   Diagnosis Date    CAD (coronary artery disease)     Hypertension     Osteoarthritis     Osteoporosis     Vitamin D deficiency        Past Surgical History:   Past Surgical History:   Procedure Laterality Date    APPENDECTOMY      HYSTERECTOMY      LA PARTIAL HIP REPLACEMENT Left 8/13/2018    Procedure: HEMIARTHROPLASTY HIP (BIPOLAR); Surgeon: Jose Solitario MD;  [facility];  Service: Orthopedics    ROTATOR CUFF REPAIR         Family History:  Family history reviewed and non-contributory  Family History   Problem Relation Age of Onset    Diabetes type II Father     Heart disease Brother        Social History:  Social History     Socioeconomic History    Marital status:       Spouse name: None    Number of children: None    Years of education: None    Highest education level: None   Occupational History    None   Social Needs    Financial resource strain: None    Food insecurity:     Worry: None     Inability: None    Transportation needs:     Medical: None     Non-medical: None   Tobacco Use    Smoking status: Former Smoker    Smokeless tobacco: Never Used   Substance and Sexual Activity    Alcohol use: Not Currently    Drug use: No    Sexual activity: None   Lifestyle    Physical activity:     Days per week: None     Minutes per session: None    Stress: None   Relationships    Social connections:     Talks on phone: None     Gets together: None     Attends Jehovah's witness service: None     Active member of club or organization: None     Attends meetings of clubs or organizations: None     Relationship status: None    Intimate partner violence:     Fear of current or ex partner: None     Emotionally abused: None     Physically abused: None     Forced sexual activity: None   Other Topics Concern    None   Social History Narrative    Daily coffee consumption - 4 cups       Allergies:   No Known Allergies        Labs:  0   Lab Value Date/Time    HCT 36 9 02/12/2019 1801    HCT 33 6 (L) 09/16/2018 1034    HCT 23 4 (L) 08/15/2018 0523    HCT 37 1 11/24/2017 0812    HCT 37 0 11/14/2016 0736    HCT 34 3 (L) 09/07/2015 0600    HGB 11 7 02/12/2019 1801    HGB 10 4 (L) 09/16/2018 1034    HGB 7 8 (L) 08/15/2018 0523    HGB 12 3 11/24/2017 0812    HGB 12 3 11/14/2016 0736    HGB 11 2 (L) 09/07/2015 0600    INR 1 05 08/12/2018 1137    WBC 10 08 02/12/2019 1801    WBC 7 20 09/16/2018 1034    WBC 12 84 (H) 08/15/2018 0523    WBC 0-5 11/24/2017 0812    WBC 8 7 11/24/2017 0812    WBC 6 1 11/14/2016 0736       Meds:    Current Facility-Administered Medications:     acetaminophen (TYLENOL) tablet 975 mg, 975 mg, Oral, Q8H PRN, iGla Farley MD    aluminum-magnesium hydroxide-simethicone (MYLANTA) 200-200-20 mg/5 mL oral suspension 15 mL, 15 mL, Oral, Q6H PRN, Gila Farley MD    aspirin chewable tablet 81 mg, 81 mg, Oral, Daily, Gila Farley MD    calcitonin (salmon) (MIACALCIN) 200 units/act nasal spray 1 spray, 1 spray, Alternating Nares, Daily, Gila Farley MD    cyanocobalamin (VITAMIN B-12) tablet 100 mcg, 100 mcg, Oral, Daily, Gila Farley MD    docusate sodium (COLACE) capsule 100 mg, 100 mg, Oral, BID PRN, Gila Farley MD    dorzolamide-timolol (COSOPT) 22 3-6 8 MG/ML ophthalmic solution 1 drop, 1 drop, Both Eyes, BID, Colton Barajas MD, 1 drop at 02/12/19 2220    ferrous sulfate tablet 325 mg, 325 mg, Oral, Daily Before Breakfast, Colton Barajas MD, 325 mg at 02/13/19 4073    glucosamine sulfate capsule 500 mg, 500 mg, Oral, Daily, Colton Barajas MD    heparin (porcine) subcutaneous injection 5,000 Units, 5,000 Units, Subcutaneous, Q8H [address], 5,000 Units at 02/13/19 0618 **AND** [COMPLETED] Platelet count, , , Once, Colton Barajas MD    latanoprost (XALATAN) 0 005 % ophthalmic solution 1 drop, 1 drop, Both Eyes, HS, Colton Barajas MD    losartan (COZAAR) tablet 25 mg, 25 mg, Oral, Daily, Colton Barajas MD    metoprolol tartrate (LOPRESSOR) tablet 50 mg, 50 mg, Oral, BID, Colton Barajas MD, 50 mg at 02/12/19 2219    multivitamin-minerals (CENTRUM) tablet 1 tablet, 1 tablet, Oral, Daily, Colton Barajas MD    ondansetron [facility]) injection 4 mg, 4 mg, Intravenous, Q6H PRN, Colton Barajas MD    pyridoxine (VITAMIN B6) tablet 200 mg, 200 mg, Oral, Daily, Colton Barajas MD    sodium chloride 0 9 % infusion, 75 mL/hr, Intravenous, Continuous, Colton Barajas MD, Last Rate: 75 mL/hr at 02/12/19 2227, 75 mL/hr at 02/12/19 2227    Blood Culture:   No results found for: BLOODCX    Wound Culture:   No results found for: WOUNDCULT    Ins and Outs:  I/O last 24 hours: In: 500 [IV Piggyback:500]  Out: -           Physical Exam:   BP (!) 198/84 (BP Location: Left arm) Comment: nurse notified  Pulse 82   Temp 98 °F (36 7 °C)   Resp 18   Ht 5' (1 524 m)   Wt 54 kg (119 lb)   SpO2 95%   BMI 23 24 kg/m²   Gen: Alert and oriented to person, place, time  HEENT: EOMI, eyes clear, moist mucus membranes, hearing intact  Respiratory: Bilateral chest rise   No audible wheezing found  Cardiovascular: Regular Rate and Rhythm  Abdomen: soft nontender/nondistended  Musculoskeletal: right lower extremity  · Skin intact  · Slight effusion   · Tender to palpation over lateral knee  · Can perform striaght leg raise  · Stable to varus/valgus stress  · Bony prominence indicating underlying osteophytic changes   · Sensation intact dp/sp/tib/larissa/saph  · Motor intact EHL/FHL/TA/GS/QD/HS  · Palpable PT pulse    Radiology:   I personally reviewed the films  X-rays right knee shows severe degenerative changes     _*_*_*_*_*_*_*_*_*_*_*_*_*_*_*_*_*_*_*_*_*_*_*_*_*_*_*_*_*_*_*_*_*_*_*_*_*_*_*_*_*    Assessment:  80 y  o female with right knee osteoarthritis exacerbation   No operative intervention indicated at this time    Plan:   · Weight bearing as tolerated  right lower extremity  · Patient would benefit from an intra-articular corticosteroid injection  · PT  · Pain control  · Dispo: Ortho signing off  · Patient may follow-up with a non-operative specialist MARILIA Chowdary MD

## 2019-02-13 NOTE — UTILIZATION REVIEW
Continued Stay Review-Observation, FRANKY Lopez  Date: 02/13/2019  Vital Signs: BP (!) 172/89 (BP Location: Left arm)   Pulse 96   Temp 97 9 °F (36 6 °C) (Oral)   Resp 18   Ht 5' (1 524 m)   Wt 54 kg (119 lb)   SpO2 100%   BMI 23 24 kg/m²   Assessment/Plan: 0948, 1330  * Ambulatory dysfunction  Assessment & Plan  · Rehab placement at discharge prior to returning to independent living      Elevated troponin  Assessment & Plan  · No complaints of chest pain and EKG looks normal   Patient had previous determinations of troponin which were also mildly elevated  May be a variant  · Troponin has peaked and is now improving  · No further workup per Cardiology      Acute lateral meniscal injury of right knee  Assessment & Plan  · S/p cortisone injection today by orthopedics  · WBAT  · Pain control  · PT/OT recommends rehab at discharge  Patient has been to [facility] in the past and is agreeable to return prior to going home to [address] [place] [address]      Essential hypertension  Assessment & Plan  · BPs have been periodically high  · Will currently on losartan and metoprolol tartrate for now and use PRN metoprolol      Acute kidney injury (HCC)resolved as of 2/13/2019  Assessment & Plan  · Creatinine on admission:  1 49  · Creatinine today:  0 73        VTE Pharmacologic Prophylaxis:   Pharmacologic: Heparin  Mechanical: Mechanical VTE prophylaxis in place  will now require > 2 midnight hospital stay due to rehab placement    Medications:   Scheduled Meds:   Current Facility-Administered Medications:  acetaminophen 975 mg Oral Q8H PRN Diane Dave MD    aluminum-magnesium hydroxide-simethicone 15 mL Oral Q6H PRN Diane Daev MD    aspirin 81 mg Oral Daily Diane Dave MD    calcitonin (salmon) 1 spray Alternating Nares Daily Diane Dave MD    vitamin B-12 100 mcg Oral Daily Diane Dave MD    docusate sodium 100 mg Oral BID PRN Diane Dave MD dorzolamide-timolol 1 drop Both Eyes BID Ashish Carranza MD    ferrous sulfate 325 mg Oral Daily Before Breakfast Ashish Carranza MD    glucosamine sulfate 500 mg Oral Daily Ashish Carranza MD    heparin (porcine) 5,000 Units Subcutaneous [facility] Ashish Carranza MD    latanoprost 1 drop Both Eyes HS Ashish Carranza MD    losartan 25 mg Oral Daily Ashish Carranza MD    metoprolol tartrate 50 mg Oral BID Ashish Carranza MD    multivitamin-minerals 1 tablet Oral Daily Ashish Carranza MD    ondansetron 4 mg Intravenous Q6H PRN Ashish Carranza MD    pyridoxine 200 mg Oral Daily Ashish Carranza MD    sodium chloride 75 mL/hr Intravenous Continuous Ashish Carranza MD Last Rate: 75 mL/hr (02/12/19 2227)     Continuous Infusions:   sodium chloride 75 mL/hr Last Rate: 75 mL/hr (02/12/19 2227)   Pertinent Labs/Diagnostic Results:   Age/Sex: 80 y o  female   Discharge Plan: TBD    -----------------------------------------------------------------------------------------------------------------------------  Consult Ortho  80 y  o female with right knee osteoarthritis exacerbation  No operative intervention indicated at this time     Plan:   · Weight bearing as tolerated  right lower extremity  · Patient would benefit from an intra-articular corticosteroid injection  · PT  · Pain control  · Dispo: Ortho signing off  · Patient may follow-up with a non-operative specialist PRN    --------------------------------------------------------------------------------------------------------------------------------  Consult Cardio  Impression:  1  Elevated troponin - Type II MI from fall  No evidence of acute coronary syndrome  2  Hypertension - not adequate control  3  ALBINO - possible volume depletion      Recommendations:  1  Increase b-blockers  2    IV fluids  3    Increase b-blockers  4    No cardiac testing necessary at this time

## 2019-02-13 NOTE — ASSESSMENT & PLAN NOTE
No complaints of chest pain and EKG looks normal   Patient had previous determinations of troponin which were also mildly elevated  May be a variant  Will also check CPK possibly leak from musculoskeletal trauma  Patient does not have any bruising  Note echocardiogram 2015 is unremarkable  Patient is asymptomatic  Would also get Cardiology involved to give opinion

## 2019-02-13 NOTE — H&P
H&P- Jakob Chowdhury 9/11/1925, 80 y o  female MRN: [id number]    Unit/Bed#: ED 06 Encounter: [id number]    Primary Care Provider: Aren Shen DO   Date and time admitted to hospital: 2/12/2019  5:54 PM        Acute lateral meniscal injury of right knee  Assessment & Plan  Rest and ice  Pain control  Orthopedic and occupational therapy consult    * Ambulatory dysfunction  Assessment & Plan  Orthopedic evaluation  Also cold compresses with mild pain management  Occupational therapy input regarding safety and ambulatory functions  Elevated troponin  Assessment & Plan  No complaints of chest pain and EKG looks normal   Patient had previous determinations of troponin which were also mildly elevated  May be a variant  Will also check CPK possibly leak from musculoskeletal trauma  Patient does not have any bruising  Dehydration, mild  Assessment & Plan  Mild hydration; check metabolic profile with CBC  Orthostatic blood pressures  Although does not claim to losing consciousness, she was dizzy; will also check telemetry  Will place possibility of syncope  Sterile pyuria  Assessment & Plan  Most likely is not infection  Patient does not have any fever  Will continue to watch vital signs  Hypertension  Assessment & Plan  Currently on losartan and metoprolol tartrate  Hyperlipidemia  Assessment & Plan  Continue fenofibrate  Will also check lipid profile as part of chest pain protocol  VTE Prophylaxis: Heparin  / sequential compression device   Code Status: Prior full Code as discussed with patient and son  POLST: There is no POLST form on file for this patient (pre-hospital)    Anticipated Length of Stay:  Patient will be admitted on an Observation basis with an anticipated length of stay of  less than 2 midnights  Justification for Hospital Stay: Please see detailed plans noted above      Chief Complaint:     Status post fall with dizziness  History of Present Illness:  Xiomara Valle Airam Purvis is a 80 y o  female who lives in assisted living with assistance from staff thing; has a history of hypertension and hyperlipidemia with osteoarthritis and osteoarthrosis  She does have a baseline ambulatory dysfunction but otherwise is needs a walker to be able to get around  According to the son who knows her situation is that she usually sits and gets up claiming on to a walker  It is unknown whether she twisted her knee  She can give a fairly reliable history although circumstances wise she cannot recall  According to the son she will was found on the floor but not sure whether she has been there for quite some time  No fever no cough or cold  No chest pain  No vomiting or nausea  No diarrhea  Other than the fall patient does not have any other complaints and she remains comfortable  She is complaining however of a lateral right knee pain specifically located at the right lateral tibial prominence  Because of that she cannot flex her knee and pinpoint tenderness can be elicited  Currently, patient is comfortable lying flat on bed  No other complaints except for the knee      Review of Systems:    Constitutional:  Denies fever or chills   Eyes:  Denies change in visual acuity   HENT:  Denies nasal congestion or sore throat   Respiratory:  Denies cough or shortness of breath   Cardiovascular:  Denies chest pain or edema   GI:  Denies abdominal pain, nausea, vomiting, bloody stools or diarrhea   :  Denies dysuria   Musculoskeletal:  Denies back pain with note of joint pain at the right knee as noted above  Integument:  Denies rash   Neurologic:  Denies headache, focal weakness or sensory changes   Endocrine:  Denies polyuria or polydipsia   Lymphatic:  Denies swollen glands   Psychiatric:  Denies depression or anxiety     Past Medical and Surgical History:   Past Medical History:   Diagnosis Date    CAD (coronary artery disease)     Hypertension     Osteoarthritis     Osteoporosis     Vitamin D deficiency      Past Surgical History:   Procedure Laterality Date    APPENDECTOMY      HYSTERECTOMY      TN PARTIAL HIP REPLACEMENT Left 8/13/2018    Procedure: HEMIARTHROPLASTY HIP (BIPOLAR); Surgeon: Alexsandra Martínez MD;  [facility];  Service: Orthopedics    ROTATOR CUFF REPAIR         Meds/Allergies:    (Not in a hospital admission)    Allergies: No Known Allergies  History:  Marital Status:    Occupation:  She is retired  Patient Pre-hospital Living Situation:  Assisted living  Patient Pre-hospital Level of Mobility:  Needs walker  Patient Pre-hospital Diet Restrictions:  Cardiac  Substance Use History:   Social History     Substance and Sexual Activity   Alcohol Use No     Social History     Tobacco Use   Smoking Status Former Smoker   Smokeless Tobacco Never Used     Social History     Substance and Sexual Activity   Drug Use No       Family History:  Family History   Problem Relation Age of Onset    Diabetes type II Father     Heart disease Brother        Physical Exam:     Vitals:   Blood Pressure: (!) 214/95 (02/12/19 1955)  Pulse: (!) 106 (02/12/19 1955)  Temperature: 98 5 °F (36 9 °C) (02/12/19 1755)  Temp Source: Oral (02/12/19 1755)  Respirations: 18 (02/12/19 1955)  SpO2: 96 % (02/12/19 1955)    Constitutional:  Well developed, well nourished, no acute distress, non-toxic appearance and can give a good conversation; very comfortable lying flat on bed  No air hunger noted  Eyes:  PERRL, conjunctiva normal   HENT:  Atraumatic, external ears normal, nose normal, oropharynx mildly dry, no pharyngeal exudates   Neck- normal range of motion, no tenderness, supple   Respiratory:  No respiratory distress, normal breath sounds, no rales, no wheezing   Cardiovascular:  Normal rate, normal rhythm, no murmurs, no gallops, no rubs   GI:  Soft, nondistended, normal bowel sounds, nontender, no organomegaly, no mass, no rebound, no guarding no hypogastric discomfort on palpation  : No costovertebral angle tenderness   Musculoskeletal:  No edema, no tenderness, no deformities  Back- no tenderness  Integument:  Well hydrated, no rash   Lymphatic:  No lymphadenopathy noted   Neurologic:  Alert &awake, communicative, CN 2-12 normal, normal motor function, normal sensory function, no focal deficits noted   Psychiatric:  Speech and behavior appropriate       Lab Results: I have personally reviewed pertinent reports  Results from last 7 days   Lab Units 02/12/19  1801   WBC Thousand/uL 10 08   HEMOGLOBIN g/dL 11 7   HEMATOCRIT % 36 9   PLATELETS Thousands/uL 225   NEUTROS PCT % 70   LYMPHS PCT % 16   MONOS PCT % 12   EOS PCT % 1     Results from last 7 days   Lab Units 02/12/19  1801   POTASSIUM mmol/L 3 7   CHLORIDE mmol/L 106   CO2 mmol/L 25   BUN mg/dL 30*   CREATININE mg/dL 1 49*   CALCIUM mg/dL 9 6   ALK PHOS U/L 54   ALT U/L 24   AST U/L 30           EKG:  Normal EKG with normal sinus rhythm with no acute ST T-wave changes  Imaging: I have personally reviewed pertinent reports  X-ray Chest 2 Views    Result Date: 2/12/2019  Narrative: CHEST INDICATION:   chest pain  COMPARISON:  8/12/2018 EXAM PERFORMED/VIEWS:  XR CHEST PA & LATERAL FINDINGS: Heart shadow appears unremarkable  Atherosclerotic vascular calcifications are noted  The lungs are clear  No pneumothorax or pleural effusion  Osseous structures appear within normal limits for patient age  Impression: No acute cardiopulmonary disease  Workstation performed: [id number]     Xr Knee 4+ Vw Right Injury    Result Date: 2/12/2019  Narrative: RIGHT KNEE INDICATION:   fall  COMPARISON:  None VIEWS:  XR KNEE 4+ VW RIGHT INJURY FINDINGS: There is no acute fracture or dislocation  There is no joint effusion  Tricompartment osteoarthritis most severe in the medial compartment  Severe osteopenia  No lytic or blastic lesions are seen  There are atherosclerotic calcifications  Soft tissues are otherwise unremarkable       Impression: No acute fracture or dislocation  Osteopenia/osteoporosis and tricompartment osteoarthritis  Workstation performed: [id number]     Ct Head Without Contrast    Result Date: 2/12/2019  Narrative: CT BRAIN - WITHOUT CONTRAST INDICATION:   Headache and head trauma  COMPARISON:  8/12/2018  TECHNIQUE:  CT examination of the brain was performed  In addition to axial images, coronal 2D reformatted images were created and submitted for interpretation  Radiation dose length product (DLP) for this visit:  865 3 mGy-cm   This examination, like all CT scans performed in the [facility], was performed utilizing techniques to minimize radiation dose exposure, including the use of iterative reconstruction and automated exposure control  IMAGE QUALITY:  Diagnostic  FINDINGS: PARENCHYMA:  Periventricular and subcortical hypoattenuating foci consistent with microangiopathic disease  No acute intracranial hemorrhage or mass effect  VENTRICLES AND EXTRA-AXIAL SPACES:  Prominence of the ventricles and sulci consistent with volume loss  No hydrocephalus or extra-axial collection  VISUALIZED ORBITS AND PARANASAL SINUSES:  Bilateral enophthalmos  No retrobulbar hematoma  No blood products in the paranasal sinuses or paranasal sinus disease  CALVARIUM AND EXTRACRANIAL SOFT TISSUES:  No acute fracture, lytic or blastic lesion or soft tissue hematoma  Impression: No acute intracranial abnormality  Workstation performed: [id number]     Ct Cervical Spine Without Contrast    Result Date: 2/12/2019  Narrative: CT CERVICAL SPINE - WITHOUT CONTRAST INDICATION:   Neck pain and trauma  COMPARISON:  8/12/2018  TECHNIQUE:  CT examination of the cervical spine was performed without intravenous contrast   Contiguous axial images were obtained  Sagittal and coronal reconstructions were performed  Radiation dose length product (DLP) for this visit:  195 24 mGy-cm     This examination, like all CT scans performed in the [facility], was performed utilizing techniques to minimize radiation dose exposure, including the use of iterative  reconstruction and automated exposure control  IMAGE QUALITY:  Diagnostic  FINDINGS: ALIGNMENT:  Stable mild dextroscoliosis  Diffuse osteopenia  Kalina Nissen VERTEBRAL BODIES:  No acute cervical spine fracture  DEGENERATIVE CHANGES:  Stable disc degenerative change and posterior disc osteophytes without significant central canal stenosis or neural foraminal narrowing  PREVERTEBRAL AND PARASPINAL SOFT TISSUES:  No prevertebral soft tissue swelling  THORACIC INLET:  Scarring in the lung apices  Impression: No acute cervical spine fracture or traumatic malalignment  Workstation performed: [id number]     Noted echocardiogram has been done in 2015 which showed good ejection fraction and no pulmonary hypertension  ** Please Note: Dragon 360 Dictation voice to text software was used in the creation of this document   **

## 2019-02-13 NOTE — PROGRESS NOTES
Progress Note - Larry Howe 9/11/1925, 80 y o  female MRN: [id number]  Unit/Bed#: Fay Donato 213-02 Encounter: [id number]  Primary Care Provider: Philip Holder DO   Date and time admitted to hospital: 2/12/2019  5:54 PM    * Ambulatory dysfunction  Assessment & Plan  · Rehab placement at discharge prior to returning to independent living  Elevated troponin  Assessment & Plan  · No complaints of chest pain and EKG looks normal   Patient had previous determinations of troponin which were also mildly elevated  May be a variant  · Troponin has peaked and is now improving  · No further workup per Cardiology  Acute lateral meniscal injury of right knee  Assessment & Plan  · S/p cortisone injection today by orthopedics  · WBAT  · Pain control  · PT/OT recommends rehab at discharge  Patient has been to [facility] in the past and is agreeable to return prior to going home to [address] [place] [address]  Essential hypertension  Assessment & Plan  · BPs have been periodically high  · Will currently on losartan and metoprolol tartrate for now and use PRN metoprolol  Acute kidney injury (HCC)resolved as of 2/13/2019  Assessment & Plan  · Creatinine on admission:  1 49  · Creatinine today:  0 73      VTE Pharmacologic Prophylaxis:   Pharmacologic: Heparin  Mechanical: Mechanical VTE prophylaxis in place  Patient Centered Rounds: I have performed bedside rounds with nursing staff today  Discussions with Specialists or Other Care Team Provider: Case management  Education and Discussions with Family / Patient: All patient questions answered to the best of my ability  Time Spent for Care: 20 minutes  More than 50% of total time spent on counseling and coordination of care as described above      Current Length of Stay: 0 day(s)  Current Patient Status: Observation   Certification Statement: The patient, admitted on an observation basis, will now require > 2 midnight hospital stay due to rehab placement  Discharge Plan: Patient is medically stable for discharge once rehab placement has been obtained  Code Status: Level 1 - Full Code    Subjective:   Patient received a right knee cortisone injection earlier today and feels improved right knee pain  Otherwise, she denies any chest pain, shortness or breath, dizziness or lightheadedness  Objective:   Vitals:   Temp (24hrs), Av °F (36 7 °C), Min:97 7 °F (36 5 °C), Max:98 5 °F (36 9 °C)    Temp:  [97 7 °F (36 5 °C)-98 5 °F (36 9 °C)] 97 7 °F (36 5 °C)  HR:  [] 74  Resp:  [18-22] 18  BP: (136-215)/() 136/65  SpO2:  [94 %-100 %] 95 %  Body mass index is 23 24 kg/m²  Input and Output Summary (last 24 hours): Intake/Output Summary (Last 24 hours) at 2019 1456  Last data filed at 2019 1300  Gross per 24 hour   Intake 620 ml   Output    Net 620 ml       Physical Exam:     Physical Exam   HENT:   Head: Normocephalic and atraumatic  Mouth/Throat: Oropharynx is clear and moist and mucous membranes are normal    Eyes: No scleral icterus  Cardiovascular: Normal rate and regular rhythm  No murmur heard  Pulmonary/Chest: Breath sounds normal  She has no wheezes  She has no rales  She exhibits no tenderness  Abdominal: Soft  Bowel sounds are normal  She exhibits no distension  There is no tenderness  Musculoskeletal: Normal range of motion  She exhibits no edema  Skin: Skin is warm and dry  No rash noted  Psychiatric: She has a normal mood and affect  Vitals reviewed        Additional Data:   Labs:  Results from last 7 days   Lab Units 19  1041   WBC Thousand/uL 8 94   HEMOGLOBIN g/dL 11 8   HEMATOCRIT % 36 1   PLATELETS Thousands/uL 207   NEUTROS PCT % 78*   LYMPHS PCT % 13*   MONOS PCT % 8   EOS PCT % 0     Results from last 7 days   Lab Units 19  1041   POTASSIUM mmol/L 3 8   CHLORIDE mmol/L 105   CO2 mmol/L 22   BUN mg/dL 18   CREATININE mg/dL 0 73   CALCIUM mg/dL 8 8   ALK PHOS U/L 37* ALT U/L 20   AST U/L 26           * I Have Reviewed All Lab Data Listed Above  * Additional Pertinent Lab Tests Reviewed: All Labs Within Last 24 Hours Reviewed    Imaging:    Imaging Reports Reviewed Today Include: None new    Cultures:   Blood Culture: No results found for: BLOODCX  Urine Culture:   Lab Results   Component Value Date    URINECX >100,000 cfu/ml Klebsiella oxytoca (A) 05/16/2018     Sputum Culture: No components found for: SPUTUMCX  Wound Culture: No results found for: WOUNDCULT    Last 24 Hours Medication List:     Current Facility-Administered Medications:  acetaminophen 975 mg Oral Q8H PRN Elodia Martinez MD    aluminum-magnesium hydroxide-simethicone 15 mL Oral Q6H PRN Elodia Martinez MD    aspirin 81 mg Oral Daily Elodia Martinez MD    calcitonin (salmon) 1 spray Alternating Nares Daily Elodia Martinez MD    vitamin B-12 100 mcg Oral Daily Elodia Martinez MD    docusate sodium 100 mg Oral BID PRN Elodia Martinez MD    dorzolamide-timolol 1 drop Both Eyes BID Elodia Martinez MD    ferrous sulfate 325 mg Oral Daily Before Breakfast Elodia Martinez MD    glucosamine sulfate 500 mg Oral Daily Elodia Martinez MD    heparin (porcine) 5,000 Units Subcutaneous [facility] Elodia Martinez MD    latanoprost 1 drop Both Eyes HS Elodia Martinez MD    losartan 25 mg Oral Daily Elodia Martinez MD    metoprolol tartrate 37 5 mg Oral BID ENID Renner    multivitamin-minerals 1 tablet Oral Daily Elodia Martinez MD    ondansetron 4 mg Intravenous Q6H PRN Elodia Martinez MD    pyridoxine 200 mg Oral Daily Elodia Martinez MD    sodium chloride 75 mL/hr Intravenous Continuous Elodia Martinez MD Last Rate: 75 mL/hr (02/12/19 2228)        Today, Patient Was Seen By: Yosef Bray PA-C    ** Please Note: Dragon 360 Dictation voice to text software may have been used in the creation of this document   **

## 2019-02-13 NOTE — SOCIAL WORK
CM introduced self to pt and her daughter Merlin Allan [phone] at the bedside and explained role  Pt lives at [facility]  Pt's son Vicky Reynoso [phone] is her emergency contact and POA  PTA pt was independent with ADL's and ambulation with walker  Pt has  Annette Tony that comes once a week to help with showers  PCP is Dr Darryl Anand and preferred pharmacy is [facility] [address] in [place]  No hx etoh/drug abuse or tx and no mental health dx  Pt does not drive has family support for transportation needs  Pt has hx of IP PT with [facility] willing to go back if need and hx with [facility] unsure of agency  CM reviewed d/c planning process including the following: identifying help at home, patient preference for d/c planning needs, Discharge Lounge, [facility] Meds to Bed program, availability of treatment team to discuss questions or concerns patient and/or family may have regarding understanding medications and recognizing signs and symptoms once discharged  CM also encouraged patient to follow up with all recommended appointments after discharge  Patient advised of importance for patient and family to participate in managing patients medical well being        Celestino Del Toro,  [phone]

## 2019-02-14 PROBLEM — I21.A1 TYPE 2 MYOCARDIAL INFARCTION WITHOUT ST ELEVATION (HCC): Status: ACTIVE | Noted: 2019-02-12

## 2019-02-14 PROBLEM — I21.A1 TYPE 2 MYOCARDIAL INFARCTION WITHOUT ST ELEVATION (HCC): Status: RESOLVED | Noted: 2019-02-12 | Resolved: 2019-02-14

## 2019-02-14 PROCEDURE — 99222 1ST HOSP IP/OBS MODERATE 55: CPT | Performed by: ORTHOPAEDIC SURGERY

## 2019-02-14 PROCEDURE — 99232 SBSQ HOSP IP/OBS MODERATE 35: CPT | Performed by: INTERNAL MEDICINE

## 2019-02-14 RX ORDER — AMLODIPINE BESYLATE 5 MG/1
5 TABLET ORAL DAILY
Status: DISCONTINUED | OUTPATIENT
Start: 2019-02-14 | End: 2019-02-14

## 2019-02-14 RX ORDER — AMLODIPINE BESYLATE 5 MG/1
5 TABLET ORAL DAILY
Status: DISCONTINUED | OUTPATIENT
Start: 2019-02-14 | End: 2019-02-15 | Stop reason: HOSPADM

## 2019-02-14 RX ADMIN — DORZOLAMIDE HYDROCHLORIDE AND TIMOLOL MALEATE 1 DROP: 20; 5 SOLUTION/ DROPS OPHTHALMIC at 09:16

## 2019-02-14 RX ADMIN — FERROUS SULFATE TAB 325 MG (65 MG ELEMENTAL FE) 325 MG: 325 (65 FE) TAB at 09:12

## 2019-02-14 RX ADMIN — ASPIRIN 81 MG 81 MG: 81 TABLET ORAL at 09:03

## 2019-02-14 RX ADMIN — AMLODIPINE BESYLATE 5 MG: 5 TABLET ORAL at 18:52

## 2019-02-14 RX ADMIN — Medication 500 MG: at 09:16

## 2019-02-14 RX ADMIN — METOPROLOL TARTRATE 37.5 MG: 25 TABLET ORAL at 09:03

## 2019-02-14 RX ADMIN — LOSARTAN POTASSIUM 25 MG: 25 TABLET, FILM COATED ORAL at 09:03

## 2019-02-14 RX ADMIN — HEPARIN SODIUM 5000 UNITS: 5000 INJECTION INTRAVENOUS; SUBCUTANEOUS at 05:48

## 2019-02-14 RX ADMIN — HEPARIN SODIUM 5000 UNITS: 5000 INJECTION INTRAVENOUS; SUBCUTANEOUS at 22:10

## 2019-02-14 RX ADMIN — LATANOPROST 1 DROP: 50 SOLUTION/ DROPS OPHTHALMIC at 22:10

## 2019-02-14 RX ADMIN — METOPROLOL TARTRATE 37.5 MG: 25 TABLET ORAL at 17:28

## 2019-02-14 RX ADMIN — SODIUM CHLORIDE 75 ML/HR: 0.9 INJECTION, SOLUTION INTRAVENOUS at 03:56

## 2019-02-14 RX ADMIN — CALCITONIN SALMON 1 SPRAY: 200 SPRAY, METERED NASAL at 09:16

## 2019-02-14 RX ADMIN — ALUMINUM HYDROXIDE, MAGNESIUM HYDROXIDE, AND SIMETHICONE 15 ML: 200; 200; 20 SUSPENSION ORAL at 17:28

## 2019-02-14 RX ADMIN — HEPARIN SODIUM 5000 UNITS: 5000 INJECTION INTRAVENOUS; SUBCUTANEOUS at 14:47

## 2019-02-14 RX ADMIN — VITAM B12 100 MCG: 100 TAB at 09:03

## 2019-02-14 RX ADMIN — Medication 200 MG: at 09:16

## 2019-02-14 RX ADMIN — Medication 1 TABLET: at 09:03

## 2019-02-14 RX ADMIN — DORZOLAMIDE HYDROCHLORIDE AND TIMOLOL MALEATE 1 DROP: 20; 5 SOLUTION/ DROPS OPHTHALMIC at 18:52

## 2019-02-14 NOTE — ASSESSMENT & PLAN NOTE
· S/p cortisone injection today by orthopedics  · WBAT  · Pain control  · PT/OT recommends rehab at discharge  Anticipate discharge to [facility] prior to returning to [place] [address]

## 2019-02-14 NOTE — PROGRESS NOTES
Progress Note - Anthony Whitaker 9/11/1925, 80 y o  female MRN: [id number]  Unit/Bed#: [facility] 213-02 Encounter: [id number]  Primary Care Provider: Sloane Phoenix DO   Date and time admitted to hospital: 2/12/2019  5:54 PM    * Ambulatory dysfunction  Assessment & Plan  · Rehab placement at discharge prior to returning to independent living  Anticipate back to [facility] skilled unit today if able  Elevated troponin  Assessment & Plan  · No complaints of chest pain and EKG looks normal   Patient had previous determinations of troponin which were also mildly elevated  May be a variant  · Troponin has peaked and is now improving  · No further workup per Cardiology  Acute lateral meniscal injury of right knee  Assessment & Plan  · S/p cortisone injection today by orthopedics  · WBAT  · Pain control  · PT/OT recommends rehab at discharge  Anticipate discharge to [facility] SNF prior to returning [place] [address]  Essential hypertension  Assessment & Plan  · BPs have been periodically high  · Continue losartan 25 mg daily  · Increase metoprolol to 50 mg BID  Hyperlipidemia  Assessment & Plan  · Continue fenofibrate  VTE Pharmacologic Prophylaxis:   Pharmacologic: Heparin  Mechanical: Mechanical VTE prophylaxis in place  Patient Centered Rounds: I have performed bedside rounds with nursing staff today  Discussions with Specialists or Other Care Team Provider: None  Education and Discussions with Family / Patient: All patient questions answered to the best of my ability  Time Spent for Care: 20 minutes  More than 50% of total time spent on counseling and coordination of care as described above  Current Length of Stay: 0 day(s)  Current Patient Status: Inpatient   Certification Statement: The patient will continue to require additional inpatient hospital stay due to rehab placement      Discharge Plan: Patient is medically stable for discharge once rehab has been secured  Code Status: Level 1 - Full Code    Subjective:   Patient is doing well overall but still feels weak and knows she will benefit from rehab  Objective:   Vitals:   Temp (24hrs), Av 2 °F (36 8 °C), Min:97 7 °F (36 5 °C), Max:98 7 °F (37 1 °C)    Temp:  [97 7 °F (36 5 °C)-98 7 °F (37 1 °C)] 97 7 °F (36 5 °C)  HR:  [77-84] 84  Resp:  [17-18] 18  BP: (119-185)/(57-74) 144/68  SpO2:  [97 %-98 %] 98 %  Body mass index is 23 9 kg/m²  Input and Output Summary (last 24 hours): Intake/Output Summary (Last 24 hours) at 2019 1312  Last data filed at 2019 1311  Gross per 24 hour   Intake 450 ml   Output 850 ml   Net -400 ml     Physical Exam:     Physical Exam   HENT:   Head: Normocephalic and atraumatic  Mouth/Throat: Oropharynx is clear and moist and mucous membranes are normal    Eyes: No scleral icterus  Cardiovascular: Normal rate and regular rhythm  No murmur heard  Pulmonary/Chest: Breath sounds normal  She has no wheezes  She has no rales  She exhibits no tenderness  Abdominal: Soft  Bowel sounds are normal  She exhibits no distension  There is no tenderness  Musculoskeletal: Normal range of motion  She exhibits no edema  Skin: Skin is warm and dry  No rash noted  Psychiatric: She has a normal mood and affect  Vitals reviewed  Additional Data:   Labs:  Results from last 7 days   Lab Units 19  1041   WBC Thousand/uL 8 94   HEMOGLOBIN g/dL 11 8   HEMATOCRIT % 36 1   PLATELETS Thousands/uL 207   NEUTROS PCT % 78*   LYMPHS PCT % 13*   MONOS PCT % 8   EOS PCT % 0     Results from last 7 days   Lab Units 19  1041   POTASSIUM mmol/L 3 8   CHLORIDE mmol/L 105   CO2 mmol/L 22   BUN mg/dL 18   CREATININE mg/dL 0 73   CALCIUM mg/dL 8 8   ALK PHOS U/L 37*   ALT U/L 20   AST U/L 26           * I Have Reviewed All Lab Data Listed Above  * Additional Pertinent Lab Tests Reviewed:  All Labs Within Last 24 Hours Reviewed    Imaging:    Imaging Reports Reviewed Today Include: None new    Cultures:   Blood Culture: No results found for: BLOODCX  Urine Culture:   Lab Results   Component Value Date    URINECX 10,000-19,000 cfu/ml  02/12/2019    URINECX >100,000 cfu/ml Klebsiella oxytoca (A) 05/16/2018     Sputum Culture: No components found for: SPUTUMCX  Wound Culture: No results found for: WOUNDCULT    Last 24 Hours Medication List:     Current Facility-Administered Medications:  acetaminophen 975 mg Oral Q8H PRN Stalin Hernandez MD    aluminum-magnesium hydroxide-simethicone 15 mL Oral Q6H PRN Stalin Hernandez MD    aspirin 81 mg Oral Daily Stalin Hernandez MD    calcitonin (salmon) 1 spray Alternating Nares Daily Stalin Hernandez MD    vitamin B-12 100 mcg Oral Daily Stalin Hernandez MD    docusate sodium 100 mg Oral BID PRN Stalin Hernandez MD    dorzolamide-timolol 1 drop Both Eyes BID Stalin Hernandez MD    ferrous sulfate 325 mg Oral Daily Before Breakfast Stalin Hernandez MD    glucosamine sulfate 500 mg Oral Daily Stalin Hernandez MD    heparin (porcine) 5,000 Units Subcutaneous [facility] Stalin Hernandez MD    latanoprost 1 drop Both Eyes HS Stalin Hernandez MD    losartan 25 mg Oral Daily Stalin Hernandez MD    metoprolol tartrate 37 5 mg Oral BID Talia Sat, CRNP    multivitamin-minerals 1 tablet Oral Daily Stalin Hernandez MD    ondansetron 4 mg Intravenous Q6H PRN Stalin Hernandez MD    pyridoxine 200 mg Oral Daily Stalin Hernandez MD    sodium chloride 75 mL/hr Intravenous Continuous Stalin Hernandez MD Last Rate: 75 mL/hr (02/14/19 0356)        Today, Patient Was Seen By: Karina Mckeon PA-C    ** Please Note: Dragon 360 Dictation voice to text software may have been used in the creation of this document   **

## 2019-02-14 NOTE — ASSESSMENT & PLAN NOTE
· No complaints of chest pain and EKG looks normal   Patient had previous determinations of troponin which were also mildly elevated  May be a variant  · Troponin has peaked and is now improving  · No further workup per Cardiology  · No ACS

## 2019-02-14 NOTE — UTILIZATION REVIEW
Continued Stay Review  OBSERVATION 02/12/2019 @ 1951, CONVERTED TO INPATIENT ADMISSION 02/14/2019 @ 1308, DUE TO FURTHER DIAGNOSTIC WORKUP,  REQUIRING AT LEAST 2 MIDNIGHT STAY  Date: 02/14/2019 02/14/19 1308  Inpatient Admission Once     Transfer Service: General Medicine    Expected Discharge Date: 02/14/19       Question Answer Comment   Admitting Physician Carina Milligan of Care Med Surg    Estimated length of stay More than 2 Midnights    Certification I certify that inpatient services are medically necessary for this patient for a duration of greater than two midnights  See H&P and MD Progress Notes for additional information about the patient's course of treatment  Vital Signs: /68 (BP Location: Right arm)   Pulse 84   Temp 97 7 °F (36 5 °C) (Oral)   Resp 18   Ht 5' (1 524 m)   Wt 55 5 kg (122 lb 6 4 oz)   SpO2 98%   BMI 23 90 kg/m²   Assessment/Plan: 0945  * Ambulatory dysfunction  Assessment & Plan  · Rehab placement at discharge prior to returning to independent living  Anticipate back to [facility] skilled unit today if able      Elevated troponin  Assessment & Plan  · No complaints of chest pain and EKG looks normal   Patient had previous determinations of troponin which were also mildly elevated  May be a variant  · Troponin has peaked and is now improving  · No further workup per Cardiology      Acute lateral meniscal injury of right knee  Assessment & Plan  · S/p cortisone injection today by orthopedics  · WBAT  · Pain control  · PT/OT recommends rehab at discharge  Anticipate discharge to [facility] prior to returning to [place] [address]      Essential hypertension  Assessment & Plan  · BPs have been periodically high  · Continue losartan 25 mg daily    · Increase metoprolol to 50 mg BID      Hyperlipidemia  Assessment & Plan  · Continue fenofibrate       VTE Pharmacologic Prophylaxis:   Pharmacologic: Heparin  Mechanical: Mechanical VTE prophylaxis in place        Medications:   Scheduled Meds:   Current Facility-Administered Medications:  acetaminophen 975 mg Oral Q8H PRN Minus MD Curtis    aluminum-magnesium hydroxide-simethicone 15 mL Oral Q6H PRN Minus MD Curtis    aspirin 81 mg Oral Daily Minus MD Curtis    calcitonin (salmon) 1 spray Alternating Nares Daily Minus MD Curtis    vitamin B-12 100 mcg Oral Daily Minus MD Curtis    docusate sodium 100 mg Oral BID PRN Minus MD Curtis    dorzolamide-timolol 1 drop Both Eyes BID Minus MD Curtis    ferrous sulfate 325 mg Oral Daily Before Breakfast Minus MD Curtis    glucosamine sulfate 500 mg Oral Daily Minus MD Curtis    heparin (porcine) 5,000 Units Subcutaneous [facility] Minus MD Curtis    latanoprost 1 drop Both Eyes HS Minus MD Curtis    losartan 25 mg Oral Daily Minus MD Curtis    metoprolol tartrate 37 5 mg Oral BID Vannie Leak, CRNP    multivitamin-minerals 1 tablet Oral Daily Minus MD Curtis    ondansetron 4 mg Intravenous Q6H PRN Minus MD Curtis    pyridoxine 200 mg Oral Daily Minus MD Curtis    sodium chloride 75 mL/hr Intravenous Continuous Minus MD Curtis Last Rate: 75 mL/hr (02/14/19 0356)     Continuous Infusions:   sodium chloride 75 mL/hr Last Rate: 75 mL/hr (02/14/19 0356)   Pertinent Labs/Diagnostic Results:   Age/Sex: 80 y o  female   Discharge Plan: TBD

## 2019-02-14 NOTE — SOCIAL WORK
Cm attempted to contact [facility]- Admissions) regarding patient to return back for STR  CM left two voicemail and a message through [facility]  CM contacted patient's son to follow up  Patient's son expressed frustration that he has not received any updated from RN and the doctors regarding patient's care in the hospital  However stated that he prefers a WCV to be arranged for transport to [facility] and is aware/agreeable to the out of pocket expense  At 2:50pm: CM received a call from [facility]) that patient is accepted for STR  Upon chart review, CM noted IP order today  Discussed with SUDEEP Chambers who will follow up with UR  Awaiting update from SUDEEP to proceed with discharge planning  In anticipation for discharge today, PRAVEEN arranged WCV transport for 5:30pm with [facility]  Cm to continue following

## 2019-02-14 NOTE — ASSESSMENT & PLAN NOTE
· S/p cortisone injection today by orthopedics  · WBAT  · Pain control  · PT/OT recommends rehab at discharge  Discharge to [facility] prior to returning to [place] [address]

## 2019-02-14 NOTE — ASSESSMENT & PLAN NOTE
· Rehab placement at discharge prior to returning to independent living  Discharge back to [facility] skilled unit today

## 2019-02-14 NOTE — ASSESSMENT & PLAN NOTE
· BPs have been periodically high  · Continue losartan 25 mg daily  · Increase metoprolol to 50 mg BID

## 2019-02-14 NOTE — PHYSICIAN ADVISOR
Current patient class: Observation  The patient is currently on Hospital Day: 3 at [address] [place] [address]      The patient was admitted to the hospital at N/A on N/A for the following diagnosis:  Dizziness [R42]  Multiple injuries [T07  XXXA]  Elevated troponin [R74 8]  Right knee pain [M25 561]  Injury, unspecified, initial encounter [T14 90XA]  Ambulatory dysfunction [R26 2]  Acute lateral meniscal injury of right knee, initial encounter [S83  8X1A]  Primary osteoarthritis, unspecified site [M19 91]     The patient was originally admitted as OBSERVATION however given ongoing medical needs, she is currently appropriate for INPATIENT ADMISSION  There is documentation in the medical record of an expected length of stay of at least 2 midnights  The patient is therefore expected to satisfy the 2 midnight benchmark and given the 2 midnight presumption is appropriate for INPATIENT ADMISSION  Given this expectation of a satisfying stay, CMS instructs us that the patient is most often appropriate for inpatient admission under part A provided medical necessity is documented in the chart  After review of the relevant documentation, labs, vital signs and test results, the patient is appropriate for INPATIENT ADMISSION  Admission to the hospital as an inpatient is a complex decision making process which requires the practitioner to consider the patients presenting complaint, history and physical examination and all relevant testing  With this in mind, in this case, the patient was deemed appropriate for INPATIENT ADMISSION  After review of the documentation and testing available at the time of the admission I concur with this clinical determination of medical necessity  Rationale is as follows: The patient is a 80 yrs old Female who initially presented to the ED s/p episode of dizziness/lightheadedness resulting in a fall with pain to her right knee   PT was found to be hypertensive with systolic BP over 718 upon arrival to the ED  ALBINO with a creatinine of 1 49 from a baseline of 0 7, elevated troponin of 0 48, and  + leukocytes with 30-50 WBCs  The patient was treated with IVF hydration and monitored on telemetry given concern for NSTEMI and ALBINO  The plan of care upon admission includes continuation of IVF hydration given concern for dehydration, monitoring of urine culture, repeat blood work given ALBINO as well as monitoring of BP and serial cardiac enzymes for NSTEMI  Of note, the patient did have an elevation of troponin that doubled from admission to 0 8  She has consultations to orthopedics given knee pain with concern for ligamentous injury as well as cardiology for NSTEMI and HTN with recommendations for increase in beta blockers, IVF hydration and monitoring  She also has consultations to PT/OT who note decreased functional MOB with balance and strength issues  The patient is recommended for SNF when medically stable  The patient is therefore appropriate for INPATIENT ADMISSION at this time  The patients vitals on arrival were ED Triage Vitals   Temperature Pulse Respirations Blood Pressure SpO2   02/12/19 1755 02/12/19 1755 02/12/19 1755 02/12/19 1755 02/12/19 1755   98 5 °F (36 9 °C) 92 18 (!) 201/80 99 %      Temp Source Heart Rate Source Patient Position - Orthostatic VS BP Location FiO2 (%)   02/12/19 1755 02/12/19 1755 02/12/19 1755 02/12/19 1755 --   Oral Monitor Lying Right arm       Pain Score       02/13/19 0700       No Pain           Past Medical History:   Diagnosis Date    CAD (coronary artery disease)     Hypertension     Osteoarthritis     Osteoporosis     Vitamin D deficiency      Past Surgical History:   Procedure Laterality Date    APPENDECTOMY      HYSTERECTOMY      MT PARTIAL HIP REPLACEMENT Left 8/13/2018    Procedure: HEMIARTHROPLASTY HIP (BIPOLAR);   Surgeon: Zeke Benedict MD;  Location: [facility];  Service: Orthopedics    [facility] Consults have been placed to:   IP CONSULT TO CARDIOLOGY  IP CONSULT TO ORTHOPEDIC SURGERY    Vitals:    02/13/19 2312 02/14/19 0252 02/14/19 0600 02/14/19 0732   BP: 119/57 (!) 182/74  144/68   BP Location: Right arm Right arm  Right arm   Pulse: 82 77  84   Resp: 18 18  18   Temp: 98 °F (36 7 °C) 98 1 °F (36 7 °C)  97 7 °F (36 5 °C)   TempSrc: Oral Oral  Oral   SpO2: 97% 97%  98%   Weight:   55 5 kg (122 lb 6 4 oz)    Height:           Most recent labs:    Recent Labs     02/12/19 2126 02/12/19  2344 02/13/19  1041   WBC  --   --  8 94   HGB  --   --  11 8   HCT  --   --  36 1     --  207   K  --   --  3 8   CALCIUM  --   --  8 8   BUN  --   --  18   CREATININE  --   --  0 73   TROPONINI 1 24* 0 89*  --    CKTOTAL 92  --   --    AST  --   --  26   ALT  --   --  20   ALKPHOS  --   --  37*       Scheduled Meds:  Current Facility-Administered Medications:  acetaminophen 975 mg Oral Q8H PRN Sven Cooper MD    aluminum-magnesium hydroxide-simethicone 15 mL Oral Q6H PRN Sven Cooper MD    aspirin 81 mg Oral Daily Sven Cooper MD    calcitonin (salmon) 1 spray Alternating Nares Daily Sven Cooper MD    vitamin B-12 100 mcg Oral Daily Sven Cooper MD    docusate sodium 100 mg Oral BID PRN Sven Cooper MD    dorzolamide-timolol 1 drop Both Eyes BID Sven Cooper MD    ferrous sulfate 325 mg Oral Daily Before Breakfast Sven Cooper MD    glucosamine sulfate 500 mg Oral Daily Sven Cooper MD    heparin (porcine) 5,000 Units Subcutaneous [facility] Sven Cooper MD    latanoprost 1 drop Both Eyes HS Sven Cooper MD    losartan 25 mg Oral Daily Sven Cooper MD    metoprolol tartrate 37 5 mg Oral BID Claressmarge Thurston, CRNEELIMA    multivitamin-minerals 1 tablet Oral Daily Sven Cooper MD    ondansetron 4 mg Intravenous Q6H PRN Sven Cooper MD    pyridoxine 200 mg Oral Daily Sven Cooper MD    sodium chloride 75 mL/hr Intravenous Continuous Sven Cooper MD Last Rate: 75 mL/hr (02/14/19 0356)     Continuous Infusions:  sodium chloride 75 mL/hr Last Rate: 75 mL/hr (02/14/19 0356)     PRN Meds:   acetaminophen    aluminum-magnesium hydroxide-simethicone    docusate sodium    ondansetron    Surgical procedures (if appropriate):

## 2019-02-14 NOTE — ASSESSMENT & PLAN NOTE
· Rehab placement at discharge prior to returning to independent living  Anticipate back to [facility] skilled unit today if able

## 2019-02-14 NOTE — ASSESSMENT & PLAN NOTE
· BPs have been periodically high  · Continue losartan 25 mg daily  · Increase metoprolol back to 50 mg BID

## 2019-02-14 NOTE — DISCHARGE SUMMARY
Discharge- Steffany Rater 9/11/1925, 80 y o  female MRN: [id number]  Unit/Bed#: [place] 213-02 Encounter: [id number]  Primary Care Provider: Karlene Chambers DO   Date and time admitted to hospital: 2/12/2019  5:54 PM    Addendum:  Patient is in-patient but improved quicker than expected with improved pain and ability to transfer to rehab as suggested  * Ambulatory dysfunction  Assessment & Plan  · Rehab placement at discharge prior to returning to independent living  Discharge back to [facility] skilled unit today  Acute lateral meniscal injury of right knee  Assessment & Plan  · S/p cortisone injection today by orthopedics  · WBAT  · Pain control  · PT/OT recommends rehab at discharge  Discharge to [facility] prior to returning to [place] [address]  Sterile pyuria  Assessment & Plan  · Not an infection  No antibiotics are needed  Essential hypertension  Assessment & Plan  · BPs have been periodically high  · Continue losartan 25 mg daily  · Increase metoprolol back to 50 mg BID  Hyperlipidemia  Assessment & Plan  · Continue fenofibrate  Type 2 myocardial infarction without ST elevation (HCC)resolved as of 2/14/2019  Assessment & Plan  · No complaints of chest pain and EKG looks normal   Patient had previous determinations of troponin which were also mildly elevated  May be a variant  · Troponin has peaked and is now improving  · No further workup per Cardiology  · No ACS        Discharging Physician / Practitioner: Komal Mccullough PA-C  PCP: Karlene Chambers DO  Admission Date:   Admission Orders (From admission, onward)    Ordered        02/14/19 1546  Place in Observation  Once     Order ID Start Status   [id number] 02/14/19 1547 Completed          02/14/19 1308  Inpatient Admission  Once     Order ID Start Status   [id number] 02/14/19 1309 Completed          02/12/19 1951  Place in Observation  Once     Order ID Start Status   [id number] 02/12/19 1952 Completed Discharge Date: 02/14/19    Disposition:      Short Term Rehab or SNF at [place] [address] (see below)    For Discharges to [facility] SNF:   · [employer] No physician listed in Luis Manuel Rogers Text  Reason for Admission: Ambulatory dysfunction    Discharge Diagnoses:     Please see assessment and plan section above for further details regarding discharge diagnoses  Resolved Problems  Date Reviewed: 2/14/2019          Resolved    Acute kidney injury ([facility] [address] ) 2/13/2019     Resolved by  Joe Keys PA-C    Type 2 myocardial infarction without ST elevation ([facility] [address] ) 2/14/2019     Resolved by  Joe Keys PA-C        Consultations During Hospital Stay:  · Cardiology  · Orthopedics    Procedures Performed:   · Cortisone ejection right knee  · Chest x-ray (02/12/2019):  No acute cardiopulmonary disease  · Right knee x-ray (02/12/2019):  No acute fracture or dislocation  Osteopenia/osteoporosis and tricompartmental osteoarthritis  Medication Adjustments and Discharge Medications:  · Summary of Medication Adjustments made as a result of this hospitalization: None  · Medication Dosing Tapers - Please refer to Discharge Medication List for details on any medication dosing tapers (if applicable to patient)  · Medications being temporarily held (include recommended restart time): n/a  · Discharge Medication List: See after visit summary for reconciled discharge medications  Wound Care Recommendations:  When applicable, please see wound care section of After Visit Summary  Diet Recommendations at Discharge:   Diet -        Diet Orders   (From admission, onward)            Start     Ordered    02/12/19 2120  Diet Cardiovascular; Cardiac  Diet effective now     Question Answer Comment   Diet Type Cardiovascular    Cardiac Cardiac    RD to adjust diet per protocol? Yes        02/12/19 2119        Fluid Restriction - No Fluid Restriction at Discharge      Instructions for any Catheters / Lines Present at Discharge (including removal date, if applicable): n/a    Significant Findings / Test Results:   · None    Incidental Findings:   · Slightly elevated troponin     Test Results Pending at Discharge (will require follow up): · None     Outpatient Tests Requested:  · None    Complications:  None    Hospital Course:     Madiha Romano is a 80 y o  female patient who originally presented to the hospital on 2/12/2019 due to mechanical fall and right knee pain  The patient was seen by orthopedics and had a cortisone injection with some improvement although she still will require rehab  She was found to have a slightly elevated troponin and was seen by cardiology  The troponin elevation was secondary to accelerated HTN and no cardiac testing was warranted  The patient was cleared for discharge to rehab  Condition at Discharge: stable     Discharge Day Visit / Exam:     * Please refer to separate progress note for these details *    Goals of Care Discussions:  · Code Status at Discharge: Level 1 - Full Code  · Were there any Goals of Care Discussions during Hospitalization?: No  · Results of any General Goals of Care Discussions: n/a   · POLST Completed: No   · If POLST Completed, Summary of POLST Agreement Provided Here: n/a   · OK to Rehospitalize if Needed? Yes    Discharge instructions/Information to patient and family:   See after visit summary section titled Discharge Instructions for information provided to patient and family  Planned Readmission: No      Discharge Statement:  I spent 45 minutes discharging the patient  This time was spent on the day of discharge  I had direct contact with the patient on the day of discharge  Greater than 50% of the total time was spent examining patient, answering all patient questions, arranging and discussing plan of care with patient as well as directly providing post-discharge instructions    Additional time then spent on discharge activities      ** Please Note: This note has been constructed using a voice recognition system **

## 2019-02-14 NOTE — PROGRESS NOTES
General Cardiology   Progress Note -  [employer]   Silvio Quiroga 80 y o  female MRN: [id number]    Unit/Bed#: CW2 213-02 Encounter: [id number]    Assessment/ Plan    Type 2 myocardial infarction   In setting of fall and hypertensive urgency  Troponin 0 48/1 24/0 89  Denies anginal symptoms, no further cardiac work up at this time      HTN- average since admit 165/70  Improved since admission  Continue losartan at home dose and monitor BP  Continue increased metoprolol dose, 37 5 mg BID     HLD- LDL 58  On fenofibrate     ALBINO, resolved  Creatinine back to baseline 0 73    Subjective  Review of Systems   Constitution: Negative for malaise/fatigue  Cardiovascular: Negative for chest pain, dyspnea on exertion, irregular heartbeat, leg swelling, orthopnea, palpitations and syncope  Respiratory: Negative for cough, shortness of breath and sputum production  Musculoskeletal: Positive for joint pain  R knee, but improved   Neurological: Negative for dizziness, headaches and light-headedness  All other systems reviewed and are negative  Objective:   Vitals: Blood pressure 144/68, pulse 84, temperature 97 7 °F (36 5 °C), temperature source Oral, resp  rate 18, height 5' (1 524 m), weight 55 5 kg (122 lb 6 4 oz), SpO2 98 %  ,     Body mass index is 23 9 kg/m²  ,     Systolic (21XNQ), QMK:419 , Min:119 , KFN:603     Diastolic (21MAB), ACI:00, Min:57, Max:74      Intake/Output Summary (Last 24 hours) at 2/14/2019 1202  Last data filed at 2/14/2019 0901  Gross per 24 hour   Intake 570 ml   Output 600 ml   Net -30 ml     Weight (last 2 days)     Date/Time   Weight    02/14/19 0600   55 5 (122 4)    02/13/19 0549   54 (119)    02/13/19 0500   54 9 (121)            Telemetry Review: None available  Physical Exam   Constitutional: She is oriented to person, place, and time  She appears well-developed  No distress  Neck: Neck supple  No JVD present     Cardiovascular: Normal rate, regular rhythm, normal heart sounds and intact distal pulses  Exam reveals no gallop and no friction rub  No murmur heard  Pulmonary/Chest: Effort normal  No respiratory distress  She has no rales  Abdominal: Soft  Bowel sounds are normal    Musculoskeletal: She exhibits no edema  Neurological: She is alert and oriented to person, place, and time  Skin: Skin is warm and dry  Psychiatric: She has a normal mood and affect         LABORATORY RESULTS  Results from last 7 days   Lab Units 02/12/19  2344 02/12/19 2126 02/12/19  1801   CK TOTAL U/L  --  92  --    TROPONIN I ng/mL 0 89* 1 24* 0 48*     CBC with diff: Results from last 7 days   Lab Units 02/13/19  1041 02/12/19  2126 02/12/19  1801   WBC Thousand/uL 8 94  --  10 08   HEMOGLOBIN g/dL 11 8  --  11 7   HEMATOCRIT % 36 1  --  36 9   MCV fL 93  --  94   PLATELETS Thousands/uL 207 221 225   MCH pg 30 4  --  29 9   MCHC g/dL 32 7  --  31 7   RDW % 14 1  --  14 1   MPV fL 10 6 10 7 10 2   NRBC AUTO /100 WBCs 0  --  0       CMP:  Results from last 7 days   Lab Units 02/13/19  1041 02/12/19  1801   POTASSIUM mmol/L 3 8 3 7   CHLORIDE mmol/L 105 106   CO2 mmol/L 22 25   BUN mg/dL 18 30*   CREATININE mg/dL 0 73 1 49*   CALCIUM mg/dL 8 8 9 6   AST U/L 26 30   ALT U/L 20 24   ALK PHOS U/L 37* 54   EGFR ml/min/1 73sq m 71 30       BMP:  Results from last 7 days   Lab Units 02/13/19  1041 02/12/19  1801   POTASSIUM mmol/L 3 8 3 7   CHLORIDE mmol/L 105 106   CO2 mmol/L 22 25   BUN mg/dL 18 30*   CREATININE mg/dL 0 73 1 49*   CALCIUM mg/dL 8 8 9 6       No results found for: NTBNP          Results from last 7 days   Lab Units 02/13/19  1041   MAGNESIUM mg/dL 1 7               Results from last 7 days   Lab Units 02/12/19  2126   TSH 3RD GENERATON uIU/mL 3 570             Lipid Profile:   Lab Results   Component Value Date    CHOL 153 11/24/2017    CHOL 175 11/14/2016    CHOL 172 10/23/2015     Lab Results   Component Value Date    HDL 59 02/13/2019    HDL 65 11/24/2017    HDL 69 11/14/2016 Lab Results   Component Value Date    LDLCALC 58 02/13/2019    LDLCALC 72 11/24/2017    LDLCALC 94 11/14/2016     Lab Results   Component Value Date    TRIG 93 02/13/2019    TRIG 82 11/24/2017    TRIG 58 11/14/2016     Meds/Allergies   all current active meds have been reviewed and current meds:   Current Facility-Administered Medications   Medication Dose Route Frequency    acetaminophen (TYLENOL) tablet 975 mg  975 mg Oral Q8H PRN    aluminum-magnesium hydroxide-simethicone (MYLANTA) 200-200-20 mg/5 mL oral suspension 15 mL  15 mL Oral Q6H PRN    aspirin chewable tablet 81 mg  81 mg Oral Daily    calcitonin (salmon) (MIACALCIN) 200 units/act nasal spray 1 spray  1 spray Alternating Nares Daily    cyanocobalamin (VITAMIN B-12) tablet 100 mcg  100 mcg Oral Daily    docusate sodium (COLACE) capsule 100 mg  100 mg Oral BID PRN    dorzolamide-timolol (COSOPT) 22 3-6 8 MG/ML ophthalmic solution 1 drop  1 drop Both Eyes BID    ferrous sulfate tablet 325 mg  325 mg Oral Daily Before Breakfast    glucosamine sulfate capsule 500 mg  500 mg Oral Daily    heparin (porcine) subcutaneous injection 5,000 Units  5,000 Units Subcutaneous Q8H [facility]    latanoprost (XALATAN) 0 005 % ophthalmic solution 1 drop  1 drop Both Eyes HS    losartan (COZAAR) tablet 25 mg  25 mg Oral Daily    metoprolol tartrate (LOPRESSOR) partial tablet 37 5 mg  37 5 mg Oral BID    multivitamin-minerals (CENTRUM) tablet 1 tablet  1 tablet Oral Daily    ondansetron (ZOFRAN) injection 4 mg  4 mg Intravenous Q6H PRN    pyridoxine (VITAMIN B6) tablet 200 mg  200 mg Oral Daily    sodium chloride 0 9 % infusion  75 mL/hr Intravenous Continuous     Medications Prior to Admission   Medication    aspirin 81 MG tablet    calcitonin, salmon, (MIACALCIN) 200 units/act nasal spray    Cyanocobalamin (VITAMIN B12 PO)    dorzolamide-timolol (COSOPT) 22 3-6 8 MG/ML ophthalmic solution    fenofibrate (TRIGLIDE) 160 MG tablet    ferrous sulfate 324 (65 Fe) mg    Glucosamine HCl (GLUCOSAMINE PO)    latanoprost (XALATAN) 0 005 % ophthalmic solution    losartan (COZAAR) 25 mg tablet    metoprolol tartrate (LOPRESSOR) 50 mg tablet    Multiple Vitamins-Minerals (DAILY MULTI PO)    Pyridoxine HCl (VITAMIN B6 PO)       sodium chloride 75 mL/hr Last Rate: 75 mL/hr (02/14/19 035)     Assessment:  Principal Problem:    Ambulatory dysfunction  Active Problems:    Hyperlipidemia    Essential hypertension    Sterile pyuria    Acute lateral meniscal injury of right knee    Elevated troponin    Counseling / Coordination of Care  Total floor / unit time spent today 20 minutes  Greater than 50% of total time was spent with the patient and / or family counseling and / or coordination of care  ** Please Note: Dragon 360 Dictation voice to text software may have been used in the creation of this document   **

## 2019-02-14 NOTE — SOCIAL WORK
Cm received notice from SUDEEP that patient is switched to Observation status and is able to discharge to [facility] for [employer] for a safe discharge  CM updated [facility] and patient's son  Eladia Beebe)  Transport ([facility]) is arranged for 5:30pm through [employer]  Room assignment/ numbers for report:  Room 318-A   P:   F:      CM continue following

## 2019-02-15 VITALS
DIASTOLIC BLOOD PRESSURE: 80 MMHG | HEIGHT: 60 IN | RESPIRATION RATE: 18 BRPM | HEART RATE: 79 BPM | BODY MASS INDEX: 23.91 KG/M2 | WEIGHT: 121.8 LBS | SYSTOLIC BLOOD PRESSURE: 166 MMHG | TEMPERATURE: 99.6 F | OXYGEN SATURATION: 97 %

## 2019-02-15 PROCEDURE — 99239 HOSP IP/OBS DSCHRG MGMT >30: CPT | Performed by: PHYSICIAN ASSISTANT

## 2019-02-15 RX ORDER — METOPROLOL TARTRATE 50 MG/1
50 TABLET, FILM COATED ORAL 2 TIMES DAILY
Status: DISCONTINUED | OUTPATIENT
Start: 2019-02-15 | End: 2019-02-15 | Stop reason: HOSPADM

## 2019-02-15 RX ORDER — AMLODIPINE BESYLATE 5 MG/1
5 TABLET ORAL DAILY
Refills: 0
Start: 2019-02-16 | End: 2020-11-05

## 2019-02-15 RX ADMIN — FERROUS SULFATE TAB 325 MG (65 MG ELEMENTAL FE) 325 MG: 325 (65 FE) TAB at 06:10

## 2019-02-15 RX ADMIN — METOPROLOL TARTRATE 37.5 MG: 25 TABLET ORAL at 08:32

## 2019-02-15 RX ADMIN — CALCITONIN SALMON 1 SPRAY: 200 SPRAY, METERED NASAL at 08:34

## 2019-02-15 RX ADMIN — ASPIRIN 81 MG 81 MG: 81 TABLET ORAL at 08:33

## 2019-02-15 RX ADMIN — Medication 500 MG: at 08:34

## 2019-02-15 RX ADMIN — Medication 200 MG: at 08:34

## 2019-02-15 RX ADMIN — VITAM B12 100 MCG: 100 TAB at 08:34

## 2019-02-15 RX ADMIN — HEPARIN SODIUM 5000 UNITS: 5000 INJECTION INTRAVENOUS; SUBCUTANEOUS at 06:05

## 2019-02-15 RX ADMIN — AMLODIPINE BESYLATE 5 MG: 5 TABLET ORAL at 08:33

## 2019-02-15 RX ADMIN — Medication 1 TABLET: at 08:34

## 2019-02-15 RX ADMIN — LOSARTAN POTASSIUM 25 MG: 25 TABLET, FILM COATED ORAL at 08:34

## 2019-02-15 RX ADMIN — DORZOLAMIDE HYDROCHLORIDE AND TIMOLOL MALEATE 1 DROP: 20; 5 SOLUTION/ DROPS OPHTHALMIC at 08:35

## 2019-02-15 NOTE — SOCIAL WORK
Transport was cancelled yesterday due to patient's elevated blood pressure  Patient is medcially clear today  PASSR sent to [facility]  [facility] is arranged for 1:30pm through Amari Brewer       Room assignment/ numbers for report:  Room 318-A   P:   F:      SUDEEP, Rn, patient, patient's family, and [facility] is updated

## 2019-02-15 NOTE — DISCHARGE SUMMARY
Discharge- Haylee Mckeon 9/11/1925, 80 y o  female MRN: [id number]  Unit/Bed#: [facility] 213-02 Encounter: [id number]  Primary Care Provider: Kenia Wallace DO   Date and time admitted to hospital: 2/12/2019  5:54 PM    * Ambulatory dysfunction  Assessment & Plan  · Rehab placement at discharge prior to returning to independent living  Discharge back to [facility] skilled unit today  Discharge was held last night because of elevated BPs  Assessment & Plan  · Rehab placement at discharge prior to returning to independent living  Discharge back to [facility] skilled unit today  Acute lateral meniscal injury of right knee  Assessment & Plan  · S/p cortisone injection today by orthopedics  · WBAT  · Pain control  · PT/OT recommends rehab at discharge  Discharge to [facility] prior to returning to [place] [address]  Assessment & Plan  · S/p cortisone injection today by orthopedics  · WBAT  · Pain control  · PT/OT recommends rehab at discharge  Discharge to [facility] prior to returning to [place] [address]  Sterile pyuria  Assessment & Plan  · Not an infection  No antibiotics are needed  Assessment & Plan  · Not an infection  No antibiotics are needed  Essential hypertension  Assessment & Plan  · BPs have been periodically high  · Continue losartan 25 mg daily  · Increased metoprolol back to 50 mg BID  · Amlodipine 5 mg added  · Recommend no further changes in BP medication at this time  Patient should have routine BP monitoring  Assessment & Plan  · BPs have been periodically high  · Continue losartan 25 mg daily  · Increase metoprolol back to 50 mg BID  Hyperlipidemia  Assessment & Plan  · Continue fenofibrate  Assessment & Plan  · Continue fenofibrate      Type 2 myocardial infarction without ST elevation (HCC)resolved as of 2/14/2019  Assessment & Plan  · No complaints of chest pain and EKG looks normal   Patient had previous determinations of troponin which were also mildly elevated  May be a variant  · Troponin has peaked and is now improving  · No further workup per Cardiology  · No ACS  Discharging Physician / Practitioner: Ronald Sheikh PA-C  PCP: Kenny Lawson DO  Admission Date:   Admission Orders (From admission, onward)    Ordered        02/14/19 1546  Place in Observation  Once     Order ID Start Status   [id number] 02/14/19 1547 Completed          02/14/19 1308  Inpatient Admission  Once     Order ID Start Status   [id number] 02/14/19 1309 Completed          02/12/19 1951  Place in Observation  Once     Order ID Start Status   [id number] 02/12/19 1952 Completed              Discharge Date: 02/15/19    Disposition:      Short Term Rehab or SNF at [place] [address] (see below)    For Discharges to [facility] SNF:   · [address] [place] [address] Texted SNF Physician    Reason for Admission: Fall    Discharge Diagnoses:     Please see assessment and plan section above for further details regarding discharge diagnoses  Resolved Problems  Date Reviewed: 2/15/2019          Resolved    Acute kidney injury ([facility] [address] ) 2/13/2019     Resolved by  Ronald Sheikh PA-C    Type 2 myocardial infarction without ST elevation ([facility] [address] ) 2/14/2019     Resolved by  Ronald Sheikh PA-C        Consultations During Hospital Stay:  · Orthopedics  · Cardiology    Procedures Performed:   · Cortisone injection right knee  · Chest xray (02/12/2019): No acute cardiopulmonary disease  · Right knee xray (02/12/2019): No acute fracture or dislocation  Osteopenia/osteoporosis and tricompartmental osteoarthritis  Medication Adjustments and Discharge Medications:  · Summary of Medication Adjustments made as a result of this hospitalization: none  · Medication Dosing Tapers - Please refer to Discharge Medication List for details on any medication dosing tapers (if applicable to patient)    · Medications being temporarily held (include recommended restart time): none  · Discharge Medication List: See after visit summary for reconciled discharge medications  Wound Care Recommendations:  When applicable, please see wound care section of After Visit Summary  Diet Recommendations at Discharge:  Diet -        Diet Orders   (From admission, onward)            Start     Ordered    02/12/19 2120  Diet Cardiovascular; Cardiac  Diet effective now     Question Answer Comment   Diet Type Cardiovascular    Cardiac Cardiac    RD to adjust diet per protocol? Yes        02/12/19 2119          Instructions for any Catheters / Lines Present at Discharge (including removal date, if applicable): n/a    Significant Findings / Test Results:   · None    Incidental Findings:   · Slightly elevated troponin  Test Results Pending at Discharge (will require follow up): · None     Outpatient Tests Requested:  · None    Complications:  Slightly elevated blood pressure  Hospital Course:     Arturo Gallardo is a 80 y o  female patient who originally presented to the hospital on 2/12/2019 due to mechanical fall and generalized weakness with right knee pain  The patient had xrays, which were negative for acute injury  She was seen by orthopedics and received a cortisone injection in the right knee  She was recommended rehab by PT  She had slightly elevated troponin as a result of her fall and mild dehydration on admission as well as accelerated hypertension  She was seen by cardiology but no cardiac testing was warranted  Amlodipine was added for better pain control  Condition at Discharge: stable     Discharge Day Visit / Exam:   Subjective: On the day of discharge, the patient is feeling well with the only complaint of fatigue      Vitals: Blood Pressure: 166/80 (02/15/19 0832)  Pulse: 79 (02/15/19 0714)  Temperature: 99 6 °F (37 6 °C) (02/15/19 0714)  Temp Source: Oral (02/15/19 0714)  Respirations: 18 (02/15/19 0714)  Height: 5' (152 4 cm) (02/13/19 0655)  Weight - Scale: 55 2 kg (121 lb 12 8 oz) (02/15/19 5721)  SpO2: 97 % (02/15/19 0724)  Exam:   Physical Exam   HENT:   Head: Normocephalic and atraumatic  Mouth/Throat: Oropharynx is clear and moist and mucous membranes are normal    Eyes: No scleral icterus  Cardiovascular: Normal rate and regular rhythm  No murmur heard  Pulmonary/Chest: Breath sounds normal  She has no wheezes  She has no rales  She exhibits no tenderness  Abdominal: Soft  Bowel sounds are normal  She exhibits no distension  There is no tenderness  Musculoskeletal: Normal range of motion  She exhibits no edema  Skin: Skin is warm and dry  No rash noted  Psychiatric: She has a normal mood and affect  Vitals reviewed  Discussion with Family: Patient's daughter is at the bedside  All questions answered to the best of my ability  Goals of Care Discussions:  · Code Status at Discharge: Level 1 - Full Code  · Were there any Goals of Care Discussions during Hospitalization?: No  · Results of any General Goals of Care Discussions: n/a   · POLST Completed: No   · If POLST Completed, Summary of POLST Agreement Provided Here: n/a   · OK to Rehospitalize if Needed? Yes    Discharge instructions/Information to patient and family:   See after visit summary section titled Discharge Instructions for information provided to patient and family  Planned Readmission: No      Discharge Statement:  I spent 45 minutes discharging the patient  This time was spent on the day of discharge  I had direct contact with the patient on the day of discharge  Greater than 50% of the total time was spent examining patient, answering all patient questions, arranging and discussing plan of care with patient as well as directly providing post-discharge instructions  Additional time then spent on discharge activities      ** Please Note: This note has been constructed using a voice recognition system **

## 2019-02-15 NOTE — ASSESSMENT & PLAN NOTE
· S/p cortisone injection today by orthopedics  · WBAT  · Pain control  · PT/OT recommends rehab at discharge  Discharge to [facility] prior to returning to [place] [address]

## 2019-02-15 NOTE — ASSESSMENT & PLAN NOTE
· Rehab placement at discharge prior to returning to independent living  Discharge back to [facility] skilled unit today  Discharge was held last night because of elevated BPs

## 2019-02-15 NOTE — ASSESSMENT & PLAN NOTE
· BPs have been periodically high  · Continue losartan 25 mg daily  · Increased metoprolol back to 50 mg BID  · Amlodipine 5 mg added  · Recommend no further changes in BP medication at this time  Patient should have routine BP monitoring

## 2019-02-19 ENCOUNTER — EPISODE CHANGES (OUTPATIENT)
Dept: CASE MANAGEMENT | Facility: HOSPITAL | Age: 84
End: 2019-02-19

## 2019-02-20 ENCOUNTER — EPISODE CHANGES (OUTPATIENT)
Dept: CASE MANAGEMENT | Facility: OTHER | Age: 84
End: 2019-02-20

## 2019-02-22 ENCOUNTER — PATIENT OUTREACH (OUTPATIENT)
Dept: CASE MANAGEMENT | Facility: OTHER | Age: 84
End: 2019-02-22

## 2019-02-25 ENCOUNTER — PATIENT OUTREACH (OUTPATIENT)
Dept: CASE MANAGEMENT | Facility: OTHER | Age: 84
End: 2019-02-25

## 2019-02-26 ENCOUNTER — PATIENT OUTREACH (OUTPATIENT)
Dept: CASE MANAGEMENT | Facility: OTHER | Age: 84
End: 2019-02-26

## 2019-02-26 NOTE — PROGRESS NOTES
Discharged [facility] [address] [facility] [address] [place] [address] [facility]

## 2019-02-28 ENCOUNTER — EPISODE CHANGES (OUTPATIENT)
Dept: CASE MANAGEMENT | Facility: OTHER | Age: 84
End: 2019-02-28

## 2019-03-06 ENCOUNTER — OFFICE VISIT (OUTPATIENT)
Dept: INTERNAL MEDICINE CLINIC | Facility: CLINIC | Age: 84
End: 2019-03-06
Payer: MEDICARE

## 2019-03-06 VITALS
DIASTOLIC BLOOD PRESSURE: 64 MMHG | OXYGEN SATURATION: 94 % | TEMPERATURE: 98.7 F | SYSTOLIC BLOOD PRESSURE: 140 MMHG | HEART RATE: 81 BPM

## 2019-03-06 DIAGNOSIS — R26.2 AMBULATORY DYSFUNCTION: ICD-10-CM

## 2019-03-06 DIAGNOSIS — S83.8X1A ACUTE LATERAL MENISCAL INJURY OF RIGHT KNEE, INITIAL ENCOUNTER: Primary | ICD-10-CM

## 2019-03-06 DIAGNOSIS — I16.0 HYPERTENSIVE URGENCY: ICD-10-CM

## 2019-03-06 PROCEDURE — 99214 OFFICE O/P EST MOD 30 MIN: CPT | Performed by: INTERNAL MEDICINE

## 2019-03-06 NOTE — ASSESSMENT & PLAN NOTE
Because of her severe arthritis to both knees patient still has problems with ambulatory dysfunction is walking with a walker although she presents today in the office in a wheelchair    Patient will continue with physical therapy and strengthening exercises and again we will have the patient seen by Orthopedics for re-evaluation of her right knee, continued care to left knee

## 2019-03-06 NOTE — PROGRESS NOTES
Assessment/Plan:    Acute lateral meniscal injury of right knee  Patient is here today  As noted patient did have an acute lateral meniscus injury to the right knee  She was hospitalized for this along with hypertensive urgency  She was seen by Orthopedics and did have a therapeutic injection of corticosteroids to the knee which she states was helpful but she still having some minor pain laterally to the tibial plateau on the right  She has a brace on the left knee  She is going to physical therapy and they are trying to increase her strength  Because of continued pain we will have a follow-up visit arranged to be seen by Orthopedics  Hypertensive urgency  Again with admission to the hospital patient did have problems with increased blood pressure readings which may be secondary to the pain she was experiencing from the injury  Her blood pressure today is controlled and patient will continue with present medication and surveillance    Ambulatory dysfunction  Because of her severe arthritis to both knees patient still has problems with ambulatory dysfunction is walking with a walker although she presents today in the office in a wheelchair  Patient will continue with physical therapy and strengthening exercises and again we will have the patient seen by Orthopedics for re-evaluation of her right knee, continued care to left knee       Diagnoses and all orders for this visit:    Acute lateral meniscal injury of right knee, initial encounter  -     Ambulatory referral to Orthopedic Surgery; Future    Hypertensive urgency    Ambulatory dysfunction          Subjective:      Patient ID: Sharyn Villagran is a 80 y o  female  A 80year-old female with a history of multiple medical problems as outlined previously  Patient is here today for follow-up after recent hospitalization and rehab    Acute injury after fall to her right knee, tear to the lateral meniscus of the right knee, seen by Orthopedics and underwent rehab, therapeutic injection to the right knee  Patient is continuing physical therapy at her residence, an independent living   States she still having some discomfort to the knee      The following portions of the patient's history were reviewed and updated as appropriate:   She  has a past medical history of CAD (coronary artery disease), Hypertension, Osteoarthritis, Osteoporosis, and Vitamin D deficiency  She   Patient Active Problem List    Diagnosis Date Noted    Acute lateral meniscal injury of right knee 02/12/2019    Ambulatory dysfunction 02/12/2019    History of left hip hemiarthroplasty 10/16/2018    Trigger middle finger of right hand 10/16/2018    Unstable knee, left 10/16/2018    S/p left hip fracture 09/12/2018    Iron deficiency anemia secondary to inadequate dietary iron intake 09/12/2018    Fracture follow-up 08/28/2018    S/P hip hemiarthroplasty 08/14/2018    Acute blood loss anemia 08/14/2018    Preoperative clearance 08/12/2018    Hypertensive urgency 08/12/2018    Auditory complaints of both ears 07/30/2018    Sterile pyuria 05/15/2018    Impacted cerumen of both ears 04/10/2018    Generalized osteoarthritis of multiple sites 07/13/2015    Hyperlipidemia 08/23/2012    Essential hypertension 08/20/2012    Osteoarthrosis 08/20/2012    Osteoporosis 08/20/2012     She  has a past surgical history that includes Appendectomy; Hysterectomy; Rotator cuff repair; and pr partial hip replacement (Left, 8/13/2018)  Her family history includes Diabetes type II in her father; Heart disease in her brother  She  reports that she has quit smoking  She has never used smokeless tobacco  She reports that she drank alcohol  She reports that she does not use drugs    Current Outpatient Medications   Medication Sig Dispense Refill    amLODIPine (NORVASC) 5 mg tablet Take 1 tablet (5 mg total) by mouth daily  0    aspirin 81 MG tablet Take 1 tablet by mouth daily      calcitonin, salmon, (MIACALCIN) 200 units/act nasal spray 1 spray and to alternate nostril q day 1 Act 5    Cyanocobalamin (VITAMIN B12 PO) Take 1 tablet by mouth daily      dorzolamide-timolol (COSOPT) 22 3-6 8 MG/ML ophthalmic solution INSTILL 1 DROP INTO LEFT EYE TWICE A DAY APPROXIMATELY 12 HOURS APART  2    fenofibrate (TRIGLIDE) 160 MG tablet Take 1 tablet (160 mg total) by mouth daily 30 tablet 3    ferrous sulfate 324 (65 Fe) mg Take 1 tablet (324 mg total) by mouth daily before breakfast 30 tablet 4    Glucosamine HCl (GLUCOSAMINE PO) Take 1 capsule by mouth daily      latanoprost (XALATAN) 0 005 % ophthalmic solution INSTILL 1 DROP IN THE AFFECTED EYE(S) IN THE EVENING  1    losartan (COZAAR) 25 mg tablet TAKE 1 TABLET BY MOUTH EVERY DAY 30 tablet 1    metoprolol tartrate (LOPRESSOR) 50 mg tablet TAKE 1 TABLET TWICE DAILY 60 tablet 5    Multiple Vitamins-Minerals (DAILY MULTI PO) Take 1 tablet by mouth daily      Pyridoxine HCl (VITAMIN B6 PO) Take 1 tablet by mouth daily       No current facility-administered medications for this visit        Current Outpatient Medications on File Prior to Visit   Medication Sig    amLODIPine (NORVASC) 5 mg tablet Take 1 tablet (5 mg total) by mouth daily    aspirin 81 MG tablet Take 1 tablet by mouth daily    calcitonin, salmon, (MIACALCIN) 200 units/act nasal spray 1 spray and to alternate nostril q day    Cyanocobalamin (VITAMIN B12 PO) Take 1 tablet by mouth daily    dorzolamide-timolol (COSOPT) 22 3-6 8 MG/ML ophthalmic solution INSTILL 1 DROP INTO LEFT EYE TWICE A DAY APPROXIMATELY 12 HOURS APART    fenofibrate (TRIGLIDE) 160 MG tablet Take 1 tablet (160 mg total) by mouth daily    ferrous sulfate 324 (65 Fe) mg Take 1 tablet (324 mg total) by mouth daily before breakfast    Glucosamine HCl (GLUCOSAMINE PO) Take 1 capsule by mouth daily    latanoprost (XALATAN) 0 005 % ophthalmic solution INSTILL 1 DROP IN THE AFFECTED EYE(S) IN THE EVENING    losartan (COZAAR) 25 mg tablet TAKE 1 TABLET BY MOUTH EVERY DAY    metoprolol tartrate (LOPRESSOR) 50 mg tablet TAKE 1 TABLET TWICE DAILY    Multiple Vitamins-Minerals (DAILY MULTI PO) Take 1 tablet by mouth daily    Pyridoxine HCl (VITAMIN B6 PO) Take 1 tablet by mouth daily     No current facility-administered medications on file prior to visit  She has No Known Allergies       Review of Systems   Constitutional: Positive for activity change (Patient states that with the pain in the knee she is not as physically active)  Negative for appetite change, chills, diaphoresis, fatigue, fever and unexpected weight change  HENT: Negative  Eyes: Negative  Cardiovascular: Negative  Gastrointestinal: Negative  Endocrine: Negative  Genitourinary: Negative  Musculoskeletal: Positive for arthralgias and gait problem  Negative for back pain, joint swelling, myalgias, neck pain and neck stiffness  Skin: Negative  Allergic/Immunologic: Negative  Hematological: Negative  Psychiatric/Behavioral: Negative  Objective:      /64   Pulse 81   Temp 98 7 °F (37 1 °C)   SpO2 94%          Physical Exam   Constitutional: She is oriented to person, place, and time  She appears well-developed and well-nourished  No distress  Very pleasant, articulate 41-year-old female who is awake alert, presenting today in a wheelchair   HENT:   Head: Normocephalic and atraumatic  Right Ear: External ear normal    Left Ear: External ear normal    Nose: Nose normal    Mouth/Throat: Oropharynx is clear and moist  No oropharyngeal exudate  Eyes: Pupils are equal, round, and reactive to light  Conjunctivae and EOM are normal  Right eye exhibits no discharge  Left eye exhibits no discharge  No scleral icterus  Neck: No JVD present  No tracheal deviation present  No thyromegaly present     Some decreased range of motion to the cervical spine both actively and passively but no pain with movement   Cardiovascular: Normal rate, regular rhythm and normal heart sounds  Exam reveals no gallop and no friction rub  No murmur heard  Pulmonary/Chest: Effort normal and breath sounds normal  No stridor  No respiratory distress  She has no wheezes  She has no rales  She exhibits no tenderness  Abdominal: Soft  Bowel sounds are normal  She exhibits no distension and no mass  There is no tenderness  There is no rebound and no guarding  No hernia  Musculoskeletal: She exhibits edema, tenderness and deformity  Evaluation of her right knee shows diffuse arthritic changes, full range of motion, slight effusion, tenderness to palpation to the tibial plateau on the right, chronic trace edema bilateral lower extremities   Lymphadenopathy:     She has no cervical adenopathy  Neurological: She is alert and oriented to person, place, and time  She displays abnormal reflex  No cranial nerve deficit or sensory deficit  Coordination (Difficulties with coordination and ambulation secondary to her degenerative arthritis but no focal neurologic changes) abnormal    Skin: Skin is warm and dry  No rash noted  She is not diaphoretic  No erythema  No pallor  Psychiatric: She has a normal mood and affect  Her behavior is normal  Judgment and thought content normal    Nursing note and vitals reviewed

## 2019-03-06 NOTE — ASSESSMENT & PLAN NOTE
Again with admission to the hospital patient did have problems with increased blood pressure readings which may be secondary to the pain she was experiencing from the injury    Her blood pressure today is controlled and patient will continue with present medication and surveillance

## 2019-03-06 NOTE — ASSESSMENT & PLAN NOTE
Patient is here today  As noted patient did have an acute lateral meniscus injury to the right knee  She was hospitalized for this along with hypertensive urgency  She was seen by Orthopedics and did have a therapeutic injection of corticosteroids to the knee which she states was helpful but she still having some minor pain laterally to the tibial plateau on the right  She has a brace on the left knee  She is going to physical therapy and they are trying to increase her strength  Because of continued pain we will have a follow-up visit arranged to be seen by Orthopedics

## 2019-03-07 ENCOUNTER — PATIENT OUTREACH (OUTPATIENT)
Dept: CASE MANAGEMENT | Facility: OTHER | Age: 84
End: 2019-03-07

## 2019-03-07 NOTE — PROGRESS NOTES
3/7/19-left message for Ana Hernández from [facility] regarding bundle patient update for care management throughout bundle episode

## 2019-03-14 ENCOUNTER — TELEPHONE (OUTPATIENT)
Dept: INTERNAL MEDICINE CLINIC | Facility: CLINIC | Age: 84
End: 2019-03-14

## 2019-03-14 NOTE — TELEPHONE ENCOUNTER
Micaela Simental from [facility] wanted to let you know that this patient has a plus one pitting edema on her right foot,no sob,no other symptoms  They will monitor this and they are having her elevate her leg  They will let us know if anything changes  Micaela Simental can be reached at [phone]

## 2019-03-21 ENCOUNTER — OFFICE VISIT (OUTPATIENT)
Dept: OBGYN CLINIC | Facility: HOSPITAL | Age: 84
End: 2019-03-21
Payer: MEDICARE

## 2019-03-21 VITALS — DIASTOLIC BLOOD PRESSURE: 75 MMHG | HEART RATE: 72 BPM | SYSTOLIC BLOOD PRESSURE: 153 MMHG

## 2019-03-21 DIAGNOSIS — G89.29 CHRONIC PAIN OF LEFT KNEE: ICD-10-CM

## 2019-03-21 DIAGNOSIS — S83.8X1A ACUTE LATERAL MENISCAL INJURY OF RIGHT KNEE, INITIAL ENCOUNTER: ICD-10-CM

## 2019-03-21 DIAGNOSIS — M25.561 CHRONIC PAIN OF RIGHT KNEE: Primary | ICD-10-CM

## 2019-03-21 DIAGNOSIS — M17.11 PRIMARY OSTEOARTHRITIS OF RIGHT KNEE: ICD-10-CM

## 2019-03-21 DIAGNOSIS — G89.29 CHRONIC PAIN OF RIGHT KNEE: Primary | ICD-10-CM

## 2019-03-21 DIAGNOSIS — M25.562 CHRONIC PAIN OF LEFT KNEE: ICD-10-CM

## 2019-03-21 PROCEDURE — 20610 DRAIN/INJ JOINT/BURSA W/O US: CPT | Performed by: ORTHOPAEDIC SURGERY

## 2019-03-21 PROCEDURE — 99213 OFFICE O/P EST LOW 20 MIN: CPT | Performed by: ORTHOPAEDIC SURGERY

## 2019-03-21 RX ORDER — LIDOCAINE HYDROCHLORIDE 20 MG/ML
2 INJECTION, SOLUTION EPIDURAL; INFILTRATION; INTRACAUDAL; PERINEURAL
Status: COMPLETED | OUTPATIENT
Start: 2019-03-21 | End: 2019-03-21

## 2019-03-21 RX ORDER — BUPIVACAINE HYDROCHLORIDE 2.5 MG/ML
2 INJECTION, SOLUTION INFILTRATION; PERINEURAL
Status: COMPLETED | OUTPATIENT
Start: 2019-03-21 | End: 2019-03-21

## 2019-03-21 RX ORDER — BETAMETHASONE SODIUM PHOSPHATE AND BETAMETHASONE ACETATE 3; 3 MG/ML; MG/ML
12 INJECTION, SUSPENSION INTRA-ARTICULAR; INTRALESIONAL; INTRAMUSCULAR; SOFT TISSUE
Status: COMPLETED | OUTPATIENT
Start: 2019-03-21 | End: 2019-03-21

## 2019-03-21 RX ADMIN — BETAMETHASONE SODIUM PHOSPHATE AND BETAMETHASONE ACETATE 12 MG: 3; 3 INJECTION, SUSPENSION INTRA-ARTICULAR; INTRALESIONAL; INTRAMUSCULAR; SOFT TISSUE at 13:35

## 2019-03-21 RX ADMIN — LIDOCAINE HYDROCHLORIDE 2 ML: 20 INJECTION, SOLUTION EPIDURAL; INFILTRATION; INTRACAUDAL; PERINEURAL at 13:35

## 2019-03-21 RX ADMIN — BUPIVACAINE HYDROCHLORIDE 2 ML: 2.5 INJECTION, SOLUTION INFILTRATION; PERINEURAL at 13:35

## 2019-03-21 NOTE — PROGRESS NOTES
Assessment:  1  Acute lateral meniscal injury of right knee, initial encounter  Ambulatory referral to Orthopedic Surgery       Plan:  The patient presents for chronic right > left knee pain  She is here with her son  She has tenderness to the medial right joint line and varus alignment  She is provided with a right kne steroid injection  She should continue physical therapy  She can follow up in 3 months  To do next visit:  No follow-ups on file  The above stated was discussed in layman's terms and the patient expressed understanding  All questions were answered to the patient's satisfaction  Scribe Attestation    I,:   Myra Spaulding am acting as a scribe while in the presence of the attending physician :        I,:   Anna Saunders MD personally performed the services described in this documentation    as scribed in my presence :              Subjective:   Skyla Nielsen is a 80 y o  female who presents for bilateral knee pain  Today she complains of right > left generalized knee pain  She rates her symptoms at 5/10  Transitional positions and starting to walk aggravates  She is currently in physical therapy 3x/week  She does use a walker for ambulation  She does use tylenol 3x/day for pain  She wears a left knee brace  She is s/p right knee steroid injection with some benefit  She is s/p left partial hip replacement  Review of systems negative unless otherwise specified in HPI    Past Medical History:   Diagnosis Date    CAD (coronary artery disease)     Hypertension     Osteoarthritis     Osteoporosis     Vitamin D deficiency        Past Surgical History:   Procedure Laterality Date    APPENDECTOMY      HYSTERECTOMY      NC PARTIAL HIP REPLACEMENT Left 8/13/2018    Procedure: HEMIARTHROPLASTY HIP (BIPOLAR);   Surgeon: Anna Saunders MD;  [facility];  Service: Orthopedics    ROTATOR CUFF REPAIR         Family History   Problem Relation Age of Onset    Diabetes type II Father     Heart disease Brother        Social History     Occupational History    Not on file   Tobacco Use    Smoking status: Former Smoker    Smokeless tobacco: Never Used   Substance and Sexual Activity    Alcohol use: Not Currently    Drug use: No    Sexual activity: Not on file         Current Outpatient Medications:     amLODIPine (NORVASC) 5 mg tablet, Take 1 tablet (5 mg total) by mouth daily, Disp: , Rfl: 0    aspirin 81 MG tablet, Take 1 tablet by mouth daily, Disp: , Rfl:     calcitonin, salmon, (MIACALCIN) 200 units/act nasal spray, 1 spray and to alternate nostril q day, Disp: 1 Act, Rfl: 5    Cyanocobalamin (VITAMIN B12 PO), Take 1 tablet by mouth daily, Disp: , Rfl:     dorzolamide-timolol (COSOPT) 22 3-6 8 MG/ML ophthalmic solution, INSTILL 1 DROP INTO LEFT EYE TWICE A DAY APPROXIMATELY 12 HOURS APART, Disp: , Rfl: 2    fenofibrate (TRIGLIDE) 160 MG tablet, Take 1 tablet (160 mg total) by mouth daily, Disp: 30 tablet, Rfl: 3    ferrous sulfate 324 (65 Fe) mg, Take 1 tablet (324 mg total) by mouth daily before breakfast, Disp: 30 tablet, Rfl: 4    Glucosamine HCl (GLUCOSAMINE PO), Take 1 capsule by mouth daily, Disp: , Rfl:     latanoprost (XALATAN) 0 005 % ophthalmic solution, INSTILL 1 DROP IN THE AFFECTED EYE(S) IN THE EVENING, Disp: , Rfl: 1    losartan (COZAAR) 25 mg tablet, TAKE 1 TABLET BY MOUTH EVERY DAY, Disp: 30 tablet, Rfl: 1    metoprolol tartrate (LOPRESSOR) 50 mg tablet, TAKE 1 TABLET TWICE DAILY, Disp: 60 tablet, Rfl: 5    Multiple Vitamins-Minerals (DAILY MULTI PO), Take 1 tablet by mouth daily, Disp: , Rfl:     Pyridoxine HCl (VITAMIN B6 PO), Take 1 tablet by mouth daily, Disp: , Rfl:     No Known Allergies         Vitals:    03/21/19 1314   BP: 153/75   Pulse: 72       Objective:  Physical exam  · General: Awake, Alert, Oriented  · Eyes: Pupils equal, round and reactive to light  · Heart: regular rate and rhythm  · Lungs: No audible wheezing  · Abdomen: soft                    Ortho Exam   Left knee: Valgus alignment  No erythema or ecchymosis  Laxity to varus and valgus stress    Right knee:  Effusion  Varus alignment  Tender medial joint line  Stable to varus and valgus stress  Good extension and flexion      Diagnostics, reviewed and taken today if performed as documented:    None performed      The attending physician has personally reviewed the pertinent films in PACS and interpretation is as follows:      Procedures, if performed today:    Large joint arthrocentesis: R knee  Date/Time: 3/21/2019 1:35 PM  Consent given by: patient  Site marked: site marked  Supporting Documentation  Indications: pain   Procedure Details  Location: knee - R knee  Preparation: Patient was prepped and draped in the usual sterile fashion  Needle size: 22 G  Ultrasound guidance: no  Approach: anterolateral  Medications administered: 12 mg betamethasone acetate-betamethasone sodium phosphate 6 (3-3) mg/mL; 2 mL bupivacaine 0 25 %; 2 mL lidocaine (PF) 2 %    Patient tolerance: patient tolerated the procedure well with no immediate complications  Dressing:  Sterile dressing applied          Portions of the record may have been created with voice recognition software   Occasional wrong word or "sound a like" substitutions may have occurred due to the inherent limitations of voice recognition software   Read the chart carefully and recognize, using context, where substitutions have occurred

## 2019-04-13 DIAGNOSIS — I10 ESSENTIAL HYPERTENSION: ICD-10-CM

## 2019-04-13 DIAGNOSIS — E78.00 PURE HYPERCHOLESTEROLEMIA: ICD-10-CM

## 2019-04-15 ENCOUNTER — PATIENT OUTREACH (OUTPATIENT)
Dept: INTERNAL MEDICINE CLINIC | Facility: CLINIC | Age: 84
End: 2019-04-15

## 2019-04-15 RX ORDER — FENOFIBRATE 160 MG/1
TABLET ORAL
Qty: 30 TABLET | Refills: 0 | Status: SHIPPED | OUTPATIENT
Start: 2019-04-15 | End: 2020-06-16

## 2019-04-15 RX ORDER — LOSARTAN POTASSIUM 25 MG/1
TABLET ORAL
Qty: 30 TABLET | Refills: 1 | Status: SHIPPED | OUTPATIENT
Start: 2019-04-15 | End: 2021-08-24 | Stop reason: ALTCHOICE

## 2019-05-02 ENCOUNTER — TELEPHONE (OUTPATIENT)
Dept: OBGYN CLINIC | Facility: HOSPITAL | Age: 84
End: 2019-05-02

## 2019-05-21 ENCOUNTER — TELEPHONE (OUTPATIENT)
Dept: INTERNAL MEDICINE CLINIC | Facility: CLINIC | Age: 84
End: 2019-05-21

## 2019-05-22 ENCOUNTER — TELEPHONE (OUTPATIENT)
Dept: INTERNAL MEDICINE CLINIC | Facility: CLINIC | Age: 84
End: 2019-05-22

## 2019-05-23 ENCOUNTER — PATIENT OUTREACH (OUTPATIENT)
Dept: INTERNAL MEDICINE CLINIC | Facility: CLINIC | Age: 84
End: 2019-05-23

## 2019-06-07 ENCOUNTER — OFFICE VISIT (OUTPATIENT)
Dept: INTERNAL MEDICINE CLINIC | Facility: CLINIC | Age: 84
End: 2019-06-07
Payer: MEDICARE

## 2019-06-07 VITALS
HEIGHT: 60 IN | HEART RATE: 72 BPM | WEIGHT: 126 LBS | BODY MASS INDEX: 24.74 KG/M2 | OXYGEN SATURATION: 98 % | SYSTOLIC BLOOD PRESSURE: 148 MMHG | DIASTOLIC BLOOD PRESSURE: 68 MMHG | TEMPERATURE: 99.4 F

## 2019-06-07 DIAGNOSIS — Z00.00 HEALTHCARE MAINTENANCE: ICD-10-CM

## 2019-06-07 DIAGNOSIS — M15.9 GENERALIZED OSTEOARTHRITIS OF MULTIPLE SITES: ICD-10-CM

## 2019-06-07 DIAGNOSIS — I10 ESSENTIAL HYPERTENSION: ICD-10-CM

## 2019-06-07 DIAGNOSIS — I21.A1 TYPE 2 MYOCARDIAL INFARCTION WITHOUT ST ELEVATION (HCC): ICD-10-CM

## 2019-06-07 DIAGNOSIS — E78.01 FAMILIAL HYPERCHOLESTEROLEMIA: ICD-10-CM

## 2019-06-07 DIAGNOSIS — M81.0 AGE-RELATED OSTEOPOROSIS WITHOUT CURRENT PATHOLOGICAL FRACTURE: ICD-10-CM

## 2019-06-07 DIAGNOSIS — K21.00 GASTROESOPHAGEAL REFLUX DISEASE WITH ESOPHAGITIS: Primary | ICD-10-CM

## 2019-06-07 PROCEDURE — G0439 PPPS, SUBSEQ VISIT: HCPCS | Performed by: INTERNAL MEDICINE

## 2019-06-07 PROCEDURE — 99214 OFFICE O/P EST MOD 30 MIN: CPT | Performed by: INTERNAL MEDICINE

## 2019-06-07 RX ORDER — RANITIDINE 150 MG/1
150 CAPSULE ORAL 2 TIMES DAILY
Qty: 180 CAPSULE | Refills: 3 | Status: SHIPPED | OUTPATIENT
Start: 2019-06-07 | End: 2019-06-18

## 2019-06-13 ENCOUNTER — TELEPHONE (OUTPATIENT)
Dept: INTERNAL MEDICINE CLINIC | Facility: CLINIC | Age: 84
End: 2019-06-13

## 2019-06-14 ENCOUNTER — TELEPHONE (OUTPATIENT)
Dept: INTERNAL MEDICINE CLINIC | Facility: CLINIC | Age: 84
End: 2019-06-14

## 2019-06-14 DIAGNOSIS — Z28.39 IMMUNIZATION DEFICIENCY: Primary | ICD-10-CM

## 2019-06-18 ENCOUNTER — TELEPHONE (OUTPATIENT)
Dept: INTERNAL MEDICINE CLINIC | Facility: CLINIC | Age: 84
End: 2019-06-18

## 2019-06-18 DIAGNOSIS — K21.00 GASTROESOPHAGEAL REFLUX DISEASE WITH ESOPHAGITIS: Primary | ICD-10-CM

## 2019-06-18 RX ORDER — FAMOTIDINE 20 MG/1
20 TABLET, FILM COATED ORAL 2 TIMES DAILY
Qty: 30 TABLET | Refills: 3 | Status: SHIPPED | OUTPATIENT
Start: 2019-06-18 | End: 2019-12-30

## 2019-06-20 ENCOUNTER — TELEPHONE (OUTPATIENT)
Dept: INTERNAL MEDICINE CLINIC | Facility: CLINIC | Age: 84
End: 2019-06-20

## 2019-06-22 ENCOUNTER — TELEPHONE (OUTPATIENT)
Dept: OTHER | Facility: OTHER | Age: 84
End: 2019-06-22

## 2019-06-24 DIAGNOSIS — N30.00 ACUTE CYSTITIS WITHOUT HEMATURIA: Primary | ICD-10-CM

## 2019-06-24 RX ORDER — CEPHALEXIN 250 MG/1
250 CAPSULE ORAL EVERY 12 HOURS SCHEDULED
Qty: 10 CAPSULE | Refills: 0 | Status: SHIPPED | OUTPATIENT
Start: 2019-06-24 | End: 2019-06-29

## 2019-06-25 ENCOUNTER — OFFICE VISIT (OUTPATIENT)
Dept: OBGYN CLINIC | Facility: HOSPITAL | Age: 84
End: 2019-06-25
Payer: MEDICARE

## 2019-06-25 VITALS
DIASTOLIC BLOOD PRESSURE: 79 MMHG | WEIGHT: 126 LBS | HEIGHT: 60 IN | HEART RATE: 67 BPM | SYSTOLIC BLOOD PRESSURE: 146 MMHG | BODY MASS INDEX: 24.74 KG/M2

## 2019-06-25 DIAGNOSIS — M17.11 PRIMARY OSTEOARTHRITIS OF RIGHT KNEE: ICD-10-CM

## 2019-06-25 DIAGNOSIS — Z96.642 HISTORY OF LEFT HIP HEMIARTHROPLASTY: ICD-10-CM

## 2019-06-25 DIAGNOSIS — M70.61 TROCHANTERIC BURSITIS OF RIGHT HIP: ICD-10-CM

## 2019-06-25 DIAGNOSIS — M17.12 PRIMARY OSTEOARTHRITIS OF LEFT KNEE: Primary | ICD-10-CM

## 2019-06-25 PROCEDURE — 20610 DRAIN/INJ JOINT/BURSA W/O US: CPT | Performed by: ORTHOPAEDIC SURGERY

## 2019-06-25 PROCEDURE — 99213 OFFICE O/P EST LOW 20 MIN: CPT | Performed by: ORTHOPAEDIC SURGERY

## 2019-06-25 RX ORDER — LIDOCAINE HYDROCHLORIDE 20 MG/ML
2 INJECTION, SOLUTION INFILTRATION; PERINEURAL
Status: COMPLETED | OUTPATIENT
Start: 2019-06-25 | End: 2019-06-25

## 2019-06-25 RX ORDER — BUPIVACAINE HYDROCHLORIDE 2.5 MG/ML
2 INJECTION, SOLUTION INFILTRATION; PERINEURAL
Status: COMPLETED | OUTPATIENT
Start: 2019-06-25 | End: 2019-06-25

## 2019-06-25 RX ORDER — BETAMETHASONE SODIUM PHOSPHATE AND BETAMETHASONE ACETATE 3; 3 MG/ML; MG/ML
12 INJECTION, SUSPENSION INTRA-ARTICULAR; INTRALESIONAL; INTRAMUSCULAR; SOFT TISSUE
Status: COMPLETED | OUTPATIENT
Start: 2019-06-25 | End: 2019-06-25

## 2019-06-25 RX ADMIN — BETAMETHASONE SODIUM PHOSPHATE AND BETAMETHASONE ACETATE 12 MG: 3; 3 INJECTION, SUSPENSION INTRA-ARTICULAR; INTRALESIONAL; INTRAMUSCULAR; SOFT TISSUE at 10:33

## 2019-06-25 RX ADMIN — BUPIVACAINE HYDROCHLORIDE 2 ML: 2.5 INJECTION, SOLUTION INFILTRATION; PERINEURAL at 10:33

## 2019-06-25 RX ADMIN — LIDOCAINE HYDROCHLORIDE 2 ML: 20 INJECTION, SOLUTION INFILTRATION; PERINEURAL at 10:33

## 2019-07-12 ENCOUNTER — TELEPHONE (OUTPATIENT)
Dept: OBGYN CLINIC | Facility: HOSPITAL | Age: 84
End: 2019-07-12

## 2019-07-15 NOTE — TELEPHONE ENCOUNTER
Carmen Nevarez from [facility] is calling wants to know if we could  e-mail detail knee brace order instead of faxing  Because for some reason it prints to  left of the paper  e-mail [email or web address] cb# [phone] fax# [phone]

## 2019-07-15 NOTE — TELEPHONE ENCOUNTER
Johann Tatum from [place] called in again stating every time they received the order all the information is not coming in correectly  They need a clear order of the knee brace  If you have any questions please call gerard @ [phone]   [facility]#[phone]

## 2019-07-17 NOTE — TELEPHONE ENCOUNTER
Called [facility] Marguerita Nipper was not in  I left a message to have order mailed to us so we can sign and return via mail, as we have faxed 3 times and emailed once already

## 2019-07-18 NOTE — TELEPHONE ENCOUNTER
Osman said to try one more time  She is asking if you can fax it upside down to see if that works  If not she will mail it   Thanks

## 2019-07-22 ENCOUNTER — TELEPHONE (OUTPATIENT)
Dept: INTERNAL MEDICINE CLINIC | Facility: CLINIC | Age: 84
End: 2019-07-22

## 2019-07-22 NOTE — TELEPHONE ENCOUNTER
Isa Nolen from [employer] [employer] called regarding patient Edwige Jones  She would like for you to write a script for Physical therapy/Occupational therapy for evaluation and treatment  Fax number is [phone]

## 2019-08-21 ENCOUNTER — TELEPHONE (OUTPATIENT)
Dept: INTERNAL MEDICINE CLINIC | Facility: CLINIC | Age: 84
End: 2019-08-21

## 2019-08-21 DIAGNOSIS — M25.572 ACUTE LEFT ANKLE PAIN: Primary | ICD-10-CM

## 2019-08-21 NOTE — PROGRESS NOTES
Nursing home called  Patient having acute left ankle pain    Her requesting x-ray, order was written

## 2019-08-28 ENCOUNTER — HOSPITAL ENCOUNTER (OUTPATIENT)
Dept: RADIOLOGY | Facility: HOSPITAL | Age: 84
Discharge: HOME | End: 2019-08-28
Attending: ORTHOPAEDIC SURGERY
Payer: MEDICARE

## 2019-08-28 ENCOUNTER — OFFICE VISIT (OUTPATIENT)
Dept: OBGYN CLINIC | Facility: HOSPITAL | Age: 84
End: 2019-08-28
Payer: MEDICARE

## 2019-08-28 VITALS
BODY MASS INDEX: 24.61 KG/M2 | HEIGHT: 60 IN | DIASTOLIC BLOOD PRESSURE: 82 MMHG | HEART RATE: 76 BPM | SYSTOLIC BLOOD PRESSURE: 138 MMHG

## 2019-08-28 DIAGNOSIS — M25.572 PAIN, JOINT, ANKLE AND FOOT, LEFT: Primary | ICD-10-CM

## 2019-08-28 DIAGNOSIS — M65.9 SYNOVITIS OF LEFT ANKLE: ICD-10-CM

## 2019-08-28 DIAGNOSIS — M25.572 PAIN, JOINT, ANKLE AND FOOT, LEFT: ICD-10-CM

## 2019-08-28 PROCEDURE — 99213 OFFICE O/P EST LOW 20 MIN: CPT | Performed by: ORTHOPAEDIC SURGERY

## 2019-08-28 PROCEDURE — 73610 X-RAY EXAM OF ANKLE: CPT

## 2019-08-28 PROCEDURE — 20605 DRAIN/INJ JOINT/BURSA W/O US: CPT | Performed by: ORTHOPAEDIC SURGERY

## 2019-08-28 RX ORDER — BUPIVACAINE HYDROCHLORIDE 2.5 MG/ML
1 INJECTION, SOLUTION EPIDURAL; INFILTRATION; INTRACAUDAL
Status: COMPLETED | OUTPATIENT
Start: 2019-08-28 | End: 2019-08-28

## 2019-08-28 RX ORDER — BETAMETHASONE SODIUM PHOSPHATE AND BETAMETHASONE ACETATE 3; 3 MG/ML; MG/ML
6 INJECTION, SUSPENSION INTRA-ARTICULAR; INTRALESIONAL; INTRAMUSCULAR; SOFT TISSUE
Status: COMPLETED | OUTPATIENT
Start: 2019-08-28 | End: 2019-08-28

## 2019-08-28 RX ORDER — LIDOCAINE HYDROCHLORIDE 10 MG/ML
1 INJECTION, SOLUTION INFILTRATION; PERINEURAL
Status: COMPLETED | OUTPATIENT
Start: 2019-08-28 | End: 2019-08-28

## 2019-08-28 RX ADMIN — LIDOCAINE HYDROCHLORIDE 1 ML: 10 INJECTION, SOLUTION INFILTRATION; PERINEURAL at 14:33

## 2019-08-28 RX ADMIN — BETAMETHASONE SODIUM PHOSPHATE AND BETAMETHASONE ACETATE 6 MG: 3; 3 INJECTION, SUSPENSION INTRA-ARTICULAR; INTRALESIONAL; INTRAMUSCULAR; SOFT TISSUE at 14:33

## 2019-08-28 RX ADMIN — BUPIVACAINE HYDROCHLORIDE 1 ML: 2.5 INJECTION, SOLUTION EPIDURAL; INFILTRATION; INTRACAUDAL at 14:33

## 2019-08-28 NOTE — PROGRESS NOTES
Assessment:  1  Pain, joint, ankle and foot, left  XR ankle 3+ vw left   2  Synovitis of left ankle         Plan:  The patient was provided with left IA ankle steroid injection  She can return with any issues or problems and follow up as needed  To do next visit:  Return if symptoms worsen or fail to improve  The above stated was discussed in layman's terms and the patient expressed understanding  All questions were answered to the patient's satisfaction  Scribe Attestation    I,:   Clarita Nageotte am acting as a scribe while in the presence of the attending physician :        I,:   Omer Almanza MD personally performed the services described in this documentation    as scribed in my presence :              Subjective:   Brooklynn Espinosa is a 80 y o  female who presents for left ankle pain  She is here with her son  She is s/p right trochanteric bursa injection with benefit, 6/25/19  Today she complains of left ankle pain  Weight bearing aggravates  Sitting alleviates  She will use tylenol for pain  Review of systems negative unless otherwise specified in HPI    Past Medical History:   Diagnosis Date    CAD (coronary artery disease)     Hypertension     Osteoarthritis     Osteoporosis     Vitamin D deficiency        Past Surgical History:   Procedure Laterality Date    APPENDECTOMY      HYSTERECTOMY      VT PARTIAL HIP REPLACEMENT Left 8/13/2018    Procedure: HEMIARTHROPLASTY HIP (BIPOLAR);   Surgeon: Omer Almanza MD;  [facility];  Service: Orthopedics    ROTATOR CUFF REPAIR         Family History   Problem Relation Age of Onset    Diabetes type II Father     Heart disease Brother        Social History     Occupational History    Not on file   Tobacco Use    Smoking status: Former Smoker    Smokeless tobacco: Never Used   Substance and Sexual Activity    Alcohol use: Not Currently    Drug use: No    Sexual activity: Not on file         Current Outpatient Medications:     amLODIPine (NORVASC) 5 mg tablet, Take 1 tablet (5 mg total) by mouth daily, Disp: , Rfl: 0    aspirin 81 MG tablet, Take 1 tablet by mouth daily, Disp: , Rfl:     calcitonin, salmon, (MIACALCIN) 200 units/act nasal spray, 1 spray and to alternate nostril q day, Disp: 1 Act, Rfl: 5    Cyanocobalamin (VITAMIN B12 PO), Take 1 tablet by mouth daily, Disp: , Rfl:     dorzolamide-timolol (COSOPT) 22 3-6 8 MG/ML ophthalmic solution, INSTILL 1 DROP INTO LEFT EYE TWICE A DAY APPROXIMATELY 12 HOURS APART, Disp: , Rfl: 2    famotidine (PEPCID) 20 mg tablet, Take 1 tablet (20 mg total) by mouth 2 (two) times a day, Disp: 30 tablet, Rfl: 3    fenofibrate (TRIGLIDE) 160 MG tablet, TAKE 1 TABLET BY MOUTH EVERY DAY, Disp: 30 tablet, Rfl: 0    ferrous sulfate 324 (65 Fe) mg, Take 1 tablet (324 mg total) by mouth daily before breakfast, Disp: 30 tablet, Rfl: 4    Glucosamine HCl (GLUCOSAMINE PO), Take 1 capsule by mouth daily, Disp: , Rfl:     latanoprost (XALATAN) 0 005 % ophthalmic solution, INSTILL 1 DROP IN THE AFFECTED EYE(S) IN THE EVENING, Disp: , Rfl: 1    losartan (COZAAR) 25 mg tablet, TAKE 1 TABLET BY MOUTH EVERY DAY, Disp: 30 tablet, Rfl: 1    metoprolol tartrate (LOPRESSOR) 50 mg tablet, TAKE 1 TABLET TWICE DAILY, Disp: 60 tablet, Rfl: 5    Multiple Vitamins-Minerals (DAILY MULTI PO), Take 1 tablet by mouth daily, Disp: , Rfl:     Pyridoxine HCl (VITAMIN B6 PO), Take 1 tablet by mouth daily, Disp: , Rfl:     No Known Allergies         Vitals:    08/28/19 1401   BP: 138/82   Pulse: 76       Objective:  Physical exam  · General: Awake, Alert, Oriented  · Eyes: Pupils equal, round and reactive to light  · Heart: regular rate and rhythm  · Lungs: No audible wheezing  · Abdomen: soft                    Ortho Exam   Left ankle:  Limited eversion, inversion,DF, PF due to pain      Calf compartments soft and supple  Sensation intact  Toes are warm sensate and mobile      Diagnostics, reviewed and taken today if performed as documented: The attending physician has personally reviewed the pertinent films in PACS and interpretation is as follows:  Left ankle x-ray:  No fractures  Moderate arthritis  Procedures, if performed today:    Medium joint arthrocentesis: L ankle  Date/Time: 8/28/2019 2:33 PM  Consent given by: patient  Site marked: site marked  Timeout: Immediately prior to procedure a time out was called to verify the correct patient, procedure, equipment, support staff and site/side marked as required   Supporting Documentation  Indications: pain   Procedure Details  Location: ankle - L ankle  Preparation: Patient was prepped and draped in the usual sterile fashion  Needle size: 22 G  Ultrasound guidance: no  Approach: anteromedial  Medications administered: 1 mL bupivacaine (PF) 0 25 %; 1 mL lidocaine 1 %; 6 mg betamethasone acetate-betamethasone sodium phosphate 6 (3-3) mg/mL    Patient tolerance: patient tolerated the procedure well with no immediate complications  Dressing:  Sterile dressing applied                Portions of the record may have been created with voice recognition software  Occasional wrong word or "sound a like" substitutions may have occurred due to the inherent limitations of voice recognition software  Read the chart carefully and recognize, using context, where substitutions have occurred

## 2019-08-30 ENCOUNTER — TELEPHONE (OUTPATIENT)
Dept: OBGYN CLINIC | Facility: HOSPITAL | Age: 84
End: 2019-08-30

## 2019-08-30 NOTE — TELEPHONE ENCOUNTER
Patient's son, Enzo Lund is calling to find out the timeline for the patient's pain to improve  Enzo Lund states that Dr Priscilla Dove told them it would feel better, then hurt again, then improve from there  Enzo Lund just wants to know what kind of timeline he was talking about  Enzo Lund states that the patient seems to feel a little better, but still has pain  He would like to know how long they should expect it to take until the patient receives maximum benefit  Please advise      Call back [phone]

## 2019-08-30 NOTE — TELEPHONE ENCOUNTER
Son advised it could take up to 2 weeks for steroid to do it job  Call back after 2 weeks if no improvement  Heat/ice 20 min on 20 min off for pain and inflammation  Please advise

## 2019-09-03 NOTE — TELEPHONE ENCOUNTER
Son Narciso Fitzgerald reports that his mother's left ankle got some improvement from the steroid shot but he thought she would be at least 90% better in a week  She is not wearing the full length leg brace due to the ankle being swollen but is whering the knee brace  I advised he should continue to have her elevate the L LE and due foot pumps several times per hour to help get fluid back into system, can try icing the ankle 20 min on 20 min off  Call back in a week with how mom is doing

## 2019-09-04 ENCOUNTER — OFFICE VISIT (OUTPATIENT)
Dept: INTERNAL MEDICINE CLINIC | Facility: CLINIC | Age: 84
End: 2019-09-04
Payer: MEDICARE

## 2019-09-04 ENCOUNTER — TELEPHONE (OUTPATIENT)
Dept: INTERNAL MEDICINE CLINIC | Facility: CLINIC | Age: 84
End: 2019-09-04

## 2019-09-04 VITALS
HEART RATE: 70 BPM | OXYGEN SATURATION: 98 % | RESPIRATION RATE: 16 BRPM | SYSTOLIC BLOOD PRESSURE: 144 MMHG | DIASTOLIC BLOOD PRESSURE: 80 MMHG | BODY MASS INDEX: 24.61 KG/M2 | HEIGHT: 60 IN | TEMPERATURE: 99.5 F

## 2019-09-04 DIAGNOSIS — R30.0 DYSURIA: Primary | ICD-10-CM

## 2019-09-04 DIAGNOSIS — I10 ESSENTIAL HYPERTENSION: ICD-10-CM

## 2019-09-04 DIAGNOSIS — R30.0 DYSURIA: ICD-10-CM

## 2019-09-04 DIAGNOSIS — R82.81 STERILE PYURIA: Primary | ICD-10-CM

## 2019-09-04 LAB
BACTERIA UR QL AUTO: ABNORMAL /HPF
BILIRUB UR QL STRIP: NEGATIVE
CLARITY UR: ABNORMAL
COLOR UR: YELLOW
GLUCOSE UR STRIP-MCNC: NEGATIVE MG/DL
HGB UR QL STRIP.AUTO: ABNORMAL
KETONES UR STRIP-MCNC: NEGATIVE MG/DL
LEUKOCYTE ESTERASE UR QL STRIP: ABNORMAL
NITRITE UR QL STRIP: POSITIVE
NON-SQ EPI CELLS URNS QL MICRO: ABNORMAL /HPF
OTHER STN SPEC: ABNORMAL
PH UR STRIP.AUTO: 7 [PH]
PROT UR STRIP-MCNC: NEGATIVE MG/DL
RBC #/AREA URNS AUTO: ABNORMAL /HPF
SL AMB  POCT GLUCOSE, UA: ABNORMAL
SL AMB LEUKOCYTE ESTERASE,UA: ABNORMAL
SL AMB POCT BILIRUBIN,UA: ABNORMAL
SL AMB POCT BLOOD,UA: ABNORMAL
SL AMB POCT CLARITY,UA: ABNORMAL
SL AMB POCT COLOR,UA: YELLOW
SL AMB POCT KETONES,UA: ABNORMAL
SL AMB POCT NITRITE,UA: ABNORMAL
SL AMB POCT PH,UA: 7
SL AMB POCT SPECIFIC GRAVITY,UA: 1
SL AMB POCT URINE PROTEIN: ABNORMAL
SL AMB POCT UROBILINOGEN: ABNORMAL
SP GR UR STRIP.AUTO: 1.01 (ref 1–1.03)
UROBILINOGEN UR QL STRIP.AUTO: 0.2 E.U./DL
WBC #/AREA URNS AUTO: ABNORMAL /HPF

## 2019-09-04 PROCEDURE — 87077 CULTURE AEROBIC IDENTIFY: CPT | Performed by: NURSE PRACTITIONER

## 2019-09-04 PROCEDURE — 1123F ACP DISCUSS/DSCN MKR DOCD: CPT | Performed by: NURSE PRACTITIONER

## 2019-09-04 PROCEDURE — 81001 URINALYSIS AUTO W/SCOPE: CPT | Performed by: NURSE PRACTITIONER

## 2019-09-04 PROCEDURE — 87186 SC STD MICRODIL/AGAR DIL: CPT | Performed by: NURSE PRACTITIONER

## 2019-09-04 PROCEDURE — 81002 URINALYSIS NONAUTO W/O SCOPE: CPT | Performed by: NURSE PRACTITIONER

## 2019-09-04 PROCEDURE — 87086 URINE CULTURE/COLONY COUNT: CPT | Performed by: NURSE PRACTITIONER

## 2019-09-04 PROCEDURE — 99214 OFFICE O/P EST MOD 30 MIN: CPT | Performed by: NURSE PRACTITIONER

## 2019-09-04 RX ORDER — PHENAZOPYRIDINE HYDROCHLORIDE 100 MG/1
100 TABLET, FILM COATED ORAL ONCE AS NEEDED
Qty: 1 TABLET | Refills: 0 | Status: SHIPPED | OUTPATIENT
Start: 2019-09-04 | End: 2019-10-08 | Stop reason: ALTCHOICE

## 2019-09-04 RX ORDER — PHENAZOPYRIDINE HYDROCHLORIDE 100 MG/1
100 TABLET, FILM COATED ORAL 3 TIMES DAILY PRN
Qty: 10 TABLET | Refills: 0 | Status: SHIPPED | OUTPATIENT
Start: 2019-09-04 | End: 2019-09-04 | Stop reason: CLARIF

## 2019-09-04 RX ORDER — CEPHALEXIN 500 MG/1
500 CAPSULE ORAL ONCE
Qty: 1 CAPSULE | Refills: 0 | Status: SHIPPED | OUTPATIENT
Start: 2019-09-04 | End: 2019-09-04

## 2019-09-04 NOTE — PROGRESS NOTES
Assessment/Plan:    Essential hypertension  Mild elevation of blood pressure today, possibly due to acute cystitis  She does appear to be approximately at her baseline  Continue with losartan and amlodipine  CTM  Dysuria  2 day history of dysuria and frequency with family reporting mild forgetfulness  Urine positive for leukocytes, nitrites, blood  Will send sample for culture  She has had issues with antibiotic associated diarrhea in the past but tolerated cephalexin well  She will be started on cephalexin but explained that will call [facility] if culture and susceptibility profile show need to change treatment  She has a single dose of pyridium ordered to take tonight for symptom management  Encouraged adequate hydration  Pt instructed to call for reevaluation if sx worsen or persist          Diagnoses and all orders for this visit:    Dysuria  -     cephalexin (KEFLEX) 500 mg capsule; Take 1 capsule (500 mg total) by mouth once for 1 dose  -     Discontinue: phenazopyridine (PYRIDIUM) 100 mg tablet; Take 1 tablet (100 mg total) by mouth 3 (three) times a day as needed for bladder spasms  -     phenazopyridine (PYRIDIUM) 100 mg tablet; Take 1 tablet (100 mg total) by mouth once as needed for bladder spasms for up to 1 dose    Essential hypertension          Subjective:      Patient ID: Daniel Marquez is a 80 y o  female  Pt is a 80 y o  y/o female who is seen today for evaluation of UTI symptoms that started about 2 days ago  Her symptoms include dysuria and frequency  She has no abdominal or flank pain, no nausea or vomiting, no fever, chills, or appetite loss  Her family is present today and they do report some mild confusion/forgetfullness        The following portions of the patient's history were reviewed and updated as appropriate: allergies, current medications, past family history, past medical history, past social history, past surgical history and problem list     Review of Systems Constitutional: Negative for appetite change, chills and fever  Respiratory: Negative for shortness of breath  Cardiovascular: Negative for chest pain and palpitations  Gastrointestinal: Negative for abdominal pain, diarrhea, nausea and vomiting  Genitourinary: Positive for dysuria, frequency and urgency  Negative for decreased urine volume, difficulty urinating, flank pain, hematuria, pelvic pain and vaginal discharge  Neurological: Negative for dizziness and headaches  Psychiatric/Behavioral: Positive for confusion  Objective:      /80 (BP Location: Left arm, Cuff Size: Standard)   Pulse 70   Temp 99 5 °F (37 5 °C)   Resp 16   Ht 5' (1 524 m)   SpO2 98%   BMI 24 61 kg/m²          Physical Exam   Constitutional: She is oriented to person, place, and time  Vital signs are normal  She appears well-developed and well-nourished  She is cooperative  HENT:   Right Ear: External ear normal    Left Ear: External ear normal    Cardiovascular: Normal rate, regular rhythm, normal heart sounds and intact distal pulses  No murmur heard  Pulmonary/Chest: Effort normal and breath sounds normal    Abdominal: Soft  Bowel sounds are normal  There is no tenderness  There is no CVA tenderness  Musculoskeletal: She exhibits no edema  Neurological: She is alert and oriented to person, place, and time  Skin: Skin is warm, dry and intact  Psychiatric: She has a normal mood and affect  Her speech is normal and behavior is normal  Judgment and thought content normal  Cognition and memory are normal    Vitals reviewed

## 2019-09-04 NOTE — ASSESSMENT & PLAN NOTE
2 day history of dysuria and frequency with family reporting mild forgetfulness  Urine positive for leukocytes, nitrites, blood  Will send sample for culture  She has had issues with antibiotic associated diarrhea in the past but tolerated cephalexin well  She will be started on cephalexin but explained that will call [facility] if culture and susceptibility profile show need to change treatment  She has a single dose of pyridium ordered to take tonight for symptom management  Encouraged adequate hydration    Pt instructed to call for reevaluation if sx worsen or persist

## 2019-09-04 NOTE — TELEPHONE ENCOUNTER
Manuel Velasquez the nurse from [facility] called requesting an order for a UACNS due to the pt complaining of burning when urinating and urgency  Fax # [phone]

## 2019-09-04 NOTE — ASSESSMENT & PLAN NOTE
Mild elevation of blood pressure today, possibly due to acute cystitis  She does appear to be approximately at her baseline  Continue with losartan and amlodipine  CTM

## 2019-09-06 LAB — BACTERIA UR CULT: ABNORMAL

## 2019-09-10 ENCOUNTER — TELEPHONE (OUTPATIENT)
Dept: INTERNAL MEDICINE CLINIC | Facility: CLINIC | Age: 84
End: 2019-09-10

## 2019-09-10 NOTE — TELEPHONE ENCOUNTER
I spoke to patient's son and he states there is improvement with the swelling but patient is still having pain when bearing weight  He wonders if patient needs to be seen again sooner now than 4 months per the discharge summary?

## 2019-09-10 NOTE — TELEPHONE ENCOUNTER
Milad Munguia from [facility] called, she wanted to know if the cephalexin is the correct treatment for  the culture grown in patient's urine  She states patient is still complaining of lots of pressure  If not would you like for her to take something else in place, if so the script has to be faxed to the nursing facility   Thanks

## 2019-09-11 ENCOUNTER — TELEPHONE (OUTPATIENT)
Dept: INTERNAL MEDICINE CLINIC | Facility: CLINIC | Age: 84
End: 2019-09-11

## 2019-09-11 DIAGNOSIS — R30.1 PAINFUL BLADDER SPASM: Primary | ICD-10-CM

## 2019-09-11 NOTE — TELEPHONE ENCOUNTER
Pt's son Rubia Means is requesting that Gene Tello give him a call back today at [phone] to follow up on urinary issues  Thank you!

## 2019-09-11 NOTE — TELEPHONE ENCOUNTER
Spoke with son, Katelynn Burk  Burning from recent visit for uti symptoms is improving but pt still complains of bladder spasms  They are not interested in trying another antibiotic because pt has issues with diarrhea  He has requested medication to treat spasms  Recommendation if for further evaluation of her complaints prior to initiating a treatment  Pt referred to urology

## 2019-09-20 ENCOUNTER — TELEPHONE (OUTPATIENT)
Dept: OBGYN CLINIC | Facility: HOSPITAL | Age: 84
End: 2019-09-20

## 2019-09-20 NOTE — TELEPHONE ENCOUNTER
Patient's son Rashaun West was advised okay to keep using the ice if he sees improvement  Keeping appt for followup to discuss bracing

## 2019-09-20 NOTE — TELEPHONE ENCOUNTER
Caller: Patients Rashard  Call Back Number: [phone]  Provider: Dr Jose Carlos Padgett has called to ask to speak with Radhika Mcgee in regards to his mothers ankle  Iftikhar Winston has stated they do have an upcoming appointment with Dr Celso Lester next Wednesday and they plan on keeping it  But that Iftikhar Winston would like to ask if they should continue applying ice 3x day for 20 min on his mothers ankle or if they should stop  Iftikhar Winston has mentioned that the swollen looks like it has completely went down or is just a little left and he wants to make sure if they continue with applying ice that this will  Not her ankle      Please advise, thank you

## 2019-09-23 RX ORDER — CEPHALEXIN 500 MG/1
CAPSULE ORAL
COMMUNITY
Start: 2019-09-04 | End: 2019-10-08 | Stop reason: ALTCHOICE

## 2019-09-24 ENCOUNTER — TELEPHONE (OUTPATIENT)
Dept: INTERNAL MEDICINE CLINIC | Facility: CLINIC | Age: 84
End: 2019-09-24

## 2019-09-25 ENCOUNTER — OFFICE VISIT (OUTPATIENT)
Dept: OBGYN CLINIC | Facility: HOSPITAL | Age: 84
End: 2019-09-25
Payer: MEDICARE

## 2019-09-25 VITALS
HEART RATE: 69 BPM | SYSTOLIC BLOOD PRESSURE: 148 MMHG | DIASTOLIC BLOOD PRESSURE: 69 MMHG | HEIGHT: 60 IN | BODY MASS INDEX: 24.61 KG/M2

## 2019-09-25 DIAGNOSIS — M25.362 UNSTABLE KNEE, LEFT: Primary | ICD-10-CM

## 2019-09-25 DIAGNOSIS — G89.29 CHRONIC PAIN OF LEFT KNEE: ICD-10-CM

## 2019-09-25 DIAGNOSIS — R26.2 AMBULATORY DYSFUNCTION: ICD-10-CM

## 2019-09-25 DIAGNOSIS — M25.572 ARTHRALGIA OF ANKLE OR FOOT, LEFT: ICD-10-CM

## 2019-09-25 DIAGNOSIS — M25.562 CHRONIC PAIN OF LEFT KNEE: ICD-10-CM

## 2019-09-25 PROCEDURE — 99213 OFFICE O/P EST LOW 20 MIN: CPT | Performed by: ORTHOPAEDIC SURGERY

## 2019-09-25 NOTE — PROGRESS NOTES
Subjective;    43-year-old female patient known to the practice  She comes the office accompanied by her son  She has known left knee instability  Present with her is a soft brace on her knee and a fabricated Orthosis in his hand  She is not been wearing the knee Ortho cyst because of pressure on the shin and swelling of her left ankle  Her left ankle swelling has seem to subside using ice elevation and light elasticized stockings  The orthosis is not been used significantly in the recent past   They have questions as to the etiology of her pain and the use of the braces or air stirrup that are present in the room    Past Medical History:   Diagnosis Date    CAD (coronary artery disease)     Hypertension     Osteoarthritis     Osteoporosis     Vitamin D deficiency        Past Surgical History:   Procedure Laterality Date    APPENDECTOMY      HYSTERECTOMY      [place] PARTIAL HIP REPLACEMENT Left 8/13/2018    Procedure: HEMIARTHROPLASTY HIP (BIPOLAR); Surgeon: Omer Almanza MD;  [facility];  Service: Orthopedics    ROTATOR CUFF REPAIR         Family History   Problem Relation Age of Onset    Diabetes type II Father     Heart disease Brother        Social History     Tobacco Use    Smoking status: Former Smoker    Smokeless tobacco: Never Used   Substance Use Topics    Alcohol use: Not Currently    Drug use: No     Exam;    She has a significant valgus deformity of her left knee  This valgus deformity is greatly enlarged with a stress applied from a lateral perspective and opens offering rotatory instability  She has significant discomfort of a Anel both medial and lateral to light and deep palpation in the soft tissues without any visible bruising and no palpable cords  She has discomfort of the distal shin but not the ankle not the medial or lateral malleolus and is tolerant of dorsiflexion and plantar flexion of the foot at the ankle  Impression;     Left knee arthritis  Left knee instability  Left knee valgus deformity  Left ankle arthralgia      Plan;    She will return to the use of the orthotic brace  Both she and her son were advised use a stocking that accommodates the entire lower leg i e  a soccer or lacrosse type stocking that can be picked up at any [employer] store  She was ordered physical therapy for her thigh and her knee at the request of her son  We will see her in follow-up on a p r n   Basis  Her exam was supervised by and plan formulated by the attending surgeon all told we spent over 45 minutes with the individual which half was in face-to-face verbal consultation

## 2019-10-04 ENCOUNTER — TELEPHONE (OUTPATIENT)
Dept: OBGYN CLINIC | Facility: HOSPITAL | Age: 84
End: 2019-10-04

## 2019-10-07 NOTE — PROGRESS NOTES
10/10/2019    Nam Dire  9/11/1925  [id number]        Assessment  -Recurrent urinary tract infection  -Vaginal atrophy    Discussion/Plan  Court Mike is a 80 y o  female who presents today in consultation    -Urine dip in office today shows no evidence of infection or hematuria  I recommend obtaining a renal ultrasound to rule out any anatomical cause for report of recurrent urinary tract infections  We also discussed starting topical Estrace  Patient denies any prior breast or gynecological cancer  Reviewed mechanism of action and administration instructions  Prescription printed and provided to daughter  We also discussed dietary and behavioral modifications for preventing recurrence  Encouraged patient to increase water intake  -She will return in 2 months to review results of renal ultrasound and evaluate efficacy of medication  Patient and daughter instructed to call with any issues  -All questions answered, patients agree with plan     History of Present Illness  80 y o  female who presents in consultation for urinary tract infection  She is accompanied today by her daughter  Patient currently resides at [facility]  Daughter states that patient has been having recurrent urinary tract infections since left hip surgery in May 2018  She reports 5 UTIs this year  Last urine culture from 9/4/19 was positive for E  Coli  This was treated by PCP with course of keflex  Prior UTI was 6/21/19, also for E  Coli organism  Unfortunately, no additional urine tests available for review in [facility]  Patient reports increased urinary frequency, urgency, and suprapubic discomfort with infection  She denies any gross hematuria  Patient denies any previous urologic history or surgery  Review of Systems  Review of Systems   Constitutional: Negative  HENT: Negative  Respiratory: Negative  Cardiovascular: Negative  Gastrointestinal: Negative      Genitourinary: Negative for decreased urine volume, difficulty urinating, dysuria, flank pain, frequency, hematuria and urgency  Musculoskeletal: Negative  Skin: Negative  Neurological: Negative  Psychiatric/Behavioral: Negative  Past Medical History  Past Medical History:   Diagnosis Date    CAD (coronary artery disease)     Hypertension     Osteoarthritis     Osteoporosis     Vitamin D deficiency        Past Social History  Past Surgical History:   Procedure Laterality Date    APPENDECTOMY      HYSTERECTOMY      [place] PARTIAL HIP REPLACEMENT Left 8/13/2018    Procedure: HEMIARTHROPLASTY HIP (BIPOLAR); Surgeon: Emily Prajapati MD;  [facility];  Service: Orthopedics    ROTATOR CUFF REPAIR         Past Family History  Family History   Problem Relation Age of Onset    Diabetes type II Father     Heart disease Brother        Past Social history  Social History     Socioeconomic History    Marital status:       Spouse name: Not on file    Number of children: Not on file    Years of education: Not on file    Highest education level: Not on file   Occupational History    Not on file   Social Needs    Financial resource strain: Not on file    Food insecurity:     Worry: Not on file     Inability: Not on file    Transportation needs:     Medical: Not on file     Non-medical: Not on file   Tobacco Use    Smoking status: Former Smoker    Smokeless tobacco: Never Used   Substance and Sexual Activity    Alcohol use: Not Currently    Drug use: No    Sexual activity: Not on file   Lifestyle    Physical activity:     Days per week: Not on file     Minutes per session: Not on file    Stress: Not on file   Relationships    Social connections:     Talks on phone: Not on file     Gets together: Not on file     Attends Christian service: Not on file     Active member of club or organization: Not on file     Attends meetings of clubs or organizations: Not on file     Relationship status: Not on file    Intimate partner violence:     Fear of current or ex partner: Not on file     Emotionally abused: Not on file     Physically abused: Not on file     Forced sexual activity: Not on file   Other Topics Concern    Not on file   Social History Narrative    Daily coffee consumption - 4 cups       Current Medications  Current Outpatient Medications   Medication Sig Dispense Refill    acetaminophen (TYLENOL) 325 mg tablet Take 650 mg by mouth every 6 (six) hours as needed for mild pain      amLODIPine (NORVASC) 5 mg tablet Take 1 tablet (5 mg total) by mouth daily  0    aspirin 81 MG tablet Take 1 tablet by mouth daily      calcitonin, salmon, (MIACALCIN) 200 units/act nasal spray 1 spray and to alternate nostril q day 1 Act 5    Cyanocobalamin (VITAMIN B12 PO) Take 1 tablet by mouth daily      dorzolamide-timolol (COSOPT) 22 3-6 8 MG/ML ophthalmic solution INSTILL 1 DROP INTO LEFT EYE TWICE A DAY APPROXIMATELY 12 HOURS APART  2    famotidine (PEPCID) 20 mg tablet Take 1 tablet (20 mg total) by mouth 2 (two) times a day 30 tablet 3    fenofibrate (TRIGLIDE) 160 MG tablet TAKE 1 TABLET BY MOUTH EVERY DAY 30 tablet 0    ferrous sulfate 324 (65 Fe) mg Take 1 tablet (324 mg total) by mouth daily before breakfast 30 tablet 4    Glucosamine HCl (GLUCOSAMINE PO) Take 1 capsule by mouth daily      losartan (COZAAR) 25 mg tablet TAKE 1 TABLET BY MOUTH EVERY DAY 30 tablet 1    metoprolol tartrate (LOPRESSOR) 50 mg tablet TAKE 1 TABLET TWICE DAILY 60 tablet 5    Multiple Vitamins-Minerals (DAILY MULTI PO) Take 1 tablet by mouth daily      Pyridoxine HCl (VITAMIN B6 PO) Take 1 tablet by mouth daily      [START ON 10/11/2019] estradiol (ESTRACE) 0 1 mg/g vaginal cream Insert 1 g into the vagina 3 (three) times a week 42 5 g 1    latanoprost (XALATAN) 0 005 % ophthalmic solution INSTILL 1 DROP IN THE AFFECTED EYE(S) IN THE EVENING  1     No current facility-administered medications for this visit          Allergies  No Known Allergies    Past medical history, social history, family history, medications and allergies were reviewed  Vitals  Vitals:    10/10/19 0914   BP: 126/74   BP Location: Left arm   Patient Position: Sitting   Cuff Size: Adult   Pulse: 76       Physical Exam  Physical Exam   Constitutional: She is oriented to person, place, and time  She appears well-developed and well-nourished  HENT:   Head: Normocephalic  Eyes: Pupils are equal, round, and reactive to light  Neck: Normal range of motion  Cardiovascular: Normal rate and regular rhythm  Pulmonary/Chest: Effort normal    Abdominal: Soft  Normal appearance  She exhibits no distension  There is no tenderness  There is no CVA tenderness  Genitourinary:   Genitourinary Comments: No suprapubic discomfort or distension  Negative CVA tenderness   Musculoskeletal:   Wearing left leg brace, wheelchair bound     Neurological: She is alert and oriented to person, place, and time  Skin: Skin is warm and dry  Psychiatric: She has a normal mood and affect  Her behavior is normal  Judgment and thought content normal        Results    I have personally reviewed all pertinent lab results and reviewed with patient  Lab Results   Component Value Date    GLUCOSE 98 11/24/2017    CALCIUM 8 8 02/13/2019     11/24/2017    K 3 8 02/13/2019    CO2 22 02/13/2019     02/13/2019    BUN 18 02/13/2019    CREATININE 0 73 02/13/2019     Lab Results   Component Value Date    WBC 8 94 02/13/2019    HGB 11 8 02/13/2019    HCT 36 1 02/13/2019    MCV 93 02/13/2019     02/13/2019     No results found for this or any previous visit (from the past 1 hour(s))

## 2019-10-08 ENCOUNTER — OFFICE VISIT (OUTPATIENT)
Dept: INTERNAL MEDICINE CLINIC | Facility: CLINIC | Age: 84
End: 2019-10-08
Payer: MEDICARE

## 2019-10-08 VITALS
BODY MASS INDEX: 25.72 KG/M2 | TEMPERATURE: 98.7 F | HEIGHT: 60 IN | OXYGEN SATURATION: 94 % | DIASTOLIC BLOOD PRESSURE: 78 MMHG | HEART RATE: 86 BPM | SYSTOLIC BLOOD PRESSURE: 142 MMHG | WEIGHT: 131 LBS

## 2019-10-08 DIAGNOSIS — K21.00 GASTROESOPHAGEAL REFLUX DISEASE WITH ESOPHAGITIS: ICD-10-CM

## 2019-10-08 DIAGNOSIS — M15.9 GENERALIZED OSTEOARTHRITIS OF MULTIPLE SITES: ICD-10-CM

## 2019-10-08 DIAGNOSIS — I10 ESSENTIAL HYPERTENSION: ICD-10-CM

## 2019-10-08 DIAGNOSIS — E78.01 FAMILIAL HYPERCHOLESTEROLEMIA: ICD-10-CM

## 2019-10-08 DIAGNOSIS — N30.00 ACUTE CYSTITIS WITHOUT HEMATURIA: Primary | ICD-10-CM

## 2019-10-08 DIAGNOSIS — E66.3 OVERWEIGHT: ICD-10-CM

## 2019-10-08 PROCEDURE — 99214 OFFICE O/P EST MOD 30 MIN: CPT | Performed by: INTERNAL MEDICINE

## 2019-10-08 RX ORDER — ACETAMINOPHEN 325 MG/1
650 TABLET ORAL 3 TIMES DAILY
COMMUNITY

## 2019-10-08 NOTE — ASSESSMENT & PLAN NOTE
Patient has a history of generalized osteoarthritis, patient does have severe degenerative arthritis to both knees worse on the left than the right  Patient is in the knee brace stay for her left knee and she states this is been helping to stabilize her as far as pain and ambulation or concern  Patient will continue to follow up with Orthopedics and has received multiple injections to the knees which have been helpful

## 2019-10-08 NOTE — ASSESSMENT & PLAN NOTE
With the patient's BMI she is considered slightly overweight  I do not feel that any caloric restriction or change in her exercise regime is indicated this point time    We will continue to follow this and make changes if the weight changes are excessive

## 2019-10-08 NOTE — PROGRESS NOTES
Assessment/Plan:    Essential hypertension  Patient is showing stable blood pressure readings in the office today  We will continue present medication and surveillance and she will have her blood pressure recheck to with a return visit in 3 months  We will also continue to monitor her renal function  Gastroesophageal reflux disease with esophagitis  Patient is now on Pepcid 20 mg twice a day  She states her reflux symptoms seem to be under control with present treatment  Patient will continue present medication and surveillance but was told if there is any changes or concerns to please call  Generalized osteoarthritis of multiple sites  Patient has a history of generalized osteoarthritis, patient does have severe degenerative arthritis to both knees worse on the left than the right  Patient is in the knee brace stay for her left knee and she states this is been helping to stabilize her as far as pain and ambulation or concern  Patient will continue to follow up with Orthopedics and has received multiple injections to the knees which have been helpful  Acute cystitis without hematuria  Recent treatment for urinary tract infection E coli  Patient was on Keflex for the E coli infection and states that she still has some slight burning with urination  She does have an appointment on Thursday for evaluation by Urology because of recurrent infections  She most likely will need to have her repeat culture obtained    Hyperlipidemia  Because of intolerance to statin drugs patient remains on fenofibrate 160 mg a day to control her cholesterol  She states that she is eating well but the with her present diet they are not giving her options to reduce her fats and cholesterol with her diet  We will continue to monitor her cholesterol and make modification of treatment if needed  Overweight  With the patient's BMI she is considered slightly overweight    I do not feel that any caloric restriction or change in her exercise regime is indicated this point time  We will continue to follow this and make changes if the weight changes are excessive       Diagnoses and all orders for this visit:    Acute cystitis without hematuria    Familial hypercholesterolemia    Essential hypertension    Gastroesophageal reflux disease with esophagitis    Generalized osteoarthritis of multiple sites    BMI 25 0-25 9,adult    Overweight    Other orders  -     acetaminophen (TYLENOL) 325 mg tablet; Take 650 mg by mouth every 6 (six) hours as needed for mild pain          Subjective:      Patient ID: Paulino Dodson is a 80 y o  female  Patient is a 61-year-old female with history of multiple medical problems as outlined previously  Patient is here today for routine follow-up  Patient states her only complaint at this point time is some continued burning with urination  She has had no fever or chills or evidence of hematuria  She does have an appointment to be seen by urologist later this week   She has a new knee brace on the left leg and she states that this is helping her with stabilization and less pain with ambulation and more stability  She continues to be seen by physical therapy  She denies any chest pain or pressure and no increasing shortness of breath  She states her gastrointestinal reflux symptoms have been controlled with present treatment      The following portions of the patient's history were reviewed and updated as appropriate: She  has a past medical history of CAD (coronary artery disease), Hypertension, Osteoarthritis, Osteoporosis, and Vitamin D deficiency    She   Patient Active Problem List    Diagnosis Date Noted    Overweight 10/08/2019    Dysuria 09/04/2019    Pain, joint, ankle and foot, left 08/28/2019    Synovitis of left ankle 08/28/2019    Acute left ankle pain 08/21/2019    Acute cystitis without hematuria 06/24/2019    Healthcare maintenance 06/07/2019    Gastroesophageal reflux disease with esophagitis 06/07/2019    Chronic pain of left knee 03/21/2019    Acute lateral meniscal injury of right knee 02/12/2019    Ambulatory dysfunction 02/12/2019    History of left hip hemiarthroplasty 10/16/2018    Trigger middle finger of right hand 10/16/2018    Unstable knee, left 10/16/2018    S/p left hip fracture 09/12/2018    Iron deficiency anemia secondary to inadequate dietary iron intake 09/12/2018    Fracture follow-up 08/28/2018    S/P hip hemiarthroplasty 08/14/2018    Acute blood loss anemia 08/14/2018    Preoperative clearance 08/12/2018    Hypertensive urgency 08/12/2018    Auditory complaints of both ears 07/30/2018    Sterile pyuria 05/15/2018    Impacted cerumen of both ears 04/10/2018    Generalized osteoarthritis of multiple sites 07/13/2015    Hyperlipidemia 08/23/2012    Essential hypertension 08/20/2012    Osteoporosis 08/20/2012     She  has a past surgical history that includes Appendectomy; Hysterectomy; Rotator cuff repair; and pr partial hip replacement (Left, 8/13/2018)  Her family history includes Diabetes type II in her father; Heart disease in her brother  She  reports that she has quit smoking  She has never used smokeless tobacco  She reports that she drank alcohol  She reports that she does not use drugs    Current Outpatient Medications   Medication Sig Dispense Refill    acetaminophen (TYLENOL) 325 mg tablet Take 650 mg by mouth every 6 (six) hours as needed for mild pain      amLODIPine (NORVASC) 5 mg tablet Take 1 tablet (5 mg total) by mouth daily  0    aspirin 81 MG tablet Take 1 tablet by mouth daily      calcitonin, salmon, (MIACALCIN) 200 units/act nasal spray 1 spray and to alternate nostril q day 1 Act 5    Cyanocobalamin (VITAMIN B12 PO) Take 1 tablet by mouth daily      dorzolamide-timolol (COSOPT) 22 3-6 8 MG/ML ophthalmic solution INSTILL 1 DROP INTO LEFT EYE TWICE A DAY APPROXIMATELY 12 HOURS APART  2    famotidine (PEPCID) 20 mg tablet Take 1 tablet (20 mg total) by mouth 2 (two) times a day 30 tablet 3    fenofibrate (TRIGLIDE) 160 MG tablet TAKE 1 TABLET BY MOUTH EVERY DAY 30 tablet 0    ferrous sulfate 324 (65 Fe) mg Take 1 tablet (324 mg total) by mouth daily before breakfast 30 tablet 4    Glucosamine HCl (GLUCOSAMINE PO) Take 1 capsule by mouth daily      latanoprost (XALATAN) 0 005 % ophthalmic solution INSTILL 1 DROP IN THE AFFECTED EYE(S) IN THE EVENING  1    losartan (COZAAR) 25 mg tablet TAKE 1 TABLET BY MOUTH EVERY DAY 30 tablet 1    metoprolol tartrate (LOPRESSOR) 50 mg tablet TAKE 1 TABLET TWICE DAILY 60 tablet 5    Multiple Vitamins-Minerals (DAILY MULTI PO) Take 1 tablet by mouth daily      Pyridoxine HCl (VITAMIN B6 PO) Take 1 tablet by mouth daily       No current facility-administered medications for this visit        Current Outpatient Medications on File Prior to Visit   Medication Sig    acetaminophen (TYLENOL) 325 mg tablet Take 650 mg by mouth every 6 (six) hours as needed for mild pain    amLODIPine (NORVASC) 5 mg tablet Take 1 tablet (5 mg total) by mouth daily    aspirin 81 MG tablet Take 1 tablet by mouth daily    calcitonin, salmon, (MIACALCIN) 200 units/act nasal spray 1 spray and to alternate nostril q day    Cyanocobalamin (VITAMIN B12 PO) Take 1 tablet by mouth daily    dorzolamide-timolol (COSOPT) 22 3-6 8 MG/ML ophthalmic solution INSTILL 1 DROP INTO LEFT EYE TWICE A DAY APPROXIMATELY 12 HOURS APART    famotidine (PEPCID) 20 mg tablet Take 1 tablet (20 mg total) by mouth 2 (two) times a day    fenofibrate (TRIGLIDE) 160 MG tablet TAKE 1 TABLET BY MOUTH EVERY DAY    ferrous sulfate 324 (65 Fe) mg Take 1 tablet (324 mg total) by mouth daily before breakfast    Glucosamine HCl (GLUCOSAMINE PO) Take 1 capsule by mouth daily    latanoprost (XALATAN) 0 005 % ophthalmic solution INSTILL 1 DROP IN THE AFFECTED EYE(S) IN THE EVENING    losartan (COZAAR) 25 mg tablet TAKE 1 TABLET BY MOUTH EVERY DAY    metoprolol tartrate (LOPRESSOR) 50 mg tablet TAKE 1 TABLET TWICE DAILY    Multiple Vitamins-Minerals (DAILY MULTI PO) Take 1 tablet by mouth daily    Pyridoxine HCl (VITAMIN B6 PO) Take 1 tablet by mouth daily    [DISCONTINUED] cephalexin (KEFLEX) 500 mg capsule     [DISCONTINUED] phenazopyridine (PYRIDIUM) 100 mg tablet Take 1 tablet (100 mg total) by mouth once as needed for bladder spasms for up to 1 dose     No current facility-administered medications on file prior to visit  She has No Known Allergies       Review of Systems   Constitutional: Negative  HENT: Negative  Eyes: Negative  Respiratory: Negative  Cardiovascular: Positive for leg swelling (Some chronic swelling bilateral lower extremities worse on the left than the right without increase)  Negative for chest pain and palpitations  Gastrointestinal: Negative  Endocrine: Negative  Genitourinary: Positive for dysuria  Negative for decreased urine volume, difficulty urinating, dyspareunia, enuresis, flank pain, frequency, genital sores, hematuria, menstrual problem, pelvic pain, urgency, vaginal bleeding, vaginal discharge and vaginal pain  Musculoskeletal: Positive for arthralgias and gait problem  Negative for back pain, joint swelling, myalgias, neck pain and neck stiffness  Skin: Negative  Allergic/Immunologic: Negative  Hematological: Negative  Psychiatric/Behavioral: Negative  Objective:      /78   Pulse 86   Temp 98 7 °F (37 1 °C)   Ht 5' (1 524 m)   Wt 59 4 kg (131 lb)   SpO2 94%   BMI 25 58 kg/m²          Physical Exam   Constitutional: She is oriented to person, place, and time  She appears well-developed and well-nourished  No distress  Pleasant, cheerful 66-year-old female who is awake alert no acute distress and oriented x3  Patient is accompanied to her visit today by her son   HENT:   Head: Normocephalic and atraumatic     Right Ear: External ear normal    Left Ear: External ear normal    Nose: Nose normal    Mouth/Throat: Oropharynx is clear and moist  No oropharyngeal exudate  Eyes: Pupils are equal, round, and reactive to light  Conjunctivae and EOM are normal  Right eye exhibits no discharge  Left eye exhibits no discharge  No scleral icterus  Neck: Normal range of motion  Neck supple  No JVD present  No tracheal deviation present  No thyromegaly present  Cardiovascular: Normal rate, regular rhythm, normal heart sounds and intact distal pulses  Exam reveals no gallop and no friction rub  No murmur heard  Pulmonary/Chest: Effort normal and breath sounds normal  No stridor  No respiratory distress  She has no wheezes  She has no rales  She exhibits no tenderness  Abdominal: Soft  Bowel sounds are normal  She exhibits no distension and no mass  There is no tenderness  There is no rebound and no guarding  No hernia  Musculoskeletal: She exhibits edema and deformity  She exhibits no tenderness  Diffuse arthritic changes as noted previously  Mostly severe to her left knee with deformity and instability  Now in a new knee brace   Lymphadenopathy:     She has no cervical adenopathy  Neurological: She is alert and oriented to person, place, and time  She displays abnormal reflex ( absent patella and Achilles tendon reflexes bilaterally)  No cranial nerve deficit or sensory deficit  She exhibits normal muscle tone  Coordination (Difficulty with coordination secondary to diffuse arthritis to the knees ) abnormal    Skin: Skin is warm and dry  Capillary refill takes less than 2 seconds  No rash noted  She is not diaphoretic  No erythema  No pallor  Psychiatric: She has a normal mood and affect  Her behavior is normal  Judgment and thought content normal    Nursing note and vitals reviewed  BMI Counseling: Body mass index is 25 58 kg/m²  The BMI is above normal  No BMI follow-up plan is appropriate   Patient is 72 years old and weight reduction/weight gain would further complicate their underlying physical disability

## 2019-10-08 NOTE — ASSESSMENT & PLAN NOTE
Patient is now on Pepcid 20 mg twice a day  She states her reflux symptoms seem to be under control with present treatment  Patient will continue present medication and surveillance but was told if there is any changes or concerns to please call

## 2019-10-08 NOTE — ASSESSMENT & PLAN NOTE
Recent treatment for urinary tract infection E coli  Patient was on Keflex for the E coli infection and states that she still has some slight burning with urination  She does have an appointment on Thursday for evaluation by Urology because of recurrent infections    She most likely will need to have her repeat culture obtained

## 2019-10-08 NOTE — ASSESSMENT & PLAN NOTE
Because of intolerance to statin drugs patient remains on fenofibrate 160 mg a day to control her cholesterol  She states that she is eating well but the with her present diet they are not giving her options to reduce her fats and cholesterol with her diet  We will continue to monitor her cholesterol and make modification of treatment if needed

## 2019-10-08 NOTE — ASSESSMENT & PLAN NOTE
Patient is showing stable blood pressure readings in the office today  We will continue present medication and surveillance and she will have her blood pressure recheck to with a return visit in 3 months  We will also continue to monitor her renal function

## 2019-10-09 ENCOUNTER — OFFICE VISIT (OUTPATIENT)
Dept: OBGYN CLINIC | Facility: HOSPITAL | Age: 84
End: 2019-10-09
Payer: MEDICARE

## 2019-10-09 VITALS
BODY MASS INDEX: 25.71 KG/M2 | WEIGHT: 130.95 LBS | SYSTOLIC BLOOD PRESSURE: 172 MMHG | HEIGHT: 60 IN | HEART RATE: 73 BPM | DIASTOLIC BLOOD PRESSURE: 68 MMHG

## 2019-10-09 DIAGNOSIS — M17.11 PRIMARY OSTEOARTHRITIS OF RIGHT KNEE: ICD-10-CM

## 2019-10-09 DIAGNOSIS — M54.50 ACUTE RIGHT-SIDED LOW BACK PAIN WITHOUT SCIATICA: Primary | ICD-10-CM

## 2019-10-09 PROCEDURE — 99213 OFFICE O/P EST LOW 20 MIN: CPT | Performed by: ORTHOPAEDIC SURGERY

## 2019-10-09 PROCEDURE — 20610 DRAIN/INJ JOINT/BURSA W/O US: CPT | Performed by: ORTHOPAEDIC SURGERY

## 2019-10-09 RX ORDER — BETAMETHASONE SODIUM PHOSPHATE AND BETAMETHASONE ACETATE 3; 3 MG/ML; MG/ML
12 INJECTION, SUSPENSION INTRA-ARTICULAR; INTRALESIONAL; INTRAMUSCULAR; SOFT TISSUE
Status: COMPLETED | OUTPATIENT
Start: 2019-10-09 | End: 2019-10-09

## 2019-10-09 RX ORDER — LIDOCAINE HYDROCHLORIDE 10 MG/ML
2 INJECTION, SOLUTION INFILTRATION; PERINEURAL
Status: COMPLETED | OUTPATIENT
Start: 2019-10-09 | End: 2019-10-09

## 2019-10-09 RX ORDER — BUPIVACAINE HYDROCHLORIDE 2.5 MG/ML
2 INJECTION, SOLUTION INFILTRATION; PERINEURAL
Status: COMPLETED | OUTPATIENT
Start: 2019-10-09 | End: 2019-10-09

## 2019-10-09 RX ADMIN — BETAMETHASONE SODIUM PHOSPHATE AND BETAMETHASONE ACETATE 12 MG: 3; 3 INJECTION, SUSPENSION INTRA-ARTICULAR; INTRALESIONAL; INTRAMUSCULAR; SOFT TISSUE at 13:53

## 2019-10-09 RX ADMIN — BUPIVACAINE HYDROCHLORIDE 2 ML: 2.5 INJECTION, SOLUTION INFILTRATION; PERINEURAL at 13:53

## 2019-10-09 RX ADMIN — LIDOCAINE HYDROCHLORIDE 2 ML: 10 INJECTION, SOLUTION INFILTRATION; PERINEURAL at 13:53

## 2019-10-09 NOTE — PROGRESS NOTES
Assessment:  1  Acute right-sided low back pain without sciatica     2  Primary osteoarthritis of right knee         Plan:  The patient was provided with right knee steroid injection  She should use ice on the low back and right knee as needed  She should follow up in 3 months  To do next visit:  Return in about 3 months (around 1/9/2020)  The above stated was discussed in layman's terms and the patient expressed understanding  All questions were answered to the patient's satisfaction  Scribe Attestation    I,:   Neha Oneill am acting as a scribe while in the presence of the attending physician :        I,:   Alec Pan MD personally performed the services described in this documentation    as scribed in my presence :              Subjective:   Gina Miranda is a 80 y o  female who presents for follow up of right knee pain and new right low back pain  She was planning to come in for right knee pain yet did fall today aggravating her low back  Today she complains of right low back pain and general right knee pain  She rates her low back at 5/10 and right knee at occasional 5/10  Quick movement and knee extension aggravates  She does use tylenol for pain 3x/day  She is s/p right knee steroid injection 3/2019 with benefit  Review of systems negative unless otherwise specified in HPI    Past Medical History:   Diagnosis Date    CAD (coronary artery disease)     Hypertension     Osteoarthritis     Osteoporosis     Vitamin D deficiency        Past Surgical History:   Procedure Laterality Date    APPENDECTOMY      HYSTERECTOMY      CO PARTIAL HIP REPLACEMENT Left 8/13/2018    Procedure: HEMIARTHROPLASTY HIP (BIPOLAR);   Surgeon: Alec Pan MD;  Location: [facility];  Service: Orthopedics    ROTATOR CUFF REPAIR         Family History   Problem Relation Age of Onset    Diabetes type II Father     Heart disease Brother        Social History     Occupational History    Not on file   Tobacco Use    Smoking status: Former Smoker    Smokeless tobacco: Never Used   Substance and Sexual Activity    Alcohol use: Not Currently    Drug use: No    Sexual activity: Not on file         Current Outpatient Medications:     acetaminophen (TYLENOL) 325 mg tablet, Take 650 mg by mouth every 6 (six) hours as needed for mild pain, Disp: , Rfl:     amLODIPine (NORVASC) 5 mg tablet, Take 1 tablet (5 mg total) by mouth daily, Disp: , Rfl: 0    aspirin 81 MG tablet, Take 1 tablet by mouth daily, Disp: , Rfl:     calcitonin, salmon, (MIACALCIN) 200 units/act nasal spray, 1 spray and to alternate nostril q day, Disp: 1 Act, Rfl: 5    Cyanocobalamin (VITAMIN B12 PO), Take 1 tablet by mouth daily, Disp: , Rfl:     dorzolamide-timolol (COSOPT) 22 3-6 8 MG/ML ophthalmic solution, INSTILL 1 DROP INTO LEFT EYE TWICE A DAY APPROXIMATELY 12 HOURS APART, Disp: , Rfl: 2    famotidine (PEPCID) 20 mg tablet, Take 1 tablet (20 mg total) by mouth 2 (two) times a day, Disp: 30 tablet, Rfl: 3    fenofibrate (TRIGLIDE) 160 MG tablet, TAKE 1 TABLET BY MOUTH EVERY DAY, Disp: 30 tablet, Rfl: 0    ferrous sulfate 324 (65 Fe) mg, Take 1 tablet (324 mg total) by mouth daily before breakfast, Disp: 30 tablet, Rfl: 4    Glucosamine HCl (GLUCOSAMINE PO), Take 1 capsule by mouth daily, Disp: , Rfl:     latanoprost (XALATAN) 0 005 % ophthalmic solution, INSTILL 1 DROP IN THE AFFECTED EYE(S) IN THE EVENING, Disp: , Rfl: 1    losartan (COZAAR) 25 mg tablet, TAKE 1 TABLET BY MOUTH EVERY DAY, Disp: 30 tablet, Rfl: 1    metoprolol tartrate (LOPRESSOR) 50 mg tablet, TAKE 1 TABLET TWICE DAILY, Disp: 60 tablet, Rfl: 5    Multiple Vitamins-Minerals (DAILY MULTI PO), Take 1 tablet by mouth daily, Disp: , Rfl:     Pyridoxine HCl (VITAMIN B6 PO), Take 1 tablet by mouth daily, Disp: , Rfl:     No Known Allergies         Vitals:    10/09/19 1334   BP: (!) 172/68   Pulse: 73       Objective:  Physical exam  · General: Awake, Alert, Oriented  · Eyes: Pupils equal, round and reactive to light  · Heart: regular rate and rhythm  · Lungs: No audible wheezing  · Abdomen: soft                    Ortho Exam   Right knee:  Varus alignment  TTP medial joint line  No erythema or ecchymosis  No effusion or swelling  Normal strength  Good ROM   Calf compartments soft and supple  Sensation intact  Toes are warm sensate and mobile    Lumbar:  L2-S1 5/5 strength   Patient able to stand from seated position without assistance  No pain with right hip ROM  Ecchymotic patch over right lumbar   No pain with deep breathing  Sensation intact        Diagnostics, reviewed and taken today if performed as documented:    None performed       Procedures, if performed today:    Large joint arthrocentesis: R knee  Date/Time: 10/9/2019 1:53 PM  Consent given by: patient  Site marked: site marked  Supporting Documentation  Indications: pain   Procedure Details  Location: knee - R knee  Preparation: Patient was prepped and draped in the usual sterile fashion  Needle size: 22 G  Ultrasound guidance: no  Approach: anterolateral  Medications administered: 12 mg betamethasone acetate-betamethasone sodium phosphate 6 (3-3) mg/mL; 2 mL bupivacaine 0 25 %; 2 mL lidocaine 1 %    Patient tolerance: patient tolerated the procedure well with no immediate complications  Dressing:  Sterile dressing applied          Portions of the record may have been created with voice recognition software  Occasional wrong word or "sound a like" substitutions may have occurred due to the inherent limitations of voice recognition software  Read the chart carefully and recognize, using context, where substitutions have occurred

## 2019-10-10 ENCOUNTER — OFFICE VISIT (OUTPATIENT)
Dept: UROLOGY | Facility: AMBULATORY SURGERY CENTER | Age: 84
End: 2019-10-10
Payer: MEDICARE

## 2019-10-10 VITALS — DIASTOLIC BLOOD PRESSURE: 74 MMHG | SYSTOLIC BLOOD PRESSURE: 126 MMHG | HEART RATE: 76 BPM

## 2019-10-10 DIAGNOSIS — R30.1 PAINFUL BLADDER SPASM: ICD-10-CM

## 2019-10-10 DIAGNOSIS — N95.2 VAGINAL ATROPHY: ICD-10-CM

## 2019-10-10 DIAGNOSIS — N39.0 RECURRENT UTI: ICD-10-CM

## 2019-10-10 DIAGNOSIS — N30.00 ACUTE CYSTITIS WITHOUT HEMATURIA: Primary | ICD-10-CM

## 2019-10-10 LAB
SL AMB  POCT GLUCOSE, UA: NORMAL
SL AMB LEUKOCYTE ESTERASE,UA: NORMAL
SL AMB POCT BILIRUBIN,UA: NORMAL
SL AMB POCT BLOOD,UA: NORMAL
SL AMB POCT CLARITY,UA: CLEAR
SL AMB POCT COLOR,UA: YELLOW
SL AMB POCT KETONES,UA: NORMAL
SL AMB POCT NITRITE,UA: NORMAL
SL AMB POCT PH,UA: 6.5
SL AMB POCT SPECIFIC GRAVITY,UA: 1.01
SL AMB POCT URINE PROTEIN: 100
SL AMB POCT UROBILINOGEN: 0.2

## 2019-10-10 PROCEDURE — 99204 OFFICE O/P NEW MOD 45 MIN: CPT | Performed by: NURSE PRACTITIONER

## 2019-10-10 PROCEDURE — 81002 URINALYSIS NONAUTO W/O SCOPE: CPT | Performed by: NURSE PRACTITIONER

## 2019-10-10 RX ORDER — ESTRADIOL 0.1 MG/G
1 CREAM VAGINAL 3 TIMES WEEKLY
Qty: 42.5 G | Refills: 1 | Status: SHIPPED | OUTPATIENT
Start: 2019-10-11 | End: 2020-02-10

## 2019-10-11 ENCOUNTER — HOSPITAL ENCOUNTER (OUTPATIENT)
Dept: RADIOLOGY | Facility: HOSPITAL | Age: 84
Discharge: HOME | End: 2019-10-11
Payer: MEDICARE

## 2019-10-11 DIAGNOSIS — N39.0 RECURRENT UTI: ICD-10-CM

## 2019-10-11 PROCEDURE — 76770 US EXAM ABDO BACK WALL COMP: CPT

## 2019-10-14 ENCOUNTER — TELEPHONE (OUTPATIENT)
Dept: UROLOGY | Facility: AMBULATORY SURGERY CENTER | Age: 84
End: 2019-10-14

## 2019-10-14 NOTE — TELEPHONE ENCOUNTER
Pt son walked into office and would like to be notified of the results of the [address] when they arrive

## 2019-10-18 NOTE — TELEPHONE ENCOUNTER
Patient seen by Jennifer Wong in [facility] on 10/10/2019  Patient son calling to get results of ultrasound  Please advise

## 2019-10-18 NOTE — TELEPHONE ENCOUNTER
Call placed to son, advised of normal results of ultrasound  Follow up appointment scheduled per last OV note

## 2019-10-30 ENCOUNTER — TELEPHONE (OUTPATIENT)
Dept: UROLOGY | Facility: MEDICAL CENTER | Age: 84
End: 2019-10-30

## 2019-10-30 DIAGNOSIS — N39.0 URINARY TRACT INFECTION WITHOUT HEMATURIA, SITE UNSPECIFIED: Primary | ICD-10-CM

## 2019-10-30 NOTE — TELEPHONE ENCOUNTER
Patient of [facility] office  History recurrent UTI, vaginal atrophy  Last seen 10/10/19 with Emil Mishra  Labs received, as well as note from facility that patient is complaining of pain and burning with urination  Will route labs to provider to review and advise of plan

## 2019-10-30 NOTE — TELEPHONE ENCOUNTER
Call placed to Nick Martinez at [address] [facility]  Advised to call office back to discuss patient  [facility]

## 2019-10-30 NOTE — TELEPHONE ENCOUNTER
Frederick Abarca called to get fax number to send results  Frederick Abarca requesting clinical to review  Awaiting labs to triage call

## 2019-10-30 NOTE — TELEPHONE ENCOUNTER
Call placed to [facility] at [address], she states that urine culture was sent but is not back yet  Will fax to our office when available  She also states that patient is complaining of burning with urination but no other symptoms at this time  Advised that she is likely colonized and in the absence of any other symptoms, no treatment at this time  Advised that patient should increase water intake  Will await fax of urine culture results and follow up at that time  Jemima to call us back with worsening symptoms in meantime

## 2019-11-01 NOTE — TELEPHONE ENCOUNTER
Pt called back stating urine culture was not done so another urine specimen to be collected he's concerned that she will be going all weekend without being treated ,asking for be something be prescribed

## 2019-11-01 NOTE — TELEPHONE ENCOUNTER
America Umana from [facility] is calling to say they need a UA CNS sent for patient  She is positive for possible infection   Has been on Keflex in the past

## 2019-11-01 NOTE — TELEPHONE ENCOUNTER
Patient son calling trying to get medication for mother meantime  Labs are in media but son is waiting for urine culture  Son does not want mother to go all weekend with no medication  Please advise

## 2019-11-01 NOTE — TELEPHONE ENCOUNTER
Call returned to [facility]  She states that urine culture was never run by lab  They are collecting new specimen today to send out for culture  Patient still only complaining of burning with urination  No additional symptoms at this time  I advised that provider had stated earlier this week that patient is likely colonized with bacteria given history  I advised [facility] to continue to make sure patient continues to hydrate well with water, monitor for worsening symptoms and to fax culture results to our office as soon as available  [facility] verbalized understanding and agrees with plan

## 2019-11-04 RX ORDER — NITROFURANTOIN 25; 75 MG/1; MG/1
100 CAPSULE ORAL 2 TIMES DAILY
Qty: 14 CAPSULE | Refills: 0 | Status: SHIPPED | OUTPATIENT
Start: 2019-11-04 | End: 2019-11-05 | Stop reason: SDUPTHER

## 2019-11-04 NOTE — TELEPHONE ENCOUNTER
Called and advised staff at [facility] of results and order for antibiotics  Patient to continue to hydrate well with water  Staff verbalized understanding and agrees with plan

## 2019-11-04 NOTE — TELEPHONE ENCOUNTER
Reviewed results of recent urine culture which was positive for infection  Prescription for Clari Hint will be sent to her [facility] pharmacy, based on sensitivities

## 2019-11-04 NOTE — TELEPHONE ENCOUNTER
Antonette Valentin calling again today  States urine culture faxed to office today  No change in symptoms  Will route to provider to review and advise

## 2019-11-04 NOTE — TELEPHONE ENCOUNTER
Call received from facility  Rx needs to be printed and faxed to facility at [phone]  Will route to provider to do so

## 2019-11-05 ENCOUNTER — TELEPHONE (OUTPATIENT)
Dept: UROLOGY | Facility: AMBULATORY SURGERY CENTER | Age: 84
End: 2019-11-05

## 2019-11-05 DIAGNOSIS — N39.0 URINARY TRACT INFECTION WITHOUT HEMATURIA, SITE UNSPECIFIED: ICD-10-CM

## 2019-11-05 RX ORDER — NITROFURANTOIN 25; 75 MG/1; MG/1
100 CAPSULE ORAL 2 TIMES DAILY
Qty: 14 CAPSULE | Refills: 0 | Status: SHIPPED | OUTPATIENT
Start: 2019-11-05 | End: 2019-11-05 | Stop reason: SDUPTHER

## 2019-11-05 RX ORDER — NITROFURANTOIN 25; 75 MG/1; MG/1
100 CAPSULE ORAL 2 TIMES DAILY
Qty: 14 CAPSULE | Refills: 0 | Status: SHIPPED | OUTPATIENT
Start: 2019-11-05 | End: 2019-11-12

## 2019-11-06 NOTE — TELEPHONE ENCOUNTER
Patient's son Kathy Heath stopped into the office to inform us that it would be best to call the Pharmacy on patient's account - [facility] [phone] in addition to faxing prescriptions over  Kathy Heath also was asking if D-mannose is something that Adryan Lovelace would recommend taking  He stated he has heard really great reviews on it and would like to see if this would also work for his mother for prevention of UTI's   He would like a call to discuss [phone]

## 2019-11-06 NOTE — TELEPHONE ENCOUNTER
Limited evidence based research supporting benefits of D-mannose  Based on patient's age, would recommend treating UTI as needed  However, will discuss options at upcoming office visit

## 2019-11-06 NOTE — TELEPHONE ENCOUNTER
Called and spoke to patients son quoted Jimmy's note "Limited evidence based research supporting benefits of D-mannose  Based on patient's age, would recommend treating UTI as needed  However, will discuss options at upcoming office visit "     Patients son was requesting if jimmy would be in the office tomorrow if there is any problems with the Macrobid       I informed him that there is a lot of providers available to answer any medication complications

## 2019-11-11 ENCOUNTER — TELEPHONE (OUTPATIENT)
Dept: UROLOGY | Facility: AMBULATORY SURGERY CENTER | Age: 84
End: 2019-11-11

## 2019-11-11 DIAGNOSIS — N39.0 URINARY TRACT INFECTION WITHOUT HEMATURIA, SITE UNSPECIFIED: Primary | ICD-10-CM

## 2019-11-11 RX ORDER — SULFAMETHOXAZOLE AND TRIMETHOPRIM 800; 160 MG/1; MG/1
1 TABLET ORAL EVERY 12 HOURS SCHEDULED
Qty: 10 TABLET | Refills: 0 | Status: SHIPPED | OUTPATIENT
Start: 2019-11-11 | End: 2019-11-11 | Stop reason: SDUPTHER

## 2019-11-11 RX ORDER — SULFAMETHOXAZOLE AND TRIMETHOPRIM 800; 160 MG/1; MG/1
1 TABLET ORAL EVERY 12 HOURS SCHEDULED
Qty: 10 TABLET | Refills: 0 | Status: SHIPPED | OUTPATIENT
Start: 2019-11-11 | End: 2019-11-16

## 2019-11-11 NOTE — TELEPHONE ENCOUNTER
Son Iona Artis stopped in stating patients UTI doesn't seem to be better  She is still experiencing some burning and fever of 99 4  I discussed this with Tiffany Calles, he sent in Bactrim to her pharmacy  [facility] will need a faxed letter stating she needs to start on a new antibiotic  normal...

## 2019-11-13 ENCOUNTER — TELEPHONE (OUTPATIENT)
Dept: UROLOGY | Facility: MEDICAL CENTER | Age: 84
End: 2019-11-13

## 2019-11-13 NOTE — TELEPHONE ENCOUNTER
Patient of [facility], seen on 10/10 by Alex Spivey for recurrent UTI, vaginal atrophy  Patient has since been treated more than once for UTI  Last urine culture was 11/4/19 (scanned in media chart)  Son was just calling to follow up and make sure that patient does not need to do anything after finishing course of antibiotics  I advised that he/patient/facility should call office with any worsening symptoms, fever, chills  Otherwise, patient to follow up as scheduled  Son verbalized understanding and agrees with plan

## 2019-11-13 NOTE — TELEPHONE ENCOUNTER
Patient saw Tutu Pedraza in [facility] on 10/10/2019  Son requesting call back to speak to clinical in regards to recent lab results

## 2019-11-15 ENCOUNTER — TELEPHONE (OUTPATIENT)
Dept: INTERNAL MEDICINE CLINIC | Facility: CLINIC | Age: 84
End: 2019-11-15

## 2019-11-15 NOTE — TELEPHONE ENCOUNTER
Patients son called would like to know if there a brand you would recommend for cranberry supplements for the patient  I explained he should to talk with the patients urologist but he would like to know what you recommend

## 2019-11-20 ENCOUNTER — TELEPHONE (OUTPATIENT)
Dept: INTERNAL MEDICINE CLINIC | Facility: CLINIC | Age: 84
End: 2019-11-20

## 2019-11-20 NOTE — TELEPHONE ENCOUNTER
Dayo Noble is following up on a letter he dropped off for Dr Maria on Monday regarding a cranberry supplement  He is requesting a call back at [phone]      Thank you

## 2019-12-09 ENCOUNTER — TELEPHONE (OUTPATIENT)
Dept: UROLOGY | Facility: MEDICAL CENTER | Age: 84
End: 2019-12-09

## 2019-12-09 NOTE — TELEPHONE ENCOUNTER
Patient seen by J[facility] in [place], managed by Dr Boogie Fay      Calling to inquire about estradiol (ESTRACE) 0 1 mg/g vaginal cream and the cost    He can be reached at [phone]

## 2019-12-09 NOTE — TELEPHONE ENCOUNTER
I returned to call to Mr Jordan Gillespie, the patient's son  His question:  He was billed $16 for estradiol cream one month and then the next month bill $47 for the same drug  Why would that be? I explained that both prescriptions were for generic Estradiol vaginal creams (cheapest), not the branded drug  He would need to take his concern with the [facility] regarding pricing  I explained we are at the end of the year so she could be in the donut-hole and responsible for 100% of the drug, her plan might have changed and she's responsible for a larger portion of the bill or it could very well be that the pharmacy has increased their prices  Should he have additional questions, he's welcome to call for further assistance but he will be best serviced by calling her pharmacy benefits directly

## 2019-12-09 NOTE — PROGRESS NOTES
12/10/2019    Oscar Kika  9/11/1925  [id number]        Assessment  -Recurrent urinary tract infection  -Vaginal atrophy    Discussion/Plan  Meka Garcia is a 80 y o  female being managed by our office    -We reviewed results of recent renal ultrasound which was unremarkable  No significant findings noted to indicate cause of recurrent urinary tract infections  I recommend patient continue using Estrace for history of recurrent urinary tract infections and atrophic vaginitis  We discussed treating urinary tract infections on as-needed basis  Instructions provided to nursing home to call office should she experience any symptom of fever, hematuria, lower urinary tract symptoms, change in mental status, etc   Patient is likely colonized with bacteria  Family wishes to continue Ellura (cranberry) supplement  They state they will switch to D-mannose if she continues to have urinary tract infections  Patient will otherwise follow up in 1 year for re-evaluation  They were instructed to call with any issues    -All questions answered, patient and family agree with plan     History of Present Illness  80 y o  female with a history of recurrent UTI and vaginal atrophy presents today for follow up  Patient accompanied today by family  She resides at [facility]  Last urine culture 11/1/19 identified E  Coli organism  She has had two urinary tract infections since last office visit on 10/10/19  Patient prescribed topical Estrace for lower urinary tract symptoms of urgency, frequency, and dysuria  She states this has helped minimize episodes of dysuria and vaginal irritation  Family recently started patient on otc Ellura (cranberry supplement) daily  She is currently asymptomatic at this time  Review of Systems  Review of Systems   Constitutional: Negative  HENT: Negative  Respiratory: Negative  Cardiovascular: Negative  Gastrointestinal: Negative      Genitourinary: Negative for decreased urine volume, difficulty urinating, dysuria, flank pain, frequency, hematuria and urgency  Musculoskeletal: Negative  Skin: Negative  Neurological: Negative  Psychiatric/Behavioral: Negative  Past Medical History  Past Medical History:   Diagnosis Date    CAD (coronary artery disease)     Hypertension     Osteoarthritis     Osteoporosis     Vitamin D deficiency        Past Social History  Past Surgical History:   Procedure Laterality Date    APPENDECTOMY      HYSTERECTOMY      OH PARTIAL HIP REPLACEMENT Left 8/13/2018    Procedure: HEMIARTHROPLASTY HIP (BIPOLAR); Surgeon: Josr Logan MD;  [facility];  Service: Orthopedics    ROTATOR CUFF REPAIR         Past Family History  Family History   Problem Relation Age of Onset    Diabetes type II Father     Heart disease Brother        Past Social history  Social History     Socioeconomic History    Marital status:       Spouse name: Not on file    Number of children: Not on file    Years of education: Not on file    Highest education level: Not on file   Occupational History    Not on file   Social Needs    Financial resource strain: Not on file    Food insecurity:     Worry: Not on file     Inability: Not on file    Transportation needs:     Medical: Not on file     Non-medical: Not on file   Tobacco Use    Smoking status: Former Smoker    Smokeless tobacco: Never Used   Substance and Sexual Activity    Alcohol use: Not Currently    Drug use: No    Sexual activity: Not on file   Lifestyle    Physical activity:     Days per week: Not on file     Minutes per session: Not on file    Stress: Not on file   Relationships    Social connections:     Talks on phone: Not on file     Gets together: Not on file     Attends Judaism service: Not on file     Active member of club or organization: Not on file     Attends meetings of clubs or organizations: Not on file     Relationship status: Not on file   Marky Chaudhry Intimate partner violence:     Fear of current or ex partner: Not on file     Emotionally abused: Not on file     Physically abused: Not on file     Forced sexual activity: Not on file   Other Topics Concern    Not on file   Social History Narrative    Daily coffee consumption - 4 cups       Current Medications  Current Outpatient Medications   Medication Sig Dispense Refill    acetaminophen (TYLENOL) 325 mg tablet Take 650 mg by mouth every 6 (six) hours as needed for mild pain      amLODIPine (NORVASC) 5 mg tablet Take 1 tablet (5 mg total) by mouth daily  0    aspirin 81 MG tablet Take 1 tablet by mouth daily      calcitonin, salmon, (MIACALCIN) 200 units/act nasal spray 1 spray and to alternate nostril q day 1 Act 5    Cyanocobalamin (VITAMIN B12 PO) Take 1 tablet by mouth daily      dorzolamide-timolol (COSOPT) 22 3-6 8 MG/ML ophthalmic solution INSTILL 1 DROP INTO LEFT EYE TWICE A DAY APPROXIMATELY 12 HOURS APART  2    estradiol (ESTRACE) 0 1 mg/g vaginal cream Insert 1 g into the vagina 3 (three) times a week 42 5 g 1    famotidine (PEPCID) 20 mg tablet Take 1 tablet (20 mg total) by mouth 2 (two) times a day 30 tablet 3    fenofibrate (TRIGLIDE) 160 MG tablet TAKE 1 TABLET BY MOUTH EVERY DAY 30 tablet 0    ferrous sulfate 324 (65 Fe) mg Take 1 tablet (324 mg total) by mouth daily before breakfast 30 tablet 4    Glucosamine HCl (GLUCOSAMINE PO) Take 1 capsule by mouth daily      latanoprost (XALATAN) 0 005 % ophthalmic solution INSTILL 1 DROP IN THE AFFECTED EYE(S) IN THE EVENING  1    losartan (COZAAR) 25 mg tablet TAKE 1 TABLET BY MOUTH EVERY DAY 30 tablet 1    metoprolol tartrate (LOPRESSOR) 50 mg tablet TAKE 1 TABLET TWICE DAILY 60 tablet 5    Multiple Vitamins-Minerals (DAILY MULTI PO) Take 1 tablet by mouth daily      Pyridoxine HCl (VITAMIN B6 PO) Take 1 tablet by mouth daily       No current facility-administered medications for this visit          Allergies  No Known Allergies    Past medical history, social history, family history, medications and allergies were reviewed  Vitals  There were no vitals filed for this visit  Physical Exam  Physical Exam   Constitutional: She is oriented to person, place, and time  She appears well-developed and well-nourished  HENT:   Head: Normocephalic  Eyes: Pupils are equal, round, and reactive to light  Neck: Normal range of motion  Cardiovascular: Normal rate and regular rhythm  Pulmonary/Chest: Effort normal    Abdominal: Soft  Normal appearance  She exhibits no distension  There is no tenderness  There is no CVA tenderness  Genitourinary:   Genitourinary Comments: No suprapubic discomfort or distension  Negative CVA tenderness   Musculoskeletal: Normal range of motion  Wheelchair bound, brace on left leg     Neurological: She is alert and oriented to person, place, and time  Skin: Skin is warm and dry  Psychiatric: She has a normal mood and affect  Her behavior is normal  Judgment and thought content normal        Results    I have personally reviewed all pertinent lab results and reviewed with patient  Lab Results   Component Value Date    GLUCOSE 98 11/24/2017    CALCIUM 8 8 02/13/2019     11/24/2017    K 3 8 02/13/2019    CO2 22 02/13/2019     02/13/2019    BUN 18 02/13/2019    CREATININE 0 73 02/13/2019     Lab Results   Component Value Date    WBC 8 94 02/13/2019    HGB 11 8 02/13/2019    HCT 36 1 02/13/2019    MCV 93 02/13/2019     02/13/2019     No results found for this or any previous visit (from the past 1 hour(s))

## 2019-12-10 ENCOUNTER — OFFICE VISIT (OUTPATIENT)
Dept: UROLOGY | Facility: AMBULATORY SURGERY CENTER | Age: 84
End: 2019-12-10
Payer: MEDICARE

## 2019-12-10 ENCOUNTER — TELEPHONE (OUTPATIENT)
Dept: UROLOGY | Facility: AMBULATORY SURGERY CENTER | Age: 84
End: 2019-12-10

## 2019-12-10 VITALS
HEART RATE: 61 BPM | DIASTOLIC BLOOD PRESSURE: 58 MMHG | HEIGHT: 60 IN | BODY MASS INDEX: 25.58 KG/M2 | SYSTOLIC BLOOD PRESSURE: 122 MMHG

## 2019-12-10 DIAGNOSIS — N95.2 VAGINAL ATROPHY: ICD-10-CM

## 2019-12-10 DIAGNOSIS — N39.0 RECURRENT UTI: Primary | ICD-10-CM

## 2019-12-10 PROCEDURE — 99213 OFFICE O/P EST LOW 20 MIN: CPT | Performed by: NURSE PRACTITIONER

## 2019-12-10 RX ORDER — CRANBERRY FRUIT EXTRACT 200 MG
CAPSULE ORAL
COMMUNITY
End: 2020-05-28 | Stop reason: CLARIF

## 2019-12-10 NOTE — TELEPHONE ENCOUNTER
[facility]ed [facility] and they said that the insurance wouldn't pay for the  Generic name only the brand name  Temitope Ximena and reiterated that to him

## 2019-12-10 NOTE — TELEPHONE ENCOUNTER
Patient's son asked if substitution was permissible because the pharmacy switch without his permission to the name brand cream and states is much more expensive for his mother, Ruddy Siegel stated script states generic allowed, son also spoke to our office and confirmed generic okay but he asked that we call the pharmacy and reiterated or send another script reiterating generic should be given

## 2019-12-10 NOTE — TELEPHONE ENCOUNTER
Attempted to reach [facility] pharmacy  Never got in touch with someone so hung up  Will try again later

## 2019-12-10 NOTE — TELEPHONE ENCOUNTER
Tanner Barrera called back and he talked to [facility] and the were out of generic and substituted brand - next time it will be generic

## 2019-12-13 ENCOUNTER — OFFICE VISIT (OUTPATIENT)
Dept: INTERNAL MEDICINE CLINIC | Facility: CLINIC | Age: 84
End: 2019-12-13
Payer: MEDICARE

## 2019-12-13 VITALS
DIASTOLIC BLOOD PRESSURE: 86 MMHG | HEIGHT: 60 IN | OXYGEN SATURATION: 97 % | BODY MASS INDEX: 25.58 KG/M2 | RESPIRATION RATE: 12 BRPM | TEMPERATURE: 98.6 F | HEART RATE: 73 BPM | SYSTOLIC BLOOD PRESSURE: 154 MMHG

## 2019-12-13 DIAGNOSIS — J06.9 URI, ACUTE: Primary | ICD-10-CM

## 2019-12-13 DIAGNOSIS — I10 ESSENTIAL HYPERTENSION: ICD-10-CM

## 2019-12-13 PROCEDURE — 99214 OFFICE O/P EST MOD 30 MIN: CPT | Performed by: NURSE PRACTITIONER

## 2019-12-13 RX ORDER — MINERAL OIL AND PETROLATUM 150; 830 MG/G; MG/G
OINTMENT OPHTHALMIC
COMMUNITY
End: 2020-05-28 | Stop reason: CLARIF

## 2019-12-13 RX ORDER — CALCIUM CARBONATE 200(500)MG
1 TABLET,CHEWABLE ORAL CONTINUOUS PRN
COMMUNITY
End: 2021-09-17

## 2019-12-13 RX ORDER — AZITHROMYCIN 250 MG/1
TABLET, FILM COATED ORAL
Qty: 6 TABLET | Refills: 0 | Status: SHIPPED | OUTPATIENT
Start: 2019-12-13 | End: 2019-12-17

## 2019-12-13 NOTE — ASSESSMENT & PLAN NOTE
Cough x 1 week with sputum production  Afebrile, no shortness of breath  Though her exam is unremarkable, I would like to treat her with antibiotics to prevent worsening infection or progression to pneumonia  Rx for azithromycin and guaifenesin prn for mucolytic    Pt instructed to call for reevaluation if sx worsen or persist

## 2019-12-13 NOTE — PROGRESS NOTES
Assessment/Plan:    Essential hypertension  Systolic blood pressure is elevated today, previoulsy controlled on losartan  She is not feeling well today, she has had a cough for the past week and this may be contributing  She returns for a f/u evaluation in a month and will reassess at that time  Continue current medications  URI, acute  Cough x 1 week with sputum production  Afebrile, no shortness of breath  Though her exam is unremarkable, I would like to treat her with antibiotics to prevent worsening infection or progression to pneumonia  Rx for azithromycin and guaifenesin prn for mucolytic  Pt instructed to call for reevaluation if sx worsen or persist          Diagnoses and all orders for this visit:    URI, acute  -     azithromycin (ZITHROMAX) 250 mg tablet; Take 2 tablets today then 1 tablet daily x 4 days  -     guaiFENesin (ROBITUSSIN) 100 MG/5ML oral liquid; Take 10 mL (200 mg total) by mouth 3 (three) times a day as needed for cough or congestion    Essential hypertension    Other orders  -     calcium carbonate (TUMS) 500 mg chewable tablet; Chew 1 tablet continuous as needed for indigestion or heartburn  -     tobramycin (TOBREX) 0 3 % OINT; 0 5 inches 3 (three) times a day  -     artificial tear (LUBRIFRESH P M ) 83-15 % ophthalmic ointment; daily at bedtime          Subjective:      Patient ID: Tristen Ivan is a 80 y o  female  Pt is a 80 y o  y/o female who is seen today for evaluation of a cough which is productive of clear/white sputum x 1 week  She denies shortness of breath or chest pain  She denies fever/chills, headache, congestion, sore throat, ear pain  Her appetite is normal, no n/v  She is not taking anything over the counter for her symptoms          The following portions of the patient's history were reviewed and updated as appropriate: allergies, current medications, past family history, past medical history, past social history, past surgical history and problem list     Review of Systems   Constitutional: Negative for appetite change, chills, fatigue and fever  HENT: Negative for congestion, ear pain, postnasal drip and sinus pressure  Respiratory: Positive for cough  Negative for chest tightness, shortness of breath and wheezing  Cardiovascular: Negative for chest pain, palpitations and leg swelling  Gastrointestinal: Negative for diarrhea, nausea and vomiting  Genitourinary: Negative for dysuria  Musculoskeletal: Negative for myalgias  Neurological: Negative for dizziness and headaches  Hematological: Negative for adenopathy  Psychiatric/Behavioral: Negative for sleep disturbance  Objective:      /86 (BP Location: Right arm, Patient Position: Sitting, Cuff Size: Adult)   Pulse 73   Temp 98 6 °F (37 °C) (Tympanic)   Resp 12   Ht 5' (1 524 m)   SpO2 97%   BMI 25 58 kg/m²          Physical Exam   Constitutional: She is oriented to person, place, and time  She appears well-developed and well-nourished  She is cooperative  HENT:   Right Ear: Hearing, tympanic membrane, external ear and ear canal normal  Tympanic membrane is not bulging  No middle ear effusion  Left Ear: Hearing, tympanic membrane, external ear and ear canal normal  Tympanic membrane is not bulging  No middle ear effusion  Nose: No mucosal edema or rhinorrhea  Mouth/Throat: Mucous membranes are not dry  No oropharyngeal exudate, posterior oropharyngeal edema, posterior oropharyngeal erythema or tonsillar abscesses  Eyes: Conjunctivae are normal    Cardiovascular: Normal rate, regular rhythm, normal heart sounds and intact distal pulses  Pulmonary/Chest: Effort normal and breath sounds normal  No accessory muscle usage  No respiratory distress  She has no decreased breath sounds  She has no wheezes  She has no rhonchi  She has no rales  Abdominal: Soft  Normal appearance and bowel sounds are normal    Musculoskeletal: She exhibits no edema  Lymphadenopathy:        Head (right side): No submental, no submandibular, no tonsillar, no preauricular, no posterior auricular and no occipital adenopathy present  Head (left side): No submental, no submandibular, no tonsillar, no preauricular, no posterior auricular and no occipital adenopathy present  She has no cervical adenopathy  Neurological: She is alert and oriented to person, place, and time  Skin: Skin is warm, dry and intact  Psychiatric: She has a normal mood and affect  Her speech is normal and behavior is normal  Judgment and thought content normal  Cognition and memory are normal    Vitals reviewed

## 2019-12-13 NOTE — ASSESSMENT & PLAN NOTE
Systolic blood pressure is elevated today, previoulsy controlled on losartan  She is not feeling well today, she has had a cough for the past week and this may be contributing  She returns for a f/u evaluation in a month and will reassess at that time  Continue current medications

## 2019-12-16 ENCOUNTER — TELEPHONE (OUTPATIENT)
Dept: INTERNAL MEDICINE CLINIC | Facility: CLINIC | Age: 84
End: 2019-12-16

## 2019-12-17 ENCOUNTER — TELEPHONE (OUTPATIENT)
Dept: INTERNAL MEDICINE CLINIC | Facility: CLINIC | Age: 84
End: 2019-12-17

## 2019-12-17 DIAGNOSIS — J06.9 URI, ACUTE: Primary | ICD-10-CM

## 2019-12-17 NOTE — TELEPHONE ENCOUNTER
Pts son would like the cough medication changed to a standing order of once or twice a day  We will fax it to [phone]

## 2019-12-19 ENCOUNTER — TELEPHONE (OUTPATIENT)
Dept: INTERNAL MEDICINE CLINIC | Facility: CLINIC | Age: 84
End: 2019-12-19

## 2019-12-19 ENCOUNTER — TELEPHONE (OUTPATIENT)
Dept: UROLOGY | Facility: MEDICAL CENTER | Age: 84
End: 2019-12-19

## 2019-12-19 DIAGNOSIS — N30.00 ACUTE CYSTITIS WITHOUT HEMATURIA: Primary | ICD-10-CM

## 2019-12-19 DIAGNOSIS — N39.0 CHRONIC UTI (URINARY TRACT INFECTION): Primary | ICD-10-CM

## 2019-12-19 NOTE — TELEPHONE ENCOUNTER
Pts son would like a script sent over for patient to have a urine culture  It can be faxed [facility] [phone]

## 2019-12-20 RX ORDER — NITROFURANTOIN 25; 75 MG/1; MG/1
100 CAPSULE ORAL 2 TIMES DAILY
Qty: 14 CAPSULE | Refills: 0 | Status: SHIPPED | OUTPATIENT
Start: 2019-12-20 | End: 2019-12-27

## 2019-12-23 ENCOUNTER — TELEPHONE (OUTPATIENT)
Dept: UROLOGY | Facility: AMBULATORY SURGERY CENTER | Age: 84
End: 2019-12-23

## 2019-12-23 DIAGNOSIS — N30.00 ACUTE CYSTITIS WITHOUT HEMATURIA: Primary | ICD-10-CM

## 2019-12-23 RX ORDER — CEPHALEXIN 500 MG/1
500 CAPSULE ORAL EVERY 12 HOURS SCHEDULED
Qty: 10 CAPSULE | Refills: 0 | Status: SHIPPED | OUTPATIENT
Start: 2019-12-23 | End: 2019-12-28

## 2019-12-23 NOTE — TELEPHONE ENCOUNTER
Pt son Gokul Mercado ([phone]) stopped in, he states his mother needs a note faxed ([phone] to [facility] Attn: Tc Dickinson) Authorizing she can take Macrobid and the instructions on taking it  They also need something sent saying she is to stop taking Ellura and start taking D-Mannose 700mg 1 pill a day  He wants to know if collanized UTI is bad or good and if it is hard to treat  He left the bottle of D-Mannose, call him when this is all done and he will pick the bottle up  He wants the fax number put into the patients chart, along with the nursing home needing Authorizing the nursing home to give meds so he does not need to keep giving this information  The D-Mannose pill bottle is at the

## 2019-12-23 NOTE — PROGRESS NOTES
Urine cultures positive  Patient was placed on Keflex 500 mg p  o  b i d  for 5 days    Prescription was faxed to the pharmacy

## 2019-12-23 NOTE — LETTER
December 23, 2019     [address] [facility]    Patient: Nirmala Brown   YOB: 1925   Date of Visit: 12/23/2019       To Whom it may concern:    Patient is to take Macrobid 100mg for 1 week    She is to stop Ellura and instead take D-Mannose 700 mg one BID       Sincerely,          ENID Valverde

## 2019-12-26 NOTE — TELEPHONE ENCOUNTER
Giuliano Mayers Nurse from [facility]  She would like to discuss the dosage of D-mannose and also the patient is taking cranberry pills daily  Please advise

## 2019-12-26 NOTE — TELEPHONE ENCOUNTER
Received call from [facility]  Their pharmacy stocks D-mannose in 500 mg tablets  Can special order in 1000 or 600 mg  Patient is currently taking 700 mg  Will route to provider regarding what dose patient should be taking       Once we have dosage, will need to fax new order to facility at [phone]

## 2019-12-27 NOTE — TELEPHONE ENCOUNTER
Pt's son Fabienne Carrel called questioning if she can get shingles shot now or have to finish course of antibiotic?

## 2019-12-27 NOTE — TELEPHONE ENCOUNTER
OK to prescribe 500 mg D-mannose daily, based on nursing home supply  Reason for 700mg daily, was due to patient's son preference  Ellura (cranberry supplement) can be discontinued

## 2019-12-27 NOTE — TELEPHONE ENCOUNTER
Call returned to son, advised patient should wait until finishing antibiotics before getting shingles vaccine  Son verbalized understanding

## 2019-12-30 ENCOUNTER — TELEPHONE (OUTPATIENT)
Dept: INTERNAL MEDICINE CLINIC | Facility: CLINIC | Age: 84
End: 2019-12-30

## 2019-12-30 DIAGNOSIS — K21.00 GASTROESOPHAGEAL REFLUX DISEASE WITH ESOPHAGITIS: Primary | ICD-10-CM

## 2019-12-30 RX ORDER — CIMETIDINE 400 MG/1
400 TABLET, FILM COATED ORAL 2 TIMES DAILY
Qty: 60 TABLET | Refills: 5 | Status: SHIPPED | OUTPATIENT
Start: 2019-12-30 | End: 2020-09-03

## 2019-12-30 NOTE — TELEPHONE ENCOUNTER
[facility] called stating that this patient's famotidine is going to be taken off the shelves and they would like a replacement medication  They also stated that in the past the patient took ranitidine but it caused her diarrhea  A new script can be faxed to [phone]

## 2020-01-10 ENCOUNTER — OFFICE VISIT (OUTPATIENT)
Dept: INTERNAL MEDICINE CLINIC | Facility: CLINIC | Age: 85
End: 2020-01-10
Payer: MEDICARE

## 2020-01-10 VITALS
BODY MASS INDEX: 25.52 KG/M2 | OXYGEN SATURATION: 98 % | TEMPERATURE: 98.1 F | WEIGHT: 130 LBS | HEIGHT: 60 IN | DIASTOLIC BLOOD PRESSURE: 72 MMHG | HEART RATE: 78 BPM | SYSTOLIC BLOOD PRESSURE: 144 MMHG

## 2020-01-10 DIAGNOSIS — M15.9 GENERALIZED OSTEOARTHRITIS OF MULTIPLE SITES: ICD-10-CM

## 2020-01-10 DIAGNOSIS — K21.00 GASTROESOPHAGEAL REFLUX DISEASE WITH ESOPHAGITIS: ICD-10-CM

## 2020-01-10 DIAGNOSIS — M17.11 PRIMARY OSTEOARTHRITIS OF RIGHT KNEE: ICD-10-CM

## 2020-01-10 DIAGNOSIS — E78.01 FAMILIAL HYPERCHOLESTEROLEMIA: ICD-10-CM

## 2020-01-10 DIAGNOSIS — I10 ESSENTIAL HYPERTENSION: Primary | ICD-10-CM

## 2020-01-10 PROCEDURE — 99214 OFFICE O/P EST MOD 30 MIN: CPT | Performed by: INTERNAL MEDICINE

## 2020-01-10 RX ORDER — POLYMYXIN B SULFATE AND TRIMETHOPRIM 1; 10000 MG/ML; [USP'U]/ML
SOLUTION OPHTHALMIC
COMMUNITY
Start: 2019-12-26 | End: 2020-05-28 | Stop reason: CLARIF

## 2020-01-10 RX ORDER — VALACYCLOVIR HYDROCHLORIDE 500 MG/1
TABLET, FILM COATED ORAL
COMMUNITY
Start: 2019-12-26 | End: 2020-05-28 | Stop reason: CLARIF

## 2020-01-10 NOTE — PROGRESS NOTES
Assessment/Plan:    Essential hypertension  Patient's blood pressure readings have been variable  As noted patient has had relatively good control over the past month with her readings  Her renal function which is checked in October is stable  Patient will continue present medication and we will continue to monitor also her renal function  Patient has now had no adverse effects from present treatment    Gastroesophageal reflux disease with esophagitis  Patient does have a history longstanding of gastroesophageal reflux disease  This is been controlled with H2 blockers and originally patient was on Zantac but this was taken off the market  There is also a problems with availability of Pepcid so the patient was placed on Tagamet  She has intolerance to PPI as period patient states this point she is stable and she will continue present treatment    Generalized osteoarthritis of multiple sites  Patient does have a history of diffuse generalized arthritis  Her biggest problem still remains her right knee which is in a brace and there is instability with gait  Because of her age she is not a candidate for total knee replacement  Patient states this point time she has no pain  She continues to follow-up with Orthopedics  No new arthritic changes    Hyperlipidemia  Has a history of hyperlipidemia  Her last cholesterol profile shows good control  We did discuss with the patient the importance of watching her intake of fats and cholesterol with her diet  Diagnoses and all orders for this visit:    Essential hypertension    Gastroesophageal reflux disease with esophagitis    Primary osteoarthritis of right knee    Generalized osteoarthritis of multiple sites    Familial hypercholesterolemia    Other orders  -     polymyxin b-trimethoprim (POLYTRIM) ophthalmic solution  -     valACYclovir (VALTREX) 500 mg tablet          Subjective:      Patient ID: Floridalma Pair is a 80 y o  female      Patient is a 80-year-old female with a history of multiple medical problems as outlined previously  Patient is here today for routine follow-up after 3 month period of time  She is accompanied today to the visit by her granddaughter  Patient states in general she is doing about the same but is somewhat anxious because her son was just hospitalized with what sounds like galkl stone pancreatitis and hopefully will be doing well  She denies any chest pain or pressure and no increasing shortness of breath with exertion  She presents today in a wheelchair because of her severe degenerative arthritis to the left knee and ambulatory dysfunction  We did review the results of tests that were performed in October of last year showing no new major abnormalities  The following portions of the patient's history were reviewed and updated as appropriate: She  has a past medical history of CAD (coronary artery disease), Hypertension, Osteoarthritis, Osteoporosis, and Vitamin D deficiency    She   Patient Active Problem List    Diagnosis Date Noted    URI, acute 12/13/2019    Primary osteoarthritis of right knee 10/09/2019    Acute right-sided low back pain without sciatica 10/09/2019    Overweight 10/08/2019    Dysuria 09/04/2019    Pain, joint, ankle and foot, left 08/28/2019    Synovitis of left ankle 08/28/2019    Acute left ankle pain 08/21/2019    Acute cystitis without hematuria 06/24/2019    Healthcare maintenance 06/07/2019    Gastroesophageal reflux disease with esophagitis 06/07/2019    Chronic pain of left knee 03/21/2019    Acute lateral meniscal injury of right knee 02/12/2019    Ambulatory dysfunction 02/12/2019    History of left hip hemiarthroplasty 10/16/2018    Trigger middle finger of right hand 10/16/2018    Unstable knee, left 10/16/2018    S/p left hip fracture 09/12/2018    Iron deficiency anemia secondary to inadequate dietary iron intake 09/12/2018    Fracture follow-up 08/28/2018    S/P hip hemiarthroplasty 08/14/2018    Acute blood loss anemia 08/14/2018    Preoperative clearance 08/12/2018    Hypertensive urgency 08/12/2018    Auditory complaints of both ears 07/30/2018    Sterile pyuria 05/15/2018    Impacted cerumen of both ears 04/10/2018    Generalized osteoarthritis of multiple sites 07/13/2015    Hyperlipidemia 08/23/2012    Essential hypertension 08/20/2012    Osteoporosis 08/20/2012     She  has a past surgical history that includes Appendectomy; Hysterectomy; Rotator cuff repair; and pr partial hip replacement (Left, 8/13/2018)  Her family history includes Diabetes type II in her father; Heart disease in her brother  She  reports that she has quit smoking  She has never used smokeless tobacco  She reports that she drank alcohol  She reports that she does not use drugs    Current Outpatient Medications   Medication Sig Dispense Refill    acetaminophen (TYLENOL) 325 mg tablet Take 650 mg by mouth every 6 (six) hours as needed for mild pain      amLODIPine (NORVASC) 5 mg tablet Take 1 tablet (5 mg total) by mouth daily  0    artificial tear (LUBRIFRESH P M ) 83-15 % ophthalmic ointment daily at bedtime      aspirin 81 MG tablet Take 1 tablet by mouth daily      calcitonin, salmon, (MIACALCIN) 200 units/act nasal spray 1 spray and to alternate nostril q day 1 Act 5    calcium carbonate (TUMS) 500 mg chewable tablet Chew 1 tablet continuous as needed for indigestion or heartburn      cimetidine (TAGAMET) 400 mg tablet Take 1 tablet (400 mg total) by mouth 2 (two) times a day 60 tablet 5    Cranberry (ELLURA) 200 MG CAPS Take by mouth      Cyanocobalamin (VITAMIN B12 PO) Take 1 tablet by mouth daily      dorzolamide-timolol (COSOPT) 22 3-6 8 MG/ML ophthalmic solution INSTILL 1 DROP INTO LEFT EYE TWICE A DAY APPROXIMATELY 12 HOURS APART  2    estradiol (ESTRACE) 0 1 mg/g vaginal cream Insert 1 g into the vagina 3 (three) times a week 42 5 g 1    fenofibrate (TRIGLIDE) 160 MG tablet TAKE 1 TABLET BY MOUTH EVERY DAY 30 tablet 0    ferrous sulfate 324 (65 Fe) mg Take 1 tablet (324 mg total) by mouth daily before breakfast 30 tablet 4    Glucosamine HCl (GLUCOSAMINE PO) Take 1 capsule by mouth daily      guaiFENesin (ROBITUSSIN) 100 MG/5ML oral liquid Take 10 mL (200 mg total) by mouth 3 (three) times a day Id patient still has persistent cough after finishing the cough medicine she needs to be seen for evaluation 120 mL 0    latanoprost (XALATAN) 0 005 % ophthalmic solution INSTILL 1 DROP IN THE AFFECTED EYE(S) IN THE EVENING  1    losartan (COZAAR) 25 mg tablet TAKE 1 TABLET BY MOUTH EVERY DAY 30 tablet 1    metoprolol tartrate (LOPRESSOR) 50 mg tablet TAKE 1 TABLET TWICE DAILY 60 tablet 5    Multiple Vitamins-Minerals (DAILY MULTI PO) Take 1 tablet by mouth daily      polymyxin b-trimethoprim (POLYTRIM) ophthalmic solution       Pyridoxine HCl (VITAMIN B6 PO) Take 1 tablet by mouth daily      valACYclovir (VALTREX) 500 mg tablet       tobramycin (TOBREX) 0 3 % OINT 0 5 inches 3 (three) times a day       No current facility-administered medications for this visit        Current Outpatient Medications on File Prior to Visit   Medication Sig    acetaminophen (TYLENOL) 325 mg tablet Take 650 mg by mouth every 6 (six) hours as needed for mild pain    amLODIPine (NORVASC) 5 mg tablet Take 1 tablet (5 mg total) by mouth daily    artificial tear (LUBRIFRESH P M ) 83-15 % ophthalmic ointment daily at bedtime    aspirin 81 MG tablet Take 1 tablet by mouth daily    calcitonin, salmon, (MIACALCIN) 200 units/act nasal spray 1 spray and to alternate nostril q day    calcium carbonate (TUMS) 500 mg chewable tablet Chew 1 tablet continuous as needed for indigestion or heartburn    cimetidine (TAGAMET) 400 mg tablet Take 1 tablet (400 mg total) by mouth 2 (two) times a day    Cranberry (ELLURA) 200 MG CAPS Take by mouth    Cyanocobalamin (VITAMIN B12 PO) Take 1 tablet by mouth daily  dorzolamide-timolol (COSOPT) 22 3-6 8 MG/ML ophthalmic solution INSTILL 1 DROP INTO LEFT EYE TWICE A DAY APPROXIMATELY 12 HOURS APART    estradiol (ESTRACE) 0 1 mg/g vaginal cream Insert 1 g into the vagina 3 (three) times a week    fenofibrate (TRIGLIDE) 160 MG tablet TAKE 1 TABLET BY MOUTH EVERY DAY    ferrous sulfate 324 (65 Fe) mg Take 1 tablet (324 mg total) by mouth daily before breakfast    Glucosamine HCl (GLUCOSAMINE PO) Take 1 capsule by mouth daily    guaiFENesin (ROBITUSSIN) 100 MG/5ML oral liquid Take 10 mL (200 mg total) by mouth 3 (three) times a day Id patient still has persistent cough after finishing the cough medicine she needs to be seen for evaluation    latanoprost (XALATAN) 0 005 % ophthalmic solution INSTILL 1 DROP IN THE AFFECTED EYE(S) IN THE EVENING    losartan (COZAAR) 25 mg tablet TAKE 1 TABLET BY MOUTH EVERY DAY    metoprolol tartrate (LOPRESSOR) 50 mg tablet TAKE 1 TABLET TWICE DAILY    Multiple Vitamins-Minerals (DAILY MULTI PO) Take 1 tablet by mouth daily    polymyxin b-trimethoprim (POLYTRIM) ophthalmic solution     Pyridoxine HCl (VITAMIN B6 PO) Take 1 tablet by mouth daily    valACYclovir (VALTREX) 500 mg tablet     tobramycin (TOBREX) 0 3 % OINT 0 5 inches 3 (three) times a day    [DISCONTINUED] guaiFENesin (ROBITUSSIN) 100 MG/5ML oral liquid Take 10 mL (200 mg total) by mouth 3 (three) times a day as needed for cough or congestion     No current facility-administered medications on file prior to visit  She has No Known Allergies       Review of Systems   Constitutional: Positive for activity change (Restriction in activity level secondary to her severe osteoarthritis especially to her left knee)  Negative for appetite change, chills, diaphoresis, fatigue, fever and unexpected weight change  HENT: Negative  Eyes: Negative  Respiratory: Negative      Cardiovascular: Positive for leg swelling ( some mild chronic edema bilateral lower extremities without change)  Negative for chest pain and palpitations  Gastrointestinal: Negative  Endocrine: Negative  Genitourinary: Negative  Negative for difficulty urinating  Patient has a history of recurrent urinary tract infections but is asymptomatic at this time   Musculoskeletal: Positive for arthralgias, gait problem and joint swelling  Negative for back pain, myalgias ( swelling especially to her left knee  This is not acute), neck pain and neck stiffness  Skin: Negative  Allergic/Immunologic: Negative  Hematological: Negative  Psychiatric/Behavioral: Negative for agitation, behavioral problems, confusion, decreased concentration, dysphoric mood, hallucinations, self-injury, sleep disturbance and suicidal ideas  The patient is nervous/anxious ( again patient is somewhat anxious about her son who was recently hospitalized)  The patient is not hyperactive  Objective:      /72   Pulse 78   Temp 98 1 °F (36 7 °C)   Ht 5' (1 524 m)   Wt 59 kg (130 lb)   SpO2 98%   BMI 25 39 kg/m²          Physical Exam   Constitutional: She is oriented to person, place, and time  She appears well-developed and well-nourished  No distress  Pleasant, mildly anxious 60-year-old female who is awake alert  Presenting in a wheelchair, accompanied by her granddaughter   HENT:   Head: Normocephalic and atraumatic  Right Ear: External ear normal    Left Ear: External ear normal    Nose: Nose normal    Mouth/Throat: Oropharynx is clear and moist  No oropharyngeal exudate  Notable decreased auditory acuity bilaterally which is chronic   Eyes: Pupils are equal, round, and reactive to light  Conjunctivae and EOM are normal  Right eye exhibits no discharge  Left eye exhibits no discharge  No scleral icterus  Neck: Normal range of motion  Neck supple  No JVD present  No tracheal deviation present  No thyromegaly present  Cardiovascular: Normal rate, regular rhythm and intact distal pulses  Exam reveals no gallop and no friction rub  Murmur heard  Pulmonary/Chest: Effort normal and breath sounds normal  No stridor  No respiratory distress  She has no wheezes  She has no rales  She exhibits no tenderness  Abdominal: Soft  Bowel sounds are normal  She exhibits no distension and no mass  There is no tenderness  There is no rebound and no guarding  No hernia  Musculoskeletal: She exhibits edema and deformity  Diffuse arthritic changes, severe deformity to her left knee which is in a brace  Trace edema bilateral lower extremities, no new or acute orthopedic changes on exam   Lymphadenopathy:     She has no cervical adenopathy  Neurological: She is alert and oriented to person, place, and time  She displays abnormal reflex ( absent patella and Achilles tendon reflexes bilaterally)  No cranial nerve deficit or sensory deficit  She exhibits normal muscle tone  Coordination (Difficulties with ambulation, coordination secondary to severe degenerative arthritis to both knees) abnormal    Skin: Skin is dry  Capillary refill takes less than 2 seconds  No rash noted  She is not diaphoretic  No erythema  No pallor  Psychiatric: Her behavior is normal  Judgment and thought content normal    Mildly anxious mood and affect again states that she is worried about her son who is in the hospital   Nursing note and vitals reviewed

## 2020-01-10 NOTE — ASSESSMENT & PLAN NOTE
Patient does have a history longstanding of gastroesophageal reflux disease  This is been controlled with H2 blockers and originally patient was on Zantac but this was taken off the market  There is also a problems with availability of Pepcid so the patient was placed on Tagamet    She has intolerance to PPI as period patient states this point she is stable and she will continue present treatment

## 2020-01-10 NOTE — ASSESSMENT & PLAN NOTE
Patient does have a history of diffuse generalized arthritis  Her biggest problem still remains her right knee which is in a brace and there is instability with gait  Because of her age she is not a candidate for total knee replacement  Patient states this point time she has no pain  She continues to follow-up with Orthopedics    No new arthritic changes

## 2020-01-10 NOTE — ASSESSMENT & PLAN NOTE
Has a history of hyperlipidemia  Her last cholesterol profile shows good control  We did discuss with the patient the importance of watching her intake of fats and cholesterol with her diet

## 2020-01-10 NOTE — ASSESSMENT & PLAN NOTE
Patient's blood pressure readings have been variable  As noted patient has had relatively good control over the past month with her readings  Her renal function which is checked in October is stable  Patient will continue present medication and we will continue to monitor also her renal function    Patient has now had no adverse effects from present treatment

## 2020-01-14 ENCOUNTER — OFFICE VISIT (OUTPATIENT)
Dept: OBGYN CLINIC | Facility: HOSPITAL | Age: 85
End: 2020-01-14
Payer: MEDICARE

## 2020-01-14 VITALS
WEIGHT: 130 LBS | HEIGHT: 60 IN | DIASTOLIC BLOOD PRESSURE: 79 MMHG | SYSTOLIC BLOOD PRESSURE: 163 MMHG | BODY MASS INDEX: 25.52 KG/M2 | HEART RATE: 59 BPM

## 2020-01-14 DIAGNOSIS — M17.11 PRIMARY OSTEOARTHRITIS OF RIGHT KNEE: Primary | ICD-10-CM

## 2020-01-14 PROCEDURE — 20610 DRAIN/INJ JOINT/BURSA W/O US: CPT | Performed by: ORTHOPAEDIC SURGERY

## 2020-01-14 PROCEDURE — 99212 OFFICE O/P EST SF 10 MIN: CPT | Performed by: ORTHOPAEDIC SURGERY

## 2020-01-14 RX ORDER — LIDOCAINE HYDROCHLORIDE 10 MG/ML
2 INJECTION, SOLUTION INFILTRATION; PERINEURAL
Status: COMPLETED | OUTPATIENT
Start: 2020-01-14 | End: 2020-01-14

## 2020-01-14 RX ORDER — BETAMETHASONE SODIUM PHOSPHATE AND BETAMETHASONE ACETATE 3; 3 MG/ML; MG/ML
12 INJECTION, SUSPENSION INTRA-ARTICULAR; INTRALESIONAL; INTRAMUSCULAR; SOFT TISSUE
Status: COMPLETED | OUTPATIENT
Start: 2020-01-14 | End: 2020-01-14

## 2020-01-14 RX ADMIN — LIDOCAINE HYDROCHLORIDE 2 ML: 10 INJECTION, SOLUTION INFILTRATION; PERINEURAL at 09:48

## 2020-01-14 RX ADMIN — BETAMETHASONE SODIUM PHOSPHATE AND BETAMETHASONE ACETATE 12 MG: 3; 3 INJECTION, SUSPENSION INTRA-ARTICULAR; INTRALESIONAL; INTRAMUSCULAR; SOFT TISSUE at 09:48

## 2020-01-14 NOTE — PROGRESS NOTES
Subjective;    61-year-old female accompanied by an attendant  She has established osteoarthritis of the right knee  She receives symptomatic improvement by periodic injections  This is why she presents the office today for injection to the right knee    Past Medical History:   Diagnosis Date    CAD (coronary artery disease)     Hypertension     Osteoarthritis     Osteoporosis     Vitamin D deficiency        Past Surgical History:   Procedure Laterality Date    APPENDECTOMY      HYSTERECTOMY      NV PARTIAL HIP REPLACEMENT Left 8/13/2018    Procedure: HEMIARTHROPLASTY HIP (BIPOLAR);   Surgeon: Susana Maldonado MD;  Location: [facility];  Service: Orthopedics    ROTATOR CUFF REPAIR         Family History   Problem Relation Age of Onset    Diabetes type II Father     Heart disease Brother        Social History     Tobacco Use    Smoking status: Former Smoker    Smokeless tobacco: Never Used   Substance Use Topics    Alcohol use: Not Currently    Drug use: No     Exam;    She is able to extend her knee actively  Right knee has no overlying ecchymosis redness or effusion  It allows for safe administration of medication  A lateral parapatellar tendon approach was chosen    Large joint arthrocentesis: R knee  Date/Time: 1/14/2020 9:48 AM  Consent given by: patient  Supporting Documentation  Indications: pain   Procedure Details  Location: knee - R knee  Needle size: 22 G  Ultrasound guidance: no  Medications administered: 2 mL lidocaine 1 %; 12 mg betamethasone acetate-betamethasone sodium phosphate 6 (3-3) mg/mL    Patient tolerance: patient tolerated the procedure well with no immediate complications  Dressing:  Sterile dressing applied       Impression;    Right knee osteoarthritis  Right knee pain    Plan;    Patient underwent injection to the right knee on today's date  Her transfer form from her facility was completed  She is permitted to return to the office in 10-12 weeks for follow-up and an additional injection if she likes    She voiced no other considerations today and exited the office in fine fashion    Her experience was supervised by and plan formulated by the attending surgeon it was my privilege to assist him in its delivery

## 2020-02-02 ENCOUNTER — NURSE TRIAGE (OUTPATIENT)
Dept: OTHER | Facility: OTHER | Age: 85
End: 2020-02-02

## 2020-02-02 NOTE — TELEPHONE ENCOUNTER
Regarding: Diarrhea   ----- Message from Nilsa Hayes sent at 2/2/2020 12:06 PM EST -----  "I have had diarrhea for 3 days in a row "

## 2020-02-02 NOTE — TELEPHONE ENCOUNTER
Reason for Disposition   [1] MILD diarrhea (e g , 1-3 or more stools than normal in past 24 hours) without known cause AND [2] present >  7 days    Answer Assessment - Initial Assessment Questions  1  DIARRHEA SEVERITY: "How bad is the diarrhea?" "How many extra stools have you had in the past 24 hours than normal?"     - NO DIARRHEA (SCALE 0)    - MILD (SCALE 1-3): Few loose or mushy BMs; increase of 1-3 stools over normal daily number of stools; mild increase in ostomy output  -  MODERATE (SCALE 4-7): Increase of 4-6 stools daily over normal; moderate increase in ostomy output  * SEVERE (SCALE 8-10; OR 'WORST POSSIBLE'): Increase of 7 or more stools daily over normal; moderate increase in ostomy output; incontinence  Mild diarrhea  Has had 3 episodes of diarrhea today  Patient states it occurs right after eating  2  ONSET: "When did the diarrhea begin?"       3 days ago   3  BM CONSISTENCY: "How loose or watery is the diarrhea?"       Loose  4  VOMITING: "Are you also vomiting?" If so, ask: "How many times in the past 24 hours?"       Denies   5  ABDOMINAL PAIN: "Are you having any abdominal pain?" If yes: "What does it feel like?" (e g , crampy, dull, intermittent, constant)       Denies  Feels mild cramping after having a BM  6  ABDOMINAL PAIN SEVERITY: If present, ask: "How bad is the pain?"  (e g , Scale 1-10; mild, moderate, or severe)    - MILD (1-3): doesn't interfere with normal activities, abdomen soft and not tender to touch     - MODERATE (4-7): interferes with normal activities or awakens from sleep, tender to touch     - SEVERE (8-10): excruciating pain, doubled over, unable to do any normal activities        Mild  7  ORAL INTAKE: If vomiting, "Have you been able to drink liquids?" "How much fluids have you had in the past 24 hours?"      Patient states has been drinking fluids and able to keep it down    8  HYDRATION: "Any signs of dehydration?" (e g , dry mouth [not just dry lips], too weak to stand, dizziness, new weight loss) "When did you last urinate?"      Denies   9  EXPOSURE: "Have you traveled to a foreign country recently?" "Have you been exposed to anyone with diarrhea?" "Could you have eaten any food that was spoiled?"      Denies   10  ANTIBIOTIC USE: "Are you taking antibiotics now or have you taken antibiotics in the past 2 months?"        Unsure, states can not recall  11  OTHER SYMPTOMS: "Do you have any other symptoms?" (e g , fever, blood in stool)        Denies a fever at this time  Denies blood in stool      Protocols used: DIARRHEA-ADULT-AH

## 2020-02-03 ENCOUNTER — TELEPHONE (OUTPATIENT)
Dept: INTERNAL MEDICINE CLINIC | Facility: CLINIC | Age: 85
End: 2020-02-03

## 2020-02-03 DIAGNOSIS — K59.1 FUNCTIONAL DIARRHEA: Primary | ICD-10-CM

## 2020-02-03 NOTE — PROGRESS NOTES
Patient is apparently having some loose stools  They are asking for the patient be placed on Imodium but we would want to check her stool for C diff with her having antibiotics recently  This order was sent to the nursing home  This may be secondary to her being on the Tagamet and patient did have problems with loose stools with PPI    I do not wish to put her on Imodium and cause her constipation which could be hazardous unless we eliminate other possible causes

## 2020-02-03 NOTE — TELEPHONE ENCOUNTER
Patient has been having loose stools 1-2 times a day mostly after eating  No abdominal cramping  Cant always make it to the bathrrom  theyd like an order for immodium    Send to [phone] [facility]

## 2020-02-05 ENCOUNTER — TELEPHONE (OUTPATIENT)
Dept: INTERNAL MEDICINE CLINIC | Facility: CLINIC | Age: 85
End: 2020-02-05

## 2020-02-05 NOTE — TELEPHONE ENCOUNTER
Received call from [address], patient test came back negative for C-Diff  The patient is still experiencing loose stools

## 2020-02-06 ENCOUNTER — TELEPHONE (OUTPATIENT)
Dept: INTERNAL MEDICINE CLINIC | Facility: CLINIC | Age: 85
End: 2020-02-06

## 2020-02-06 DIAGNOSIS — K59.1 FUNCTIONAL DIARRHEA: Primary | ICD-10-CM

## 2020-02-06 NOTE — TELEPHONE ENCOUNTER
I am concerned about this patient having problems with loose stools  The nurses are insisting that she be placed on Imodium but I do not feel comfortable without medication and therefore we did check for C diff with the patient being on recurrent antibiotics for urinary tract infections  The C diff was negative and again I think that Imodium is inappropriate for patient who is elderly and could have problems with constipation  Were going to be trying some Kaopectate 1st to see if this works  Patient is to be seen on Monday  There are concerns with her daughter in that her brother who takes care mostly of mother is ill and cannot attend to her needs as previously    They are thinking of transferring her care to another physician in the facility where she resides and I agree that that might be a good alternative at this time

## 2020-02-06 NOTE — TELEPHONE ENCOUNTER
Just a FYI daughter Jeff Corcoran is thinking about switching her mother to the doctor at [facility] just to make things easier for her mother

## 2020-02-10 ENCOUNTER — TELEPHONE (OUTPATIENT)
Dept: UROLOGY | Facility: MEDICAL CENTER | Age: 85
End: 2020-02-10

## 2020-02-10 DIAGNOSIS — N95.2 VAGINAL ATROPHY: Primary | ICD-10-CM

## 2020-02-10 NOTE — TELEPHONE ENCOUNTER
Per Laurie Correia son, need to fax order to [facility] faxed to [phone] the order for anibal

## 2020-02-10 NOTE — TELEPHONE ENCOUNTER
Patient followed by Alek Wheeler at the [facility]  Patient is currently prescribed Estrace 3 times weekly  Her son, Maral Porras, called in regard to it being quite costly  He inquired as to if it can be utilized less frequently or an alternative can be prescribed    Please call to discuss at [phone]  Thank you

## 2020-02-12 ENCOUNTER — TELEPHONE (OUTPATIENT)
Dept: UROLOGY | Facility: AMBULATORY SURGERY CENTER | Age: 85
End: 2020-02-12

## 2020-02-12 ENCOUNTER — OFFICE VISIT (OUTPATIENT)
Dept: GERIATRICS | Facility: REHABILITATION | Age: 85
End: 2020-02-12
Payer: MEDICARE

## 2020-02-12 VITALS
HEART RATE: 65 BPM | OXYGEN SATURATION: 97 % | RESPIRATION RATE: 20 BRPM | WEIGHT: 126 LBS | DIASTOLIC BLOOD PRESSURE: 62 MMHG | TEMPERATURE: 98.9 F | SYSTOLIC BLOOD PRESSURE: 122 MMHG | BODY MASS INDEX: 24.61 KG/M2

## 2020-02-12 DIAGNOSIS — N39.0 RECURRENT UTI: ICD-10-CM

## 2020-02-12 DIAGNOSIS — I10 ESSENTIAL HYPERTENSION: ICD-10-CM

## 2020-02-12 DIAGNOSIS — R26.2 AMBULATORY DYSFUNCTION: ICD-10-CM

## 2020-02-12 DIAGNOSIS — K59.1 FUNCTIONAL DIARRHEA: Primary | ICD-10-CM

## 2020-02-12 DIAGNOSIS — E78.01 FAMILIAL HYPERCHOLESTEROLEMIA: ICD-10-CM

## 2020-02-12 DIAGNOSIS — K21.00 GASTROESOPHAGEAL REFLUX DISEASE WITH ESOPHAGITIS: ICD-10-CM

## 2020-02-12 DIAGNOSIS — M81.0 AGE-RELATED OSTEOPOROSIS WITHOUT CURRENT PATHOLOGICAL FRACTURE: ICD-10-CM

## 2020-02-12 PROCEDURE — 1160F RVW MEDS BY RX/DR IN RCRD: CPT | Performed by: FAMILY MEDICINE

## 2020-02-12 PROCEDURE — 3078F DIAST BP <80 MM HG: CPT | Performed by: FAMILY MEDICINE

## 2020-02-12 PROCEDURE — 1036F TOBACCO NON-USER: CPT | Performed by: FAMILY MEDICINE

## 2020-02-12 PROCEDURE — 99214 OFFICE O/P EST MOD 30 MIN: CPT | Performed by: FAMILY MEDICINE

## 2020-02-12 PROCEDURE — 3074F SYST BP LT 130 MM HG: CPT | Performed by: FAMILY MEDICINE

## 2020-02-12 PROCEDURE — 4040F PNEUMOC VAC/ADMIN/RCVD: CPT | Performed by: FAMILY MEDICINE

## 2020-02-12 NOTE — TELEPHONE ENCOUNTER
LVM for Bob Teeo at [facility] that patients Premarin cream was authorized with the insurance as non formulary from 1/1/2020-2/11/2021

## 2020-02-12 NOTE — PROGRESS NOTES
Assessment/Plan:    Functional diarrhea  Improved, continue bismuth and encouraged po hydration  C diff was negative  Ordered CBC, CMP, lipase, TSH and follow up with results  Gastroesophageal reflux disease with esophagitis  Discussed about diet , will continue cimetidine and monitor    Essential hypertension  Patient's blood pressure is controlled, asymptomatic  Patient denies any side effects with medications  Patient educated on the importance of appropriate dieting  Continue same regimen and continue to monitor  Avoid alcohol use  Hyperlipidemia  Will discontinue fenofibrate as per discussion with her daughter    Recurrent UTI  Continue Estrace , encouraged po hydration, encouraged cranberry supplements  Will continue to monitor  Osteoporosis  Continue calcitonin and calcium and vitamin D supplements    Ambulatory dysfunction  Gave referral to PT/OT        Subjective:     Sharifa Hirsch is a 80 y o  female  who presented to the office today to establish care  Her daughter was present during the visit  She is currently denying any acute complaints, she has chronic Left knee pain  As per daughters he noted memory changes for the past few years and progressively declining  Also daughter who is the POA would like to transition to comfort measures      HPI        Current Outpatient Medications on File Prior to Visit   Medication Sig Dispense Refill    acetaminophen (TYLENOL) 325 mg tablet Take 650 mg by mouth every 6 (six) hours as needed for mild pain      amLODIPine (NORVASC) 5 mg tablet Take 1 tablet (5 mg total) by mouth daily  0    artificial tear (LUBRIFRESH P M ) 83-15 % ophthalmic ointment daily at bedtime      aspirin 81 MG tablet Take 1 tablet by mouth daily      bismuth subsalicylate (KAOPECTATE) 524 mg/30 mL oral suspension Take 15 mL (262 mg total) by mouth every 4 (four) hours as needed for diarrhea 360 mL 3    calcitonin, salmon, (MIACALCIN) 200 units/act nasal spray 1 spray and to alternate nostril q day 1 Act 5    calcium carbonate (TUMS) 500 mg chewable tablet Chew 1 tablet continuous as needed for indigestion or heartburn      cimetidine (TAGAMET) 400 mg tablet Take 1 tablet (400 mg total) by mouth 2 (two) times a day 60 tablet 5    conjugated estrogens (PREMARIN) vaginal cream Insert 1 g into the vagina 3 (three) times a week 30 g 1    Cranberry (ELLURA) 200 MG CAPS Take by mouth      Cyanocobalamin (VITAMIN B12 PO) Take 1 tablet by mouth daily      dorzolamide-timolol (COSOPT) 22 3-6 8 MG/ML ophthalmic solution INSTILL 1 DROP INTO LEFT EYE TWICE A DAY APPROXIMATELY 12 HOURS APART  2    fenofibrate (TRIGLIDE) 160 MG tablet TAKE 1 TABLET BY MOUTH EVERY DAY 30 tablet 0    ferrous sulfate 324 (65 Fe) mg Take 1 tablet (324 mg total) by mouth daily before breakfast 30 tablet 4    Glucosamine HCl (GLUCOSAMINE PO) Take 1 capsule by mouth daily      guaiFENesin (ROBITUSSIN) 100 MG/5ML oral liquid Take 10 mL (200 mg total) by mouth 3 (three) times a day Id patient still has persistent cough after finishing the cough medicine she needs to be seen for evaluation 120 mL 0    latanoprost (XALATAN) 0 005 % ophthalmic solution INSTILL 1 DROP IN THE AFFECTED EYE(S) IN THE EVENING  1    losartan (COZAAR) 25 mg tablet TAKE 1 TABLET BY MOUTH EVERY DAY 30 tablet 1    metoprolol tartrate (LOPRESSOR) 50 mg tablet TAKE 1 TABLET TWICE DAILY 60 tablet 5    Multiple Vitamins-Minerals (DAILY MULTI PO) Take 1 tablet by mouth daily      polymyxin b-trimethoprim (POLYTRIM) ophthalmic solution       Pyridoxine HCl (VITAMIN B6 PO) Take 1 tablet by mouth daily      tobramycin (TOBREX) 0 3 % OINT 0 5 inches 3 (three) times a day      valACYclovir (VALTREX) 500 mg tablet        No current facility-administered medications on file prior to visit        Past Medical History:   Diagnosis Date    CAD (coronary artery disease)     Hypertension     Osteoarthritis     Osteoporosis     Vitamin D deficiency      Past Surgical History:   Procedure Laterality Date    APPENDECTOMY      HYSTERECTOMY      CT PARTIAL HIP REPLACEMENT Left 8/13/2018    Procedure: HEMIARTHROPLASTY HIP (BIPOLAR); Surgeon: Natasha Izquierdo MD;  [facility];  Service: Orthopedics    ROTATOR CUFF REPAIR       Social History     Socioeconomic History    Marital status:      Spouse name: Not on file    Number of children: Not on file    Years of education: Not on file    Highest education level: Not on file   Occupational History    Not on file   Social Needs    Financial resource strain: Not on file    Food insecurity:     Worry: Not on file     Inability: Not on file    Transportation needs:     Medical: Not on file     Non-medical: Not on file   Tobacco Use    Smoking status: Former Smoker    Smokeless tobacco: Never Used   Substance and Sexual Activity    Alcohol use: Not Currently    Drug use: No    Sexual activity: Not on file   Lifestyle    Physical activity:     Days per week: Not on file     Minutes per session: Not on file    Stress: Not on file   Relationships    Social connections:     Talks on phone: Not on file     Gets together: Not on file     Attends [employer] service: Not on file     Active member of club or organization: Not on file     Attends meetings of clubs or organizations: Not on file     Relationship status: Not on file    Intimate partner violence:     Fear of current or ex partner: Not on file     Emotionally abused: Not on file     Physically abused: Not on file     Forced sexual activity: Not on file   Other Topics Concern    Not on file   Social History Narrative    Daily coffee consumption - 4 cups     Family History   Problem Relation Age of Onset    Diabetes type II Father     Heart disease Brother          Review of Systems   Constitutional: Negative for chills and fever  HENT: Negative for congestion and rhinorrhea      Respiratory: Negative for cough, shortness of breath and wheezing  Cardiovascular: Positive for leg swelling  Negative for chest pain and palpitations  Gastrointestinal: Negative for abdominal pain and constipation  Endocrine: Negative for cold intolerance  Genitourinary: Negative for difficulty urinating, dysuria and hematuria  Musculoskeletal: Positive for arthralgias and gait problem  Skin: Negative for wound  Allergic/Immunologic: Negative for environmental allergies  Neurological: Negative for dizziness and seizures  Hematological: Does not bruise/bleed easily  Psychiatric/Behavioral: Negative for behavioral problems and sleep disturbance  Objective:    /62   Pulse 65   Temp 98 9 °F (37 2 °C)   Resp 20   Wt 57 2 kg (126 lb)   SpO2 97%   BMI 24 61 kg/m²     Physical Exam   Constitutional: No distress  HENT:   Head: Normocephalic and atraumatic  Hearing aids, glasses   Eyes: Pupils are equal, round, and reactive to light  EOM are normal  Right eye exhibits no discharge  Left eye exhibits no discharge  Neck: Normal range of motion  Neck supple  Cardiovascular: Normal rate, regular rhythm and normal heart sounds  No murmur heard  Pulmonary/Chest: Effort normal and breath sounds normal  No respiratory distress  She has no wheezes  Abdominal: Soft  There is no tenderness  There is no rebound and no guarding  Musculoskeletal: She exhibits edema and deformity  Wearing sling LLE   Neurological: She is alert  Oriented to person and place   Skin: Skin is warm and dry  She is not diaphoretic  Psychiatric: Her behavior is normal    Nursing note and vitals reviewed        Cathie Jefferson MD  02/14/20  11:06 AM

## 2020-02-14 PROBLEM — N39.0 RECURRENT UTI: Status: ACTIVE | Noted: 2020-02-14

## 2020-02-14 NOTE — ASSESSMENT & PLAN NOTE
Improved, continue bismuth and encouraged po hydration  C diff was negative  Ordered CBC, CMP, lipase, TSH and follow up with results

## 2020-02-14 NOTE — ASSESSMENT & PLAN NOTE
Patient's blood pressure is controlled, asymptomatic  Patient denies any side effects with medications  Patient educated on the importance of appropriate dieting  Continue same regimen and continue to monitor  Avoid alcohol use

## 2020-02-14 NOTE — ASSESSMENT & PLAN NOTE
Continue Estrace , encouraged po hydration, encouraged cranberry supplements  Will continue to monitor

## 2020-02-20 DIAGNOSIS — R30.0 BURNING WITH URINATION: Primary | ICD-10-CM

## 2020-03-11 ENCOUNTER — OFFICE VISIT (OUTPATIENT)
Dept: GERIATRICS | Facility: REHABILITATION | Age: 85
End: 2020-03-11
Payer: MEDICARE

## 2020-03-11 VITALS
TEMPERATURE: 98.7 F | RESPIRATION RATE: 20 BRPM | BODY MASS INDEX: 25.27 KG/M2 | SYSTOLIC BLOOD PRESSURE: 138 MMHG | DIASTOLIC BLOOD PRESSURE: 62 MMHG | OXYGEN SATURATION: 97 % | WEIGHT: 129.4 LBS

## 2020-03-11 DIAGNOSIS — K59.1 FUNCTIONAL DIARRHEA: ICD-10-CM

## 2020-03-11 DIAGNOSIS — I10 ESSENTIAL HYPERTENSION: Primary | ICD-10-CM

## 2020-03-11 DIAGNOSIS — R60.0 LOCALIZED EDEMA: ICD-10-CM

## 2020-03-11 DIAGNOSIS — E55.9 VITAMIN D DEFICIENCY: ICD-10-CM

## 2020-03-11 PROCEDURE — 3078F DIAST BP <80 MM HG: CPT | Performed by: FAMILY MEDICINE

## 2020-03-11 PROCEDURE — 3075F SYST BP GE 130 - 139MM HG: CPT | Performed by: FAMILY MEDICINE

## 2020-03-11 PROCEDURE — 99214 OFFICE O/P EST MOD 30 MIN: CPT | Performed by: FAMILY MEDICINE

## 2020-03-11 PROCEDURE — 1160F RVW MEDS BY RX/DR IN RCRD: CPT | Performed by: FAMILY MEDICINE

## 2020-03-11 PROCEDURE — 4040F PNEUMOC VAC/ADMIN/RCVD: CPT | Performed by: FAMILY MEDICINE

## 2020-03-11 PROCEDURE — 1036F TOBACCO NON-USER: CPT | Performed by: FAMILY MEDICINE

## 2020-03-11 NOTE — ASSESSMENT & PLAN NOTE
Encouraged to keep LE elevated while sitting and use a pillow under her legs while lying down  Advised low salt diet, will continue to monitor

## 2020-03-11 NOTE — ASSESSMENT & PLAN NOTE
Vitamin D level was 27 , increased vitamin d supplements to 2000 units daily    Will monitor vit D level , recheck in 6 months

## 2020-03-11 NOTE — ASSESSMENT & PLAN NOTE
Had elevated lipase not meeting criteria for pancreatitis though  Will repeat lipase, CBC and encourage po hydration  Diarrhea resolved, she has 1-2 Bm a day, no abdominal discomfort, nausea, vomiting

## 2020-03-11 NOTE — PROGRESS NOTES
Assessment/Plan:    Essential hypertension  Patient's blood pressure is controlled, asymptomatic  Patient denies any side effects with medications  Patient educated on the importance of appropriate dieting  Continue same regimen and continue to monitor  Avoid alcohol use  Functional diarrhea  Had elevated lipase not meeting criteria for pancreatitis though  Will repeat lipase, CBC and encourage po hydration  Diarrhea resolved, she has 1-2 Bm a day, no abdominal discomfort, nausea, vomiting  Vitamin D deficiency  Vitamin D level was 27 , increased vitamin d supplements to 2000 units daily  Will monitor vit D level , recheck in 6 months     Localized edema  Encouraged to keep LE elevated while sitting and use a pillow under her legs while lying down  Advised low salt diet, will continue to monitor  Subjective:     Jose Luis Hudson is a 80 y o  female who presented to the office today for follow up  States that she feels well, has good appetite , no nausea , vomiting, has 1-2 BM a day not clear if she still has diarrhea  Denies SOB, CP, fever, chills  Has good appetite and sleeps well  Review of Systems   Constitutional: Negative for appetite change, chills and fever  HENT: Positive for hearing loss  Negative for congestion and rhinorrhea  Respiratory: Negative for cough, shortness of breath and wheezing  Cardiovascular: Positive for leg swelling  Negative for chest pain and palpitations  Gastrointestinal: Positive for diarrhea  Negative for abdominal pain and constipation  Genitourinary: Negative for difficulty urinating, dysuria and hematuria  Musculoskeletal: Positive for arthralgias and gait problem  Skin: Negative for wound  Allergic/Immunologic: Negative for environmental allergies  Neurological: Negative for dizziness and seizures  Hematological: Does not bruise/bleed easily     Psychiatric/Behavioral: Negative for behavioral problems and sleep disturbance  Past Medical History:   Diagnosis Date    CAD (coronary artery disease)     Hypertension     Osteoarthritis     Osteoporosis     Vitamin D deficiency            Objective:    /62   Temp 98 7 °F (37 1 °C)   Resp 20   Wt 58 7 kg (129 lb 6 4 oz)   SpO2 97%   BMI 25 27 kg/m²     Physical Exam   Constitutional: No distress  HENT:   Head: Normocephalic and atraumatic  Eyes: Pupils are equal, round, and reactive to light  EOM are normal  Right eye exhibits no discharge  Left eye exhibits no discharge  Neck: Normal range of motion  Neck supple  Cardiovascular: Normal rate, regular rhythm and normal heart sounds  No murmur heard  Pulmonary/Chest: Effort normal and breath sounds normal  No respiratory distress  She has no wheezes  Abdominal: Soft  There is no tenderness  There is no rebound and no guarding  Musculoskeletal: She exhibits edema  wheelchair  Sling left LE   Neurological: She is alert  AAOx2   Skin: Skin is warm and dry  She is not diaphoretic  Psychiatric: Her behavior is normal    Nursing note and vitals reviewed        Kevan Paul MD  03/11/20  2:25 PM

## 2020-04-07 ENCOUNTER — TELEPHONE (OUTPATIENT)
Dept: INTERNAL MEDICINE CLINIC | Facility: CLINIC | Age: 85
End: 2020-04-07

## 2020-05-27 ENCOUNTER — HOSPITAL ENCOUNTER (INPATIENT)
Facility: HOSPITAL | Age: 85
LOS: 1 days | Discharge: HOME HEALTH | DRG: 563 | End: 2020-05-29
Attending: EMERGENCY MEDICINE | Admitting: INTERNAL MEDICINE
Payer: MEDICARE

## 2020-05-27 ENCOUNTER — APPOINTMENT (EMERGENCY)
Dept: RADIOLOGY | Facility: HOSPITAL | Age: 85
DRG: 563 | End: 2020-05-27
Payer: MEDICARE

## 2020-05-27 DIAGNOSIS — R55 SYNCOPE: Primary | ICD-10-CM

## 2020-05-27 DIAGNOSIS — R26.2 AMBULATORY DYSFUNCTION: ICD-10-CM

## 2020-05-27 DIAGNOSIS — S52.612A TRAUMATIC CLOSED FRACTURE OF ULNAR STYLOID WITH MINIMAL DISPLACEMENT, LEFT, INITIAL ENCOUNTER: ICD-10-CM
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
## 2020-05-27 LAB
ANION GAP SERPL CALCULATED.3IONS-SCNC: 6 MMOL/L (ref 4–13)
BASOPHILS # BLD AUTO: 0.09 THOUSANDS/ΜL (ref 0–0.1)
BASOPHILS NFR BLD AUTO: 1 % (ref 0–1)
BUN SERPL-MCNC: 24 MG/DL (ref 5–25)
CALCIUM SERPL-MCNC: 9 MG/DL (ref 8.3–10.1)
CHLORIDE SERPL-SCNC: 106 MMOL/L (ref 100–108)
CO2 SERPL-SCNC: 25 MMOL/L (ref 21–32)
CREAT SERPL-MCNC: 1.01 MG/DL (ref 0.6–1.3)
EOSINOPHIL # BLD AUTO: 0.22 THOUSAND/ΜL (ref 0–0.61)
EOSINOPHIL NFR BLD AUTO: 3 % (ref 0–6)
ERYTHROCYTE [DISTWIDTH] IN BLOOD BY AUTOMATED COUNT: 13.2 % (ref 11.6–15.1)
GFR SERPL CREATININE-BSD FRML MDRD: 48 ML/MIN/1.73SQ M
GLUCOSE SERPL-MCNC: 102 MG/DL (ref 65–140)
HCT VFR BLD AUTO: 33.8 % (ref 34.8–46.1)
HGB BLD-MCNC: 10.9 G/DL (ref 11.5–15.4)
IMM GRANULOCYTES # BLD AUTO: 0.02 THOUSAND/UL (ref 0–0.2)
IMM GRANULOCYTES NFR BLD AUTO: 0 % (ref 0–2)
LYMPHOCYTES # BLD AUTO: 2.21 THOUSANDS/ΜL (ref 0.6–4.47)
LYMPHOCYTES NFR BLD AUTO: 26 % (ref 14–44)
MCH RBC QN AUTO: 31 PG (ref 26.8–34.3)
MCHC RBC AUTO-ENTMCNC: 32.2 G/DL (ref 31.4–37.4)
MCV RBC AUTO: 96 FL (ref 82–98)
MONOCYTES # BLD AUTO: 1.23 THOUSAND/ΜL (ref 0.17–1.22)
MONOCYTES NFR BLD AUTO: 15 % (ref 4–12)
NEUTROPHILS # BLD AUTO: 4.62 THOUSANDS/ΜL (ref 1.85–7.62)
NEUTS SEG NFR BLD AUTO: 55 % (ref 43–75)
NRBC BLD AUTO-RTO: 0 /100 WBCS
PLATELET # BLD AUTO: 205 THOUSANDS/UL (ref 149–390)
PMV BLD AUTO: 10.2 FL (ref 8.9–12.7)
POTASSIUM SERPL-SCNC: 3.9 MMOL/L (ref 3.5–5.3)
RBC # BLD AUTO: 3.52 MILLION/UL (ref 3.81–5.12)
SODIUM SERPL-SCNC: 137 MMOL/L (ref 136–145)
WBC # BLD AUTO: 8.39 THOUSAND/UL (ref 4.31–10.16)

## 2020-05-27 PROCEDURE — 72125 CT NECK SPINE W/O DYE: CPT

## 2020-05-27 PROCEDURE — 99285 EMERGENCY DEPT VISIT HI MDM: CPT | Performed by: EMERGENCY MEDICINE

## 2020-05-27 PROCEDURE — 85025 COMPLETE CBC W/AUTO DIFF WBC: CPT | Performed by: EMERGENCY MEDICINE

## 2020-05-27 PROCEDURE — 70450 CT HEAD/BRAIN W/O DYE: CPT

## 2020-05-27 PROCEDURE — 80048 BASIC METABOLIC PNL TOTAL CA: CPT | Performed by: EMERGENCY MEDICINE

## 2020-05-27 PROCEDURE — 99285 EMERGENCY DEPT VISIT HI MDM: CPT

## 2020-05-27 PROCEDURE — 73110 X-RAY EXAM OF WRIST: CPT

## 2020-05-27 PROCEDURE — 93005 ELECTROCARDIOGRAM TRACING: CPT

## 2020-05-27 PROCEDURE — 36415 COLL VENOUS BLD VENIPUNCTURE: CPT | Performed by: EMERGENCY MEDICINE

## 2020-05-27 RX ORDER — ACETAMINOPHEN 325 MG/1
975 TABLET ORAL ONCE
Status: COMPLETED | OUTPATIENT
Start: 2020-05-27 | End: 2020-05-27

## 2020-05-27 RX ADMIN — ACETAMINOPHEN 975 MG: 325 TABLET ORAL at 23:34

## 2020-05-28 PROBLEM — R55 SYNCOPE AND COLLAPSE: Status: ACTIVE | Noted: 2020-05-28

## 2020-05-28 PROBLEM — B02.33 HERPES ZOSTER KERATOCONJUNCTIVITIS: Status: ACTIVE | Noted: 2020-05-28

## 2020-05-28 PROBLEM — S52.615A CLOSED NONDISPLACED FRACTURE OF STYLOID PROCESS OF LEFT ULNA: Status: ACTIVE | Noted: 2020-05-28

## 2020-05-28 PROBLEM — S52.515A NONDISPLACED FRACTURE OF LEFT RADIAL STYLOID PROCESS, INITIAL ENCOUNTER FOR CLOSED FRACTURE: Status: ACTIVE | Noted: 2020-05-28

## 2020-05-28 LAB
ANION GAP SERPL CALCULATED.3IONS-SCNC: 7 MMOL/L (ref 4–13)
ATRIAL RATE: 97 BPM
BUN SERPL-MCNC: 17 MG/DL (ref 5–25)
CALCIUM SERPL-MCNC: 7.9 MG/DL (ref 8.3–10.1)
CHLORIDE SERPL-SCNC: 110 MMOL/L (ref 100–108)
CO2 SERPL-SCNC: 21 MMOL/L (ref 21–32)
CREAT SERPL-MCNC: 0.63 MG/DL (ref 0.6–1.3)
ERYTHROCYTE [DISTWIDTH] IN BLOOD BY AUTOMATED COUNT: 13.1 % (ref 11.6–15.1)
GFR SERPL CREATININE-BSD FRML MDRD: 77 ML/MIN/1.73SQ M
GLUCOSE P FAST SERPL-MCNC: 98 MG/DL (ref 65–99)
GLUCOSE SERPL-MCNC: 98 MG/DL (ref 65–140)
HCT VFR BLD AUTO: 34.8 % (ref 34.8–46.1)
HGB BLD-MCNC: 10.7 G/DL (ref 11.5–15.4)
MAGNESIUM SERPL-MCNC: 1.7 MG/DL (ref 1.6–2.6)
MCH RBC QN AUTO: 30.6 PG (ref 26.8–34.3)
MCHC RBC AUTO-ENTMCNC: 30.7 G/DL (ref 31.4–37.4)
MCV RBC AUTO: 99 FL (ref 82–98)
P AXIS: 75 DEGREES
PLATELET # BLD AUTO: 166 THOUSANDS/UL (ref 149–390)
PMV BLD AUTO: 10.2 FL (ref 8.9–12.7)
POTASSIUM SERPL-SCNC: 3.4 MMOL/L (ref 3.5–5.3)
PR INTERVAL: 174 MS
QRS AXIS: 71 DEGREES
QRSD INTERVAL: 80 MS
QT INTERVAL: 328 MS
QTC INTERVAL: 416 MS
RBC # BLD AUTO: 3.5 MILLION/UL (ref 3.81–5.12)
SODIUM SERPL-SCNC: 138 MMOL/L (ref 136–145)
T WAVE AXIS: 49 DEGREES
VENTRICULAR RATE: 97 BPM
WBC # BLD AUTO: 8.41 THOUSAND/UL (ref 4.31–10.16)

## 2020-05-28 PROCEDURE — 80048 BASIC METABOLIC PNL TOTAL CA: CPT | Performed by: INTERNAL MEDICINE

## 2020-05-28 PROCEDURE — 97167 OT EVAL HIGH COMPLEX 60 MIN: CPT

## 2020-05-28 PROCEDURE — 99024 POSTOP FOLLOW-UP VISIT: CPT | Performed by: PHYSICIAN ASSISTANT

## 2020-05-28 PROCEDURE — 93010 ELECTROCARDIOGRAM REPORT: CPT | Performed by: INTERNAL MEDICINE

## 2020-05-28 PROCEDURE — 85027 COMPLETE CBC AUTOMATED: CPT | Performed by: INTERNAL MEDICINE

## 2020-05-28 PROCEDURE — 2W3DX1Z IMMOBILIZATION OF LEFT LOWER ARM USING SPLINT: ICD-10-PCS | Performed by: EMERGENCY MEDICINE

## 2020-05-28 PROCEDURE — 83735 ASSAY OF MAGNESIUM: CPT | Performed by: INTERNAL MEDICINE

## 2020-05-28 PROCEDURE — 99219 PR INITIAL OBSERVATION CARE/DAY 50 MINUTES: CPT | Performed by: INTERNAL MEDICINE

## 2020-05-28 PROCEDURE — 36415 COLL VENOUS BLD VENIPUNCTURE: CPT | Performed by: INTERNAL MEDICINE

## 2020-05-28 PROCEDURE — 97163 PT EVAL HIGH COMPLEX 45 MIN: CPT

## 2020-05-28 RX ORDER — AMLODIPINE BESYLATE 5 MG/1
5 TABLET ORAL DAILY
Status: DISCONTINUED | OUTPATIENT
Start: 2020-05-28 | End: 2020-05-28

## 2020-05-28 RX ORDER — CYCLOSPORINE 0.5 MG/ML
1 EMULSION OPHTHALMIC 2 TIMES DAILY
COMMUNITY

## 2020-05-28 RX ORDER — ONDANSETRON 2 MG/ML
4 INJECTION INTRAMUSCULAR; INTRAVENOUS EVERY 6 HOURS PRN
Status: DISCONTINUED | OUTPATIENT
Start: 2020-05-28 | End: 2020-05-29 | Stop reason: HOSPADM

## 2020-05-28 RX ORDER — ACYCLOVIR 400 MG/1
400 TABLET ORAL 2 TIMES DAILY
COMMUNITY
Start: 2020-05-19 | End: 2020-06-16

## 2020-05-28 RX ORDER — ACETAMINOPHEN 325 MG/1
650 TABLET ORAL EVERY 6 HOURS PRN
Status: DISCONTINUED | OUTPATIENT
Start: 2020-05-28 | End: 2020-05-28

## 2020-05-28 RX ORDER — LOSARTAN POTASSIUM 25 MG/1
25 TABLET ORAL DAILY
Status: DISCONTINUED | OUTPATIENT
Start: 2020-05-28 | End: 2020-05-29 | Stop reason: HOSPADM

## 2020-05-28 RX ORDER — ASPIRIN 81 MG/1
81 TABLET, CHEWABLE ORAL DAILY
Status: DISCONTINUED | OUTPATIENT
Start: 2020-05-28 | End: 2020-05-29 | Stop reason: HOSPADM

## 2020-05-28 RX ORDER — MINERAL OIL AND PETROLATUM 150; 830 MG/G; MG/G
OINTMENT OPHTHALMIC
Status: DISCONTINUED | OUTPATIENT
Start: 2020-05-28 | End: 2020-05-29

## 2020-05-28 RX ORDER — METOPROLOL TARTRATE 50 MG/1
50 TABLET, FILM COATED ORAL DAILY
Status: DISCONTINUED | OUTPATIENT
Start: 2020-05-29 | End: 2020-05-29 | Stop reason: HOSPADM

## 2020-05-28 RX ORDER — AMLODIPINE BESYLATE 5 MG/1
5 TABLET ORAL EVERY EVENING
Status: DISCONTINUED | OUTPATIENT
Start: 2020-05-28 | End: 2020-05-29 | Stop reason: HOSPADM

## 2020-05-28 RX ORDER — FERROUS SULFATE 325(65) MG
325 TABLET ORAL
Status: DISCONTINUED | OUTPATIENT
Start: 2020-05-28 | End: 2020-05-29 | Stop reason: HOSPADM

## 2020-05-28 RX ORDER — METOPROLOL TARTRATE 50 MG/1
50 TABLET, FILM COATED ORAL 2 TIMES DAILY
Status: DISCONTINUED | OUTPATIENT
Start: 2020-05-28 | End: 2020-05-28

## 2020-05-28 RX ORDER — ACETAMINOPHEN 325 MG/1
975 TABLET ORAL EVERY 8 HOURS SCHEDULED
Status: DISCONTINUED | OUTPATIENT
Start: 2020-05-28 | End: 2020-05-29 | Stop reason: HOSPADM

## 2020-05-28 RX ORDER — ACYCLOVIR 400 MG/1
400 TABLET ORAL DAILY
COMMUNITY
End: 2021-04-29

## 2020-05-28 RX ORDER — DORZOLAMIDE HYDROCHLORIDE AND TIMOLOL MALEATE 20; 5 MG/ML; MG/ML
1 SOLUTION/ DROPS OPHTHALMIC 2 TIMES DAILY
Status: DISCONTINUED | OUTPATIENT
Start: 2020-05-28 | End: 2020-05-29 | Stop reason: HOSPADM

## 2020-05-28 RX ORDER — LATANOPROST 50 UG/ML
1 SOLUTION/ DROPS OPHTHALMIC
Status: DISCONTINUED | OUTPATIENT
Start: 2020-05-28 | End: 2020-05-29 | Stop reason: HOSPADM

## 2020-05-28 RX ORDER — FENOFIBRATE 145 MG/1
145 TABLET, COATED ORAL DAILY
Status: DISCONTINUED | OUTPATIENT
Start: 2020-05-28 | End: 2020-05-29 | Stop reason: HOSPADM

## 2020-05-28 RX ORDER — VIT C/E/CUPERIC/ZINC/LUTEIN 226-90-0.8
1 CAPSULE ORAL 2 TIMES DAILY
COMMUNITY

## 2020-05-28 RX ORDER — POLYVINYL ALCOHOL 14 MG/ML
1 SOLUTION/ DROPS OPHTHALMIC 4 TIMES DAILY
COMMUNITY
End: 2021-09-17

## 2020-05-28 RX ADMIN — ENOXAPARIN SODIUM 40 MG: 40 INJECTION SUBCUTANEOUS at 11:00

## 2020-05-28 RX ADMIN — ACETAMINOPHEN 975 MG: 325 TABLET ORAL at 13:44

## 2020-05-28 RX ADMIN — ACETAMINOPHEN 975 MG: 325 TABLET ORAL at 22:28

## 2020-05-28 RX ADMIN — FERROUS SULFATE TAB 325 MG (65 MG ELEMENTAL FE) 325 MG: 325 (65 FE) TAB at 11:00

## 2020-05-28 RX ADMIN — DORZOLAMIDE HYDROCHLORIDE AND TIMOLOL MALEATE 1 DROP: 20; 5 SOLUTION/ DROPS OPHTHALMIC at 17:28

## 2020-05-28 RX ADMIN — ACETAMINOPHEN 975 MG: 325 TABLET ORAL at 10:59

## 2020-05-28 RX ADMIN — LATANOPROST 1 DROP: 50 SOLUTION OPHTHALMIC at 22:28

## 2020-05-28 RX ADMIN — FENOFIBRATE 145 MG: 145 TABLET, FILM COATED ORAL at 11:00

## 2020-05-28 RX ADMIN — VITAM B12 100 MCG: 100 TAB at 11:00

## 2020-05-28 RX ADMIN — LOSARTAN POTASSIUM 25 MG: 25 TABLET, FILM COATED ORAL at 11:01

## 2020-05-28 RX ADMIN — ASPIRIN 81 MG 81 MG: 81 TABLET ORAL at 10:59

## 2020-05-28 RX ADMIN — DORZOLAMIDE HYDROCHLORIDE AND TIMOLOL MALEATE 1 DROP: 20; 5 SOLUTION/ DROPS OPHTHALMIC at 11:00

## 2020-05-28 RX ADMIN — WHITE PETROLATUM 57.7 %-MINERAL OIL 31.9 % EYE OINTMENT: at 22:27

## 2020-05-28 RX ADMIN — AMLODIPINE BESYLATE 5 MG: 5 TABLET ORAL at 17:28

## 2020-05-28 RX ADMIN — Medication 1 TABLET: at 11:01

## 2020-05-29 VITALS
WEIGHT: 125 LBS | TEMPERATURE: 98.1 F | SYSTOLIC BLOOD PRESSURE: 148 MMHG | BODY MASS INDEX: 24.41 KG/M2 | HEART RATE: 89 BPM | RESPIRATION RATE: 18 BRPM | OXYGEN SATURATION: 99 % | DIASTOLIC BLOOD PRESSURE: 78 MMHG

## 2020-05-29 PROBLEM — G31.84 MILD COGNITIVE IMPAIRMENT WITH MEMORY LOSS: Chronic | Status: ACTIVE | Noted: 2020-05-29

## 2020-05-29 LAB — SARS-COV-2 RNA RESP QL NAA+PROBE: NEGATIVE

## 2020-05-29 PROCEDURE — 99238 HOSP IP/OBS DSCHRG MGMT 30/<: CPT | Performed by: FAMILY MEDICINE

## 2020-05-29 PROCEDURE — U0003 INFECTIOUS AGENT DETECTION BY NUCLEIC ACID (DNA OR RNA); SEVERE ACUTE RESPIRATORY SYNDROME CORONAVIRUS 2 (SARS-COV-2) (CORONAVIRUS DISEASE [COVID-19]), AMPLIFIED PROBE TECHNIQUE, MAKING USE OF HIGH THROUGHPUT TECHNOLOGIES AS DESCRIBED BY CMS-2020-01-R: HCPCS | Performed by: FAMILY MEDICINE

## 2020-05-29 RX ORDER — ACYCLOVIR 800 MG/1
400 TABLET ORAL 2 TIMES DAILY
Status: DISCONTINUED | OUTPATIENT
Start: 2020-05-29 | End: 2020-05-29 | Stop reason: HOSPADM

## 2020-05-29 RX ORDER — ACYCLOVIR 800 MG/1
400 TABLET ORAL DAILY
Status: DISCONTINUED | OUTPATIENT
Start: 2020-05-30 | End: 2020-05-29

## 2020-05-29 RX ORDER — POLYVINYL ALCOHOL 14 MG/ML
1 SOLUTION/ DROPS OPHTHALMIC EVERY 12 HOURS SCHEDULED
Status: DISCONTINUED | OUTPATIENT
Start: 2020-05-29 | End: 2020-05-29

## 2020-05-29 RX ADMIN — Medication 1 TABLET: at 08:14

## 2020-05-29 RX ADMIN — DORZOLAMIDE HYDROCHLORIDE AND TIMOLOL MALEATE 1 DROP: 20; 5 SOLUTION/ DROPS OPHTHALMIC at 17:41

## 2020-05-29 RX ADMIN — ACETAMINOPHEN 975 MG: 325 TABLET ORAL at 13:18

## 2020-05-29 RX ADMIN — LOSARTAN POTASSIUM 25 MG: 25 TABLET, FILM COATED ORAL at 08:14

## 2020-05-29 RX ADMIN — FERROUS SULFATE TAB 325 MG (65 MG ELEMENTAL FE) 325 MG: 325 (65 FE) TAB at 08:14

## 2020-05-29 RX ADMIN — AMLODIPINE BESYLATE 5 MG: 5 TABLET ORAL at 17:41

## 2020-05-29 RX ADMIN — METOPROLOL TARTRATE 50 MG: 50 TABLET, FILM COATED ORAL at 08:18

## 2020-05-29 RX ADMIN — DORZOLAMIDE HYDROCHLORIDE AND TIMOLOL MALEATE 1 DROP: 20; 5 SOLUTION/ DROPS OPHTHALMIC at 08:15

## 2020-05-29 RX ADMIN — FENOFIBRATE 145 MG: 145 TABLET, FILM COATED ORAL at 08:15

## 2020-05-29 RX ADMIN — ACETAMINOPHEN 975 MG: 325 TABLET ORAL at 06:01

## 2020-05-29 RX ADMIN — ASPIRIN 81 MG 81 MG: 81 TABLET ORAL at 08:14

## 2020-05-29 RX ADMIN — VITAM B12 100 MCG: 100 TAB at 08:14

## 2020-05-29 RX ADMIN — ENOXAPARIN SODIUM 40 MG: 40 INJECTION SUBCUTANEOUS at 08:15

## 2020-06-01 ENCOUNTER — TELEPHONE (OUTPATIENT)
Dept: INTERNAL MEDICINE CLINIC | Facility: CLINIC | Age: 85
End: 2020-06-01

## 2020-06-03 ENCOUNTER — TELEPHONE (OUTPATIENT)
Dept: OBGYN CLINIC | Facility: MEDICAL CENTER | Age: 85
End: 2020-06-03

## 2020-06-03 ENCOUNTER — OFFICE VISIT (OUTPATIENT)
Dept: GERIATRICS | Facility: REHABILITATION | Age: 85
End: 2020-06-03
Payer: MEDICARE

## 2020-06-03 VITALS
RESPIRATION RATE: 20 BRPM | TEMPERATURE: 97.7 F | OXYGEN SATURATION: 98 % | SYSTOLIC BLOOD PRESSURE: 126 MMHG | BODY MASS INDEX: 25.82 KG/M2 | WEIGHT: 132.2 LBS | HEART RATE: 68 BPM | DIASTOLIC BLOOD PRESSURE: 58 MMHG

## 2020-06-03 DIAGNOSIS — S52.202D: Primary | ICD-10-CM

## 2020-06-03 DIAGNOSIS — E78.01 FAMILIAL HYPERCHOLESTEROLEMIA: ICD-10-CM

## 2020-06-03 DIAGNOSIS — B02.33 HERPES ZOSTER KERATOCONJUNCTIVITIS: ICD-10-CM

## 2020-06-03 DIAGNOSIS — D50.8 IRON DEFICIENCY ANEMIA SECONDARY TO INADEQUATE DIETARY IRON INTAKE: ICD-10-CM

## 2020-06-03 PROCEDURE — 99496 TRANSJ CARE MGMT HIGH F2F 7D: CPT | Performed by: FAMILY MEDICINE

## 2020-06-04 ENCOUNTER — TELEPHONE (OUTPATIENT)
Dept: OBGYN CLINIC | Facility: HOSPITAL | Age: 85
End: 2020-06-04

## 2020-06-04 ENCOUNTER — OFFICE VISIT (OUTPATIENT)
Dept: OBGYN CLINIC | Facility: MEDICAL CENTER | Age: 85
End: 2020-06-04
Payer: MEDICARE

## 2020-06-04 VITALS
TEMPERATURE: 97.8 F | HEART RATE: 63 BPM | BODY MASS INDEX: 24.18 KG/M2 | SYSTOLIC BLOOD PRESSURE: 167 MMHG | HEIGHT: 62 IN | DIASTOLIC BLOOD PRESSURE: 68 MMHG

## 2020-06-04 DIAGNOSIS — S52.292A OTHER CLOSED FRACTURE OF SHAFT OF LEFT ULNA, INITIAL ENCOUNTER: Primary | ICD-10-CM

## 2020-06-04 PROCEDURE — 4040F PNEUMOC VAC/ADMIN/RCVD: CPT | Performed by: ORTHOPAEDIC SURGERY

## 2020-06-04 PROCEDURE — 1160F RVW MEDS BY RX/DR IN RCRD: CPT | Performed by: ORTHOPAEDIC SURGERY

## 2020-06-04 PROCEDURE — 1111F DSCHRG MED/CURRENT MED MERGE: CPT | Performed by: ORTHOPAEDIC SURGERY

## 2020-06-04 PROCEDURE — 3079F DIAST BP 80-89 MM HG: CPT | Performed by: ORTHOPAEDIC SURGERY

## 2020-06-04 PROCEDURE — 3075F SYST BP GE 130 - 139MM HG: CPT | Performed by: ORTHOPAEDIC SURGERY

## 2020-06-04 PROCEDURE — 1036F TOBACCO NON-USER: CPT | Performed by: ORTHOPAEDIC SURGERY

## 2020-06-04 PROCEDURE — 99214 OFFICE O/P EST MOD 30 MIN: CPT | Performed by: ORTHOPAEDIC SURGERY

## 2020-06-04 PROCEDURE — 25530 CLTX ULNAR SHFT FX W/O MNPJ: CPT | Performed by: ORTHOPAEDIC SURGERY

## 2020-06-16 ENCOUNTER — TELEPHONE (OUTPATIENT)
Dept: OBGYN CLINIC | Facility: HOSPITAL | Age: 85
End: 2020-06-16

## 2020-06-16 ENCOUNTER — OFFICE VISIT (OUTPATIENT)
Dept: OBGYN CLINIC | Facility: HOSPITAL | Age: 85
End: 2020-06-16
Payer: MEDICARE

## 2020-06-16 VITALS
DIASTOLIC BLOOD PRESSURE: 80 MMHG | SYSTOLIC BLOOD PRESSURE: 139 MMHG | HEART RATE: 64 BPM | HEIGHT: 62 IN | BODY MASS INDEX: 24.18 KG/M2

## 2020-06-16 DIAGNOSIS — M25.362 UNSTABLE KNEE, LEFT: ICD-10-CM

## 2020-06-16 DIAGNOSIS — G89.29 CHRONIC PAIN OF LEFT KNEE: ICD-10-CM

## 2020-06-16 DIAGNOSIS — M17.11 PRIMARY OSTEOARTHRITIS OF RIGHT KNEE: Primary | ICD-10-CM

## 2020-06-16 DIAGNOSIS — M25.562 CHRONIC PAIN OF LEFT KNEE: ICD-10-CM

## 2020-06-16 PROCEDURE — 20610 DRAIN/INJ JOINT/BURSA W/O US: CPT | Performed by: PHYSICIAN ASSISTANT

## 2020-06-16 PROCEDURE — 1111F DSCHRG MED/CURRENT MED MERGE: CPT | Performed by: ORTHOPAEDIC SURGERY

## 2020-06-16 PROCEDURE — 99212 OFFICE O/P EST SF 10 MIN: CPT | Performed by: PHYSICIAN ASSISTANT

## 2020-06-16 PROCEDURE — 3075F SYST BP GE 130 - 139MM HG: CPT | Performed by: ORTHOPAEDIC SURGERY

## 2020-06-16 PROCEDURE — 1036F TOBACCO NON-USER: CPT | Performed by: ORTHOPAEDIC SURGERY

## 2020-06-16 PROCEDURE — 3079F DIAST BP 80-89 MM HG: CPT | Performed by: ORTHOPAEDIC SURGERY

## 2020-06-16 PROCEDURE — 4040F PNEUMOC VAC/ADMIN/RCVD: CPT | Performed by: ORTHOPAEDIC SURGERY

## 2020-06-16 RX ORDER — METOPROLOL SUCCINATE 50 MG/1
TABLET, EXTENDED RELEASE ORAL
COMMUNITY
Start: 2020-06-05 | End: 2022-02-07 | Stop reason: HOSPADM

## 2020-06-16 RX ORDER — BETAMETHASONE SODIUM PHOSPHATE AND BETAMETHASONE ACETATE 3; 3 MG/ML; MG/ML
12 INJECTION, SUSPENSION INTRA-ARTICULAR; INTRALESIONAL; INTRAMUSCULAR; SOFT TISSUE
Status: COMPLETED | OUTPATIENT
Start: 2020-06-16 | End: 2020-06-16

## 2020-06-16 RX ORDER — LIDOCAINE HYDROCHLORIDE 10 MG/ML
2 INJECTION, SOLUTION INFILTRATION; PERINEURAL
Status: COMPLETED | OUTPATIENT
Start: 2020-06-16 | End: 2020-06-16

## 2020-06-16 RX ORDER — FENOFIBRATE 145 MG/1
145 TABLET, COATED ORAL
COMMUNITY
End: 2021-09-17

## 2020-06-16 RX ORDER — BUPIVACAINE HYDROCHLORIDE 2.5 MG/ML
2 INJECTION, SOLUTION INFILTRATION; PERINEURAL
Status: COMPLETED | OUTPATIENT
Start: 2020-06-16 | End: 2020-06-16

## 2020-06-16 RX ADMIN — LIDOCAINE HYDROCHLORIDE 2 ML: 10 INJECTION, SOLUTION INFILTRATION; PERINEURAL at 12:15

## 2020-06-16 RX ADMIN — BUPIVACAINE HYDROCHLORIDE 2 ML: 2.5 INJECTION, SOLUTION INFILTRATION; PERINEURAL at 12:15

## 2020-06-16 RX ADMIN — BETAMETHASONE SODIUM PHOSPHATE AND BETAMETHASONE ACETATE 12 MG: 3; 3 INJECTION, SUSPENSION INTRA-ARTICULAR; INTRALESIONAL; INTRAMUSCULAR; SOFT TISSUE at 12:15

## 2020-07-02 ENCOUNTER — OFFICE VISIT (OUTPATIENT)
Dept: GERIATRICS | Facility: REHABILITATION | Age: 85
End: 2020-07-02
Payer: MEDICARE

## 2020-07-02 VITALS
DIASTOLIC BLOOD PRESSURE: 70 MMHG | HEART RATE: 68 BPM | TEMPERATURE: 97.2 F | RESPIRATION RATE: 20 BRPM | BODY MASS INDEX: 23.59 KG/M2 | SYSTOLIC BLOOD PRESSURE: 162 MMHG | OXYGEN SATURATION: 98 % | WEIGHT: 129 LBS

## 2020-07-02 DIAGNOSIS — S52.202D: ICD-10-CM

## 2020-07-02 DIAGNOSIS — R26.2 AMBULATORY DYSFUNCTION: ICD-10-CM

## 2020-07-02 DIAGNOSIS — G31.84 MILD COGNITIVE IMPAIRMENT WITH MEMORY LOSS: Chronic | ICD-10-CM

## 2020-07-02 DIAGNOSIS — I10 ESSENTIAL HYPERTENSION: Primary | ICD-10-CM

## 2020-07-02 PROCEDURE — 3077F SYST BP >= 140 MM HG: CPT | Performed by: FAMILY MEDICINE

## 2020-07-02 PROCEDURE — 1111F DSCHRG MED/CURRENT MED MERGE: CPT | Performed by: FAMILY MEDICINE

## 2020-07-02 PROCEDURE — 1036F TOBACCO NON-USER: CPT | Performed by: FAMILY MEDICINE

## 2020-07-02 PROCEDURE — 99214 OFFICE O/P EST MOD 30 MIN: CPT | Performed by: FAMILY MEDICINE

## 2020-07-02 PROCEDURE — 4040F PNEUMOC VAC/ADMIN/RCVD: CPT | Performed by: FAMILY MEDICINE

## 2020-07-02 PROCEDURE — 1160F RVW MEDS BY RX/DR IN RCRD: CPT | Performed by: FAMILY MEDICINE

## 2020-07-02 PROCEDURE — 3078F DIAST BP <80 MM HG: CPT | Performed by: FAMILY MEDICINE

## 2020-07-02 NOTE — ASSESSMENT & PLAN NOTE
BP elevated today, repeated and still high 162/70  Will continue amlodipine, metoprolol at current dose and increase losartan to 50 mg daily

## 2020-07-02 NOTE — PROGRESS NOTES
Assessment/Plan:    Essential hypertension  BP elevated today, repeated and still high 162/70  Will continue amlodipine, metoprolol at current dose and increase losartan to 50 mg daily  Aftercare for healing traumatic closed fracture of left ulna  Wearing a cast currently  Pain is well controlled  Will follow up with Ortho    Ambulatory dysfunction  Using wheelchair for ambulation  No recent falls  Continue PT/OT  Mild cognitive impairment with memory loss  Stable, continue supportive treatment  Reorient as needed  No behaviors noted  Maintain sleep/wake cycle  Subjective:     Annabelle Toussaint is a 80 y o  female who presented to the office today for follow up  Currently feels well, denies CP, SOB, cough, fever, chills  No recent falls, appetite is preserved   She is looking forward to remove left wrist cast  Denies insomnia  Review of Systems   Constitutional: Negative for chills and fever  HENT: Positive for dental problem and hearing loss  Negative for congestion and rhinorrhea  Eyes: Positive for visual disturbance  Respiratory: Negative for cough, shortness of breath and wheezing  Cardiovascular: Negative for chest pain, palpitations and leg swelling  Gastrointestinal: Negative for abdominal pain and constipation  Endocrine: Negative for cold intolerance  Genitourinary: Negative for difficulty urinating, dysuria and hematuria  Musculoskeletal: Positive for gait problem  Cast left wrist   Skin: Negative for wound  Allergic/Immunologic: Negative for environmental allergies  Neurological: Negative for dizziness and seizures  Hematological: Does not bruise/bleed easily  Psychiatric/Behavioral: Negative for behavioral problems and sleep disturbance         Past Medical History:   Diagnosis Date    CAD (coronary artery disease)     Hypertension     Mild cognitive impairment with memory loss 5/29/2020    Osteoarthritis     Osteoporosis     Vitamin D deficiency            Objective:    /70   Pulse 68   Temp (!) 97 2 °F (36 2 °C)   Resp 20   Wt 58 5 kg (129 lb)   SpO2 98%   BMI 23 59 kg/m²     Physical Exam   Constitutional: She is oriented to person, place, and time  No distress  HENT:   Head: Normocephalic and atraumatic  [place]   Eyes: Pupils are equal, round, and reactive to light  EOM are normal  Right eye exhibits no discharge  Left eye exhibits no discharge  Neck: Normal range of motion  Neck supple  Cardiovascular: Normal rate, regular rhythm and normal heart sounds  No murmur heard  Pulmonary/Chest: Effort normal  No respiratory distress  She has no wheezes  Decreased breath sounds at basis   Abdominal: Soft  There is no tenderness  There is no rebound and no guarding  Musculoskeletal: She exhibits no edema  Cast left wrist   Neurological: She is alert and oriented to person, place, and time  Skin: Skin is warm and dry  She is not diaphoretic  Psychiatric: Her behavior is normal    Nursing note and vitals reviewed        Mahi Aviles MD  07/02/20  2:52 PM

## 2020-07-02 NOTE — ASSESSMENT & PLAN NOTE
Stable, continue supportive treatment  Reorient as needed  No behaviors noted  Maintain sleep/wake cycle

## 2020-07-14 ENCOUNTER — HOSPITAL ENCOUNTER (OUTPATIENT)
Dept: RADIOLOGY | Facility: HOSPITAL | Age: 85
Discharge: HOME | End: 2020-07-14
Attending: ORTHOPAEDIC SURGERY
Payer: MEDICARE

## 2020-07-14 ENCOUNTER — TELEPHONE (OUTPATIENT)
Dept: OBGYN CLINIC | Facility: HOSPITAL | Age: 85
End: 2020-07-14

## 2020-07-14 ENCOUNTER — OFFICE VISIT (OUTPATIENT)
Dept: OBGYN CLINIC | Facility: HOSPITAL | Age: 85
End: 2020-07-14

## 2020-07-14 VITALS — HEIGHT: 62 IN | BODY MASS INDEX: 23.59 KG/M2

## 2020-07-14 DIAGNOSIS — Z09 FRACTURE FOLLOW-UP: ICD-10-CM

## 2020-07-14 DIAGNOSIS — T14.8XXA FRACTURE: Primary | ICD-10-CM

## 2020-07-14 DIAGNOSIS — T14.8XXA FRACTURE: ICD-10-CM

## 2020-07-14 DIAGNOSIS — S52.615D CLOSED NONDISPLACED FRACTURE OF STYLOID PROCESS OF LEFT ULNA WITH ROUTINE HEALING, SUBSEQUENT ENCOUNTER: ICD-10-CM

## 2020-07-14 PROCEDURE — 4040F PNEUMOC VAC/ADMIN/RCVD: CPT | Performed by: ORTHOPAEDIC SURGERY

## 2020-07-14 PROCEDURE — 1111F DSCHRG MED/CURRENT MED MERGE: CPT | Performed by: ORTHOPAEDIC SURGERY

## 2020-07-14 PROCEDURE — 3077F SYST BP >= 140 MM HG: CPT | Performed by: ORTHOPAEDIC SURGERY

## 2020-07-14 PROCEDURE — 99024 POSTOP FOLLOW-UP VISIT: CPT | Performed by: ORTHOPAEDIC SURGERY

## 2020-07-14 PROCEDURE — 3078F DIAST BP <80 MM HG: CPT | Performed by: ORTHOPAEDIC SURGERY

## 2020-07-14 PROCEDURE — 73110 X-RAY EXAM OF WRIST: CPT

## 2020-07-14 PROCEDURE — 1160F RVW MEDS BY RX/DR IN RCRD: CPT | Performed by: ORTHOPAEDIC SURGERY

## 2020-07-14 NOTE — TELEPHONE ENCOUNTER
Dr Jam Miller called in, asking for an update of what happened during the patient's appt  Was her cast removed?        #[phone]

## 2020-07-14 NOTE — PROGRESS NOTES
Assessment:  1  Fracture  XR wrist 3+ vw left   2  Fracture follow-up     3  Closed nondisplaced fracture of styloid process of left ulna with routine healing, subsequent encounter         Plan:  The patient displays evidence of healing  She should initiate physical therapy for ROM of left wrist   She should follow up as needed  To do next visit:  Return if symptoms worsen or fail to improve  The above stated was discussed in layman's terms and the patient expressed understanding  All questions were answered to the patient's satisfaction  Scribe Attestation    I,:   Casey Rodriguez am acting as a scribe while in the presence of the attending physician :        I,:   Matt Dueñas MD personally performed the services described in this documentation    as scribed in my presence :              Subjective:   Sarah Hernandez is a 80 y o  female who presents for follow up of left wrist fracture sustained about 6 weeks ago  Today she has no left wrist pain complaints yet mentions she is stiff  Review of systems negative unless otherwise specified in HPI    Past Medical History:   Diagnosis Date    CAD (coronary artery disease)     Hypertension     Mild cognitive impairment with memory loss 5/29/2020    Osteoarthritis     Osteoporosis     Vitamin D deficiency        Past Surgical History:   Procedure Laterality Date    APPENDECTOMY      HYSTERECTOMY      [place] PARTIAL HIP REPLACEMENT Left 8/13/2018    Procedure: HEMIARTHROPLASTY HIP (BIPOLAR);   Surgeon: Matt Dueñas MD;  Location[facility];  Service: Orthopedics    ROTATOR CUFF REPAIR         Family History   Problem Relation Age of Onset    Diabetes type II Father     Heart disease Brother        Social History     Occupational History    Not on file   Tobacco Use    Smoking status: Former Smoker    Smokeless tobacco: Never Used   Substance and Sexual Activity    Alcohol use: Not Currently    Drug use: No    Sexual activity: Not on file         Current Outpatient Medications:     acetaminophen (TYLENOL) 325 mg tablet, Take 650 mg by mouth 3 (three) times a day , Disp: , Rfl:     acyclovir (ZOVIRAX) 400 MG tablet, Take 400 mg by mouth daily 5/19-8/31, Disp: , Rfl:     amLODIPine (NORVASC) 5 mg tablet, Take 1 tablet (5 mg total) by mouth daily (Patient taking differently: Take 5 mg by mouth every evening ), Disp: , Rfl: 0    aspirin 81 MG tablet, Take 1 tablet by mouth daily, Disp: , Rfl:     calcitonin, salmon, (MIACALCIN) 200 units/act nasal spray, 1 spray and to alternate nostril q day (Patient taking differently: 1 spray into each nostril daily Alternate left and right nostril every other day), Disp: 1 Act, Rfl: 5    calcium carbonate (TUMS) 500 mg chewable tablet, Chew 1 tablet continuous as needed for indigestion or heartburn, Disp: , Rfl:     cimetidine (TAGAMET) 400 mg tablet, Take 1 tablet (400 mg total) by mouth 2 (two) times a day, Disp: 60 tablet, Rfl: 5    conjugated estrogens (PREMARIN) vaginal cream, Insert 1 g into the vagina daily, Disp: , Rfl:     Cyanocobalamin (VITAMIN B12) 1000 MCG TBCR, Take 1 tablet by mouth daily , Disp: , Rfl:     cycloSPORINE (RESTASIS) 0 05 % ophthalmic emulsion, Administer 1 drop to both eyes 2 (two) times a day, Disp: , Rfl:     D-Mannose 500 MG CAPS, Take 1 tablet by mouth daily, Disp: , Rfl:     dorzolamide-timolol (COSOPT) 22 3-6 8 MG/ML ophthalmic solution, INSTILL 1 DROP INTO LEFT EYE TWICE A DAY APPROXIMATELY 12 HOURS APART, Disp: , Rfl: 2    fenofibrate (TRICOR) 145 mg tablet, Take 145 mg by mouth, Disp: , Rfl:     ferrous sulfate 324 (65 Fe) mg, Take 1 tablet (324 mg total) by mouth daily before breakfast, Disp: 30 tablet, Rfl: 4    Ketotifen Fumarate (REFRESH EYE ITCH RELIEF OP), Administer 1 drop into the left eye every 2 (two) hours, Disp: , Rfl:     latanoprost (XALATAN) 0 005 % ophthalmic solution, INSTILL 1 DROP IN THE AFFECTED EYE(S) IN THE EVENING, Disp: , Rfl: 1   losartan (COZAAR) 25 mg tablet, TAKE 1 TABLET BY MOUTH EVERY DAY, Disp: 30 tablet, Rfl: 1    metoprolol succinate (TOPROL-XL) 50 mg 24 hr tablet, , Disp: , Rfl:     metoprolol tartrate (LOPRESSOR) 50 mg tablet, TAKE 1 TABLET TWICE DAILY (Patient taking differently: Take 50 mg by mouth daily ), Disp: 60 tablet, Rfl: 5    Multiple Vitamins-Minerals (PRESERVISION/LUTEIN) CAPS, Take 1 capsule by mouth 2 (two) times a day, Disp: , Rfl:     polyvinyl alcohol (LIQUIFILM TEARS) 1 4 % ophthalmic solution, Administer 1 drop to both eyes 4 (four) times a day, Disp: , Rfl:     Pyridoxine HCl (VITAMIN B6) 200 MG TABS, Take 0 5 tablets by mouth daily , Disp: , Rfl:     No Known Allergies         Vitals:       Objective:  Physical exam  · General: Awake, Alert, Oriented  · Eyes: Pupils equal, round and reactive to light  · Heart: regular rate and rhythm  · Lungs: No audible wheezing  · Abdomen: soft                    Ortho Exam   Left wrist:  Cast removed for exam  No tenderness along distal ulna and radius  Mild stiffness with motion of wrist  Sensation intact      Diagnostics, reviewed and taken today if performed as documented: The attending physician has personally reviewed the pertinent films in PACS and interpretation is as follows:  Left wrist x-ray:  Well aligned distal radial fracture with evidence of healing  Procedures, if performed today:    Procedures    None performed      Portions of the record may have been created with voice recognition software  Occasional wrong word or "sound a like" substitutions may have occurred due to the inherent limitations of voice recognition software  Read the chart carefully and recognize, using context, where substitutions have occurred

## 2020-07-14 NOTE — TELEPHONE ENCOUNTER
Fritz Magdaleno given above information  He wondered if Dr Dary Martinez a brace post cast   I advised that  did not order one because after cast he would like her to get her strength back and a brace will impede the use and her strength  She should call office if wrist pain fails to improve or worsens  Please advise

## 2020-07-14 NOTE — TELEPHONE ENCOUNTER
This patient had her cast removed    Fracture is healing quite nicely  Please call patient's son inform of above

## 2020-07-17 NOTE — TELEPHONE ENCOUNTER
Javon Alcantara has been advised of above  Advised that patient is WBAT with the fracture healing  F/u as needed  Patient has been using the wrist for getting out of the bed and using the walker

## 2020-07-17 NOTE — TELEPHONE ENCOUNTER
This patient has a healed wrist fracture  You may call the person at [facility] and extend this good news    She requires no additional care for this healed fracture of her left wrist

## 2020-07-17 NOTE — TELEPHONE ENCOUNTER
Trista Door PT  cb [phone]    Eli Zarate is calling to get information about the patient  Eli Zarate wants to know what is going on with her, she only knows that she has a fx  She would like to know wbs as well as other information  Please call Eli Zarate at the number above

## 2020-09-03 ENCOUNTER — OFFICE VISIT (OUTPATIENT)
Dept: GERIATRICS | Facility: REHABILITATION | Age: 85
End: 2020-09-03
Payer: MEDICARE

## 2020-09-03 VITALS
OXYGEN SATURATION: 98 % | WEIGHT: 137.2 LBS | BODY MASS INDEX: 25.09 KG/M2 | HEART RATE: 68 BPM | RESPIRATION RATE: 16 BRPM | DIASTOLIC BLOOD PRESSURE: 56 MMHG | SYSTOLIC BLOOD PRESSURE: 138 MMHG | TEMPERATURE: 97.5 F

## 2020-09-03 DIAGNOSIS — K21.00 GASTROESOPHAGEAL REFLUX DISEASE WITH ESOPHAGITIS: ICD-10-CM

## 2020-09-03 DIAGNOSIS — I10 ESSENTIAL HYPERTENSION: Primary | ICD-10-CM

## 2020-09-03 DIAGNOSIS — D62 ACUTE BLOOD LOSS ANEMIA: ICD-10-CM

## 2020-09-03 DIAGNOSIS — N18.30 CKD (CHRONIC KIDNEY DISEASE) STAGE 3, GFR 30-59 ML/MIN (HCC): ICD-10-CM

## 2020-09-03 PROCEDURE — 99214 OFFICE O/P EST MOD 30 MIN: CPT | Performed by: FAMILY MEDICINE

## 2020-09-03 RX ORDER — PANTOPRAZOLE SODIUM 20 MG/1
20 TABLET, DELAYED RELEASE ORAL DAILY
COMMUNITY

## 2020-09-03 NOTE — ASSESSMENT & PLAN NOTE
BP at goal today  Denies any symptoms  Continue same regimen  Continue to monitor    Goal for her age and comorbidity will be BP<150/90

## 2020-09-03 NOTE — PROGRESS NOTES
Assessment/Plan:    Essential hypertension  BP at goal today  Denies any symptoms  Continue same regimen  Continue to monitor  Goal for her age and comorbidity will be BP<150/90    Gastroesophageal reflux disease with esophagitis  Will discontinue cimetidine and start protonix 20 mg daily before breakfast   Stable currently    CKD (chronic kidney disease) stage 3, GFR 30-59 ml/min ([facility])  Creatinine stable  Will avoid nephrotoxic medication  Encourage po hydration  Will monitor BMP  Acute blood loss anemia  Continue iron supplements  Denies symptoms currently  Will continue to monitor CBC and signs of bleeding  Subjective:     Flash Butts is a 80 y o  female who presented to the office today for follow up  States she is doing well, denies CP, SOB, cough, fever  Reports chronic joint pain intermittently, currently no pain  No recent falls, no difficulty sleeping, she states that her appetite is good  Denies headache, dizziness, changes in vision  Review of Systems   Constitutional: Negative for chills and fever  HENT: Positive for hearing loss  Negative for congestion and rhinorrhea  Eyes: Positive for visual disturbance  Respiratory: Negative for cough, shortness of breath and wheezing  Cardiovascular: Positive for leg swelling  Negative for chest pain and palpitations  Gastrointestinal: Negative for abdominal pain and constipation  Endocrine: Negative for cold intolerance  Genitourinary: Negative for difficulty urinating, dysuria and hematuria  Musculoskeletal: Positive for arthralgias and gait problem  Skin: Negative for wound  Allergic/Immunologic: Negative for environmental allergies  Neurological: Negative for dizziness and seizures  Hematological: Does not bruise/bleed easily  Psychiatric/Behavioral: Negative for behavioral problems and sleep disturbance         Past Medical History:   Diagnosis Date    CAD (coronary artery disease)     Hypertension     Mild cognitive impairment with memory loss 5/29/2020    Osteoarthritis     Osteoporosis     Vitamin D deficiency            Objective:    /56   Pulse 68   Temp 97 5 °F (36 4 °C)   Resp 16   Wt 62 2 kg (137 lb 3 2 oz)   SpO2 98%   BMI 25 09 kg/m²     Physical Exam  Vitals signs and nursing note reviewed  Constitutional:       General: She is not in acute distress  Appearance: She is not diaphoretic  HENT:      Head: Normocephalic and atraumatic  Ears:      Comments: [place]  Eyes:      General:         Right eye: No discharge  Left eye: No discharge  Pupils: Pupils are equal, round, and reactive to light  Comments: Wears glasses    Neck:      Musculoskeletal: Normal range of motion and neck supple  Cardiovascular:      Rate and Rhythm: Normal rate and regular rhythm  Heart sounds: Normal heart sounds  No murmur  Pulmonary:      Effort: Pulmonary effort is normal  No respiratory distress  Breath sounds: Normal breath sounds  No wheezing  Abdominal:      Palpations: Abdomen is soft  Tenderness: There is no abdominal tenderness  There is no guarding or rebound  Musculoskeletal:         General: No tenderness  Right lower leg: Edema present  Left lower leg: Edema present  Comments: Walker/wheelchair  Brace left LE   Skin:     General: Skin is warm and dry  Neurological:      Mental Status: She is alert  Mental status is at baseline        Comments: AAOx2   Psychiatric:         Behavior: Behavior normal          Aureliano Mosquera MD  09/03/20  12:05 PM

## 2020-09-03 NOTE — ASSESSMENT & PLAN NOTE
Continue iron supplements  Denies symptoms currently  Will continue to monitor CBC and signs of bleeding

## 2020-09-15 ENCOUNTER — TELEPHONE (OUTPATIENT)
Dept: OBGYN CLINIC | Facility: MEDICAL CENTER | Age: 85
End: 2020-09-15

## 2020-09-15 ENCOUNTER — OFFICE VISIT (OUTPATIENT)
Dept: OBGYN CLINIC | Facility: HOSPITAL | Age: 85
End: 2020-09-15
Payer: MEDICARE

## 2020-09-15 VITALS
HEIGHT: 62 IN | WEIGHT: 137 LBS | DIASTOLIC BLOOD PRESSURE: 89 MMHG | SYSTOLIC BLOOD PRESSURE: 122 MMHG | BODY MASS INDEX: 25.21 KG/M2 | HEART RATE: 71 BPM

## 2020-09-15 DIAGNOSIS — M25.561 CHRONIC PAIN OF BOTH KNEES: ICD-10-CM

## 2020-09-15 DIAGNOSIS — M17.0 PRIMARY OSTEOARTHRITIS OF BOTH KNEES: Primary | ICD-10-CM

## 2020-09-15 DIAGNOSIS — M25.562 CHRONIC PAIN OF BOTH KNEES: ICD-10-CM

## 2020-09-15 DIAGNOSIS — G89.29 CHRONIC PAIN OF BOTH KNEES: ICD-10-CM

## 2020-09-15 PROCEDURE — 99213 OFFICE O/P EST LOW 20 MIN: CPT | Performed by: ORTHOPAEDIC SURGERY

## 2020-09-15 PROCEDURE — 20610 DRAIN/INJ JOINT/BURSA W/O US: CPT | Performed by: ORTHOPAEDIC SURGERY

## 2020-09-15 RX ORDER — BETAMETHASONE SODIUM PHOSPHATE AND BETAMETHASONE ACETATE 3; 3 MG/ML; MG/ML
12 INJECTION, SUSPENSION INTRA-ARTICULAR; INTRALESIONAL; INTRAMUSCULAR; SOFT TISSUE
Status: COMPLETED | OUTPATIENT
Start: 2020-09-15 | End: 2020-09-15

## 2020-09-15 RX ORDER — LIDOCAINE HYDROCHLORIDE 10 MG/ML
2 INJECTION, SOLUTION INFILTRATION; PERINEURAL
Status: COMPLETED | OUTPATIENT
Start: 2020-09-15 | End: 2020-09-15

## 2020-09-15 RX ORDER — BUPIVACAINE HYDROCHLORIDE 2.5 MG/ML
2 INJECTION, SOLUTION INFILTRATION; PERINEURAL
Status: COMPLETED | OUTPATIENT
Start: 2020-09-15 | End: 2020-09-15

## 2020-09-15 RX ADMIN — BETAMETHASONE SODIUM PHOSPHATE AND BETAMETHASONE ACETATE 12 MG: 3; 3 INJECTION, SUSPENSION INTRA-ARTICULAR; INTRALESIONAL; INTRAMUSCULAR; SOFT TISSUE at 10:26

## 2020-09-15 RX ADMIN — LIDOCAINE HYDROCHLORIDE 2 ML: 10 INJECTION, SOLUTION INFILTRATION; PERINEURAL at 10:26

## 2020-09-15 RX ADMIN — BUPIVACAINE HYDROCHLORIDE 2 ML: 2.5 INJECTION, SOLUTION INFILTRATION; PERINEURAL at 10:26

## 2020-09-15 NOTE — TELEPHONE ENCOUNTER
Patient sees Dr Verna Moreland  Patient's son Stephen Sessions calling on a follow-up from today's visit to review with him his mother's care  Please call at [phone] for update

## 2020-09-15 NOTE — PROGRESS NOTES
Assessment:   Diagnosis ICD-10-CM Associated Orders   1  Primary osteoarthritis of both knees  M17 0 Large joint arthrocentesis: bilateral knee   2  Chronic pain of both knees  M25 561 Large joint arthrocentesis: bilateral knee    M25 562     G89 29        Plan:  Diagnosis, treatment options and associated risks were discussed with the patient including no treatment, nonsurgical treatment and potential for surgical intervention  The patient was given the opportunity to ask questions regarding each  Recommendation is to continue non operative care with periodic cortisone injections  Patient was offered, accepted, performed injection of cortisone to both knees for symptomatic relief  Patient tolerated both injections well  Ice and post injection protocol advised  Weightbearing activities as tolerated  To do next visit:  Return in about 3 months (around 12/15/2020) for re-check  The above stated was discussed in layman's terms and the patient expressed understanding  All questions were answered to the patient's satisfaction  Scribe Attestation    I,:   Jenny Whitley am acting as a scribe while in the presence of the attending physician :        I,:   Ruddy Cowan MD personally performed the services described in this documentation    as scribed in my presence :              Subjective:   Madiha Romano is a 80 y o  female who presents repeat evaluation of her bilateral knees, known osteoarthritis  She resides at [facility]  She continues to find relief with intermittent periodic cortisone injections her last being 3 months ago  She presents with an  brace on her left lower extremity that goes from her proximal thigh to just proximal to her ankle  Patient denies any significant pain at rest   She has increased knee pain with weight-bearing activities        Review of systems negative unless otherwise specified in HPI    Past Medical History:   Diagnosis Date    CAD (coronary artery disease)     Hypertension     Mild cognitive impairment with memory loss 5/29/2020    Osteoarthritis     Osteoporosis     Vitamin D deficiency        Past Surgical History:   Procedure Laterality Date    APPENDECTOMY      HYSTERECTOMY      WI PARTIAL HIP REPLACEMENT Left 8/13/2018    Procedure: HEMIARTHROPLASTY HIP (BIPOLAR);   Surgeon: Wilfredo Zhang MD;  [facility];  Service: Orthopedics    ROTATOR CUFF REPAIR         Family History   Problem Relation Age of Onset    Diabetes type II Father     Heart disease Brother        Social History     Occupational History    Not on file   Tobacco Use    Smoking status: Former Smoker    Smokeless tobacco: Never Used   Substance and Sexual Activity    Alcohol use: Not Currently    Drug use: No    Sexual activity: Not on file         Current Outpatient Medications:     acetaminophen (TYLENOL) 325 mg tablet, Take 650 mg by mouth 3 (three) times a day , Disp: , Rfl:     acyclovir (ZOVIRAX) 400 MG tablet, Take 400 mg by mouth daily 5/19-8/31, Disp: , Rfl:     amLODIPine (NORVASC) 5 mg tablet, Take 1 tablet (5 mg total) by mouth daily (Patient taking differently: Take 5 mg by mouth every evening ), Disp: , Rfl: 0    aspirin 81 MG tablet, Take 1 tablet by mouth daily, Disp: , Rfl:     calcitonin, salmon, (MIACALCIN) 200 units/act nasal spray, 1 spray and to alternate nostril q day (Patient taking differently: 1 spray into each nostril daily Alternate left and right nostril every other day), Disp: 1 Act, Rfl: 5    calcium carbonate (TUMS) 500 mg chewable tablet, Chew 1 tablet continuous as needed for indigestion or heartburn, Disp: , Rfl:     conjugated estrogens (PREMARIN) vaginal cream, Insert 1 g into the vagina daily, Disp: , Rfl:     Cyanocobalamin (VITAMIN B12) 1000 MCG TBCR, Take 1 tablet by mouth daily , Disp: , Rfl:     cycloSPORINE (RESTASIS) 0 05 % ophthalmic emulsion, Administer 1 drop to both eyes 2 (two) times a day, Disp: , Rfl:     D-Mannose 500 MG CAPS, Take 1 tablet by mouth daily, Disp: , Rfl:     dorzolamide-timolol (COSOPT) 22 3-6 8 MG/ML ophthalmic solution, INSTILL 1 DROP INTO LEFT EYE TWICE A DAY APPROXIMATELY 12 HOURS APART, Disp: , Rfl: 2    fenofibrate (TRICOR) 145 mg tablet, Take 145 mg by mouth, Disp: , Rfl:     ferrous sulfate 324 (65 Fe) mg, Take 1 tablet (324 mg total) by mouth daily before breakfast, Disp: 30 tablet, Rfl: 4    Ketotifen Fumarate (REFRESH EYE ITCH RELIEF OP), Administer 1 drop into the left eye every 2 (two) hours, Disp: , Rfl:     latanoprost (XALATAN) 0 005 % ophthalmic solution, INSTILL 1 DROP IN THE AFFECTED EYE(S) IN THE EVENING, Disp: , Rfl: 1    losartan (COZAAR) 25 mg tablet, TAKE 1 TABLET BY MOUTH EVERY DAY, Disp: 30 tablet, Rfl: 1    metoprolol succinate (TOPROL-XL) 50 mg 24 hr tablet, , Disp: , Rfl:     metoprolol tartrate (LOPRESSOR) 50 mg tablet, TAKE 1 TABLET TWICE DAILY (Patient taking differently: Take 50 mg by mouth daily ), Disp: 60 tablet, Rfl: 5    Multiple Vitamins-Minerals (PRESERVISION/LUTEIN) CAPS, Take 1 capsule by mouth 2 (two) times a day, Disp: , Rfl:     pantoprazole (PROTONIX) 20 mg tablet, Take 20 mg by mouth daily, Disp: , Rfl:     polyvinyl alcohol (LIQUIFILM TEARS) 1 4 % ophthalmic solution, Administer 1 drop to both eyes 4 (four) times a day, Disp: , Rfl:     Pyridoxine HCl (VITAMIN B6) 200 MG TABS, Take 0 5 tablets by mouth daily , Disp: , Rfl:     No Known Allergies         Vitals:    09/15/20 0935   BP: 122/89   Pulse: 71       Objective:                    Right Knee Exam     Tenderness   The patient is experiencing tenderness in the medial joint line  Range of Motion   Extension: 5   Flexion: 100     Other   Erythema: absent  Sensation: normal  Swelling: mild      Left Knee Exam     Tenderness   The patient is experiencing tenderness in the medial joint line      Range of Motion   Extension: 10   Flexion: 90     Other   Erythema: absent  Sensation: normal  Swelling: mild            Diagnostics, reviewed and taken today if performed as documented:    None performed          Procedures, if performed today:    Large joint arthrocentesis: bilateral knee  Date/Time: 9/15/2020 10:26 AM  Consent given by: patient  Site marked: site marked  Supporting Documentation  Indications: pain   Procedure Details  Location: knee - bilateral knee  Preparation: Patient was prepped and draped in the usual sterile fashion  Needle size: 22 G  Ultrasound guidance: no    Medications (Right): 2 mL bupivacaine 0 25 %; 2 mL lidocaine 1 %; 12 mg betamethasone acetate-betamethasone sodium phosphate 6 (3-3) mg/mLMedications (Left): 2 mL bupivacaine 0 25 %; 2 mL lidocaine 1 %; 12 mg betamethasone acetate-betamethasone sodium phosphate 6 (3-3) mg/mL   Patient tolerance: patient tolerated the procedure well with no immediate complications  Dressing:  Sterile dressing applied              Portions of the record may have been created with voice recognition software  Occasional wrong word or "sound a like" substitutions may have occurred due to the inherent limitations of voice recognition software  Read the chart carefully and recognize, using context, where substitutions have occurred

## 2020-09-15 NOTE — TELEPHONE ENCOUNTER
I followed up with Christa Corbin pt son, made him aware of the visit today and patient received injections in both knees, follow-up in 3 months  He understood

## 2020-09-15 NOTE — TELEPHONE ENCOUNTER
Mrs Airam Purvis had corticosteroid injections into both of her knees    We will review this again with see her back in 3 months

## 2020-09-16 ENCOUNTER — TELEPHONE (OUTPATIENT)
Dept: OBGYN CLINIC | Facility: HOSPITAL | Age: 85
End: 2020-09-16

## 2020-09-16 NOTE — TELEPHONE ENCOUNTER
Thanh Smith from [facility] is calling to check on the next appointment details  Information provided 12-15-20 at 10 AM in [facility] [place] with Dr Asya Valencia

## 2020-11-02 ENCOUNTER — TELEPHONE (OUTPATIENT)
Dept: OBGYN CLINIC | Facility: HOSPITAL | Age: 85
End: 2020-11-02

## 2020-11-03 DIAGNOSIS — M19.90 ARTHRITIS: Primary | ICD-10-CM

## 2020-11-03 DIAGNOSIS — M25.369 INSTABILITY OF KNEE JOINT, UNSPECIFIED LATERALITY: ICD-10-CM

## 2020-11-05 ENCOUNTER — OFFICE VISIT (OUTPATIENT)
Dept: GERIATRICS | Facility: REHABILITATION | Age: 85
End: 2020-11-05
Payer: MEDICARE

## 2020-11-05 VITALS
RESPIRATION RATE: 16 BRPM | TEMPERATURE: 97.7 F | DIASTOLIC BLOOD PRESSURE: 72 MMHG | OXYGEN SATURATION: 97 % | HEART RATE: 68 BPM | SYSTOLIC BLOOD PRESSURE: 174 MMHG

## 2020-11-05 DIAGNOSIS — I10 ESSENTIAL HYPERTENSION: Primary | ICD-10-CM

## 2020-11-05 DIAGNOSIS — R20.0 NUMBNESS AND TINGLING IN LEFT HAND: ICD-10-CM

## 2020-11-05 DIAGNOSIS — R20.2 NUMBNESS AND TINGLING IN LEFT HAND: ICD-10-CM

## 2020-11-05 DIAGNOSIS — N39.0 RECURRENT UTI: ICD-10-CM

## 2020-11-05 DIAGNOSIS — N18.30 STAGE 3 CHRONIC KIDNEY DISEASE, UNSPECIFIED WHETHER STAGE 3A OR 3B CKD (HCC): ICD-10-CM

## 2020-11-05 PROCEDURE — 99214 OFFICE O/P EST MOD 30 MIN: CPT | Performed by: FAMILY MEDICINE

## 2020-11-05 RX ORDER — CHLORTHALIDONE 25 MG/1
12.5 TABLET ORAL DAILY
COMMUNITY
End: 2021-09-09

## 2020-11-05 RX ORDER — AMLODIPINE BESYLATE 2.5 MG/1
2.5 TABLET ORAL DAILY
COMMUNITY
End: 2020-12-17

## 2020-12-14 RX ORDER — CEPHALEXIN 500 MG/1
CAPSULE ORAL
COMMUNITY
Start: 2020-10-23 | End: 2020-12-17

## 2020-12-14 RX ORDER — LOSARTAN POTASSIUM 50 MG/1
TABLET ORAL
COMMUNITY
Start: 2020-10-23 | End: 2021-08-24 | Stop reason: SDUPTHER

## 2020-12-14 RX ORDER — CIPROFLOXACIN 250 MG/1
TABLET, FILM COATED ORAL
COMMUNITY
Start: 2020-10-30 | End: 2020-12-17

## 2020-12-15 ENCOUNTER — OFFICE VISIT (OUTPATIENT)
Dept: OBGYN CLINIC | Facility: HOSPITAL | Age: 85
End: 2020-12-15
Payer: MEDICARE

## 2020-12-15 VITALS
BODY MASS INDEX: 25.06 KG/M2 | DIASTOLIC BLOOD PRESSURE: 63 MMHG | SYSTOLIC BLOOD PRESSURE: 170 MMHG | HEIGHT: 62 IN | HEART RATE: 73 BPM

## 2020-12-15 DIAGNOSIS — M25.562 CHRONIC PAIN OF BOTH KNEES: ICD-10-CM

## 2020-12-15 DIAGNOSIS — M25.561 CHRONIC PAIN OF BOTH KNEES: ICD-10-CM

## 2020-12-15 DIAGNOSIS — G89.29 CHRONIC PAIN OF BOTH KNEES: ICD-10-CM

## 2020-12-15 DIAGNOSIS — M17.0 PRIMARY OSTEOARTHRITIS OF BOTH KNEES: Primary | ICD-10-CM

## 2020-12-15 PROCEDURE — 99213 OFFICE O/P EST LOW 20 MIN: CPT | Performed by: ORTHOPAEDIC SURGERY

## 2020-12-15 PROCEDURE — 20610 DRAIN/INJ JOINT/BURSA W/O US: CPT | Performed by: ORTHOPAEDIC SURGERY

## 2020-12-15 RX ORDER — LIDOCAINE HYDROCHLORIDE 10 MG/ML
2 INJECTION, SOLUTION INFILTRATION; PERINEURAL
Status: COMPLETED | OUTPATIENT
Start: 2020-12-15 | End: 2020-12-15

## 2020-12-15 RX ORDER — BETAMETHASONE SODIUM PHOSPHATE AND BETAMETHASONE ACETATE 3; 3 MG/ML; MG/ML
12 INJECTION, SUSPENSION INTRA-ARTICULAR; INTRALESIONAL; INTRAMUSCULAR; SOFT TISSUE
Status: COMPLETED | OUTPATIENT
Start: 2020-12-15 | End: 2020-12-15

## 2020-12-15 RX ORDER — BUPIVACAINE HYDROCHLORIDE 2.5 MG/ML
2 INJECTION, SOLUTION INFILTRATION; PERINEURAL
Status: COMPLETED | OUTPATIENT
Start: 2020-12-15 | End: 2020-12-15

## 2020-12-15 RX ADMIN — LIDOCAINE HYDROCHLORIDE 2 ML: 10 INJECTION, SOLUTION INFILTRATION; PERINEURAL at 10:26

## 2020-12-15 RX ADMIN — BETAMETHASONE SODIUM PHOSPHATE AND BETAMETHASONE ACETATE 12 MG: 3; 3 INJECTION, SUSPENSION INTRA-ARTICULAR; INTRALESIONAL; INTRAMUSCULAR; SOFT TISSUE at 10:26

## 2020-12-15 RX ADMIN — BUPIVACAINE HYDROCHLORIDE 2 ML: 2.5 INJECTION, SOLUTION INFILTRATION; PERINEURAL at 10:26

## 2020-12-17 ENCOUNTER — OFFICE VISIT (OUTPATIENT)
Dept: GERIATRICS | Facility: REHABILITATION | Age: 85
End: 2020-12-17
Payer: MEDICARE

## 2020-12-17 VITALS
DIASTOLIC BLOOD PRESSURE: 76 MMHG | BODY MASS INDEX: 26.12 KG/M2 | RESPIRATION RATE: 20 BRPM | TEMPERATURE: 97.5 F | SYSTOLIC BLOOD PRESSURE: 126 MMHG | HEART RATE: 70 BPM | OXYGEN SATURATION: 99 % | WEIGHT: 142.8 LBS

## 2020-12-17 DIAGNOSIS — I10 ESSENTIAL HYPERTENSION: ICD-10-CM

## 2020-12-17 DIAGNOSIS — R20.2 NUMBNESS AND TINGLING IN LEFT HAND: ICD-10-CM

## 2020-12-17 DIAGNOSIS — N18.31 STAGE 3A CHRONIC KIDNEY DISEASE (HCC): ICD-10-CM

## 2020-12-17 DIAGNOSIS — R20.0 NUMBNESS AND TINGLING IN LEFT HAND: ICD-10-CM

## 2020-12-17 DIAGNOSIS — K21.00 GASTROESOPHAGEAL REFLUX DISEASE WITH ESOPHAGITIS WITHOUT HEMORRHAGE: Primary | ICD-10-CM

## 2020-12-17 DIAGNOSIS — D50.8 IRON DEFICIENCY ANEMIA SECONDARY TO INADEQUATE DIETARY IRON INTAKE: ICD-10-CM

## 2020-12-17 PROCEDURE — 99214 OFFICE O/P EST MOD 30 MIN: CPT | Performed by: FAMILY MEDICINE

## 2020-12-17 RX ORDER — GABAPENTIN 100 MG/1
100 CAPSULE ORAL 3 TIMES DAILY
COMMUNITY

## 2021-01-29 ENCOUNTER — TELEPHONE (OUTPATIENT)
Dept: UROLOGY | Facility: AMBULATORY SURGERY CENTER | Age: 86
End: 2021-01-29

## 2021-01-29 NOTE — TELEPHONE ENCOUNTER
Placed call to [facility], received voicemail, left detailed message regarding cancelling of 1 year f/u due to a schedule change

## 2021-02-25 DIAGNOSIS — N39.0 RECURRENT UTI: Primary | ICD-10-CM

## 2021-03-11 ENCOUNTER — OFFICE VISIT (OUTPATIENT)
Dept: GERIATRICS | Facility: REHABILITATION | Age: 86
End: 2021-03-11
Payer: MEDICARE

## 2021-03-11 VITALS
BODY MASS INDEX: 26.7 KG/M2 | HEART RATE: 70 BPM | SYSTOLIC BLOOD PRESSURE: 142 MMHG | DIASTOLIC BLOOD PRESSURE: 60 MMHG | WEIGHT: 146 LBS | TEMPERATURE: 97.8 F | RESPIRATION RATE: 16 BRPM | OXYGEN SATURATION: 95 %

## 2021-03-11 DIAGNOSIS — I10 ESSENTIAL HYPERTENSION: Primary | ICD-10-CM

## 2021-03-11 DIAGNOSIS — K21.00 GASTROESOPHAGEAL REFLUX DISEASE WITH ESOPHAGITIS WITHOUT HEMORRHAGE: ICD-10-CM

## 2021-03-11 DIAGNOSIS — F01.50 VASCULAR DEMENTIA WITHOUT BEHAVIORAL DISTURBANCE (HCC): ICD-10-CM

## 2021-03-11 DIAGNOSIS — N18.31 STAGE 3A CHRONIC KIDNEY DISEASE (HCC): ICD-10-CM

## 2021-03-11 PROCEDURE — 99214 OFFICE O/P EST MOD 30 MIN: CPT | Performed by: FAMILY MEDICINE

## 2021-03-11 NOTE — PROGRESS NOTES
Assessment/Plan:    Essential hypertension  BP currently at goal for age and comorbidity  Will continue same regimen  Monitor CMP, lipid profile  Goal BP <150/90    Gastroesophageal reflux disease with esophagitis  Attempted to taper off protonix, however she could not tolerated  Will continue protonix and monitor  CKD (chronic kidney disease) stage 3, GFR 30-59 ml/min ([facility])  Creatinine stable  Will avoid nephrotoxic medication  Encourage po hydration  Will monitor BMP  Vascular dementia without behavioral disturbance ([facility])  Stable , no behavioral issues noted  Will continue supportive care  Maintain sleep Princella Alireza cycle  Maintain good po  hydration, avoid constipation  Encouraged social interaction and physical activity            Subjective:     Nirmala Brown is a 80 y o  female who presented to the office today for follow up  States she feels well, denies CP, SOB, cough, fever, chills  Reports good appetite, no difficulty sleeping, no recent falls  Denies joint pain, uses walker for ambulation for short distances and wheelchair for long distances          Review of Systems   Constitutional: Negative for appetite change, chills and fever  HENT: Positive for hearing loss  Negative for congestion and rhinorrhea  Respiratory: Negative for cough, shortness of breath and wheezing  Cardiovascular: Negative for chest pain, palpitations and leg swelling  Gastrointestinal: Negative for abdominal pain and constipation  Endocrine: Negative for cold intolerance  Genitourinary: Negative for difficulty urinating, dysuria and hematuria  Musculoskeletal: Positive for gait problem  Skin: Negative for wound  Allergic/Immunologic: Negative for environmental allergies  Neurological: Positive for weakness  Negative for dizziness and seizures  Hematological: Does not bruise/bleed easily  Psychiatric/Behavioral: Negative for behavioral problems and sleep disturbance         Past Medical History: Diagnosis Date    CAD (coronary artery disease)     Hypertension     Mild cognitive impairment with memory loss 5/29/2020    Osteoarthritis     Osteoporosis     Vitamin D deficiency            Objective:    /60   Pulse 70   Temp 97 8 °F (36 6 °C)   Resp 16   Wt 66 2 kg (146 lb)   SpO2 95%   BMI 26 70 kg/m²     Physical Exam  Vitals signs and nursing note reviewed  Constitutional:       General: She is not in acute distress  Appearance: She is not diaphoretic  HENT:      Head: Normocephalic and atraumatic  Ears:      Comments: [place]  Eyes:      General:         Right eye: No discharge  Left eye: No discharge  Pupils: Pupils are equal, round, and reactive to light  Neck:      Musculoskeletal: Normal range of motion and neck supple  Cardiovascular:      Rate and Rhythm: Normal rate and regular rhythm  Heart sounds: Normal heart sounds  No murmur  Pulmonary:      Effort: Pulmonary effort is normal  No respiratory distress  Breath sounds: Normal breath sounds  No wheezing  Abdominal:      Palpations: Abdomen is soft  Tenderness: There is no abdominal tenderness  There is no guarding or rebound  Musculoskeletal:         General: No tenderness  Right lower leg: Edema present  Left lower leg: Edema present  Comments: Wheelchair  Brace LLE   Skin:     General: Skin is warm and dry  Neurological:      Mental Status: She is alert and oriented to person, place, and time  Mental status is at baseline  Motor: Weakness (LLE) present     Psychiatric:         Behavior: Behavior normal          Nahun Yip MD  03/11/21  8:28 PM

## 2021-03-12 NOTE — ASSESSMENT & PLAN NOTE
Stable , no behavioral issues noted  Will continue supportive care  Maintain sleep Maxine  cycle  Maintain good po  hydration, avoid constipation    Encouraged social interaction and physical activity

## 2021-03-12 NOTE — ASSESSMENT & PLAN NOTE
Attempted to taper off protonix, however she could not tolerated  Will continue protonix and monitor

## 2021-03-12 NOTE — ASSESSMENT & PLAN NOTE
BP currently at goal for age and comorbidity  Will continue same regimen  Monitor CMP, lipid profile    Goal BP <150/90

## 2021-03-16 ENCOUNTER — OFFICE VISIT (OUTPATIENT)
Dept: OBGYN CLINIC | Facility: HOSPITAL | Age: 86
End: 2021-03-16
Payer: MEDICARE

## 2021-03-16 VITALS
BODY MASS INDEX: 26.7 KG/M2 | SYSTOLIC BLOOD PRESSURE: 179 MMHG | HEIGHT: 62 IN | DIASTOLIC BLOOD PRESSURE: 71 MMHG | HEART RATE: 71 BPM

## 2021-03-16 DIAGNOSIS — M25.562 CHRONIC PAIN OF LEFT KNEE: ICD-10-CM

## 2021-03-16 DIAGNOSIS — G89.29 CHRONIC PAIN OF LEFT KNEE: ICD-10-CM

## 2021-03-16 DIAGNOSIS — M25.362 UNSTABLE KNEE, LEFT: ICD-10-CM

## 2021-03-16 DIAGNOSIS — M17.11 PRIMARY OSTEOARTHRITIS OF RIGHT KNEE: Primary | ICD-10-CM

## 2021-03-16 DIAGNOSIS — M17.12 PRIMARY OSTEOARTHRITIS OF LEFT KNEE: ICD-10-CM

## 2021-03-16 PROCEDURE — 99213 OFFICE O/P EST LOW 20 MIN: CPT | Performed by: ORTHOPAEDIC SURGERY

## 2021-03-16 PROCEDURE — 20610 DRAIN/INJ JOINT/BURSA W/O US: CPT | Performed by: ORTHOPAEDIC SURGERY

## 2021-03-16 RX ORDER — BUPIVACAINE HYDROCHLORIDE 2.5 MG/ML
2 INJECTION, SOLUTION INFILTRATION; PERINEURAL
Status: COMPLETED | OUTPATIENT
Start: 2021-03-16 | End: 2021-03-16

## 2021-03-16 RX ORDER — LIDOCAINE HYDROCHLORIDE 10 MG/ML
2 INJECTION, SOLUTION INFILTRATION; PERINEURAL
Status: COMPLETED | OUTPATIENT
Start: 2021-03-16 | End: 2021-03-16

## 2021-03-16 RX ORDER — BETAMETHASONE SODIUM PHOSPHATE AND BETAMETHASONE ACETATE 3; 3 MG/ML; MG/ML
6 INJECTION, SUSPENSION INTRA-ARTICULAR; INTRALESIONAL; INTRAMUSCULAR; SOFT TISSUE
Status: COMPLETED | OUTPATIENT
Start: 2021-03-16 | End: 2021-03-16

## 2021-03-16 RX ADMIN — BUPIVACAINE HYDROCHLORIDE 2 ML: 2.5 INJECTION, SOLUTION INFILTRATION; PERINEURAL at 11:05

## 2021-03-16 RX ADMIN — BETAMETHASONE SODIUM PHOSPHATE AND BETAMETHASONE ACETATE 6 MG: 3; 3 INJECTION, SUSPENSION INTRA-ARTICULAR; INTRALESIONAL; INTRAMUSCULAR; SOFT TISSUE at 11:05

## 2021-03-16 RX ADMIN — LIDOCAINE HYDROCHLORIDE 2 ML: 10 INJECTION, SOLUTION INFILTRATION; PERINEURAL at 11:05

## 2021-03-16 NOTE — PROGRESS NOTES
Assessment  Diagnoses and all orders for this visit:    Primary osteoarthritis of right knee    Chronic pain of left knee    Unstable knee, left    Primary osteoarthritis of left knee      Discussion and Plan:    WBAT BL LE  Continue to use brace for Left knee for ambulation  Steroid injections provided to bilateral knees  Facility paperwork filled out  Follow up in about 3 months for reevaluation    Subjective:   Patient ID: Floridalma Chong is a 80 y o  female      30-year-old female presents for follow-up for bilateral knee arthritis  She has receivedsteroid injections in the past with relief of her pain  She currently presents in a wheelchair with a PCA from [facility]  She ambulates short distances with the use of her rollator  The following portions of the patient's history were reviewed and updated as appropriate: allergies, current medications, past family history, past medical history, past social history, past surgical history and problem list     Review of Systems   Constitutional: Negative for appetite change and fever  HENT: Negative for sore throat  Eyes: Negative for visual disturbance  Respiratory: Negative for shortness of breath  Cardiovascular: Negative for chest pain  Gastrointestinal: Negative for nausea and vomiting  Musculoskeletal: Positive for arthralgias and myalgias  Skin: Negative for rash and wound  Objective:  BP (!) 179/71 Comment: aide advised to notify facility  Pulse 71   Ht 5' 2" (1 575 m)   BMI 26 70 kg/m²       Right Knee Exam     Comments:  Varus alignment  Bony enlargement  No effusion  No warmth  ROM from 5-100      Left Knee Exam     Comments:  Valgus alignment   Bony enlargement  No warmth  ROM form 5-90              Physical Exam  Vitals signs and nursing note reviewed  Constitutional:       Appearance: Normal appearance  HENT:      Head: Normocephalic and atraumatic        Nose: Nose normal       Mouth/Throat:      Mouth: Mucous membranes are moist    Eyes:      Extraocular Movements: Extraocular movements intact  Conjunctiva/sclera: Conjunctivae normal    Neck:      Musculoskeletal: Normal range of motion  Cardiovascular:      Rate and Rhythm: Normal rate  Pulses: Normal pulses  Pulmonary:      Effort: Pulmonary effort is normal       Breath sounds: Normal breath sounds  Abdominal:      Palpations: Abdomen is soft  Musculoskeletal:      Comments: See Ortho Exam   Skin:     General: Skin is warm  Capillary Refill: Capillary refill takes less than 2 seconds  Neurological:      General: No focal deficit present  Mental Status: She is alert  Mental status is at baseline  Psychiatric:         Mood and Affect: Mood normal          Behavior: Behavior normal        Large joint arthrocentesis: R knee  Universal Protocol:  Consent given by: patient  Time out: Immediately prior to procedure a "time out" was called to verify the correct patient, procedure, equipment, support staff and site/side marked as required  Timeout called at: 3/16/2021 10:50 AM   Patient understanding: patient states understanding of the procedure being performed    Supporting Documentation  Indications: pain   Procedure Details  Location: knee - R knee  Preparation: Patient was prepped and draped in the usual sterile fashion  Needle size: 22 G  Ultrasound guidance: no  Approach: anterolateral  Medications administered: 6 mg betamethasone acetate-betamethasone sodium phosphate 6 (3-3) mg/mL; 2 mL bupivacaine 0 25 %; 2 mL lidocaine 1 %    Patient tolerance: patient tolerated the procedure well with no immediate complications  Dressing:  Sterile dressing applied    Large joint arthrocentesis: L knee  Universal Protocol:  Consent given by: patient  Time out: Immediately prior to procedure a "time out" was called to verify the correct patient, procedure, equipment, support staff and site/side marked as required    Timeout called at: 3/16/2021 10:50 AM   Patient understanding: patient states understanding of the procedure being performed    Supporting Documentation  Indications: pain   Procedure Details  Location: knee - L knee  Needle size: 22 G  Ultrasound guidance: no  Approach: anteromedial  Medications administered: 6 mg betamethasone acetate-betamethasone sodium phosphate 6 (3-3) mg/mL; 2 mL bupivacaine 0 25 %; 2 mL lidocaine 1 %    Patient tolerance: patient tolerated the procedure well with no immediate complications  Dressing:  Sterile dressing applied

## 2021-04-29 ENCOUNTER — OFFICE VISIT (OUTPATIENT)
Dept: GERIATRICS | Facility: REHABILITATION | Age: 86
End: 2021-04-29
Payer: MEDICARE

## 2021-04-29 VITALS
WEIGHT: 146.2 LBS | HEART RATE: 74 BPM | RESPIRATION RATE: 18 BRPM | DIASTOLIC BLOOD PRESSURE: 62 MMHG | TEMPERATURE: 97.5 F | SYSTOLIC BLOOD PRESSURE: 142 MMHG | OXYGEN SATURATION: 97 % | BODY MASS INDEX: 26.74 KG/M2

## 2021-04-29 DIAGNOSIS — Z71.89 ACP (ADVANCE CARE PLANNING): ICD-10-CM

## 2021-04-29 DIAGNOSIS — I10 ESSENTIAL HYPERTENSION: ICD-10-CM

## 2021-04-29 DIAGNOSIS — F01.50 VASCULAR DEMENTIA WITHOUT BEHAVIORAL DISTURBANCE (HCC): Primary | ICD-10-CM

## 2021-04-29 DIAGNOSIS — N18.31 STAGE 3A CHRONIC KIDNEY DISEASE (HCC): ICD-10-CM

## 2021-04-29 PROCEDURE — 99214 OFFICE O/P EST MOD 30 MIN: CPT | Performed by: FAMILY MEDICINE

## 2021-04-29 NOTE — ASSESSMENT & PLAN NOTE
BP well controlled today  Patient is asymptomatic, continue current regimen  Advised about low salt diet and exercise

## 2021-04-29 NOTE — PROGRESS NOTES
Assessment/Plan:    Vascular dementia without behavioral disturbance (HCC)  Stable , no behavioral issues noted  Scored 8/30 on [facility] today  Will continue supportive care  Maintain sleep Agnieszka Breslow cycle  Maintain good po  hydration, avoid constipation  Essential hypertension  BP well controlled today  Patient is asymptomatic, continue current regimen  Advised about low salt diet and exercise  CKD (chronic kidney disease) stage 3, GFR 30-59 ml/min (HCC)  Creatinine stable  Will avoid nephrotoxic medication  Encourage po hydration  Will monitor BMP  ACP (advance care planning)  Code status discussed with the patient  Code status is now DNR  POLST signed and placed in the paper chart  Subjective:     Baltazar Pham is a 80 y o  female who presented to the office today for follow up  Patient states she feels well, reports good appetite, no difficulty sleeping  No CP, SOB, cough, fever, chills  No recent falls  Review of Systems   Constitutional: Negative for chills and fever  HENT: Positive for hearing loss  Negative for congestion and rhinorrhea  Eyes: Positive for visual disturbance  Respiratory: Negative for cough, shortness of breath and wheezing  Cardiovascular: Negative for chest pain, palpitations and leg swelling  Gastrointestinal: Negative for abdominal pain and constipation  Endocrine: Negative for cold intolerance  Genitourinary: Negative for difficulty urinating, dysuria and hematuria  Musculoskeletal: Positive for gait problem  Skin: Negative for wound  Allergic/Immunologic: Negative for environmental allergies  Neurological: Negative for dizziness  Hematological: Does not bruise/bleed easily  Psychiatric/Behavioral: Negative for behavioral problems and sleep disturbance          Memory changes       Past Medical History:   Diagnosis Date    CAD (coronary artery disease)     Hypertension     Mild cognitive impairment with memory loss 5/29/2020    Osteoarthritis     Osteoporosis     Vitamin D deficiency            Objective:    /62   Pulse 74   Temp 97 5 °F (36 4 °C)   Resp 18   Wt 66 3 kg (146 lb 3 2 oz)   SpO2 97%   BMI 26 74 kg/m²     Physical Exam  Vitals signs and nursing note reviewed  Constitutional:       General: She is not in acute distress  Appearance: She is not diaphoretic  Comments: Frail looking   HENT:      Head: Normocephalic and atraumatic  Ears:      Comments: [place]  Eyes:      General:         Right eye: No discharge  Left eye: No discharge  Pupils: Pupils are equal, round, and reactive to light  Neck:      Musculoskeletal: Normal range of motion and neck supple  Cardiovascular:      Rate and Rhythm: Normal rate and regular rhythm  Heart sounds: Normal heart sounds  No murmur  Pulmonary:      Effort: Pulmonary effort is normal  No respiratory distress  Breath sounds: Normal breath sounds  No wheezing  Abdominal:      Palpations: Abdomen is soft  Tenderness: There is no abdominal tenderness  There is no guarding or rebound  Musculoskeletal:         General: Deformity present  No tenderness  Right lower leg: Edema present  Left lower leg: Edema present  Comments: Brace left LE  Wheelchair/walker   Skin:     General: Skin is warm and dry  Neurological:      Mental Status: She is alert and oriented to person, place, and time  Mental status is at baseline     Psychiatric:         Behavior: Behavior normal          Jeff Colon MD  04/29/21  2:08 PM

## 2021-04-29 NOTE — ASSESSMENT & PLAN NOTE
Stable , no behavioral issues noted  Scored 8/30 on SL[facility]MS today  Will continue supportive care  Maintain sleep Lolita Messenger cycle  Maintain good po  hydration, avoid constipation

## 2021-04-29 NOTE — ASSESSMENT & PLAN NOTE
Code status discussed with the patient  Code status is now DNR  POLST signed and placed in the paper chart

## 2021-06-08 ENCOUNTER — OFFICE VISIT (OUTPATIENT)
Dept: UROLOGY | Facility: AMBULATORY SURGERY CENTER | Age: 86
End: 2021-06-08
Payer: MEDICARE

## 2021-06-08 VITALS
HEIGHT: 62 IN | DIASTOLIC BLOOD PRESSURE: 60 MMHG | SYSTOLIC BLOOD PRESSURE: 120 MMHG | HEART RATE: 69 BPM | BODY MASS INDEX: 26.74 KG/M2

## 2021-06-08 DIAGNOSIS — N39.0 RECURRENT UTI: Primary | ICD-10-CM

## 2021-06-08 PROCEDURE — 99213 OFFICE O/P EST LOW 20 MIN: CPT | Performed by: NURSE PRACTITIONER

## 2021-06-08 NOTE — PROGRESS NOTES
6/8/2021    Assessment and Plan    80 y o  female managed by Dr Mayra Portillo    1  Recurrent urinary tract infection  · No new/recent occurrences of unfections   · continue with supplemental cranberry and D- Mannose  · Maintain hydration up to 40 oz per day  · Timed voiding  · Follow up in the office in 1 year    2  Vaginal atrophy  ·  continue with vaginal estrogen cream    History of Present Illness  Larry Howe is a 80 y o  female here for follow up evaluation of recurrent urinary tract infections and vaginal atrophy  A previous office evaluation patient was started on Estrace for management of lower urinary tract symptoms of urgency, frequency and dysuria  She resides at [facility]  Since her prior office evaluation, she has had no episodes of urinary tract infections  She reports feeling and doing well  Review of Systems   Constitutional: Negative for chills and fever  Respiratory: Negative for cough and shortness of breath  Cardiovascular: Negative for chest pain  Gastrointestinal: Negative for abdominal distention, abdominal pain, blood in stool, nausea and vomiting  Genitourinary: Negative for difficulty urinating, dysuria, enuresis, flank pain, frequency, hematuria and urgency  Skin: Negative for rash  Past Medical History  Past Medical History:   Diagnosis Date    CAD (coronary artery disease)     Hypertension     Mild cognitive impairment with memory loss 5/29/2020    Osteoarthritis     Osteoporosis     Vitamin D deficiency        Past Social History  Past Surgical History:   Procedure Laterality Date    APPENDECTOMY      HYSTERECTOMY      [place] PARTIAL HIP REPLACEMENT Left 8/13/2018    Procedure: HEMIARTHROPLASTY HIP (BIPOLAR);   Surgeon: Wilfredo Zhang MD;  Location:  MAIN OR;  Service: Orthopedics    ROTATOR CUFF REPAIR       Social History     Tobacco Use   Smoking Status Former Smoker   Smokeless Tobacco Never Used       Past Family History  Family History Problem Relation Age of Onset    Diabetes type II Father     Heart disease Brother        Past Social history  Social History     Socioeconomic History    Marital status:       Spouse name: Not on file    Number of children: Not on file    Years of education: Not on file    Highest education level: Not on file   Occupational History    Not on file   Social Needs    Financial resource strain: Not on file    Food insecurity     Worry: Not on file     Inability: Not on file    Transportation needs     Medical: Not on file     Non-medical: Not on file   Tobacco Use    Smoking status: Former Smoker    Smokeless tobacco: Never Used   Substance and Sexual Activity    Alcohol use: Not Currently    Drug use: No    Sexual activity: Not on file   Lifestyle    Physical activity     Days per week: Not on file     Minutes per session: Not on file    Stress: Not on file   Relationships    Social connections     Talks on phone: Not on file     Gets together: Not on file     Attends Congregation service: Not on file     Active member of club or organization: Not on file     Attends meetings of clubs or organizations: Not on file     Relationship status: Not on file    Intimate partner violence     Fear of current or ex partner: Not on file     Emotionally abused: Not on file     Physically abused: Not on file     Forced sexual activity: Not on file   Other Topics Concern    Not on file   Social History Narrative    Daily coffee consumption - 4 cups       Current Medications  Current Outpatient Medications   Medication Sig Dispense Refill    acetaminophen (TYLENOL) 325 mg tablet Take 650 mg by mouth 3 (three) times a day       aspirin 81 MG tablet Take 1 tablet by mouth daily      calcitonin, salmon, (MIACALCIN) 200 units/act nasal spray 1 spray and to alternate nostril q day (Patient taking differently: 1 spray into each nostril daily Alternate left and right nostril every other day) 1 Act 5    calcium carbonate (TUMS) 500 mg chewable tablet Chew 1 tablet continuous as needed for indigestion or heartburn      chlorthalidone 25 mg tablet Take 12 5 mg by mouth daily      conjugated estrogens (PREMARIN) vaginal cream Insert 1 g into the vagina daily      Cyanocobalamin (VITAMIN B12) 1000 MCG TBCR Take 1 tablet by mouth daily       cycloSPORINE (RESTASIS) 0 05 % ophthalmic emulsion Administer 1 drop to both eyes 2 (two) times a day      D-Mannose 500 MG CAPS Take 1 tablet by mouth daily      dorzolamide-timolol (COSOPT) 22 3-6 8 MG/ML ophthalmic solution INSTILL 1 DROP INTO LEFT EYE TWICE A DAY APPROXIMATELY 12 HOURS APART  2    fenofibrate (TRICOR) 145 mg tablet Take 145 mg by mouth      ferrous sulfate 324 (65 Fe) mg Take 1 tablet (324 mg total) by mouth daily before breakfast 30 tablet 4    gabapentin (NEURONTIN) 100 mg capsule Take 100 mg by mouth daily      Ketotifen Fumarate (REFRESH EYE ITCH RELIEF OP) Administer 1 drop into the left eye every 2 (two) hours      latanoprost (XALATAN) 0 005 % ophthalmic solution INSTILL 1 DROP IN THE AFFECTED EYE(S) IN THE EVENING  1    losartan (COZAAR) 50 mg tablet       metoprolol succinate (TOPROL-XL) 50 mg 24 hr tablet       Multiple Vitamins-Minerals (PRESERVISION/LUTEIN) CAPS Take 1 capsule by mouth 2 (two) times a day      pantoprazole (PROTONIX) 20 mg tablet Take 20 mg by mouth every other day      polyvinyl alcohol (LIQUIFILM TEARS) 1 4 % ophthalmic solution Administer 1 drop to both eyes 4 (four) times a day      Pyridoxine HCl (VITAMIN B6) 200 MG TABS Take 0 5 tablets by mouth daily       losartan (COZAAR) 25 mg tablet TAKE 1 TABLET BY MOUTH EVERY DAY (Patient not taking: Reported on 6/8/2021) 30 tablet 1    metoprolol tartrate (LOPRESSOR) 50 mg tablet TAKE 1 TABLET TWICE DAILY (Patient taking differently: Take 50 mg by mouth daily ) 60 tablet 5     No current facility-administered medications for this visit          Allergies  No Known Allergies      The following portions of the patient's history were reviewed and updated as appropriate: allergies, current medications, past medical history, past social history, past surgical history and problem list       Vitals  Vitals:    06/08/21 1420   BP: 120/60   Pulse: 69   Height: 5' 2" (1 575 m)           Physical Exam  Physical Exam  Vitals signs reviewed  Constitutional:       General: She is not in acute distress  Appearance: Normal appearance  Eyes:      Pupils: Pupils are equal, round, and reactive to light  Cardiovascular:      Rate and Rhythm: Normal rate and regular rhythm  Heart sounds: Normal heart sounds  Pulmonary:      Effort: Pulmonary effort is normal  No respiratory distress  Breath sounds: Normal breath sounds  Musculoskeletal: Normal range of motion  Skin:     General: Skin is warm and dry  Neurological:      General: No focal deficit present  Mental Status: She is alert  Psychiatric:         Mood and Affect: Mood normal          Behavior: Behavior normal        Results  No results found for this or any previous visit (from the past 1 hour(s))  ]  No results found for: PSA  Lab Results   Component Value Date    GLUCOSE 98 11/24/2017    CALCIUM 7 9 (L) 05/28/2020     11/24/2017    K 3 4 (L) 05/28/2020    CO2 21 05/28/2020     (H) 05/28/2020    BUN 17 05/28/2020    CREATININE 0 63 05/28/2020     Lab Results   Component Value Date    WBC 8 41 05/28/2020    HGB 10 7 (L) 05/28/2020    HCT 34 8 05/28/2020    MCV 99 (H) 05/28/2020     05/28/2020     Orders  Orders Placed This Encounter   Procedures    POCT urine dip    POCT Measure PVR       ENID Jung

## 2021-06-08 NOTE — PATIENT INSTRUCTIONS
Maintain adequate hydration upwards to 40 oz of water intake per day   continue with D-mannose and cranberry juice   continue with vaginal estrogen cream   Continue with timed voiding- every 3-4 hours    Call the office for concerns questions

## 2021-06-10 ENCOUNTER — OFFICE VISIT (OUTPATIENT)
Dept: GERIATRICS | Facility: REHABILITATION | Age: 86
End: 2021-06-10
Payer: MEDICARE

## 2021-06-10 VITALS
WEIGHT: 148.6 LBS | BODY MASS INDEX: 27.18 KG/M2 | HEART RATE: 70 BPM | DIASTOLIC BLOOD PRESSURE: 66 MMHG | RESPIRATION RATE: 18 BRPM | TEMPERATURE: 97.5 F | SYSTOLIC BLOOD PRESSURE: 128 MMHG | OXYGEN SATURATION: 96 %

## 2021-06-10 DIAGNOSIS — R20.0 NUMBNESS AND TINGLING IN LEFT HAND: ICD-10-CM

## 2021-06-10 DIAGNOSIS — R26.2 AMBULATORY DYSFUNCTION: ICD-10-CM

## 2021-06-10 DIAGNOSIS — K21.00 GASTROESOPHAGEAL REFLUX DISEASE WITH ESOPHAGITIS WITHOUT HEMORRHAGE: ICD-10-CM

## 2021-06-10 DIAGNOSIS — D64.9 ANEMIA, UNSPECIFIED TYPE: ICD-10-CM

## 2021-06-10 DIAGNOSIS — I10 ESSENTIAL HYPERTENSION: Primary | ICD-10-CM

## 2021-06-10 DIAGNOSIS — R20.2 NUMBNESS AND TINGLING IN LEFT HAND: ICD-10-CM

## 2021-06-10 PROCEDURE — 99214 OFFICE O/P EST MOD 30 MIN: CPT | Performed by: FAMILY MEDICINE

## 2021-06-10 NOTE — ASSESSMENT & PLAN NOTE
Will continue PT/OT as needed  Uses wheelchair for ambulation  Continue on fall precautions    The goal is to continue care in long-term

## 2021-06-10 NOTE — ASSESSMENT & PLAN NOTE
Blood pressure well controlled  Will continue same regimen we had chlorthalidone, losartan and metoprolol  Encouraged low-salt diet  Continue to monitor blood pressure  Monitor CMP

## 2021-06-10 NOTE — PROGRESS NOTES
Assessment/Plan:    Essential hypertension  Blood pressure well controlled  Will continue same regimen we had chlorthalidone, losartan and metoprolol  Encouraged low-salt diet  Continue to monitor blood pressure  Monitor CMP  Gastroesophageal reflux disease with esophagitis  Currently stable, will continue Protonix 20 mg daily  Attempted to taper down, however patient start having symptoms with Protonix every other day  Numbness and tingling in left hand  Chronic, s/p L ulnar fracture, will increase dose of gabapentin to 100 mg po TID  Consult OT as needed  Ambulatory dysfunction  Will continue PT/OT as needed  Uses wheelchair for ambulation  Continue on fall precautions  The goal is to continue care in [facility]    Anemia  Will continue iron supplements for now  Recheck CBC  No signs of bleeding reported  Subjective:     Mary Patrick is a 80 y o  female who presented to the office today for follow up, denies any acute complaints at this time, denies CP, SOB, cough  Reports chronic joint pain and numbness left hand  She has good appetite and no difficulty sleeping  Review of Systems   Constitutional: Negative for chills and fever  HENT: Positive for hearing loss  Negative for congestion and rhinorrhea  Respiratory: Negative for cough, shortness of breath and wheezing  Cardiovascular: Negative for chest pain, palpitations and leg swelling  Gastrointestinal: Negative for abdominal pain and constipation  Endocrine: Negative for cold intolerance  Genitourinary: Negative for dysuria and hematuria  Nocturia   Musculoskeletal: Positive for gait problem  Skin: Negative for wound  Allergic/Immunologic: Negative for environmental allergies  Neurological: Positive for numbness (left hand)  Negative for dizziness  Hematological: Does not bruise/bleed easily  Psychiatric/Behavioral: Negative for behavioral problems and sleep disturbance         Past Medical History: Diagnosis Date    CAD (coronary artery disease)     Hypertension     Mild cognitive impairment with memory loss 5/29/2020    Osteoarthritis     Osteoporosis     Vitamin D deficiency            Objective:    /66   Pulse 70   Temp 97 5 °F (36 4 °C)   Resp 18   Wt 67 4 kg (148 lb 9 6 oz)   SpO2 96%   BMI 27 18 kg/m²     Physical Exam  Vitals signs and nursing note reviewed  Constitutional:       General: She is not in acute distress  Appearance: She is not diaphoretic  HENT:      Head: Normocephalic and atraumatic  Ears:      Comments: [place]  Eyes:      General:         Right eye: No discharge  Left eye: No discharge  Pupils: Pupils are equal, round, and reactive to light  Neck:      Musculoskeletal: Normal range of motion and neck supple  Cardiovascular:      Rate and Rhythm: Normal rate and regular rhythm  Heart sounds: Normal heart sounds  No murmur  Pulmonary:      Effort: Pulmonary effort is normal  No respiratory distress  Breath sounds: Normal breath sounds  No wheezing  Abdominal:      Palpations: Abdomen is soft  Tenderness: There is no abdominal tenderness  There is no guarding or rebound  Musculoskeletal:      Right lower leg: Edema present  Left lower leg: Edema present  Comments: Domingo HERNANDEZ     Skin:     General: Skin is warm and dry  Neurological:      Mental Status: She is alert and oriented to person, place, and time  Mental status is at baseline     Psychiatric:         Behavior: Behavior normal          Olga Rosado MD  06/10/21  10:41 AM

## 2021-06-10 NOTE — ASSESSMENT & PLAN NOTE
Chronic, s/p L ulnar fracture, will increase dose of gabapentin to 100 mg po TID  Consult OT as needed

## 2021-06-10 NOTE — ASSESSMENT & PLAN NOTE
Currently stable, will continue Protonix 20 mg daily  Attempted to taper down, however patient start having symptoms with Protonix every other day

## 2021-06-14 ENCOUNTER — TELEPHONE (OUTPATIENT)
Dept: UROLOGY | Facility: MEDICAL CENTER | Age: 86
End: 2021-06-14

## 2021-06-14 DIAGNOSIS — N95.2 VAGINAL ATROPHY: ICD-10-CM

## 2021-06-14 DIAGNOSIS — N95.2 VAGINAL ATROPHY: Primary | ICD-10-CM

## 2021-06-14 RX ORDER — ESTRADIOL 10 UG/1
1 INSERT VAGINAL 2 TIMES WEEKLY
Qty: 60 TABLET | Refills: 1 | Status: SHIPPED | OUTPATIENT
Start: 2021-06-14 | End: 2021-06-14 | Stop reason: SDUPTHER

## 2021-06-14 RX ORDER — ESTRADIOL 10 UG/1
1 INSERT VAGINAL 2 TIMES WEEKLY
Qty: 60 TABLET | Refills: 1 | Status: SHIPPED | OUTPATIENT
Start: 2021-06-14

## 2021-06-14 NOTE — TELEPHONE ENCOUNTER
Patients son Skylar Decker called in stating these are the two tier 4 drugs that can be replaced  Estradiol   01%  Or Yuvafem tab 10mcg   Skylar Decker can be reached at [phone]

## 2021-06-14 NOTE — TELEPHONE ENCOUNTER
Called Deepa Jaimes back and per [employer] they do not approve premarin cream   They approve Estradiol0 1% cream or tablet Yuvafem tab 10 mcg   [facility] in [facility],  Put orders for new medication need to be faxed to Ana Hernández at the [facility] where Karen Castelan resides so they can administer it @ fax # [phone]    Please advise of medication refill

## 2021-06-14 NOTE — TELEPHONE ENCOUNTER
Pt managed by Raymond Lopez,pt's son called regarding Premarin Vaginal Cream he is checking with [facility] for alternative that would be covered and call back to forward to physician

## 2021-06-15 ENCOUNTER — OFFICE VISIT (OUTPATIENT)
Dept: OBGYN CLINIC | Facility: HOSPITAL | Age: 86
End: 2021-06-15
Payer: MEDICARE

## 2021-06-15 VITALS
HEART RATE: 67 BPM | SYSTOLIC BLOOD PRESSURE: 159 MMHG | BODY MASS INDEX: 27.18 KG/M2 | DIASTOLIC BLOOD PRESSURE: 84 MMHG | HEIGHT: 62 IN

## 2021-06-15 DIAGNOSIS — M25.562 CHRONIC PAIN OF BOTH KNEES: Primary | ICD-10-CM

## 2021-06-15 DIAGNOSIS — M25.561 CHRONIC PAIN OF BOTH KNEES: Primary | ICD-10-CM

## 2021-06-15 DIAGNOSIS — M17.0 PRIMARY OSTEOARTHRITIS OF BOTH KNEES: ICD-10-CM

## 2021-06-15 DIAGNOSIS — G89.29 CHRONIC PAIN OF BOTH KNEES: Primary | ICD-10-CM

## 2021-06-15 PROCEDURE — 99212 OFFICE O/P EST SF 10 MIN: CPT | Performed by: PHYSICIAN ASSISTANT

## 2021-06-15 PROCEDURE — 20610 DRAIN/INJ JOINT/BURSA W/O US: CPT | Performed by: PHYSICIAN ASSISTANT

## 2021-06-15 RX ORDER — LIDOCAINE HYDROCHLORIDE 10 MG/ML
2 INJECTION, SOLUTION INFILTRATION; PERINEURAL
Status: COMPLETED | OUTPATIENT
Start: 2021-06-15 | End: 2021-06-15

## 2021-06-15 RX ORDER — BUPIVACAINE HYDROCHLORIDE 2.5 MG/ML
2 INJECTION, SOLUTION INFILTRATION; PERINEURAL
Status: COMPLETED | OUTPATIENT
Start: 2021-06-15 | End: 2021-06-15

## 2021-06-15 RX ORDER — BETAMETHASONE SODIUM PHOSPHATE AND BETAMETHASONE ACETATE 3; 3 MG/ML; MG/ML
12 INJECTION, SUSPENSION INTRA-ARTICULAR; INTRALESIONAL; INTRAMUSCULAR; SOFT TISSUE
Status: COMPLETED | OUTPATIENT
Start: 2021-06-15 | End: 2021-06-15

## 2021-06-15 RX ADMIN — BUPIVACAINE HYDROCHLORIDE 2 ML: 2.5 INJECTION, SOLUTION INFILTRATION; PERINEURAL at 10:53

## 2021-06-15 RX ADMIN — BETAMETHASONE SODIUM PHOSPHATE AND BETAMETHASONE ACETATE 12 MG: 3; 3 INJECTION, SUSPENSION INTRA-ARTICULAR; INTRALESIONAL; INTRAMUSCULAR; SOFT TISSUE at 10:53

## 2021-06-15 RX ADMIN — LIDOCAINE HYDROCHLORIDE 2 ML: 10 INJECTION, SOLUTION INFILTRATION; PERINEURAL at 10:53

## 2021-06-15 NOTE — PROGRESS NOTES
Subjective;     patient has bilateral knee pain from arthritic knees  she benefits by periodic injections     she presents the office today for that very reason  Past Medical History:   Diagnosis Date    CAD (coronary artery disease)     Hypertension     Mild cognitive impairment with memory loss 5/29/2020    Osteoarthritis     Osteoporosis     Vitamin D deficiency        Past Surgical History:   Procedure Laterality Date    APPENDECTOMY      HYSTERECTOMY      [place] PARTIAL HIP REPLACEMENT Left 8/13/2018    Procedure: HEMIARTHROPLASTY HIP (BIPOLAR); Surgeon: Sandra Stinson MD;  [facility];  Service: Orthopedics    ROTATOR CUFF REPAIR         Family History   Problem Relation Age of Onset    Diabetes type II Father     Heart disease Brother        Social History     Tobacco Use    Smoking status: Former Smoker    Smokeless tobacco: Never Used   Vaping Use    Vaping Use: Never used   Substance Use Topics    Alcohol use: Not Currently    Drug use: No      exam;     both knees are devoid of any redness or bruising that would preclude safe administration of medication      Large joint arthrocentesis: R knee  Universal Protocol:  Consent given by: patient    Supporting Documentation  Indications: pain   Procedure Details  Location: knee - R knee  Needle size: 22 G  Ultrasound guidance: no  Medications administered: 2 mL bupivacaine 0 25 %; 2 mL lidocaine 1 %; 12 mg betamethasone acetate-betamethasone sodium phosphate 6 (3-3) mg/mL      Large joint arthrocentesis: L knee  Universal Protocol:  Consent given by: patient    Supporting Documentation  Indications: pain   Procedure Details  Location: knee - L knee  Needle size: 22 G  Medications administered: 2 mL bupivacaine 0 25 %; 2 mL lidocaine 1 %; 12 mg betamethasone acetate-betamethasone sodium phosphate 6 (3-3) mg/mL         impression;     bilateral knee osteoarthritis   bilateral knee pain     plan;     she received well placed injections from a medial parapatellar tendon approach at today's appointment  we can see her in follow-up in 3 additional months  her transfer form from her facility was completed   her activity level remains unchanged     it was my privilege to assist her in care and at the expressed permission of the patient   Her exam was supervised by and plan formulated by the attending

## 2021-07-28 DIAGNOSIS — I10 ESSENTIAL HYPERTENSION: Primary | ICD-10-CM

## 2021-07-28 RX ORDER — LOSARTAN POTASSIUM 50 MG/1
TABLET ORAL
Qty: 30 TABLET | Refills: 3 | Status: CANCELLED | OUTPATIENT
Start: 2021-07-28

## 2021-08-05 DIAGNOSIS — K21.00 GASTROESOPHAGEAL REFLUX DISEASE WITH ESOPHAGITIS, UNSPECIFIED WHETHER HEMORRHAGE: Primary | ICD-10-CM

## 2021-08-05 RX ORDER — PANTOPRAZOLE SODIUM 20 MG/1
20 TABLET, DELAYED RELEASE ORAL EVERY OTHER DAY
Qty: 45 TABLET | Refills: 2 | Status: CANCELLED | OUTPATIENT
Start: 2021-08-05

## 2021-08-05 NOTE — TELEPHONE ENCOUNTER
Patient prescription plan  [facility] is recommending 90 day script for patient as a cost savings

## 2021-08-24 DIAGNOSIS — I10 ESSENTIAL HYPERTENSION: Primary | ICD-10-CM

## 2021-08-25 RX ORDER — LOSARTAN POTASSIUM 50 MG/1
50 TABLET ORAL DAILY
Qty: 90 TABLET | Refills: 2 | Status: SHIPPED | OUTPATIENT
Start: 2021-08-25

## 2021-09-07 ENCOUNTER — NURSING HOME VISIT (OUTPATIENT)
Dept: GERIATRICS | Facility: OTHER | Age: 86
End: 2021-09-07
Payer: MEDICARE

## 2021-09-07 ENCOUNTER — HOSPITAL ENCOUNTER (EMERGENCY)
Facility: HOSPITAL | Age: 86
Discharge: HOME | End: 2021-09-07
Attending: EMERGENCY MEDICINE
Payer: MEDICARE

## 2021-09-07 VITALS
TEMPERATURE: 97.5 F | SYSTOLIC BLOOD PRESSURE: 132 MMHG | RESPIRATION RATE: 18 BRPM | HEART RATE: 76 BPM | DIASTOLIC BLOOD PRESSURE: 76 MMHG | OXYGEN SATURATION: 95 %

## 2021-09-07 VITALS
WEIGHT: 159.17 LBS | DIASTOLIC BLOOD PRESSURE: 78 MMHG | SYSTOLIC BLOOD PRESSURE: 148 MMHG | HEART RATE: 90 BPM | BODY MASS INDEX: 29.11 KG/M2 | RESPIRATION RATE: 20 BRPM | OXYGEN SATURATION: 97 % | TEMPERATURE: 99 F

## 2021-09-07 DIAGNOSIS — N18.31 STAGE 3A CHRONIC KIDNEY DISEASE (HCC): ICD-10-CM

## 2021-09-07 DIAGNOSIS — N39.0 ACUTE URINARY TRACT INFECTION: Primary | ICD-10-CM

## 2021-09-07 DIAGNOSIS — I10 HYPERTENSION: ICD-10-CM
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
## 2021-09-07 DIAGNOSIS — F01.50 VASCULAR DEMENTIA WITHOUT BEHAVIORAL DISTURBANCE (HCC): ICD-10-CM

## 2021-09-07 DIAGNOSIS — N30.00 ACUTE CYSTITIS WITHOUT HEMATURIA: Primary | ICD-10-CM

## 2021-09-07 DIAGNOSIS — F03.90 DEMENTIA (HCC): ICD-10-CM

## 2021-09-07 DIAGNOSIS — R26.2 AMBULATORY DYSFUNCTION: ICD-10-CM

## 2021-09-07 DIAGNOSIS — I10 ESSENTIAL HYPERTENSION: ICD-10-CM

## 2021-09-07 LAB
ATRIAL RATE: 89 BPM
BILIRUB UR QL STRIP: NEGATIVE
CLARITY UR: ABNORMAL
COLOR UR: YELLOW
GLUCOSE UR STRIP-MCNC: NEGATIVE MG/DL
HGB UR QL STRIP.AUTO: ABNORMAL
KETONES UR STRIP-MCNC: NEGATIVE MG/DL
LEUKOCYTE ESTERASE UR QL STRIP: ABNORMAL
NITRITE UR QL STRIP: NEGATIVE
P AXIS: 77 DEGREES
PH UR STRIP.AUTO: 7 [PH] (ref 4.5–8)
PR INTERVAL: 190 MS
PROT UR STRIP-MCNC: ABNORMAL MG/DL
QRS AXIS: 64 DEGREES
QRSD INTERVAL: 78 MS
QT INTERVAL: 340 MS
QTC INTERVAL: 413 MS
SP GR UR STRIP.AUTO: >=1.03 (ref 1–1.03)
T WAVE AXIS: 62 DEGREES
UROBILINOGEN UR QL STRIP.AUTO: 0.2 E.U./DL
VENTRICULAR RATE: 89 BPM

## 2021-09-07 PROCEDURE — 99285 EMERGENCY DEPT VISIT HI MDM: CPT | Performed by: EMERGENCY MEDICINE

## 2021-09-07 PROCEDURE — 99284 EMERGENCY DEPT VISIT MOD MDM: CPT

## 2021-09-07 PROCEDURE — 93010 ELECTROCARDIOGRAM REPORT: CPT | Performed by: INTERNAL MEDICINE

## 2021-09-07 PROCEDURE — 93005 ELECTROCARDIOGRAM TRACING: CPT

## 2021-09-07 PROCEDURE — 99305 1ST NF CARE MODERATE MDM 35: CPT | Performed by: FAMILY MEDICINE

## 2021-09-07 RX ORDER — NITROFURANTOIN 25; 75 MG/1; MG/1
100 CAPSULE ORAL 2 TIMES DAILY
Qty: 10 CAPSULE | Refills: 0 | Status: SHIPPED | OUTPATIENT
Start: 2021-09-07 | End: 2021-09-12

## 2021-09-07 NOTE — ASSESSMENT & PLAN NOTE
Patient with UA suggestive of UTI  Continue macrobid, encourage po hydration  Check CBC, CMP in am   Encourage po fluids- cranberry juice  Monitor bladder scans q shift

## 2021-09-07 NOTE — ASSESSMENT & PLAN NOTE
Patient had 2 falls today , first this morning unwitnessed with possible LOC  Was seen in ER thereafter  Found to have UTI and started on antibiotics  Patient return to her apartment and had a second fall early this afternoon  She was transferred to [facility] for close monitoring  Will check orthostatic VS q shift  Placed on fall precautions  PT/OT evaluation pending  Encourage po hydration    Check CBC, CMP, TSH

## 2021-09-07 NOTE — PROGRESS NOTES
[address], [place], [address] [address]  History and Physical  POS: 32    Records Reviewed include: Hospital records      Chief Complaint/ Reason for Admission:   UTI, fall    History of Present Illness:      Ms Kiana Lu is a 81 yo female who lives at [place] [address] and was sent to ER this morning due to unwitnessed fall and tachycardia  Pt Does not remember any details about her fall  She will be monitored in health center for now  She denies any pain, she is AAOx3 at baseline  She reports abdominal tenderness and difficulty urinating  She was started on macrobid in ER will continue  Monitor bladder scans and encourage po hydration- cranberry juice  Will consult PT/OT for evaluation, monitor orthostatic vS , continue fall precautions  She denies CP, SOB, palpitations, difficulty sleeping, dizziness  Reports occasional lightheadedness when she stands up quickly  No changes in appetite or mood  Unable to obtain history from patient due to dementia  History was obtained from the medical records / medical staff  Allergies  No Known Allergies    Past Medical History  Past Medical History:   Diagnosis Date    CAD (coronary artery disease)     Hypertension     Mild cognitive impairment with memory loss 5/29/2020    Osteoarthritis     Osteoporosis     Vitamin D deficiency         Past Surgical History:   Procedure Laterality Date    APPENDECTOMY      HYSTERECTOMY      [place] PARTIAL HIP REPLACEMENT Left 8/13/2018    Procedure: HEMIARTHROPLASTY HIP (BIPOLAR);   Surgeon: Luis Warren MD;  [facility];  Service: Orthopedics    ROTATOR CUFF REPAIR         Family History  Family History   Problem Relation Age of Onset    Diabetes type II Father     Heart disease Brother        Social History  Social History     Tobacco Use   Smoking Status Former Smoker   Smokeless Tobacco Never Used      Social History     Substance and Sexual Activity   Alcohol Use Not Currently      Social History     Substance and Sexual Activity   Drug Use No        Lives: Assisted Living,  Social Support: family  Fall in the past 12 months: yes  Use of assistance Device: Wheelchair    Physical Exam    Vital Signs    Vitals:    09/07/21 1640   BP: 132/76   Pulse: 76   Resp: 18   Temp: 97 5 °F (36 4 °C)   SpO2: 95%           Constitutional: Frail appearing patient       Physical Exam  Vitals and nursing note reviewed  Constitutional:       General: She is not in acute distress  Appearance: She is not diaphoretic  HENT:      Head: Normocephalic and atraumatic  Ears:      Comments: [place]     Mouth/Throat:      Mouth: Mucous membranes are dry  Eyes:      General:         Right eye: No discharge  Left eye: No discharge  Pupils: Pupils are equal, round, and reactive to light  Cardiovascular:      Rate and Rhythm: Normal rate and regular rhythm  Heart sounds: Normal heart sounds  No murmur heard  Pulmonary:      Effort: Pulmonary effort is normal  No respiratory distress  Breath sounds: Normal breath sounds  No wheezing  Abdominal:      Palpations: Abdomen is soft  Tenderness: There is abdominal tenderness (lower abdomen)  There is no guarding or rebound  Musculoskeletal:         General: No tenderness  Cervical back: Normal range of motion and neck supple  Right lower leg: No edema  Left lower leg: No edema  Comments: Brace left LE   Skin:     General: Skin is warm and dry  Neurological:      Mental Status: She is alert and oriented to person, place, and time  Mental status is at baseline  Psychiatric:         Behavior: Behavior normal          Review of Systems:  Review of Systems   Constitutional: Negative for chills and fever  HENT: Positive for hearing loss  Negative for congestion and rhinorrhea  Respiratory: Negative for cough, shortness of breath and wheezing      Cardiovascular: Negative for chest pain, palpitations and leg swelling  Gastrointestinal: Negative for abdominal pain and constipation  Endocrine: Negative for cold intolerance  Genitourinary: Negative for difficulty urinating, dysuria and hematuria  Musculoskeletal: Positive for gait problem  Skin: Negative for wound  Allergic/Immunologic: Negative for environmental allergies  Neurological: Negative for dizziness and seizures  Hematological: Does not bruise/bleed easily  Psychiatric/Behavioral: Negative for behavioral problems and sleep disturbance  List of Current Medications:    Medication reviewed  All orders signed  Complete list is in the paper chart  Allergies  No Known Allergies    Labs/Diagnostics (reviewed by this provider): I personally reviewed lab results and imaging studies  Full reports are in the paper chart  Assessment/Plan:    Acute cystitis without hematuria  Patient with UA suggestive of UTI  Continue macrobid, encourage po hydration  Check CBC, CMP in am   Encourage po fluids- cranberry juice  Monitor bladder scans q shift  CKD (chronic kidney disease) stage 3, GFR 30-59 ml/min [facility])  Lab Results   Component Value Date    EGFR 77 05/28/2020    EGFR 48 05/27/2020    EGFR 71 02/13/2019    CREATININE 0 63 05/28/2020    CREATININE 1 01 05/27/2020    CREATININE 0 73 02/13/2019     Creatinine stable  Will avoid nephrotoxic medication  Encourage po hydration  Will monitor BMP  Ambulatory dysfunction  Patient had 2 falls today , first this morning unwitnessed with possible LOC  Was seen in ER thereafter  Found to have UTI and started on antibiotics  Patient return to her apartment and had a second fall early this afternoon  She was transferred to [facility] for [facility] monitoring  Will check orthostatic VS q shift  Placed on fall precautions  PT/OT evaluation pending  Encourage po hydration  Check CBC, CMP, TSH        Vascular dementia without behavioral disturbance (HCC)  Stable , no behavioral issues noted  Patients needs assistance with alI ADL's  Will continue supportive care  Maintain sleep Bishop Stakes cycle  Maintain good po  hydration, avoid constipation  Essential hypertension  Currently at goal , continue same regimen and monitor  Adjust regimen as needed         Pain: no  Rehab Potential:Fair  Patient Informed of Medical Condition: yes   Patient is Capable of Understanding Their Right: yes   Discharge Plan: [facility]  Vaccination:   Immunization History   Administered Date(s) Administered    INFLUENZA 10/13/2005, 10/07/2019    Influenza Split High Dose Preservative Free IM 10/29/2013, 11/10/2014, 09/10/2015, 11/29/2016, 10/23/2017    Influenza, high dose seasonal 0 7 mL 10/03/2018, 10/07/2019    Influenza, seasonal, injectable 09/09/2011, 08/23/2012, 10/29/2013    Pneumococcal Conjugate 13-Valent 11/10/2014    Pneumococcal Polysaccharide PPV23 01/01/2000    Tdap 06/14/2019     Advanced Directives: yes: Yes   Code status:DNR (Per discussion with resident or POA)      Von Bustos MD  Geriatric Medicine  9/7/20215:06 PM

## 2021-09-07 NOTE — ASSESSMENT & PLAN NOTE
Lab Results   Component Value Date    EGFR 77 05/28/2020    EGFR 48 05/27/2020    EGFR 71 02/13/2019    CREATININE 0 63 05/28/2020    CREATININE 1 01 05/27/2020    CREATININE 0 73 02/13/2019     Creatinine stable  Will avoid nephrotoxic medication  Encourage po hydration  Will monitor BMP

## 2021-09-07 NOTE — ED PROVIDER NOTES
History  Chief Complaint   Patient presents with    Fall     per facility pt was unable to bear weight today  Elevated BP     80year-old female presents for evaluation after her knees appeared to give out during transitioning  Patient's son is present states that she has a hard time secondary to pain in her legs  She states she normally ambulates without a walker but is not has been active in the nursing, she has been lately  Patient is currently asymptomatic and offers no complaints at this time  Patient's son states that she is at her baseline mental status  History provided by:  Patient, relative and EMS personnel  History limited by:  Dementia  Fall      Prior to Admission Medications   Prescriptions Last Dose Informant Patient Reported? Taking?    Cyanocobalamin (VITAMIN B12) 1000 MCG TBCR  Self Yes No   Sig: Take 1 tablet by mouth daily    D-Mannose 500 MG CAPS  Self Yes Yes   Sig: Take 1 tablet by mouth daily   Ketotifen Fumarate (REFRESH EYE ITCH RELIEF OP)  Self Yes No   Sig: Administer 1 drop into the left eye every 2 (two) hours   Multiple Vitamins-Minerals (PRESERVISION/LUTEIN) CAPS  Self Yes Yes   Sig: Take 1 capsule by mouth 2 (two) times a day   Pyridoxine HCl (VITAMIN B6) 200 MG TABS  Self Yes No   Sig: Take 0 5 tablets by mouth daily    acetaminophen (TYLENOL) 325 mg tablet  Self Yes Yes   Sig: Take 650 mg by mouth 3 (three) times a day    aspirin 81 MG tablet  Self Yes Yes   Sig: Take 1 tablet by mouth daily   calcitonin, salmon, (MIACALCIN) 200 units/act nasal spray  Self No Yes   Si spray and to alternate nostril q day   Patient taking differently: 1 spray into each nostril daily Alternate left and right nostril every other day   calcium carbonate (TUMS) 500 mg chewable tablet  Self Yes No   Sig: Chew 1 tablet continuous as needed for indigestion or heartburn   chlorthalidone 25 mg tablet   Yes Yes   Sig: Take 12 5 mg by mouth daily   cycloSPORINE (RESTASIS) 0 05 % ophthalmic emulsion  Self Yes No   Sig: Administer 1 drop to both eyes 2 (two) times a day   dorzolamide-timolol (COSOPT) 22 3-6 8 MG/ML ophthalmic solution  Self Yes Yes   Sig: INSTILL 1 DROP INTO LEFT EYE TWICE A DAY APPROXIMATELY 12 HOURS APART   estradiol (VAGIFEM, YUVAFEM) 10 MCG TABS vaginal tablet   No Yes   Sig: Insert 1 tablet (10 mcg total) into the vagina 2 (two) times a week   fenofibrate (TRICOR) 145 mg tablet  Self Yes No   Sig: Take 145 mg by mouth   ferrous sulfate 324 (65 Fe) mg  Self No Yes   Sig: Take 1 tablet (324 mg total) by mouth daily before breakfast   gabapentin (NEURONTIN) 100 mg capsule   Yes Yes   Sig: Take 100 mg by mouth 3 (three) times a day   latanoprost (XALATAN) 0 005 % ophthalmic solution  Self Yes Yes   Sig: INSTILL 1 DROP IN THE AFFECTED EYE(S) IN THE EVENING   losartan (COZAAR) 50 mg tablet   No Yes   Sig: Take 1 tablet (50 mg total) by mouth daily   metoprolol succinate (TOPROL-XL) 50 mg 24 hr tablet  Self Yes Yes   metoprolol tartrate (LOPRESSOR) 50 mg tablet  Self No No   Sig: TAKE 1 TABLET TWICE DAILY   Patient taking differently: Take 50 mg by mouth daily    pantoprazole (PROTONIX) 20 mg tablet  Self Yes Yes   Sig: Take 20 mg by mouth every other day   polyvinyl alcohol (LIQUIFILM TEARS) 1 4 % ophthalmic solution  Self Yes No   Sig: Administer 1 drop to both eyes 4 (four) times a day      Facility-Administered Medications: None       Past Medical History:   Diagnosis Date    CAD (coronary artery disease)     Hypertension     Mild cognitive impairment with memory loss 5/29/2020    Osteoarthritis     Osteoporosis     Vitamin D deficiency        Past Surgical History:   Procedure Laterality Date    APPENDECTOMY      HYSTERECTOMY      RI PARTIAL HIP REPLACEMENT Left 8/13/2018    Procedure: HEMIARTHROPLASTY HIP (BIPOLAR);   Surgeon: Catrachito Wylie MD;  [facility]: [facility];  Service: Orthopedics    ROTATOR CUFF REPAIR         Family History   Problem Relation Age of Onset    Diabetes type II Father     Heart disease Brother      I have reviewed and agree with the history as documented  E-Cigarette/Vaping    E-Cigarette Use Never User      E-Cigarette/Vaping Substances    Nicotine No     THC No     CBD No     Flavoring No     Other No     Unknown No      Social History     Tobacco Use    Smoking status: Former Smoker    Smokeless tobacco: Never Used   Vaping Use    Vaping Use: Never used   Substance Use Topics    Alcohol use: Not Currently    Drug use: No       Review of Systems   Unable to perform ROS: Dementia       Physical Exam  Physical Exam  Constitutional:       Appearance: Normal appearance  HENT:      Head: Normocephalic and atraumatic  Nose: Nose normal    Eyes:      Extraocular Movements: Extraocular movements intact  Pupils: Pupils are equal, round, and reactive to light  Neck:      Comments: No tenderness to palpation or cervical thoracic and lumbar spines  Cardiovascular:      Rate and Rhythm: Normal rate and regular rhythm  Pulmonary:      Effort: Pulmonary effort is normal       Breath sounds: Normal breath sounds  Chest:      Chest wall: No tenderness  Abdominal:      Palpations: Abdomen is soft  Tenderness: There is no abdominal tenderness  There is no guarding  Comments: Pelvis stable bilateral   Nontender to palpation  Pain painless range of motion of hips  Musculoskeletal:      Cervical back: Normal range of motion  Comments: Bilateral upper extremity trauma exam is within normal limits  Right lower extremity trauma exam is within normal limits  Patient has brace present extending from left knee to ankle  Patient has no tenderness to palpation noted is able to fully lift her leg off the bed without pain  Neurological:      General: No focal deficit present  Mental Status: She is alert  Mental status is at baseline     Psychiatric:         Mood and Affect: Mood normal          Behavior: Behavior normal  Vital Signs  ED Triage Vitals [09/07/21 0900]   Temperature Pulse Respirations Blood Pressure SpO2   99 °F (37 2 °C) 94 16 (!) 187/79 97 %      Temp Source Heart Rate Source Patient Position - Orthostatic VS BP Location FiO2 (%)   Oral Monitor Sitting Right arm --      Pain Score       --           Vitals:    09/07/21 0900 09/07/21 0930 09/07/21 1020   BP: (!) 187/79  148/78   Pulse: 94 90 96   Patient Position - Orthostatic VS: Sitting  Sitting         Visual Acuity  Visual Acuity      Most Recent Value   L Pupil Size (mm)  3          ED Medications  Medications - No data to display    Diagnostic Studies  Results Reviewed     Procedure Component Value Units Date/Time    Urine Macroscopic, POC [[id number]]  (Abnormal) Collected: 09/07/21 1015    Lab Status: Final result Specimen: Urine Updated: 09/07/21 1017     Color, UA Yellow     Clarity, UA Cloudy     pH, UA 7 0     Leukocytes, UA Moderate     Nitrite, UA Negative     Protein, UA 30 (1+) mg/dl      Glucose, UA Negative mg/dl      Ketones, UA Negative mg/dl      Urobilinogen, UA 0 2 E U /dl      Bilirubin, UA Negative     Blood, UA Trace     Specific Gravity, UA >=1 030    Narrative:      CLINITEK RESULT    Urine Microscopic [[id number]] Collected: 09/07/21 1015    Lab Status: No result Specimen: Urine, Clean Catch                  No orders to display              Procedures  ECG 12 Lead Documentation Only    Date/Time: 9/7/2021 10:06 AM  Performed by: Fidel Nguyen MD  Authorized by: Fidel Nguyen MD     ECG reviewed by me, the ED Provider: yes    Patient location:  ED  Rate:     ECG rate:  89    ECG rate assessment: normal    Rhythm:     Rhythm: sinus rhythm    Ectopy:     Ectopy: none    QRS:     QRS axis:  Normal    QRS intervals:  Normal  Conduction:     Conduction: normal    ST segments:     ST segments:  Normal  T waves:     T waves: normal               ED Course  ED Course as of Sep 07 1037   Tue Sep 07, 2021   1035 Will tx for uti   Leukocytes, UA(!): Moderate                             SBIRT 20yo+      Most Recent Value   SBIRT (22 yo +)   In order to provide better care to our patients, we are screening all of our patients for alcohol and drug use  Would it be okay to ask you these screening questions? No Filed at: 09/07/2021 0906                    MDM  Number of Diagnoses or Management Options  Diagnosis management comments: Patient appeared to be weak in her legs when transitioning and fell to her buttocks with absence of traumatic complaints with a baseline neuro exam and a normal trauma exam-will do EKG, urine dip, ambulatory challenge for discharge planning  Disposition  Final diagnoses:   Acute urinary tract infection   Fall, initial encounter   Dementia [facility])   Hypertension     Time reflects when diagnosis was documented in both MDM as applicable and the Disposition within this note     Time User Action Codes Description Comment    9/7/2021 10:35 AM Sonia BOTELLO Add [N39 0] Acute urinary tract infection     9/7/2021 10:35 AM Hosak, Lorenzo Gosselin Add Jose Armandodilip Adrian  PFEJ] Fall, initial encounter     9/7/2021 10:36 AM Alondra Pathak Add [F03 90] Dementia ([facility] [address] )     9/7/2021 10:36 AM Alondra Pathak Add [I10] Hypertension       ED Disposition     ED Disposition Condition Date/Time Comment    Discharge Stable Tue Sep 7, 2021 10:35 AM Apurva Fleeting discharge to home/self care  Follow-up Information     Follow up With Specialties Details Why [address], [place] Geriatric Medicine Schedule an appointment as soon as possible for a visit   [address]  [phone]            Patient's Medications   Discharge Prescriptions    NITROFURANTOIN (MACROBID) 100 MG CAPSULE    Take 1 capsule (100 mg total) by mouth 2 (two) times a day for 5 days       Start Date: 9/7/2021  End Date: 9/12/2021       Order Dose: 100 mg       Quantity: 10 capsule    Refills: 0     No discharge procedures on file      PDMP Review None          ED Provider  Electronically Signed by           Fidel Nguyen MD  09/07/21 1037

## 2021-09-09 ENCOUNTER — NURSING HOME VISIT (OUTPATIENT)
Dept: GERIATRICS | Facility: OTHER | Age: 86
End: 2021-09-09
Payer: MEDICARE

## 2021-09-09 VITALS
HEART RATE: 85 BPM | OXYGEN SATURATION: 96 % | SYSTOLIC BLOOD PRESSURE: 110 MMHG | RESPIRATION RATE: 18 BRPM | TEMPERATURE: 97.8 F | DIASTOLIC BLOOD PRESSURE: 54 MMHG

## 2021-09-09 DIAGNOSIS — N30.00 ACUTE CYSTITIS WITHOUT HEMATURIA: ICD-10-CM

## 2021-09-09 DIAGNOSIS — E87.1 HYPONATREMIA: Primary | ICD-10-CM

## 2021-09-09 PROCEDURE — 99307 SBSQ NF CARE SF MDM 10: CPT | Performed by: NURSE PRACTITIONER

## 2021-09-09 RX ORDER — MELATONIN
2000 DAILY
COMMUNITY

## 2021-09-09 NOTE — ASSESSMENT & PLAN NOTE
· Stable and asymptomatic   · WBC trending down  · Continue with macrobid  · encourage po fluids   · Continue to monitor

## 2021-09-09 NOTE — ASSESSMENT & PLAN NOTE
·  on labwork this morning  · Insert IV, 1L of NSS to be given at 60ml/r  · encourage po fluids   · Discussed with Dr Serg Holder, suggested discontinue chlorthalidone  · Will discontinue Chlorthalidone and monitor blood pressures  · Continue to monitor closely

## 2021-09-10 ENCOUNTER — NURSING HOME VISIT (OUTPATIENT)
Dept: GERIATRICS | Facility: OTHER | Age: 86
End: 2021-09-10
Payer: MEDICARE

## 2021-09-10 DIAGNOSIS — D72.829 LEUKOCYTOSIS, UNSPECIFIED TYPE: ICD-10-CM

## 2021-09-10 DIAGNOSIS — E87.1 HYPONATREMIA: Primary | ICD-10-CM

## 2021-09-10 PROCEDURE — 99309 SBSQ NF CARE MODERATE MDM 30: CPT | Performed by: FAMILY MEDICINE

## 2021-09-10 NOTE — PROGRESS NOTES
[facility]  [address]  [phone] [address] of Service: nursing home place of service: POS 32 Unskilled- No Part A Coverage      NAME: Jerardo Turner  AGE: 80 y o  SEX: female [id number]    DATE OF ENCOUNTER: 9/10/2021    Assessment and Plan     Problem List Items Addressed This Visit        Other    Hyponatremia - Primary     Sodium today 126 slowly trending down  Will hold iv fluids, place on fluid restriction  ordered sodium studies in am , will follow up with results  Of note patient had a recent head injury  Confusion noted over night  Will monitor vSq shift  Monitor closely, neuro checks q 4 hours          Leukocytosis     WBC trending up  Will recheck UA C&S   COVID test negative today  Occasional cough noted- continue to monitor  Recheck CBC with diff in am                   Chief Complaint     Follow up hyponatremia    History of Present Illness     Jerardo Turner is a 80 y o  female who was seen today for follow up  Patient seen and examined at bedside, states she feels well, denies any headache, focal deficits, changes in vision  As per staff she was confused and slightly agitated over night, pulled her iv line x4  Appetite is preserved  The following portions of the patient's history were reviewed and updated as appropriate: allergies, current medications, past family history, past medical history, past social history, past surgical history and problem list     Review of Systems     Review of Systems   Constitutional: Negative for chills and fever  HENT: Negative for congestion  Respiratory: Positive for cough (occasional)  Negative for shortness of breath and wheezing  Cardiovascular: Negative for chest pain, palpitations and leg swelling  Gastrointestinal: Negative for abdominal pain and constipation  Endocrine: Negative for cold intolerance     Genitourinary: Negative for difficulty urinating, dysuria and hematuria  Musculoskeletal: Positive for gait problem  Skin: Negative for wound  Allergic/Immunologic: Negative for environmental allergies  Neurological: Negative for dizziness and seizures  Hematological: Does not bruise/bleed easily  Psychiatric/Behavioral: Negative for behavioral problems and sleep disturbance         Active Problem List     Patient Active Problem List   Diagnosis    Generalized osteoarthritis of multiple sites    Hyperlipidemia    Essential hypertension    Osteoporosis    Impacted cerumen of both ears    Sterile pyuria    Auditory complaints of both ears    Preoperative clearance    Hypertensive urgency    S/P hip hemiarthroplasty    Acute blood loss anemia    Fracture follow-up    S/p left hip fracture    Iron deficiency anemia secondary to inadequate dietary iron intake    History of left hip hemiarthroplasty    Trigger middle finger of right hand    Unstable knee, left    Acute lateral meniscal injury of right knee    Ambulatory dysfunction    Chronic pain of left knee    Gastroesophageal reflux disease with esophagitis    Acute cystitis without hematuria    Acute left ankle pain    Pain, joint, ankle and foot, left    Synovitis of left ankle    Dysuria    Overweight    Primary osteoarthritis of right knee    Acute right-sided low back pain without sciatica    URI, acute    Functional diarrhea    Recurrent UTI    Vitamin D deficiency    Localized edema    Syncope and collapse    Closed nondisplaced fracture of styloid process of left ulna    Herpes zoster keratoconjunctivitis    Mild cognitive impairment with memory loss    Aftercare for healing traumatic closed fracture of left ulna    CKD (chronic kidney disease) stage 3, GFR 30-59 ml/min ([facility])    Numbness and tingling in left hand    Vascular dementia without behavioral disturbance ([facility])    Primary osteoarthritis of left knee    ACP (advance care planning)    Anemia    Hyponatremia  Leukocytosis       Objective     Vital Signs:     Blood pressure 132/76 Heart Rate: 78 Respiratory Rate 18   Temperature 97 5 Oxygen Saturation 99%     Physical Exam  Vitals and nursing note reviewed  Constitutional:       General: She is not in acute distress  Appearance: She is not diaphoretic  HENT:      Head: Normocephalic and atraumatic  Ears:      Comments: [place]  Eyes:      General:         Right eye: No discharge  Left eye: No discharge  Pupils: Pupils are equal, round, and reactive to light  Comments: Wears glasses   Cardiovascular:      Rate and Rhythm: Normal rate and regular rhythm  Heart sounds: Normal heart sounds  No murmur heard  Pulmonary:      Effort: Pulmonary effort is normal  No respiratory distress  Breath sounds: Normal breath sounds  No wheezing  Abdominal:      Palpations: Abdomen is soft  Tenderness: There is no abdominal tenderness  There is no guarding or rebound  Musculoskeletal:         General: Deformity present  No tenderness  Cervical back: Normal range of motion and neck supple  Right lower leg: Edema present  Left lower leg: Edema present  Comments: Walker/wheelchair  Brace left knee   Skin:     General: Skin is warm and dry  Neurological:      Mental Status: She is alert and oriented to person, place, and time  Mental status is at baseline  Psychiatric:         Behavior: Behavior normal          Pertinent Laboratory/Diagnostic Studies:  Laboratory and Imaging studies reviewed  Full report in the paper chart  Current Medications   Medications reviewed and updated in facility chart      Name: Gus Bower  : 1925  MRN: [id number]        Anjana Valenzuela MD  Geriatric Medicine  9/10/2021 7:28 PM

## 2021-09-10 NOTE — ASSESSMENT & PLAN NOTE
Sodium today 126 slowly trending down  Will hold iv fluids, place on fluid restriction  ordered sodium studies in am , will follow up with results  Of note patient had a recent head injury  Confusion noted over night  Will monitor vSq shift    Monitor closely, neuro checks q 4 hours

## 2021-09-10 NOTE — ASSESSMENT & PLAN NOTE
WBC trending up  Will recheck UA C&S   COVID test negative today  Occasional cough noted- continue to monitor    Recheck CBC with diff in am

## 2021-09-14 ENCOUNTER — OFFICE VISIT (OUTPATIENT)
Dept: OBGYN CLINIC | Facility: HOSPITAL | Age: 86
End: 2021-09-14
Payer: MEDICARE

## 2021-09-14 ENCOUNTER — NURSING HOME VISIT (OUTPATIENT)
Dept: GERIATRICS | Facility: OTHER | Age: 86
End: 2021-09-14
Payer: MEDICARE

## 2021-09-14 VITALS
TEMPERATURE: 97.5 F | SYSTOLIC BLOOD PRESSURE: 135 MMHG | DIASTOLIC BLOOD PRESSURE: 68 MMHG | OXYGEN SATURATION: 97 % | RESPIRATION RATE: 18 BRPM | HEART RATE: 74 BPM

## 2021-09-14 VITALS
HEIGHT: 62 IN | DIASTOLIC BLOOD PRESSURE: 67 MMHG | SYSTOLIC BLOOD PRESSURE: 133 MMHG | BODY MASS INDEX: 29.11 KG/M2 | HEART RATE: 63 BPM

## 2021-09-14 DIAGNOSIS — M25.561 CHRONIC PAIN OF BOTH KNEES: Primary | ICD-10-CM

## 2021-09-14 DIAGNOSIS — G89.29 CHRONIC PAIN OF BOTH KNEES: Primary | ICD-10-CM

## 2021-09-14 DIAGNOSIS — N30.00 ACUTE CYSTITIS WITHOUT HEMATURIA: ICD-10-CM

## 2021-09-14 DIAGNOSIS — E87.1 HYPONATREMIA: Primary | ICD-10-CM

## 2021-09-14 DIAGNOSIS — M17.0 PRIMARY OSTEOARTHRITIS OF BOTH KNEES: ICD-10-CM

## 2021-09-14 DIAGNOSIS — M25.562 CHRONIC PAIN OF BOTH KNEES: Primary | ICD-10-CM

## 2021-09-14 PROCEDURE — 99213 OFFICE O/P EST LOW 20 MIN: CPT | Performed by: ORTHOPAEDIC SURGERY

## 2021-09-14 PROCEDURE — 20610 DRAIN/INJ JOINT/BURSA W/O US: CPT | Performed by: ORTHOPAEDIC SURGERY

## 2021-09-14 PROCEDURE — 99307 SBSQ NF CARE SF MDM 10: CPT | Performed by: NURSE PRACTITIONER

## 2021-09-14 RX ORDER — LIDOCAINE HYDROCHLORIDE 10 MG/ML
2 INJECTION, SOLUTION INFILTRATION; PERINEURAL
Status: COMPLETED | OUTPATIENT
Start: 2021-09-14 | End: 2021-09-14

## 2021-09-14 RX ORDER — BUPIVACAINE HYDROCHLORIDE 2.5 MG/ML
2 INJECTION, SOLUTION INFILTRATION; PERINEURAL
Status: COMPLETED | OUTPATIENT
Start: 2021-09-14 | End: 2021-09-14

## 2021-09-14 RX ORDER — BETAMETHASONE SODIUM PHOSPHATE AND BETAMETHASONE ACETATE 3; 3 MG/ML; MG/ML
12 INJECTION, SUSPENSION INTRA-ARTICULAR; INTRALESIONAL; INTRAMUSCULAR; SOFT TISSUE
Status: COMPLETED | OUTPATIENT
Start: 2021-09-14 | End: 2021-09-14

## 2021-09-14 RX ADMIN — BETAMETHASONE SODIUM PHOSPHATE AND BETAMETHASONE ACETATE 12 MG: 3; 3 INJECTION, SUSPENSION INTRA-ARTICULAR; INTRALESIONAL; INTRAMUSCULAR; SOFT TISSUE at 10:56

## 2021-09-14 RX ADMIN — LIDOCAINE HYDROCHLORIDE 2 ML: 10 INJECTION, SOLUTION INFILTRATION; PERINEURAL at 10:56

## 2021-09-14 RX ADMIN — BUPIVACAINE HYDROCHLORIDE 2 ML: 2.5 INJECTION, SOLUTION INFILTRATION; PERINEURAL at 10:56

## 2021-09-14 NOTE — ASSESSMENT & PLAN NOTE
· Sodium improving slowly, 133 on yesterday bloodwork  · Continue with FR 1200ML  · Continue with Sodium tabs 1gm tid  · Repeat labwork tomorrow

## 2021-09-14 NOTE — PROGRESS NOTES
[address] [facility]  [address]  [phone]  [facility]  Code 32      NAME: Priscilla Steele  AGE: 80 y o  SEX: female    DATE OF ENCOUNTER: 9/14/2021    Assessment and Plan     Problem List Items Addressed This Visit        Genitourinary    Acute cystitis without hematuria     · Remains stable and asymptomatic  · White blood cells trended down  · Encourage p o  hydration  · Continue to monitor            Other    Hyponatremia - Primary     · Sodium improving slowly, 133 on yesterday bloodwork  · Continue with FR 1200ML  · Continue with Sodium tabs 1gm tid  · Repeat labwork tomorrow               No orders of the defined types were placed in this encounter       - Counseling Documentation: patient was counseled regarding: importance of compliance with treatment    Chief Complaint     No chief complaint on file  History of Present Illness     Kyung Lancaster is a 51-year-old female [facility] after recent fall at [employer]  Was sent to the hospital afer the fall and was sent back with antibiotics for UTI  She is being seen today for follow-up on hyponatremia  Her comorbidities include hypertension, dementia, and ambulatory dysfunction  Upon examination patient was out of bed in the chair, resting  She appeared comfortable and was in no acute distress  She says she was feeling good and offered no acute complaints  Her appetite is at baseline  She is sleeping well at night  Vital signs have been stable  Her sodium level has improved  She denies headaches, dizziness, lightheadedness, chest pain, shortness of breath cough, abdominal pain, and GI  upset  Per nursing no other acute concerns or issues at this time        The following portions of the patient's history were reviewed and updated as appropriate: allergies, current medications, past family history, past medical history, past social history, past surgical history and problem list     Review of Systems     Review of Systems   Constitutional: Negative for chills and fever  HENT: Positive for hearing loss  Negative for ear pain and sore throat  Eyes: Negative for pain and visual disturbance  Respiratory: Negative for cough and shortness of breath  Cardiovascular: Negative for chest pain and palpitations  Gastrointestinal: Negative for abdominal pain and vomiting  Genitourinary: Negative for difficulty urinating, dysuria and hematuria  Musculoskeletal: Positive for gait problem  Negative for arthralgias and back pain  Skin: Negative for color change and rash  Neurological: Negative for dizziness, seizures, syncope, weakness and headaches         Active Problem List     Patient Active Problem List   Diagnosis    Generalized osteoarthritis of multiple sites    Hyperlipidemia    Essential hypertension    Osteoporosis    Impacted cerumen of both ears    Sterile pyuria    Auditory complaints of both ears    Preoperative clearance    Hypertensive urgency    S/P hip hemiarthroplasty    Acute blood loss anemia    Fracture follow-up    S/p left hip fracture    Iron deficiency anemia secondary to inadequate dietary iron intake    History of left hip hemiarthroplasty    Trigger middle finger of right hand    Unstable knee, left    Acute lateral meniscal injury of right knee    Ambulatory dysfunction    Chronic pain of left knee    Gastroesophageal reflux disease with esophagitis    Acute cystitis without hematuria    Acute left ankle pain    Pain, joint, ankle and foot, left    Synovitis of left ankle    Dysuria    Overweight    Primary osteoarthritis of right knee    Acute right-sided low back pain without sciatica    URI, acute    Functional diarrhea    Recurrent UTI    Vitamin D deficiency    Localized edema    Syncope and collapse    Closed nondisplaced fracture of styloid process of left ulna    Herpes zoster keratoconjunctivitis    Mild cognitive impairment with memory loss    Aftercare for healing traumatic closed fracture of left ulna    CKD (chronic kidney disease) stage 3, GFR 30-59 ml/min ([facility])    Numbness and tingling in left hand    Vascular dementia without behavioral disturbance ([facility])    Primary osteoarthritis of left knee    ACP (advance care planning)    Anemia    Hyponatremia    Leukocytosis       Objective     /68   Pulse 74   Temp 97 5 °F (36 4 °C)   Resp 18   SpO2 97%     Physical Exam  Vitals reviewed  Constitutional:       General: She is not in acute distress  Appearance: She is not ill-appearing  Comments: Frail appearing   HENT:      Head: Normocephalic  Ears:      Comments: [place]     Mouth/Throat:      Mouth: Mucous membranes are dry  Cardiovascular:      Rate and Rhythm: Normal rate and regular rhythm  Pulses: Normal pulses  Heart sounds: Normal heart sounds  No murmur heard  Pulmonary:      Effort: Pulmonary effort is normal  No respiratory distress  Breath sounds: Normal breath sounds  No wheezing  Abdominal:      General: Bowel sounds are normal  There is no distension  Palpations: Abdomen is soft  Tenderness: There is no abdominal tenderness  Musculoskeletal:      Right lower leg: No edema  Left lower leg: No edema  Comments: Brace LLE   Skin:     General: Skin is warm and dry  Coloration: Skin is not jaundiced  Findings: No bruising  Neurological:      General: No focal deficit present  Mental Status: She is alert  Mental status is at baseline  Motor: Weakness present  Gait: Gait abnormal    Psychiatric:         Mood and Affect: Mood normal          Behavior: Behavior normal          Pertinent Laboratory/Diagnostic Studies:  Labs reviewed in facility paper chart          Ja Tab[address]   9/14/2021 3:28 PM

## 2021-09-14 NOTE — ASSESSMENT & PLAN NOTE
· Remains stable and asymptomatic  · White blood cells trended down  · Encourage p o  hydration  · Continue to monitor

## 2021-09-17 ENCOUNTER — NURSING HOME VISIT (OUTPATIENT)
Dept: GERIATRICS | Facility: OTHER | Age: 86
End: 2021-09-17
Payer: MEDICARE

## 2021-09-17 VITALS
SYSTOLIC BLOOD PRESSURE: 113 MMHG | DIASTOLIC BLOOD PRESSURE: 65 MMHG | TEMPERATURE: 97 F | HEART RATE: 70 BPM | OXYGEN SATURATION: 96 % | RESPIRATION RATE: 18 BRPM

## 2021-09-17 DIAGNOSIS — N30.00 ACUTE CYSTITIS WITHOUT HEMATURIA: Primary | ICD-10-CM

## 2021-09-17 DIAGNOSIS — I10 ESSENTIAL HYPERTENSION: ICD-10-CM

## 2021-09-17 DIAGNOSIS — R26.2 AMBULATORY DYSFUNCTION: ICD-10-CM

## 2021-09-17 DIAGNOSIS — E87.1 HYPONATREMIA: ICD-10-CM

## 2021-09-17 PROCEDURE — 99316 NF DSCHRG MGMT 30 MIN+: CPT | Performed by: NURSE PRACTITIONER

## 2021-09-17 RX ORDER — SODIUM CHLORIDE 1000 MG
1 TABLET, SOLUBLE MISCELLANEOUS 3 TIMES DAILY
COMMUNITY

## 2021-09-17 NOTE — ASSESSMENT & PLAN NOTE
· Sodium improved, 137 today  · Was 127 on last  Fridays labwork, 1L of fluid given with no increase in sodium level  · Was then placed on FR 1200, and sodium tablets  · Continue with sodium tablets 1gm TID  · Will give script for repeat labwork upon discharge

## 2021-09-17 NOTE — ASSESSMENT & PLAN NOTE
· Treated for UTI  · Antibiotics completed   · Asymptomatic   · WBC trended down, 10 4 today  · Encouraged PO hydration  · Continue to monitor

## 2021-09-17 NOTE — PROGRESS NOTES
[facility]  Małachjoel Dean 79  [phone]  [address] [place] [address]  Discharge Summary      NAME: Jag Harper  AGE: 80 y o  SEX: female    DATE OF ENCOUNTER: 9/17/2021    Assessment and Plan     Problem List Items Addressed This Visit        Cardiovascular and Mediastinum    Essential hypertension     · Currently stable and at goal  · /65 this morning  · Continue with current medication regimen  · COntinue to monitor and adjust medications as needed            Genitourinary    Acute cystitis without hematuria - Primary     · Treated for UTI  · Antibiotics completed   · Asymptomatic   · WBC trended down, 10 4 today  · Encouraged PO hydration  · Continue to monitor            Other    Ambulatory dysfunction     · Had 2 fall at apartment prior to admission to facility  · PT/OT have been following  · Currently requires an extensive assist x1 for transfers and ambulation with rolling walker  · Fall precautions  · Encourage use of rolling walker with ambulation  · Continue PT/OT for continued strength and balance training upon discharge back to [facility]         Hyponatremia     · Sodium improved, 137 today  · Was 127 on last  Fridays labwork, 1L of fluid given with no increase in sodium level  · Was then placed on FR 1200, and sodium tablets  · Continue with sodium tablets 1gm TID  · Will give script for repeat labwork upon discharge                No orders of the defined types were placed in this encounter       - Counseling Documentation: patient was counseled regarding: impressions and importance of compliance with treatment    Chief Complaint     No chief complaint on file  History of Present Illness     Madison Garcia is a 70-year-old female at [facility] after having 2 falls while at her apartment, sent to ER found to have UTI    She is being seen today for follow-up on her chronic medical conditions and for her pending discharge on Monday September 20th back to [facility]  Her comorbidities include hypertension, dementia, hyponatremia, GERD, and ambulatory dysfunction  While admitted to [facility] she worked with PT/OT, currently requires an extensive assist x1 for transfers and ambulation with rolling walker  Speech therapy followed, currently on a regular diet with thin liquids  Was treated for UTI with Macrobid  Labwork was trended  Found to have hyponatremia, initially received IV fluids with no improvement in sodium level  Was then started on sodium chloride tablets  She had a follow-up with orthopedics  Script provided [facility] have [facility]  Upon examination patient was out of bed in the chair, resting  She appeared comfortable and was in no acute distress  She says she was feeling good and offered no acute complaints  Says that she feels better and is ready to go back to her apartment  Her appetite has been at her baseline  She is sleeping well at night  Her vital signs have been stable  She denies headaches, dizziness, lightheadedness, blurred vision, chest pain, shortness of breath, cough, abdominal pain, and GI  upset  Per nursing no other acute concerns or issues at this time  The following portions of the patient's history were reviewed and updated as appropriate: allergies, current medications, past family history, past medical history, past social history, past surgical history and problem list     Review of Systems     Review of Systems   Constitutional: Negative for activity change, appetite change, chills and fever  HENT: Positive for hearing loss  Negative for ear pain and sore throat  Eyes: Negative for pain and visual disturbance  Respiratory: Negative for cough and shortness of breath  Cardiovascular: Negative for chest pain and palpitations  Gastrointestinal: Negative for abdominal pain and vomiting     Genitourinary: Negative for difficulty urinating, dysuria, flank pain, frequency and hematuria  Musculoskeletal: Positive for gait problem  Negative for arthralgias and back pain  Skin: Negative for color change and rash  Neurological: Negative for dizziness, seizures, syncope, weakness and headaches         Active Problem List     Patient Active Problem List   Diagnosis    Generalized osteoarthritis of multiple sites    Hyperlipidemia    Essential hypertension    Osteoporosis    Impacted cerumen of both ears    Sterile pyuria    Auditory complaints of both ears    Preoperative clearance    Hypertensive urgency    S/P hip hemiarthroplasty    Acute blood loss anemia    Fracture follow-up    S/p left hip fracture    Iron deficiency anemia secondary to inadequate dietary iron intake    History of left hip hemiarthroplasty    Trigger middle finger of right hand    Unstable knee, left    Acute lateral meniscal injury of right knee    Ambulatory dysfunction    Chronic pain of left knee    Gastroesophageal reflux disease with esophagitis    Acute cystitis without hematuria    Acute left ankle pain    Pain, joint, ankle and foot, left    Synovitis of left ankle    Dysuria    Overweight    Primary osteoarthritis of right knee    Acute right-sided low back pain without sciatica    URI, acute    Functional diarrhea    Recurrent UTI    Vitamin D deficiency    Localized edema    Syncope and collapse    Closed nondisplaced fracture of styloid process of left ulna    Herpes zoster keratoconjunctivitis    Mild cognitive impairment with memory loss    Aftercare for healing traumatic closed fracture of left ulna    CKD (chronic kidney disease) stage 3, GFR 30-59 ml/min ([facility]    Numbness and tingling in left hand    Vascular dementia without behavioral disturbance ([facility])    Primary osteoarthritis of left knee    ACP (advance care planning)    Anemia    Hyponatremia    Leukocytosis       Objective     /65   Pulse 70   Temp (!) 97 °F (36 1 °C)   Resp 18   SpO2 96%     Physical Exam  Vitals reviewed  Constitutional:       General: She is not in acute distress  Appearance: She is not ill-appearing  Comments: Frail appearing   HENT:      Head: Normocephalic and atraumatic  Ears:      Comments: [place]     Mouth/Throat:      Mouth: Mucous membranes are dry  Cardiovascular:      Rate and Rhythm: Normal rate and regular rhythm  Pulses: Normal pulses  Heart sounds: Normal heart sounds  No murmur heard  Pulmonary:      Effort: Pulmonary effort is normal  No respiratory distress  Breath sounds: Normal breath sounds  No wheezing  Abdominal:      General: Bowel sounds are normal  There is no distension  Palpations: Abdomen is soft  Tenderness: There is no abdominal tenderness  Musculoskeletal:         General: No signs of injury  Right lower leg: No edema  Left lower leg: No edema  Comments: Brace LLE   Skin:     General: Skin is warm and dry  Coloration: Skin is pale  Skin is not jaundiced  Findings: Bruising present  No erythema  Neurological:      General: No focal deficit present  Mental Status: She is alert  Mental status is at baseline  Motor: Weakness present  Gait: Gait abnormal       Comments: Alert and oriented x2  Forgetful   Psychiatric:         Mood and Affect: Mood normal          Behavior: Behavior normal          Pertinent Laboratory/Diagnostic Studies:  Labs reviewed in facility paper chart      Current Medications     Current Outpatient Medications:     Polyethyl Glyc-Propyl Glyc PF 0 4-0 3 % SOLN, Apply 1 drop to eye every 2 (two) hours while awake, Disp: , Rfl:     sodium chloride 1 g tablet, Take 1 g by mouth 3 (three) times a day, Disp: , Rfl:     acetaminophen (TYLENOL) 325 mg tablet, Take 650 mg by mouth 3 (three) times a day , Disp: , Rfl:     aspirin 81 MG tablet, Take 1 tablet by mouth daily, Disp: , Rfl:     calcitonin, salmon, (MIACALCIN) 200 units/act nasal spray, 1 spray and to alternate nostril q day (Patient taking differently: 1 spray into each nostril daily Alternate left and right nostril every other day), Disp: 1 Act, Rfl: 5    cholecalciferol (VITAMIN D3) 1,000 units tablet, Take 2,000 Units by mouth daily, Disp: , Rfl:     Cyanocobalamin (VITAMIN B12) 1000 MCG TBCR, Take 1 tablet by mouth daily , Disp: , Rfl:     cycloSPORINE (RESTASIS) 0 05 % ophthalmic emulsion, Administer 1 drop to both eyes 2 (two) times a day, Disp: , Rfl:     D-Mannose 500 MG CAPS, Take 1 tablet by mouth daily, Disp: , Rfl:     dorzolamide-timolol (COSOPT) 22 3-6 8 MG/ML ophthalmic solution, Administer 1 drop to both eyes daily, Disp: , Rfl: 2    estradiol (VAGIFEM, YUVAFEM) 10 MCG TABS vaginal tablet, Insert 1 tablet (10 mcg total) into the vagina 2 (two) times a week, Disp: 60 tablet, Rfl: 1    ferrous sulfate 324 (65 Fe) mg, Take 1 tablet (324 mg total) by mouth daily before breakfast, Disp: 30 tablet, Rfl: 4    gabapentin (NEURONTIN) 100 mg capsule, Take 100 mg by mouth 3 (three) times a day, Disp: , Rfl:     latanoprost (XALATAN) 0 005 % ophthalmic solution, Administer 1 drop to both eyes daily at bedtime, Disp: , Rfl: 1    losartan (COZAAR) 50 mg tablet, Take 1 tablet (50 mg total) by mouth daily, Disp: 90 tablet, Rfl: 2    metoprolol succinate (TOPROL-XL) 50 mg 24 hr tablet, , Disp: , Rfl:     Multiple Vitamins-Minerals (PRESERVISION/LUTEIN) CAPS, Take 1 capsule by mouth 2 (two) times a day, Disp: , Rfl:     pantoprazole (PROTONIX) 20 mg tablet, Take 20 mg by mouth daily, Disp: , Rfl:     Pyridoxine HCl (VITAMIN B6) 200 MG TABS, Take 0 5 tablets by mouth daily , Disp: , Rfl:       Time spent greater than 30 minutes with coordination of care, direct patient care, patient examination, teaching, and chart review      Vinayak ROSSI  9/17/2021 2:14 PM    Please note:  Voice-recognition software may have been used in the preparation of this document  Occasional wrong word or "sound-alike" substitutions may have occurred due to the inherent limitations of voice recognition software  Interpretation should be guided by context

## 2021-09-17 NOTE — ASSESSMENT & PLAN NOTE
· Currently stable and at goal  · /65 this morning  · Continue with current medication regimen  · COntinue to monitor and adjust medications as needed

## 2021-09-17 NOTE — ASSESSMENT & PLAN NOTE
· Had 2 fall at apartment prior to admission to facility  · PT/OT have been following  · Currently requires an extensive assist x1 for transfers and ambulation with rolling walker  · Fall precautions  · Encourage use of rolling walker with ambulation  · Continue PT/OT for continued strength and balance training upon discharge back to [facility]

## 2022-01-03 ENCOUNTER — TELEPHONE (OUTPATIENT)
Dept: OBGYN CLINIC | Facility: MEDICAL CENTER | Age: 87
End: 2022-01-03

## 2022-01-27 ENCOUNTER — OFFICE VISIT (OUTPATIENT)
Dept: OBGYN CLINIC | Facility: HOSPITAL | Age: 87
End: 2022-01-27
Payer: MEDICARE

## 2022-01-27 VITALS
HEART RATE: 73 BPM | SYSTOLIC BLOOD PRESSURE: 157 MMHG | HEIGHT: 62 IN | DIASTOLIC BLOOD PRESSURE: 76 MMHG | BODY MASS INDEX: 26.52 KG/M2

## 2022-01-27 DIAGNOSIS — G89.29 CHRONIC PAIN OF BOTH KNEES: ICD-10-CM

## 2022-01-27 DIAGNOSIS — M25.561 CHRONIC PAIN OF BOTH KNEES: ICD-10-CM

## 2022-01-27 DIAGNOSIS — M25.562 CHRONIC PAIN OF BOTH KNEES: ICD-10-CM

## 2022-01-27 DIAGNOSIS — M17.0 PRIMARY OSTEOARTHRITIS OF BOTH KNEES: Primary | ICD-10-CM

## 2022-01-27 DIAGNOSIS — M70.52 PES ANSERINUS BURSITIS OF LEFT KNEE: ICD-10-CM

## 2022-01-27 PROCEDURE — 20610 DRAIN/INJ JOINT/BURSA W/O US: CPT | Performed by: ORTHOPAEDIC SURGERY

## 2022-01-27 PROCEDURE — 99213 OFFICE O/P EST LOW 20 MIN: CPT | Performed by: ORTHOPAEDIC SURGERY

## 2022-01-27 RX ORDER — LIDOCAINE HYDROCHLORIDE 10 MG/ML
2 INJECTION, SOLUTION INFILTRATION; PERINEURAL
Status: COMPLETED | OUTPATIENT
Start: 2022-01-27 | End: 2022-01-27

## 2022-01-27 RX ORDER — BUPIVACAINE HYDROCHLORIDE 2.5 MG/ML
2 INJECTION, SOLUTION INFILTRATION; PERINEURAL
Status: COMPLETED | OUTPATIENT
Start: 2022-01-27 | End: 2022-01-27

## 2022-01-27 RX ORDER — BETAMETHASONE SODIUM PHOSPHATE AND BETAMETHASONE ACETATE 3; 3 MG/ML; MG/ML
12 INJECTION, SUSPENSION INTRA-ARTICULAR; INTRALESIONAL; INTRAMUSCULAR; SOFT TISSUE
Status: COMPLETED | OUTPATIENT
Start: 2022-01-27 | End: 2022-01-27

## 2022-01-27 RX ADMIN — BETAMETHASONE SODIUM PHOSPHATE AND BETAMETHASONE ACETATE 12 MG: 3; 3 INJECTION, SUSPENSION INTRA-ARTICULAR; INTRALESIONAL; INTRAMUSCULAR; SOFT TISSUE at 14:51

## 2022-01-27 RX ADMIN — BUPIVACAINE HYDROCHLORIDE 2 ML: 2.5 INJECTION, SOLUTION INFILTRATION; PERINEURAL at 14:51

## 2022-01-27 RX ADMIN — LIDOCAINE HYDROCHLORIDE 2 ML: 10 INJECTION, SOLUTION INFILTRATION; PERINEURAL at 14:51

## 2022-01-27 NOTE — PROGRESS NOTES
Assessment:   Diagnosis ICD-10-CM Associated Orders   1  Primary osteoarthritis of both knees  M17 0 Large joint arthrocentesis: bilateral knee   2  Chronic pain of both knees  M25 561 Large joint arthrocentesis: bilateral knee    M25 562     G89 29    3  Pes anserinus bursitis of left knee  M70 52 Large joint arthrocentesis: L anserine bursa       Plan:  Bilateral knees known osteoarthritis with recurrent pain  Left knee has pes anserine bursitis  She was offered, accepted, performed injection of cortisone to both knee joints and left pes bursa today for symptomatic relief  She tolerated all 3 injections well  Ice and post injection protocol advised  Weightbearing activities as tolerated  She will continue with her knee brace  She will continue with physical therapy at her place of residence    To do next visit:  Return in about 3 months (around 4/27/2022) for re-check  The above stated was discussed in layman's terms and the patient expressed understanding  All questions were answered to the patient's satisfaction  Scribe Attestation    I,:  Layla Miranda am acting as a scribe while in the presence of the attending physician :       I,:  Navjot Campbell MD personally performed the services described in this documentation    as scribed in my presence :             Subjective:   Arturo Gallardo is a 80 y o  female who presents with her son for repeat evaluation for bilateral knees due to return of pain  She has known osteoarthritis and finds relief with injections of cortisone her last being September  She is in a long-term assistant living  She does get therapy  She is in a wheelchair but uses a walker at times    She does find relief with the injections      Review of systems negative unless otherwise specified in HPI  Review of Systems    Past Medical History:   Diagnosis Date    Arthritis     CAD (coronary artery disease)     Disease of thyroid gland     GERD (gastroesophageal reflux disease)     Hypertension     Mild cognitive impairment with memory loss 5/29/2020    Osteoarthritis     Osteoporosis     Vitamin D deficiency        Past Surgical History:   Procedure Laterality Date    APPENDECTOMY      HYSTERECTOMY      UT PARTIAL HIP REPLACEMENT Left 8/13/2018    Procedure: HEMIARTHROPLASTY HIP (BIPOLAR);   Surgeon: Liliam Burger MD;  [facility]: [facility];  Service: Orthopedics    ROTATOR CUFF REPAIR         Family History   Problem Relation Age of Onset    Diabetes type II Father     Heart disease Brother     Hypertension Family     Arthritis Family        Social History     Occupational History    Not on file   Tobacco Use    Smoking status: Former Smoker    Smokeless tobacco: Never Used   Vaping Use    Vaping Use: Never used   Substance and Sexual Activity    Alcohol use: Not Currently    Drug use: No    Sexual activity: Not on file         Current Outpatient Medications:     acetaminophen (TYLENOL) 325 mg tablet, Take 650 mg by mouth 3 (three) times a day , Disp: , Rfl:     aspirin 81 MG tablet, Take 1 tablet by mouth daily, Disp: , Rfl:     calcitonin, salmon, (MIACALCIN) 200 units/act nasal spray, 1 spray and to alternate nostril q day (Patient taking differently: 1 spray into each nostril daily Alternate left and right nostril every other day), Disp: 1 Act, Rfl: 5    cholecalciferol (VITAMIN D3) 1,000 units tablet, Take 2,000 Units by mouth daily, Disp: , Rfl:     Cyanocobalamin (VITAMIN B12) 1000 MCG TBCR, Take 1 tablet by mouth daily , Disp: , Rfl:     cycloSPORINE (RESTASIS) 0 05 % ophthalmic emulsion, Administer 1 drop to both eyes 2 (two) times a day, Disp: , Rfl:     D-Mannose 500 MG CAPS, Take 1 tablet by mouth daily, Disp: , Rfl:     dorzolamide-timolol (COSOPT) 22 3-6 8 MG/ML ophthalmic solution, Administer 1 drop to both eyes daily, Disp: , Rfl: 2    estradiol (VAGIFEM, YUVAFEM) 10 MCG TABS vaginal tablet, Insert 1 tablet (10 mcg total) into the vagina 2 (two) times a week, Disp: 60 tablet, Rfl: 1    ferrous sulfate 324 (65 Fe) mg, Take 1 tablet (324 mg total) by mouth daily before breakfast, Disp: 30 tablet, Rfl: 4    gabapentin (NEURONTIN) 100 mg capsule, Take 100 mg by mouth 3 (three) times a day, Disp: , Rfl:     latanoprost (XALATAN) 0 005 % ophthalmic solution, Administer 1 drop to both eyes daily at bedtime, Disp: , Rfl: 1    losartan (COZAAR) 50 mg tablet, Take 1 tablet (50 mg total) by mouth daily, Disp: 90 tablet, Rfl: 2    metoprolol succinate (TOPROL-XL) 50 mg 24 hr tablet, , Disp: , Rfl:     Multiple Vitamins-Minerals (PRESERVISION/LUTEIN) CAPS, Take 1 capsule by mouth 2 (two) times a day, Disp: , Rfl:     pantoprazole (PROTONIX) 20 mg tablet, Take 20 mg by mouth daily, Disp: , Rfl:     Polyethyl Glyc-Propyl Glyc PF 0 4-0 3 % SOLN, Apply 1 drop to eye every 2 (two) hours while awake, Disp: , Rfl:     Pyridoxine HCl (VITAMIN B6) 200 MG TABS, Take 0 5 tablets by mouth daily , Disp: , Rfl:     sodium chloride 1 g tablet, Take 1 g by mouth 3 (three) times a day, Disp: , Rfl:     No Known Allergies         Vitals:    01/27/22 1426   BP: 157/76   Pulse: 73       Objective:                    Right Knee Exam     Tenderness   The patient is experiencing tenderness in the medial joint line and lateral joint line  Range of Motion   Extension: 5   Flexion: 100     Other   Erythema: absent  Sensation: normal  Swelling: mild  Effusion: no effusion present      Left Knee Exam     Tenderness   The patient is experiencing tenderness in the medial joint line and lateral joint line      Range of Motion   Extension: 5   Flexion: 90     Other   Erythema: absent  Sensation: normal  Swelling: mild  Effusion: no effusion present            Diagnostics, reviewed and taken today if performed as documented:    None performed          Procedures, if performed today:    Large joint arthrocentesis: bilateral knee  Universal Protocol:  Consent: Verbal consent obtained  Risks and benefits: risks, benefits and alternatives were discussed  Consent given by: patient  Time out: Immediately prior to procedure a "time out" was called to verify the correct patient, procedure, equipment, support staff and site/side marked as required  Timeout called at: 1/27/2022 2:42 PM   Patient understanding: patient states understanding of the procedure being performed  Site marked: the operative site was marked  Patient identity confirmed: verbally with patient    Supporting Documentation  Indications: pain and diagnostic evaluation   Procedure Details  Location: knee - bilateral knee  Preparation: Patient was prepped and draped in the usual sterile fashion  Needle size: 22 G  Ultrasound guidance: no    Medications (Right): 2 mL bupivacaine 0 25 %; 2 mL lidocaine 1 %; 12 mg betamethasone acetate-betamethasone sodium phosphate 6 (3-3) mg/mLMedications (Left): 2 mL bupivacaine 0 25 %; 2 mL lidocaine 1 %; 12 mg betamethasone acetate-betamethasone sodium phosphate 6 (3-3) mg/mL   Patient tolerance: patient tolerated the procedure well with no immediate complications  Dressing:  Sterile dressing applied    Large joint arthrocentesis: L anserine bursa  Universal Protocol:  Consent: Verbal consent obtained  Risks and benefits: risks, benefits and alternatives were discussed  Consent given by: patient  Time out: Immediately prior to procedure a "time out" was called to verify the correct patient, procedure, equipment, support staff and site/side marked as required    Timeout called at: 1/27/2022 2:43 PM   Patient understanding: patient states understanding of the procedure being performed  Site marked: the operative site was marked  Patient identity confirmed: verbally with patient    Supporting Documentation  Indications: pain and diagnostic evaluation   Procedure Details  Location: knee - L anserine bursa  Preparation: Patient was prepped and draped in the usual sterile fashion  Needle size: 22 G  Ultrasound guidance: no  Approach: anteromedial  Medications administered: 2 mL lidocaine 1 %; 2 mL bupivacaine 0 25 %; 12 mg betamethasone acetate-betamethasone sodium phosphate 6 (3-3) mg/mL    Patient tolerance: patient tolerated the procedure well with no immediate complications  Dressing:  Sterile dressing applied              Portions of the record may have been created with voice recognition software  Occasional wrong word or "sound a like" substitutions may have occurred due to the inherent limitations of voice recognition software  Read the chart carefully and recognize, using context, where substitutions have occurred

## 2022-02-04 ENCOUNTER — APPOINTMENT (EMERGENCY)
Dept: RADIOLOGY | Facility: HOSPITAL | Age: 87
DRG: 563 | End: 2022-02-04
Payer: MEDICARE

## 2022-02-04 ENCOUNTER — HOSPITAL ENCOUNTER (INPATIENT)
Facility: HOSPITAL | Age: 87
LOS: 3 days | Discharge: NURSING, REHAB OR LONG-TERM CARE | DRG: 563 | End: 2022-02-07
Attending: EMERGENCY MEDICINE | Admitting: HOSPITALIST
Payer: MEDICARE

## 2022-02-04 ENCOUNTER — TELEPHONE (OUTPATIENT)
Dept: OBGYN CLINIC | Facility: HOSPITAL | Age: 87
End: 2022-02-04

## 2022-02-04 ENCOUNTER — APPOINTMENT (INPATIENT)
Dept: RADIOLOGY | Facility: HOSPITAL | Age: 87
DRG: 563 | End: 2022-02-04
Payer: MEDICARE

## 2022-02-04 DIAGNOSIS — D72.829 LEUKOCYTOSIS: ICD-10-CM

## 2022-02-04 DIAGNOSIS — N39.0 UTI (URINARY TRACT INFECTION): ICD-10-CM

## 2022-02-04 DIAGNOSIS — I25.10 CAD (CORONARY ARTERY DISEASE): ICD-10-CM

## 2022-02-04 DIAGNOSIS — R03.0 ELEVATED BLOOD PRESSURE READING: ICD-10-CM

## 2022-02-04 DIAGNOSIS — S82.402A CLOSED LEFT FIBULAR FRACTURE: ICD-10-CM
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
## 2022-02-04 DIAGNOSIS — R26.2 AMBULATORY DYSFUNCTION: ICD-10-CM

## 2022-02-04 DIAGNOSIS — R91.8 PULMONARY NODULES: ICD-10-CM

## 2022-02-04 DIAGNOSIS — S82.402A CLOSED FRACTURE OF SHAFT OF LEFT FIBULA: ICD-10-CM

## 2022-02-04 DIAGNOSIS — R41.82 AMS (ALTERED MENTAL STATUS): ICD-10-CM

## 2022-02-04 DIAGNOSIS — K80.20 CHOLELITHIASIS: ICD-10-CM

## 2022-02-04 DIAGNOSIS — I44.7 LBBB (LEFT BUNDLE BRANCH BLOCK): ICD-10-CM

## 2022-02-04 DIAGNOSIS — M79.605 ACUTE PAIN OF LEFT LOWER EXTREMITY: ICD-10-CM

## 2022-02-04 LAB
2HR DELTA HS TROPONIN: -3 NG/L
4HR DELTA HS TROPONIN: 0 NG/L
ALBUMIN SERPL BCP-MCNC: 3.2 G/DL (ref 3.5–5)
ALP SERPL-CCNC: 75 U/L (ref 46–116)
ALT SERPL W P-5'-P-CCNC: 43 U/L (ref 12–78)
ANION GAP SERPL CALCULATED.3IONS-SCNC: 6 MMOL/L (ref 4–13)
AST SERPL W P-5'-P-CCNC: 24 U/L (ref 5–45)
ATRIAL RATE: 71 BPM
BACTERIA UR QL AUTO: ABNORMAL /HPF
BASOPHILS # BLD AUTO: 0.08 THOUSANDS/ΜL (ref 0–0.1)
BASOPHILS NFR BLD AUTO: 1 % (ref 0–1)
BILIRUB SERPL-MCNC: 0.62 MG/DL (ref 0.2–1)
BILIRUB UR QL STRIP: NEGATIVE
BUN SERPL-MCNC: 22 MG/DL (ref 5–25)
CALCIUM ALBUM COR SERPL-MCNC: 9.7 MG/DL (ref 8.3–10.1)
CALCIUM SERPL-MCNC: 9.1 MG/DL (ref 8.3–10.1)
CARDIAC TROPONIN I PNL SERPL HS: 20 NG/L
CARDIAC TROPONIN I PNL SERPL HS: 23 NG/L
CARDIAC TROPONIN I PNL SERPL HS: 23 NG/L
CHLORIDE SERPL-SCNC: 103 MMOL/L (ref 100–108)
CLARITY UR: ABNORMAL
CO2 SERPL-SCNC: 26 MMOL/L (ref 21–32)
COLOR UR: YELLOW
CREAT SERPL-MCNC: 0.97 MG/DL (ref 0.6–1.3)
EOSINOPHIL # BLD AUTO: 0.12 THOUSAND/ΜL (ref 0–0.61)
EOSINOPHIL NFR BLD AUTO: 1 % (ref 0–6)
ERYTHROCYTE [DISTWIDTH] IN BLOOD BY AUTOMATED COUNT: 14.9 % (ref 11.6–15.1)
GFR SERPL CREATININE-BSD FRML MDRD: 49 ML/MIN/1.73SQ M
GLUCOSE SERPL-MCNC: 90 MG/DL (ref 65–140)
GLUCOSE UR STRIP-MCNC: NEGATIVE MG/DL
HCT VFR BLD AUTO: 36.9 % (ref 34.8–46.1)
HGB BLD-MCNC: 11.7 G/DL (ref 11.5–15.4)
HGB UR QL STRIP.AUTO: ABNORMAL
IMM GRANULOCYTES # BLD AUTO: 0.15 THOUSAND/UL (ref 0–0.2)
IMM GRANULOCYTES NFR BLD AUTO: 1 % (ref 0–2)
KETONES UR STRIP-MCNC: NEGATIVE MG/DL
LEUKOCYTE ESTERASE UR QL STRIP: ABNORMAL
LYMPHOCYTES # BLD AUTO: 2.26 THOUSANDS/ΜL (ref 0.6–4.47)
LYMPHOCYTES NFR BLD AUTO: 14 % (ref 14–44)
MCH RBC QN AUTO: 29.8 PG (ref 26.8–34.3)
MCHC RBC AUTO-ENTMCNC: 31.7 G/DL (ref 31.4–37.4)
MCV RBC AUTO: 94 FL (ref 82–98)
MONOCYTES # BLD AUTO: 2.58 THOUSAND/ΜL (ref 0.17–1.22)
MONOCYTES NFR BLD AUTO: 16 % (ref 4–12)
NEUTROPHILS # BLD AUTO: 10.89 THOUSANDS/ΜL (ref 1.85–7.62)
NEUTS SEG NFR BLD AUTO: 67 % (ref 43–75)
NITRITE UR QL STRIP: POSITIVE
NON-SQ EPI CELLS URNS QL MICRO: ABNORMAL /HPF
NRBC BLD AUTO-RTO: 0 /100 WBCS
P AXIS: 59 DEGREES
PH UR STRIP.AUTO: 6.5 [PH]
PLATELET # BLD AUTO: 229 THOUSANDS/UL (ref 149–390)
PMV BLD AUTO: 10 FL (ref 8.9–12.7)
POTASSIUM SERPL-SCNC: 3.6 MMOL/L (ref 3.5–5.3)
PR INTERVAL: 170 MS
PROT SERPL-MCNC: 6.4 G/DL (ref 6.4–8.2)
PROT UR STRIP-MCNC: ABNORMAL MG/DL
QRS AXIS: -24 DEGREES
QRSD INTERVAL: 124 MS
QT INTERVAL: 420 MS
QTC INTERVAL: 456 MS
RBC # BLD AUTO: 3.93 MILLION/UL (ref 3.81–5.12)
RBC #/AREA URNS AUTO: ABNORMAL /HPF
SODIUM SERPL-SCNC: 135 MMOL/L (ref 136–145)
SP GR UR STRIP.AUTO: 1.02 (ref 1–1.03)
T WAVE AXIS: 66 DEGREES
UROBILINOGEN UR QL STRIP.AUTO: 1 E.U./DL
VENTRICULAR RATE: 71 BPM
WBC # BLD AUTO: 16.08 THOUSAND/UL (ref 4.31–10.16)
WBC #/AREA URNS AUTO: ABNORMAL /HPF

## 2022-02-04 PROCEDURE — 73590 X-RAY EXAM OF LOWER LEG: CPT

## 2022-02-04 PROCEDURE — 99223 1ST HOSP IP/OBS HIGH 75: CPT | Performed by: INTERNAL MEDICINE

## 2022-02-04 PROCEDURE — 99223 1ST HOSP IP/OBS HIGH 75: CPT | Performed by: HOSPITALIST

## 2022-02-04 PROCEDURE — 84484 ASSAY OF TROPONIN QUANT: CPT | Performed by: EMERGENCY MEDICINE

## 2022-02-04 PROCEDURE — 1123F ACP DISCUSS/DSCN MKR DOCD: CPT | Performed by: INTERNAL MEDICINE

## 2022-02-04 PROCEDURE — 87086 URINE CULTURE/COLONY COUNT: CPT | Performed by: EMERGENCY MEDICINE

## 2022-02-04 PROCEDURE — 73610 X-RAY EXAM OF ANKLE: CPT

## 2022-02-04 PROCEDURE — 36415 COLL VENOUS BLD VENIPUNCTURE: CPT | Performed by: EMERGENCY MEDICINE

## 2022-02-04 PROCEDURE — 85025 COMPLETE CBC W/AUTO DIFF WBC: CPT | Performed by: EMERGENCY MEDICINE

## 2022-02-04 PROCEDURE — 87077 CULTURE AEROBIC IDENTIFY: CPT | Performed by: EMERGENCY MEDICINE

## 2022-02-04 PROCEDURE — 70450 CT HEAD/BRAIN W/O DYE: CPT

## 2022-02-04 PROCEDURE — 71250 CT THORAX DX C-: CPT

## 2022-02-04 PROCEDURE — 87081 CULTURE SCREEN ONLY: CPT | Performed by: HOSPITALIST

## 2022-02-04 PROCEDURE — G1004 CDSM NDSC: HCPCS

## 2022-02-04 PROCEDURE — 80053 COMPREHEN METABOLIC PANEL: CPT | Performed by: EMERGENCY MEDICINE

## 2022-02-04 PROCEDURE — 93010 ELECTROCARDIOGRAM REPORT: CPT | Performed by: INTERNAL MEDICINE

## 2022-02-04 PROCEDURE — 99285 EMERGENCY DEPT VISIT HI MDM: CPT | Performed by: EMERGENCY MEDICINE

## 2022-02-04 PROCEDURE — 72125 CT NECK SPINE W/O DYE: CPT

## 2022-02-04 PROCEDURE — 99285 EMERGENCY DEPT VISIT HI MDM: CPT

## 2022-02-04 PROCEDURE — 93005 ELECTROCARDIOGRAM TRACING: CPT

## 2022-02-04 PROCEDURE — 87186 SC STD MICRODIL/AGAR DIL: CPT | Performed by: EMERGENCY MEDICINE

## 2022-02-04 PROCEDURE — 81001 URINALYSIS AUTO W/SCOPE: CPT | Performed by: EMERGENCY MEDICINE

## 2022-02-04 RX ORDER — HEPARIN SODIUM 5000 [USP'U]/ML
5000 INJECTION, SOLUTION INTRAVENOUS; SUBCUTANEOUS EVERY 8 HOURS SCHEDULED
Status: DISCONTINUED | OUTPATIENT
Start: 2022-02-04 | End: 2022-02-07 | Stop reason: HOSPADM

## 2022-02-04 RX ORDER — ACETAMINOPHEN 325 MG/1
650 TABLET ORAL EVERY 6 HOURS PRN
Status: DISCONTINUED | OUTPATIENT
Start: 2022-02-04 | End: 2022-02-07

## 2022-02-04 RX ORDER — LATANOPROST 50 UG/ML
1 SOLUTION/ DROPS OPHTHALMIC
Status: DISCONTINUED | OUTPATIENT
Start: 2022-02-04 | End: 2022-02-07 | Stop reason: HOSPADM

## 2022-02-04 RX ORDER — METOPROLOL SUCCINATE 25 MG/1
25 TABLET, EXTENDED RELEASE ORAL DAILY
Status: DISCONTINUED | OUTPATIENT
Start: 2022-02-05 | End: 2022-02-07 | Stop reason: HOSPADM

## 2022-02-04 RX ORDER — PANTOPRAZOLE SODIUM 20 MG/1
20 TABLET, DELAYED RELEASE ORAL
Status: DISCONTINUED | OUTPATIENT
Start: 2022-02-05 | End: 2022-02-07 | Stop reason: HOSPADM

## 2022-02-04 RX ORDER — LOSARTAN POTASSIUM 50 MG/1
50 TABLET ORAL DAILY
Status: DISCONTINUED | OUTPATIENT
Start: 2022-02-05 | End: 2022-02-07 | Stop reason: HOSPADM

## 2022-02-04 RX ORDER — CEPHALEXIN 250 MG/1
500 CAPSULE ORAL ONCE
Status: COMPLETED | OUTPATIENT
Start: 2022-02-04 | End: 2022-02-04

## 2022-02-04 RX ORDER — SODIUM CHLORIDE 1000 MG
1 TABLET, SOLUBLE MISCELLANEOUS 3 TIMES DAILY
Status: DISCONTINUED | OUTPATIENT
Start: 2022-02-04 | End: 2022-02-07 | Stop reason: HOSPADM

## 2022-02-04 RX ADMIN — CEPHALEXIN 500 MG: 250 CAPSULE ORAL at 14:45

## 2022-02-04 RX ADMIN — LATANOPROST 1 DROP: 50 SOLUTION/ DROPS OPHTHALMIC at 20:56

## 2022-02-04 RX ADMIN — CEFTRIAXONE 1000 MG: 1 INJECTION, POWDER, FOR SOLUTION INTRAMUSCULAR; INTRAVENOUS at 19:58

## 2022-02-04 RX ADMIN — HEPARIN SODIUM 5000 UNITS: 5000 INJECTION INTRAVENOUS; SUBCUTANEOUS at 21:00

## 2022-02-04 RX ADMIN — SODIUM CHLORIDE TAB 1 GM 1 G: 1 TAB at 20:56

## 2022-02-04 NOTE — ASSESSMENT & PLAN NOTE
80-year-old female who lives at [facility] with past medical history hypertension, dementia, GERD, hyponatremia, ambulatory dysfunction, cognitive impairment, CAD, osteoarthritis, bursitis who presented to the ED after having a fall  Patient is found to have left fibula fracture that is nondisplaced and UTI for which she is getting admitted to hospitalist service  Patient is a very poor historian and appears confused  She is oriented to self and place only  Her left leg is in brace and patient will be evaluated by Orthopedic surgery for possible fixation of the left fibula fracture  We will control her pain for now and she will be evaluated by PT OT after cleared by Ortho

## 2022-02-04 NOTE — TELEPHONE ENCOUNTER
Dr Melany Bailey  RE: Patient in hospital     Patient's son calling - he wants to let Dr Melany Bailey know his mother fell and is in the hospital with Acute fibular shaft fracture without angulation or displacement L-leg   Patient's son wanted to be proactive in case Dr Melany Bailey wanted to see her as this is her bad leg

## 2022-02-04 NOTE — TELEPHONE ENCOUNTER
Your message has been reviewed  Provided orthopedic consultation has been initiated, as I am the call  , I would be happy to see Mrs Kramer  while she is in the hospital    PJB

## 2022-02-04 NOTE — ED PROVIDER NOTES
History  Chief Complaint   Patient presents with    Fatigue     Patient is a 59-year-old female, with a history significant for CAD on daily aspirin, mild cognitive impairment with memory loss, bilateral knee osteoarthritis/recent bursitis diagnosis on 01/27, who presents to the ED today, via EMS from her independent but assisted living facility, due to fall  There is associated confusion, left lower extremity pain, and inability to ambulate unassisted  Patient was found at 4:30 a m  this morning, on the ground next to the commode in her room, by staff  The fall was not witnessed and staff at the facility are uncertain of how long the patient was down for  At the time she was found, patient was not complaining of pain; however, she was confused and required frequent reorientation  Currently, the patient denies pain anywhere except her left lower extremity  She does not remember falling and is unable to provide a complete history  Patient denies fever, chest pain, shortness of breath, urinary symptoms, headache, weakness, numbness, dysfunction, vision change, vomiting  Per patient's son, who visits her every day, patient is currently at her baseline mental status  He states that, in regards to ambulatory ability, patient is able to move independently with the assistance of a walker; however, this morning, patient require the assistance of three individuals to transition from the wheelchair to bed  He states the patient has had a similar constellation of symptoms in the past when she has had a UTI      - No language barrier    - History obtained from patient, her son, staff at the facility which she resides  - Previous charting was reviewed          Prior to Admission Medications   Prescriptions Last Dose Informant Patient Reported? Taking?    Cyanocobalamin (VITAMIN B12) 1000 MCG TBCR  Self Yes No   Sig: Take 1 tablet by mouth daily    D-Mannose 500 MG CAPS  Self Yes No   Sig: Take 1 tablet by mouth daily   Multiple Vitamins-Minerals (PRESERVISION/LUTEIN) CAPS  Self Yes No   Sig: Take 1 capsule by mouth 2 (two) times a day   Polyethyl Glyc-Propyl Glyc PF 0 4-0 3 % SOLN   Yes No   Sig: Apply 1 drop to eye every 2 (two) hours while awake   Pyridoxine HCl (VITAMIN B6) 200 MG TABS  Self Yes No   Sig: Take 0 5 tablets by mouth daily    acetaminophen (TYLENOL) 325 mg tablet  Self Yes No   Sig: Take 650 mg by mouth 3 (three) times a day    aspirin 81 MG tablet  Self Yes No   Sig: Take 1 tablet by mouth daily   calcitonin, salmon, (MIACALCIN) 200 units/act nasal spray  Self No No   Si spray and to alternate nostril q day   Patient taking differently: 1 spray into each nostril daily Alternate left and right nostril every other day   cholecalciferol (VITAMIN D3) 1,000 units tablet   Yes No   Sig: Take 2,000 Units by mouth daily   cycloSPORINE (RESTASIS) 0 05 % ophthalmic emulsion  Self Yes No   Sig: Administer 1 drop to both eyes 2 (two) times a day   dorzolamide-timolol (COSOPT) 22 3-6 8 MG/ML ophthalmic solution  Self Yes No   Sig: Administer 1 drop to both eyes daily   estradiol (VAGIFEM, YUVAFEM) 10 MCG TABS vaginal tablet   No No   Sig: Insert 1 tablet (10 mcg total) into the vagina 2 (two) times a week   ferrous sulfate 324 (65 Fe) mg  Self No No   Sig: Take 1 tablet (324 mg total) by mouth daily before breakfast   gabapentin (NEURONTIN) 100 mg capsule   Yes No   Sig: Take 100 mg by mouth 3 (three) times a day   latanoprost (XALATAN) 0 005 % ophthalmic solution  Self Yes No   Sig: Administer 1 drop to both eyes daily at bedtime   losartan (COZAAR) 50 mg tablet   No No   Sig: Take 1 tablet (50 mg total) by mouth daily   metoprolol succinate (TOPROL-XL) 50 mg 24 hr tablet  Self Yes No   pantoprazole (PROTONIX) 20 mg tablet  Self Yes No   Sig: Take 20 mg by mouth daily   sodium chloride 1 g tablet   Yes No   Sig: Take 1 g by mouth 3 (three) times a day      Facility-Administered Medications: None       Past Medical History:   Diagnosis Date    Arthritis     CAD (coronary artery disease)     Disease of thyroid gland     GERD (gastroesophageal reflux disease)     Hypertension     Mild cognitive impairment with memory loss 5/29/2020    Osteoarthritis     Osteoporosis     Vitamin D deficiency        Past Surgical History:   Procedure Laterality Date    APPENDECTOMY      HYSTERECTOMY      [place] PARTIAL HIP REPLACEMENT Left 8/13/2018    Procedure: HEMIARTHROPLASTY HIP (BIPOLAR); Surgeon: Demi Veliz MD;  Location:  MAIN OR;  Service: Orthopedics    ROTATOR CUFF REPAIR         Family History   Problem Relation Age of Onset    Diabetes type II Father     Heart disease Brother     Hypertension Family     Arthritis Family      I have reviewed and agree with the history as documented  E-Cigarette/Vaping    E-Cigarette Use Never User      E-Cigarette/Vaping Substances    Nicotine No     THC No     CBD No     Flavoring No     Other No     Unknown No      Social History     Tobacco Use    Smoking status: Former Smoker    Smokeless tobacco: Never Used   Vaping Use    Vaping Use: Never used   Substance Use Topics    Alcohol use: Not Currently    Drug use: No        Review of Systems   Constitutional: Negative for fever  HENT: Negative for trouble swallowing  Eyes: Negative for visual disturbance  Respiratory: Negative for shortness of breath  Cardiovascular: Negative for chest pain  Gastrointestinal: Negative for abdominal pain and vomiting  Endocrine: Negative for polyuria  Genitourinary: Negative for dysuria  Musculoskeletal: Positive for gait problem  Allergic/Immunologic: Negative for environmental allergies  Neurological: Negative for weakness, numbness and headaches  Hematological: Negative for adenopathy  Psychiatric/Behavioral: Positive for confusion  All other systems reviewed and are negative        Physical Exam  ED Triage Vitals   Temperature Pulse Respirations Blood Pressure SpO2   02/04/22 0958 02/04/22 0958 02/04/22 0958 02/04/22 0958 02/04/22 0958   98 7 °F (37 1 °C) 72 18 147/64 98 %      Temp Source Heart Rate Source Patient Position - Orthostatic VS BP Location FiO2 (%)   02/04/22 0958 02/04/22 0958 02/04/22 1000 02/04/22 1102 --   Oral Monitor Lying Right arm       Pain Score       --                    Orthostatic Vital Signs  Vitals:    02/04/22 0958 02/04/22 1000 02/04/22 1102 02/04/22 1200   BP: 147/64 147/64 163/70 155/68   Pulse: 72 74 76 78   Patient Position - Orthostatic VS:  Lying Lying Held       Physical Exam  Vitals and nursing note reviewed  Constitutional:       General: She is not in acute distress  Appearance: She is not toxic-appearing or diaphoretic  Comments: Patient appears comfortable during my evaluation    HENT:      Head: Normocephalic and atraumatic  Comments: No raccoon's eyes, no wilson sign, no tenderness to palpation of the head, no hematoma on the head     Right Ear: Tympanic membrane, ear canal and external ear normal  There is no impacted cerumen  Left Ear: Ear canal and external ear normal  There is impacted cerumen  Nose: Nose normal  No rhinorrhea  Mouth/Throat:      Mouth: Mucous membranes are moist       Pharynx: Oropharynx is clear  No oropharyngeal exudate or posterior oropharyngeal erythema  Eyes:      General: No scleral icterus  Right eye: No discharge  Left eye: No discharge  Extraocular Movements: Extraocular movements intact  Conjunctiva/sclera: Conjunctivae normal       Pupils: Pupils are equal, round, and reactive to light  Neck:      Comments: Patient is spontaneously rotating their neck to the left and right during the history and physical exam interaction without difficulty or apparent discomfort    Cardiovascular:      Rate and Rhythm: Normal rate and regular rhythm  Pulses: Normal pulses  Heart sounds: Normal heart sounds  No murmur heard    No friction rub  No gallop  Comments: 2+ Radial  Pulmonary:      Effort: Pulmonary effort is normal  No respiratory distress  Breath sounds: Normal breath sounds  No stridor  No wheezing, rhonchi or rales  Chest:      Chest wall: No tenderness  Abdominal:      General: Abdomen is flat  There is no distension  Palpations: Abdomen is soft  Tenderness: There is no abdominal tenderness  There is no right CVA tenderness, left CVA tenderness, guarding or rebound  Musculoskeletal:         General: Tenderness (Left shin) present  No deformity  Cervical back: Neck supple  No tenderness  No muscular tenderness  Comments: No tenderness to palpation of the bilateral shoulders, elbows, arms, thighs, knees  No RLE tenderness to palpation  No chest wall or pelvic tenderness to palpation     No midline C, L spine tenderness to palpation     Midline T-spine and left shin tenderness to palpation    Patient is able to flex and extend her right and left knee knees and hips  Patient is able to internally and externally rotate her hips  Lymphadenopathy:      Cervical: No cervical adenopathy  Skin:     General: Skin is warm and dry  Capillary Refill: Capillary refill takes less than 2 seconds  Findings: Bruising (Left shin) present  Neurological:      Mental Status: She is alert  Comments: AAO x2/3 (Year incorrect)  Cranial nerves 2-12 intact  5/5 strength in all four extremities  Sensation to light touch in intact in all four extremities  Patient is speaking clearly in complete sentences  Patient is answering appropriately and able follow commands     Psychiatric:         Mood and Affect: Mood normal          Behavior: Behavior normal          ED Medications  Medications   cephalexin (KEFLEX) capsule 500 mg (500 mg Oral Given 2/4/22 1445)       Diagnostic Studies  Results Reviewed     Procedure Component Value Units Date/Time    HS Troponin I 2hr [[id number]]  (Normal) Collected: 02/04/22 1344    Lab Status: Final result Specimen: Blood from Arm, Left Updated: 02/04/22 1443     hs TnI 2hr 20 ng/L      Delta 2hr hsTnI -3 ng/L     Urine Microscopic [[id number]]  (Abnormal) Collected: 02/04/22 1233    Lab Status: Final result Specimen: Urine, Other Updated: 02/04/22 1423     RBC, UA 4-10 /hpf      WBC, UA Innumerable /hpf      Epithelial Cells None Seen /hpf      Bacteria, UA Innumerable /hpf     Urine culture [[id number]] Collected: 02/04/22 1233    Lab Status:  In process Specimen: Urine, Other Updated: 02/04/22 1423    UA w Reflex to Microscopic w Reflex to Culture [[id number]]  (Abnormal) Collected: 02/04/22 1233    Lab Status: Final result Specimen: Urine, Other Updated: 02/04/22 1332     Color, UA Yellow     Clarity, UA Turbid     Specific [place], UA 1 020     pH, UA 6 5     Leukocytes, UA Large     Nitrite, UA Positive     Protein,  (2+) mg/dl      Glucose, UA Negative mg/dl      Ketones, UA Negative mg/dl      Urobilinogen, UA 1 0 E U /dl      Bilirubin, UA Negative     Blood, UA Moderate    HS Troponin I 4hr [[id number]]     Lab Status: No result Specimen: Blood     HS Troponin 0hr (reflex protocol) [[id number]]  (Normal) Collected: 02/04/22 1052    Lab Status: Final result Specimen: Blood from Arm, Left Updated: 02/04/22 1134     hs TnI 0hr 23 ng/L     Comprehensive metabolic panel [[id number]]  (Abnormal) Collected: 02/04/22 1052    Lab Status: Final result Specimen: Blood from Arm, Left Updated: 02/04/22 1125     Sodium 135 mmol/L      Potassium 3 6 mmol/L      Chloride 103 mmol/L      CO2 26 mmol/L      ANION GAP 6 mmol/L      BUN 22 mg/dL      Creatinine 0 97 mg/dL      Glucose 90 mg/dL      Calcium 9 1 mg/dL      Corrected Calcium 9 7 mg/dL      AST 24 U/L      ALT 43 U/L      Alkaline Phosphatase 75 U/L      Total Protein 6 4 g/dL      Albumin 3 2 g/dL      Total Bilirubin 0 62 mg/dL      eGFR 49 ml/min/1 73sq m     Narrative:      [place] guidelines for Chronic Kidney Disease (CKD):     Stage 1 with normal or high GFR (GFR > 90 mL/min/1 73 square meters)    Stage 2 Mild CKD (GFR = 60-89 mL/min/1 73 square meters)    Stage 3A Moderate CKD (GFR = 45-59 mL/min/1 73 square meters)    Stage 3B Moderate CKD (GFR = 30-44 mL/min/1 73 square meters)    Stage 4 Severe CKD (GFR = 15-29 mL/min/1 73 square meters)    Stage 5 End Stage CKD (GFR <15 mL/min/1 73 square meters)  Note: GFR calculation is accurate only with a steady state creatinine    CBC and differential [[id number]]  (Abnormal) Collected: 02/04/22 1052    Lab Status: Final result Specimen: Blood from Arm, Left Updated: 02/04/22 1106     WBC 16 08 Thousand/uL      RBC 3 93 Million/uL      Hemoglobin 11 7 g/dL      Hematocrit 36 9 %      MCV 94 fL      MCH 29 8 pg      MCHC 31 7 g/dL      RDW 14 9 %      MPV 10 0 fL      Platelets 717 Thousands/uL      nRBC 0 /100 WBCs      Neutrophils Relative 67 %      Immat GRANS % 1 %      Lymphocytes Relative 14 %      Monocytes Relative 16 %      Eosinophils Relative 1 %      Basophils Relative 1 %      Neutrophils Absolute 10 89 Thousands/µL      Immature Grans Absolute 0 15 Thousand/uL      Lymphocytes Absolute 2 26 Thousands/µL      Monocytes Absolute 2 58 Thousand/µL      Eosinophils Absolute 0 12 Thousand/µL      Basophils Absolute 0 08 Thousands/µL                  CT head without contrast   Final Result by Emily Goodwin MD (02/04 1133)      No acute intracranial abnormality  Workstation performed: [id number]         CT spine cervical without contrast   Final Result by Emily Goodwin MD (02/04 1130)      No cervical spine fracture or traumatic malalignment  Workstation performed: [id number]         CT chest wo contrast   Final Result by Emily Goodwin MD (02/04 1151)      No acute posttraumatic abnormality detected  Gallstones without surrounding inflammation  3-4 mm pulmonary nodules as described  Considerations related to the patient's age and/or comorbidities may be used to entertain any follow-up recommendations  Workstation performed: [id number]         XR tibia fibula 2 views LEFT   ED Interpretation by Sejal Ramirez MD (02/04 1128)   Fibular fracture      Final Result by Akin Rowland DO (02/04 1302)      Acute fibular shaft fracture without angulation or displacement  Workstation performed: [id number]               Procedures  Procedures      ED Course  ED Course as of 02/04/22 1553   Fri Feb 04, 2022   1059 WBC(!): 16 08   1140 Distal left fibula fracture, no pain out of proportion to exam - Low clinical suspicion for compartment syndrome at this time  Intact sensation in the superficial and deep peroneal nerves, dorsal lateral cutaneous nerve, saphenous nerve distributions bilaterally  Capillary refill < 2 sec in the distal LE    Result reviewed with patient and her son and they are agreeable with admission    1157 ECG: Normal rate, regular rhythm, normal axis, no ectopy, LBBB (new), Qtc 456,    1246 Discussed with Dr Singh Nova from orthopaedic surgery  Will place in [facility] boot  Patient is weight bearing as tolerted   1344 Leukocytes, UA(!): Large   1344 Nitrite, UA(!): Positive   1354 Results reviewed with patient and her son  Patient's son, Carlos Wheeler, provided the director of the facilities phone number to contact him regarding accessibility of the facility for the patient [phone])  Discussed with Rajesh  If the patient is unable to ambulate on her own, the facility will not be able to take her back at her current level of care and she will require admission for rehab placement      1433 Bacteria, UA(!): Innumerable   1433 WBC, UA(!): Innumerable   1443 Delta 2hr hsTnI: -3   1504 Patient unable to ambulate, will admit     1552 Patient is admitted to [address] [place] [address] 20yo+      Most Recent Value   SBIRT (24 yo +)    In order to provide better care to our patients, we are screening all of our patients for alcohol and drug use  Would it be okay to ask you these screening questions? No Filed at: 02/04/2022 1009   Initial Alcohol Screen: US AUDIT-C     1  How often do you have a drink containing alcohol? 0 Filed at: 02/04/2022 1009   Audit-C Score 0 Filed at: 02/04/2022 1009                MDM  Number of Diagnoses or Management Options  Acute pain of left lower extremity  Ambulatory dysfunction  AMS (altered mental status)  Cholelithiasis  Closed left fibular fracture  Elevated blood pressure reading  Fall, initial encounter  LBBB (left bundle branch block)  Leukocytosis  Pulmonary nodules  Diagnosis management comments: Patient is a 51-year-old female, with a history significant for CAD on daily aspirin, mild cognitive impairment with memory loss, bilateral knee osteoarthritis/recent bursitis diagnosis on 01/27, who presents to the ED today, via EMS from her independent but assisted living facility, due to fall  There is associated confusion, left lower extremity pain, and inability to ambulate unassisted  Patient was found at 4:30 a m  this morning, on the ground next to the commode in her room, by staff  The fall was not witnessed and staff at the facility are uncertain of how long the patient was down for  At the time she was found, patient was not complaining of pain; however, she was confused and required frequent reorientation  Currently, per patient's son, patient is at her baseline mental status  Patient is currently afebrile and hemodynamically stable  Her physical exam is notable for midline T-spine tenderness palpation as well as tenderness to palpation of the left shin  This presentation is concerning for:  Mechanical fall given history of bilateral knee OA, syncope, UTI    I also considered, despite no obvious signs of head trauma, ICH given patient's age and fall on aspirin as well as confusion, ACS, sprain, strain, contusion, tibial fracture, thoracic vertebral fracture  Will investigate with CBC, CMP, troponin, ECG, CT head, C-spine, chest, UA  Will manage based upon workup  Disposition  Final diagnoses:   Fall, initial encounter   AMS (altered mental status)   Elevated blood pressure reading   Acute pain of left lower extremity   Leukocytosis   Closed left fibular fracture   Ambulatory dysfunction   Cholelithiasis   Pulmonary nodules   LBBB (left bundle branch block)   UTI (urinary tract infection)     Time reflects when diagnosis was documented in both MDM as applicable and the Disposition within this note     Time User Action Codes Description Comment    2/4/2022 10:26 AM Bobbi Alamin A Add [A74  TAEM] Fall, initial encounter     2/4/2022 10:26 AM Legare, Murriel Footman A Add [R41 82] AMS (altered mental status)     2/4/2022 10:26 AM Legare, Murriel Footman A Add [R03 0] Elevated blood pressure reading     2/4/2022 10:27 AM Legare, Murriel Footman A Add [M79 605] Acute pain of left lower extremity     2/4/2022 11:20 AM Bobbi Alamin A Add [D72 829] Leukocytosis     2/4/2022 11:20 AM Bobbi Alamin A Add [S82 402A] Closed left fibular fracture     2/4/2022 11:20 AM Bobbi Alamin A Add [R26 2] Ambulatory dysfunction     2/4/2022 11:50 AM Bobbi Alamin A Modify [R26 2] Ambulatory dysfunction     2/4/2022 11:57 AM Bobbi Alamin A Add [K80 20] Cholelithiasis     2/4/2022 11:57 AM Bobbi Alamin A Add [R91 8] Pulmonary nodules     2/4/2022 11:59 AM Legare, Murriel Footman A Add [I44 7] LBBB (left bundle branch block)     2/4/2022  1:45 PM Legare, Murriel Footman A Add [N39 0] UTI (urinary tract infection)       ED Disposition     ED Disposition Condition Date/Time Comment    Admit Stable Fri Feb 4, 2022  3:44 PM Case was discussed with CARROLL and the patient's admission status was agreed to be Admission Status: inpatient status to the service of Dr Zion Rodriguez           Follow-up Information    None Patient's Medications   Discharge Prescriptions    No medications on file     No discharge procedures on file  PDMP Review     None           ED Provider  Attending physically available and evaluated Brice Aguilar I managed the patient along with the ED Attending      Electronically Signed by         Bryce Hearn MD  02/04/22 9201

## 2022-02-04 NOTE — ED ATTENDING ATTESTATION
2/4/2022  IAbi MD, saw and evaluated the patient  I have discussed the patient with the resident/non-physician practitioner and agree with the resident's/non-physician practitioner's findings, Plan of Care, and MDM as documented in the resident's/non-physician practitioner's note, except where noted  All available labs and Radiology studies were reviewed  I was present for key portions of any procedure(s) performed by the resident/non-physician practitioner and I was immediately available to provide assistance  At this point I agree with the current assessment done in the Emergency Department  I have conducted an independent evaluation of this patient a history and physical is as follows:    ED Course     80year-old female presenting to the emergency department for evaluation after fall  Patient had an unwitnessed fall that occurred in the middle the night, at approximately 04:00 o'clock in the morning was found next to her bedside commode  Patient had no recollection of falling, and is unclear about whether she hit her head or loss consciousness  Much of the history is provided by the son who states that he had attempted to get his mother up to ambulate with to a does and found that she was having difficulty bearing weight secondary to pain in her left lower extremity  This prompted patient's transport to the emergency department  No reported recent fever  No chest pain, shortness of breath, abdominal pain, dysuria  Patient reports some upper midline thoracic back pain  Patient takes aspirin  Ten systems reviewed and negative except as noted  On examination head is normocephalic and atraumatic  Eyelids lashes are normal   Conjunctiva is nonicteric  Negative raccoon's eyes  Negative Murrell sign  Neck is nontender to palpation in the midline cervical spine  Patient has some midline thoracic spine tenderness without any step-offs or crepitance    Heart is regular rate rhythm with no murmurs rubs or gallops  Lungs are clear to auscultation bilaterally  Abdomen is nondistended, soft and nontender  Extremities are significant for left lower extremity which is in a brace  The patient has some valgus deformity at the left knee  Patient has some tenderness to palpation mid tib-fib  Motor is 5/5 bilateral upper lower extremities      Labs Reviewed   CBC AND DIFFERENTIAL - Abnormal       Result Value Ref Range Status    WBC 16 08 (*) 4 31 - 10 16 Thousand/uL Final    RBC 3 93  3 81 - 5 12 Million/uL Final    Hemoglobin 11 7  11 5 - 15 4 g/dL Final    Hematocrit 36 9  34 8 - 46 1 % Final    MCV 94  82 - 98 fL Final    MCH 29 8  26 8 - 34 3 pg Final    MCHC 31 7  31 4 - 37 4 g/dL Final    RDW 14 9  11 6 - 15 1 % Final    MPV 10 0  8 9 - 12 7 fL Final    Platelets 744  735 - 390 Thousands/uL Final    nRBC 0  /100 WBCs Final    Neutrophils Relative 67  43 - 75 % Final    Immat GRANS % 1  0 - 2 % Final    Lymphocytes Relative 14  14 - 44 % Final    Monocytes Relative 16 (*) 4 - 12 % Final    Eosinophils Relative 1  0 - 6 % Final    Basophils Relative 1  0 - 1 % Final    Neutrophils Absolute 10 89 (*) 1 85 - 7 62 Thousands/µL Final    Immature Grans Absolute 0 15  0 00 - 0 20 Thousand/uL Final    Lymphocytes Absolute 2 26  0 60 - 4 47 Thousands/µL Final    Monocytes Absolute 2 58 (*) 0 17 - 1 22 Thousand/µL Final    Eosinophils Absolute 0 12  0 00 - 0 61 Thousand/µL Final    Basophils Absolute 0 08  0 00 - 0 10 Thousands/µL Final   COMPREHENSIVE METABOLIC PANEL - Abnormal    Sodium 135 (*) 136 - 145 mmol/L Final    Potassium 3 6  3 5 - 5 3 mmol/L Final    Chloride 103  100 - 108 mmol/L Final    CO2 26  21 - 32 mmol/L Final    ANION GAP 6  4 - 13 mmol/L Final    BUN 22  5 - 25 mg/dL Final    Creatinine 0 97  0 60 - 1 30 mg/dL Final    Comment: Standardized to IDMS reference method    Glucose 90  65 - 140 mg/dL Final    Comment: If the patient is fasting, the ADA then defines impaired fasting glucose as > 100 mg/dL and diabetes as > or equal to 123 mg/dL  Specimen collection should occur prior to Sulfasalazine administration due to the potential for falsely depressed results  Specimen collection should occur prior to Sulfapyridine administration due to the potential for falsely elevated results  Calcium 9 1  8 3 - 10 1 mg/dL Final    Corrected Calcium 9 7  8 3 - 10 1 mg/dL Final    AST 24  5 - 45 U/L Final    Comment: Specimen collection should occur prior to Sulfasalazine administration due to the potential for falsely depressed results  ALT 43  12 - 78 U/L Final    Comment: Specimen collection should occur prior to Sulfasalazine and/or Sulfapyridine administration due to the potential for falsely depressed results  Alkaline Phosphatase 75  46 - 116 U/L Final    Total Protein 6 4  6 4 - 8 2 g/dL Final    Albumin 3 2 (*) 3 5 - 5 0 g/dL Final    Total Bilirubin 0 62  0 20 - 1 00 mg/dL Final    Comment: Use of this assay is not recommended for patients undergoing treatment with eltrombopag due to the potential for falsely elevated results      eGFR 49  ml/min/1 73sq m Final    Narrative:     [place] guidelines for Chronic Kidney Disease (CKD):     Stage 1 with normal or high GFR (GFR > 90 mL/min/1 73 square meters)    Stage 2 Mild CKD (GFR = 60-89 mL/min/1 73 square meters)    Stage 3A Moderate CKD (GFR = 45-59 mL/min/1 73 square meters)    Stage 3B Moderate CKD (GFR = 30-44 mL/min/1 73 square meters)    Stage 4 Severe CKD (GFR = 15-29 mL/min/1 73 square meters)    Stage 5 End Stage CKD (GFR <15 mL/min/1 73 square meters)  Note: GFR calculation is accurate only with a steady state creatinine   UA W REFLEX TO MICROSCOPIC WITH REFLEX TO CULTURE - Abnormal    Color, UA Yellow   Final    Clarity, UA Turbid   Final    Specific Gravity, UA 1 020  1 003 - 1 030 Final    pH, UA 6 5  4 5, 5 0, 5 5, 6 0, 6 5, 7 0, 7 5, 8 0 Final    Leukocytes, UA Large (*) Negative Final Nitrite, UA Positive (*) Negative Final    Protein,  (2+) (*) Negative mg/dl Final    Glucose, UA Negative  Negative mg/dl Final    Ketones, UA Negative  Negative mg/dl Final    Urobilinogen, UA 1 0  0 2, 1 0 E U /dl E U /dl Final    Bilirubin, UA Negative  Negative Final    Blood, UA Moderate   Final   URINE MICROSCOPIC - Abnormal    RBC, UA 4-10 (*) None Seen, 2-4 /hpf Final    WBC, UA Innumerable (*) None Seen, 2-4, 5-60 /hpf Final    Epithelial Cells None Seen  None Seen, Occasional /hpf Final    Bacteria, UA Innumerable (*) None Seen, Occasional /hpf Final   HS TROPONIN I 0HR - Normal    hs TnI 0hr 23  "Refer to ACS Flowchart"- see link ng/L Final    Comment:                                              Initial (time 0) result  If >=50 ng/L, Myocardial injury suggested ;  Type of myocardial injury and treatment strategy  to be determined  If 5-49 ng/L, a delta result at 2 hours and or 4 hours will be needed to further evaluate  If <4 ng/L, and chest pain has been >3 hours since onset, patient may qualify for discharge based on the HEART score in the ED  If <5 ng/L and <3hours since onset of chest pain, a delta result at 2 hours will be needed to further evaluate  Second Troponin (time 2 hours)  If calculated delta >= 20 ng/L,  Myocardial injury suggested ; Type of myocardial injury and treatment strategy to be determined  If 5-49 ng/L and the calculated delta is 5-19 ng/L, consult medical service for evaluation  Continue evaluation for ischemia on ecg and other possible etiology and repeat hs troponin at 4 hours  If delta is <5 ng/L at 2 hours, consider discharge based on risk stratification via the HEART score (if in ED), or BHARATI risk score in IP/Observation     HS TROPONIN I 2HR - Normal    hs TnI 2hr 20  "Refer to ACS Flowchart"- see link ng/L Final    Comment:                                              Initial (time 0) result  If >=50 ng/L, Myocardial injury suggested ;  Type of myocardial injury and treatment strategy  to be determined  If 5-49 ng/L, a delta result at 2 hours and or 4 hours will be needed to further evaluate  If <4 ng/L, and chest pain has been >3 hours since onset, patient may qualify for discharge based on the HEART score in the ED  If <5 ng/L and <3hours since onset of chest pain, a delta result at 2 hours will be needed to further evaluate  Second Troponin (time 2 hours)  If calculated delta >= 20 ng/L,  Myocardial injury suggested ; Type of myocardial injury and treatment strategy to be determined  If 5-49 ng/L and the calculated delta is 5-19 ng/L, consult medical service for evaluation  Continue evaluation for ischemia on ecg and other possible etiology and repeat hs troponin at 4 hours  If delta is <5 ng/L at 2 hours, consider discharge based on risk stratification via the HEART score (if in ED), or BHARATI risk score in IP/Observation  Delta 2hr hsTnI -3  <20 ng/L Final   URINE CULTURE   HS TROPONIN I 4HR       CT head without contrast   Final Result      No acute intracranial abnormality  Workstation performed: [id number]         CT spine cervical without contrast   Final Result      No cervical spine fracture or traumatic malalignment  Workstation performed: [id number]         CT chest wo contrast   Final Result      No acute posttraumatic abnormality detected  Gallstones without surrounding inflammation  3-4 mm pulmonary nodules as described  Considerations related to the patient's age and/or comorbidities may be used to entertain any follow-up recommendations  Workstation performed: [id number]         XR tibia fibula 2 views LEFT   ED Interpretation   Fibular fracture      Final Result      Acute fibular shaft fracture without angulation or displacement              Workstation performed: [id number]                 Critical Care Time  Procedures

## 2022-02-04 NOTE — H&P
[facility]  H&P- Anthony Whitaekr 9/11/1925, 80 y o  female MRN: [id number]  Unit/Bed#: ED 08 Encounter: [id number]  Primary Care Provider: Sandi Hassan MD   Date and time admitted to hospital: 2/4/2022  9:54 AM    * Closed fracture of shaft of left fibula  Assessment & Plan  80year-old female who lives at [facility] with past medical history hypertension, dementia, GERD, hyponatremia, ambulatory dysfunction, cognitive impairment, CAD, osteoarthritis, bursitis who presented to the ED after having a fall  Patient is found to have left fibula fracture that is nondisplaced and UTI for which she is getting admitted to hospitalist service  Patient is a very poor historian and appears confused  She is oriented to self and place only  Her left leg is in brace and patient will be evaluated by Orthopedic surgery for possible fixation of the left fibula fracture  We will control her pain for now and she will be evaluated by PT OT after cleared by Ortho  Ambulatory dysfunction  Assessment & Plan  Patient has ambulatory dysfunction to begin with then presented after a fall  Now found to have left fibula fracture  Also UTI  PT/OT will be consulted once the patient is appropriate to work with the     Acute UTI  Assessment & Plan  Empiric Rocephin  Follow-up on urine culture  Adjust antibiotic accordingly    Essential hypertension  Assessment & Plan  Continue home Cozaar    Hyperlipidemia  Assessment & Plan  Continue home medications    Generalized osteoarthritis of multiple sites  Assessment & Plan  Patient does not complain of any pain in any of the joints except the left leg pain for now  Hyponatremia  Assessment & Plan  Cont salt tablets    CAD:  Per chart review patient has mild CAD  Last echo was done in 2015 that showed EF of 55 percent and hypokinesis of the basal inferior walls    If Orthopedic surgery decides to take her to the wire then she will need cardiac preop clearance  Abnormal EKG  Serial troponin and repeat EKG  No angina  Get Cardiology evaluation  VTE Pharmacologic Prophylaxis:   heparin subQ  Code Status:  DNR/DNI per polst  Discussion with family:  No family at the bedside    Anticipated Length of Stay:- 2-3 days    Total Time for Visit, including Counseling / Coordination of Care:  45 minute Greater than 50% of this total time spent on direct patient counseling and coordination of care  Chief Complaint:  Status post fall, left leg pain, confusion    History of Present Illness:  Larry Howe is a 80 y o  female who lives at [facility] with past medical history hypertension, dementia, GERD, hyponatremia, ambulatory dysfunction, cognitive impairment, CAD, osteoarthritis, bursitis who presented to the ED after having a fall  Patient is found to have left fibula fracture that is nondisplaced and UTI for which she is getting admitted to hospitalist service  Patient is a very poor historian and appears confused  She is oriented to self and place only  Her left leg is in brace and patient will be evaluated by Orthopedic surgery for possible fixation of the left fibula fracture  Her CT head and cervical spine were nonacute  CT chest showed right lung pulmonary nodule  Her UA did show positive nitrites and large leukocyte  Sodium 135 and leukocytosis of 16 K  Review of Systems:  Review of Systems   Constitutional: Negative for fatigue and fever  HENT: Negative for congestion and sore throat  Eyes: Negative for visual disturbance  Respiratory: Negative for cough, shortness of breath, wheezing and stridor  Cardiovascular: Negative for chest pain, palpitations and leg swelling  Gastrointestinal: Negative for constipation, diarrhea, nausea and vomiting  Genitourinary: Negative for dysuria, flank pain and frequency  Skin: Negative for color change  Neurological: Negative for dizziness, tremors and weakness  Psychiatric/Behavioral: Positive for confusion  Left leg pain positive  Past Medical and Surgical History:   Past Medical History:   Diagnosis Date    Arthritis     CAD (coronary artery disease)     Disease of thyroid gland     GERD (gastroesophageal reflux disease)     Hypertension     Mild cognitive impairment with memory loss 5/29/2020    Osteoarthritis     Osteoporosis     Vitamin D deficiency        Past Surgical History:   Procedure Laterality Date    APPENDECTOMY      HYSTERECTOMY      NV PARTIAL HIP REPLACEMENT Left 8/13/2018    Procedure: HEMIARTHROPLASTY HIP (BIPOLAR); Surgeon: Ruddy Cowan MD;  Location: [facility];  Service: Orthopedics    ROTATOR CUFF REPAIR         Meds/Allergies:  Prior to Admission medications    Medication Sig Start Date End Date Taking?  Authorizing Provider   acetaminophen (TYLENOL) 325 mg tablet Take 650 mg by mouth 3 (three) times a day     Historical Provider, MD   aspirin 81 MG tablet Take 1 tablet by mouth daily    Historical Provider, MD   calcitonin salmon, (MIACALCIN) 200 units/act nasal spray 1 spray and to alternate nostril q day  Patient taking differently: 1 spray into each nostril daily Alternate left and right nostril every other day 2/4/19   Sloan Hanna,    cholecalciferol (VITAMIN D3) 1,000 units tablet Take 2,000 Units by mouth daily    Historical Provider, MD   Cyanocobalamin (VITAMIN B12) 1000 MCG TBCR Take 1 tablet by mouth daily     Historical Provider, MD   cycloSPORINE (RESTASIS) 0 05 % ophthalmic emulsion Administer 1 drop to both eyes 2 (two) times a day    Historical Provider, MD   D-Mannose 500 MG CAPS Take 1 tablet by mouth daily    Historical Provider, MD   dorzolamide-timolol (COSOPT) 22 3-6 8 MG/ML ophthalmic solution Administer 1 drop to both eyes daily 7/2/18   Historical Provider, MD   estradiol (VAGIFEM, YUVAFEM) 10 MCG TABS vaginal tablet Insert 1 tablet (10 mcg total) into the vagina 2 (two) times a week 6/14/21 ENID Marcum   ferrous sulfate 324 (65 Fe) mg Take 1 tablet (324 mg total) by mouth daily before breakfast 9/12/18   Sloan Hanna DO   gabapentin (NEURONTIN) 100 mg capsule Take 100 mg by mouth 3 (three) times a day    Historical Provider, MD   latanoprost (XALATAN) 0 005 % ophthalmic solution Administer 1 drop to both eyes daily at bedtime 2/19/18   Historical Provider, MD   losartan (COZAAR) 50 mg tablet Take 1 tablet (50 mg total) by mouth daily 8/25/21   Abhi Rendon MD   metoprolol succinate (TOPROL-XL) 50 mg 24 hr tablet  6/5/20   Historical Provider, MD   Multiple Vitamins-Minerals (PRESERVISION/LUTEIN) CAPS Take 1 capsule by mouth 2 (two) times a day    Historical Provider, MD   pantoprazole (PROTONIX) 20 mg tablet Take 20 mg by mouth daily    Historical Provider, MD   Polyethyl Glyc-Propyl Glyc PF 0 4-0 3 % SOLN Apply 1 drop to eye every 2 (two) hours while awake    Historical Provider, MD   Pyridoxine HCl (VITAMIN B6) 200 MG TABS Take 0 5 tablets by mouth daily     Historical Provider, MD   sodium chloride 1 g tablet Take 1 g by mouth 3 (three) times a day    Historical Provider, MD   Home meds reviewed in the chart    Allergies: No Known Allergies    Social History:  Marital Status:     Patient Pre-hospital Living Situation:  Patient lives at assisted living  Patient Pre-hospital Level of Mobility:  She has ambulatory dysfunction  Patient Pre-hospital Diet Restrictions:  Probably she follows cardiac diet  Substance Use History:   Social History     Substance and Sexual Activity   Alcohol Use Not Currently     Social History     Tobacco Use   Smoking Status Former Smoker   Smokeless Tobacco Never Used     Social History     Substance and Sexual Activity   Drug Use No       Family History:  Family History   Problem Relation Age of Onset    Diabetes type II Father     Heart disease Brother     Hypertension Family     Arthritis Family        Physical Exam:     Vitals:   Blood Pressure: 139/58 (02/04/22 1556)  Pulse: 81 (02/04/22 1556)  Temperature: 98 7 °F (37 1 °C) (02/04/22 0958)  Temp Source: Oral (02/04/22 0958)  Respirations: 18 (02/04/22 1556)  SpO2: 93 % (02/04/22 1556)      Awake alert oriented x2, NAD  Neck supple, no JVD  S1-S2 audible, no murmur  Abdomen soft nontender nondistended, bowel sounds positive  No neurological deficit other than confusion   Left lower extremity in brace  No edema of lower extremity or cyanosis no clubbing    Lab Results:  Results from last 7 days   Lab Units 02/04/22  1052   WBC Thousand/uL 16 08*   HEMOGLOBIN g/dL 11 7   HEMATOCRIT % 36 9   PLATELETS Thousands/uL 229   NEUTROS PCT % 67   LYMPHS PCT % 14   MONOS PCT % 16*   EOS PCT % 1     Results from last 7 days   Lab Units 02/04/22  1052   SODIUM mmol/L 135*   POTASSIUM mmol/L 3 6   CHLORIDE mmol/L 103   CO2 mmol/L 26   BUN mg/dL 22   CREATININE mg/dL 0 97   ANION GAP mmol/L 6   CALCIUM mg/dL 9 1   ALBUMIN g/dL 3 2*   TOTAL BILIRUBIN mg/dL 0 62   ALK PHOS U/L 75   ALT U/L 43   AST U/L 24   GLUCOSE RANDOM mg/dL 90                       Imaging:   CT head without contrast   Final Result by Debria Hatchet, MD (02/04 1133)      No acute intracranial abnormality  Workstation performed: [id number]         CT spine cervical without contrast   Final Result by Debria Hatchet, MD (02/04 1130)      No cervical spine fracture or traumatic malalignment  Workstation performed: [id number]         CT chest wo contrast   Final Result by Debria Hatchet, MD (02/04 1151)      No acute posttraumatic abnormality detected  Gallstones without surrounding inflammation  3-4 mm pulmonary nodules as described  Considerations related to the patient's age and/or comorbidities may be used to entertain any follow-up recommendations                   Workstation performed: [id number]         XR tibia fibula 2 views LEFT   ED Interpretation by Brian Small MD (02/04 1128)   Fibular fracture      Final Result by Akin Emerson DO (02/04 6062)      Acute fibular shaft fracture without angulation or displacement  Workstation performed: [id number]             EKG and Other Studies Reviewed on Admission:   · EKG: NSR with new left bundle branch block    ** Please Note: This note has been constructed using a voice recognition system   **

## 2022-02-05 ENCOUNTER — APPOINTMENT (INPATIENT)
Dept: NON INVASIVE DIAGNOSTICS | Facility: HOSPITAL | Age: 87
DRG: 563 | End: 2022-02-05
Payer: MEDICARE

## 2022-02-05 LAB
BASOPHILS # BLD AUTO: 0.06 THOUSANDS/ΜL (ref 0–0.1)
BASOPHILS NFR BLD AUTO: 0 % (ref 0–1)
EOSINOPHIL # BLD AUTO: 0.08 THOUSAND/ΜL (ref 0–0.61)
EOSINOPHIL NFR BLD AUTO: 1 % (ref 0–6)
ERYTHROCYTE [DISTWIDTH] IN BLOOD BY AUTOMATED COUNT: 15.1 % (ref 11.6–15.1)
HCT VFR BLD AUTO: 39.4 % (ref 34.8–46.1)
HGB BLD-MCNC: 12.7 G/DL (ref 11.5–15.4)
IMM GRANULOCYTES # BLD AUTO: 0.09 THOUSAND/UL (ref 0–0.2)
IMM GRANULOCYTES NFR BLD AUTO: 1 % (ref 0–2)
LYMPHOCYTES # BLD AUTO: 2.54 THOUSANDS/ΜL (ref 0.6–4.47)
LYMPHOCYTES NFR BLD AUTO: 19 % (ref 14–44)
MCH RBC QN AUTO: 29.7 PG (ref 26.8–34.3)
MCHC RBC AUTO-ENTMCNC: 32.2 G/DL (ref 31.4–37.4)
MCV RBC AUTO: 92 FL (ref 82–98)
MONOCYTES # BLD AUTO: 2.04 THOUSAND/ΜL (ref 0.17–1.22)
MONOCYTES NFR BLD AUTO: 15 % (ref 4–12)
NEUTROPHILS # BLD AUTO: 8.92 THOUSANDS/ΜL (ref 1.85–7.62)
NEUTS SEG NFR BLD AUTO: 64 % (ref 43–75)
NRBC BLD AUTO-RTO: 0 /100 WBCS
PLATELET # BLD AUTO: 210 THOUSANDS/UL (ref 149–390)
PMV BLD AUTO: 10.2 FL (ref 8.9–12.7)
PROCALCITONIN SERPL-MCNC: 0.11 NG/ML
RBC # BLD AUTO: 4.28 MILLION/UL (ref 3.81–5.12)
WBC # BLD AUTO: 13.73 THOUSAND/UL (ref 4.31–10.16)

## 2022-02-05 PROCEDURE — 85025 COMPLETE CBC W/AUTO DIFF WBC: CPT | Performed by: HOSPITALIST

## 2022-02-05 PROCEDURE — 99232 SBSQ HOSP IP/OBS MODERATE 35: CPT | Performed by: INTERNAL MEDICINE

## 2022-02-05 PROCEDURE — 99222 1ST HOSP IP/OBS MODERATE 55: CPT | Performed by: ORTHOPAEDIC SURGERY

## 2022-02-05 PROCEDURE — 84145 PROCALCITONIN (PCT): CPT | Performed by: ORTHOPAEDIC SURGERY

## 2022-02-05 PROCEDURE — 27780 TREATMENT OF FIBULA FRACTURE: CPT | Performed by: ORTHOPAEDIC SURGERY

## 2022-02-05 PROCEDURE — NC001 PR NO CHARGE: Performed by: ORTHOPAEDIC SURGERY

## 2022-02-05 PROCEDURE — 97163 PT EVAL HIGH COMPLEX 45 MIN: CPT

## 2022-02-05 RX ADMIN — CEFTRIAXONE 1000 MG: 1 INJECTION, POWDER, FOR SOLUTION INTRAMUSCULAR; INTRAVENOUS at 18:00

## 2022-02-05 RX ADMIN — LATANOPROST 1 DROP: 50 SOLUTION/ DROPS OPHTHALMIC at 22:15

## 2022-02-05 RX ADMIN — ACETAMINOPHEN 650 MG: 325 TABLET, FILM COATED ORAL at 06:31

## 2022-02-05 RX ADMIN — LOSARTAN POTASSIUM 50 MG: 50 TABLET, FILM COATED ORAL at 08:47

## 2022-02-05 RX ADMIN — PANTOPRAZOLE SODIUM 20 MG: 20 TABLET, DELAYED RELEASE ORAL at 06:18

## 2022-02-05 RX ADMIN — HEPARIN SODIUM 5000 UNITS: 5000 INJECTION INTRAVENOUS; SUBCUTANEOUS at 14:41

## 2022-02-05 RX ADMIN — SODIUM CHLORIDE TAB 1 GM 1 G: 1 TAB at 08:47

## 2022-02-05 RX ADMIN — SODIUM CHLORIDE TAB 1 GM 1 G: 1 TAB at 18:00

## 2022-02-05 RX ADMIN — METOPROLOL SUCCINATE 25 MG: 25 TABLET, EXTENDED RELEASE ORAL at 08:47

## 2022-02-05 RX ADMIN — HEPARIN SODIUM 5000 UNITS: 5000 INJECTION INTRAVENOUS; SUBCUTANEOUS at 22:23

## 2022-02-05 RX ADMIN — HEPARIN SODIUM 5000 UNITS: 5000 INJECTION INTRAVENOUS; SUBCUTANEOUS at 06:18

## 2022-02-05 RX ADMIN — SODIUM CHLORIDE TAB 1 GM 1 G: 1 TAB at 22:15

## 2022-02-05 NOTE — PROGRESS NOTES
Progress Note - Orthopedics   Sharyn Villagran 80 y o  female MRN: [id number]  Unit/Bed#: [facility] 830-01      Subjective:    80 y  o female No acute events, no complaints  Pt doing well  Pain controlled  Denies fevers chills, CP, SOB      Labs:  0   Lab Value Date/Time    HCT 39 4 02/05/2022 0600    HCT 36 9 02/04/2022 1052    HCT 34 8 05/28/2020 0557    HCT 37 1 11/24/2017 0812    HCT 37 0 11/14/2016 0736    HCT 34 3 (L) 09/07/2015 0600    HGB 12 7 02/05/2022 0600    HGB 11 7 02/04/2022 1052    HGB 10 7 (L) 05/28/2020 0557    HGB 12 3 11/24/2017 0812    HGB 12 3 11/14/2016 0736    HGB 11 2 (L) 09/07/2015 0600    INR 1 05 08/12/2018 1137    WBC 13 73 (H) 02/05/2022 0600    WBC 16 08 (H) 02/04/2022 1052    WBC 8 41 05/28/2020 0557    WBC 0-5 11/24/2017 0812    WBC 8 7 11/24/2017 0812    WBC 6 1 11/14/2016 0736       Meds:    Current Facility-Administered Medications:     acetaminophen (TYLENOL) tablet 650 mg, 650 mg, Oral, Q6H PRN, Thelma Valentin MD, 650 mg at 02/05/22 0631    cefTRIAXone (ROCEPHIN) 1,000 mg in dextrose 5 % 50 mL IVPB, 1,000 mg, Intravenous, Q24H, Thelma Valentin MD, Last Rate: 100 mL/hr at 02/04/22 1958, 1,000 mg at 02/04/22 1958    heparin (porcine) subcutaneous injection 5,000 Units, 5,000 Units, Subcutaneous, Q8H [address], 5,000 Units at 02/05/22 0618 **AND** Platelet count, , , Once, Thelma Valentin MD    latanoprost (XALATAN) 0 005 % ophthalmic solution 1 drop, 1 drop, Both Eyes, HS, Thelma Valentin MD, 1 drop at 02/04/22 2056    losartan (COZAAR) tablet 50 mg, 50 mg, Oral, Daily, Thelma Valentin MD    metoprolol succinate (TOPROL-XL) 24 hr tablet 25 mg, 25 mg, Oral, Daily, Thelma Valentin MD    pantoprazole (PROTONIX) EC tablet 20 mg, 20 mg, Oral, Daily Before Breakfast, Thelma Valentin MD, 20 mg at 02/05/22 0429    sodium chloride tablet 1 g, 1 g, Oral, TID, Thelma Valentin MD, 1 g at 02/04/22 2056    Blood Culture:   No results found for: BLOODCX    Wound Culture:   No results found for: WOUNDCULT    Ins and Outs:  No intake/output data recorded  Physical:  Vitals:    02/05/22 0718   BP: 150/65   Pulse: 71   Resp: 20   Temp: 98 °F (36 7 °C)   SpO2: 98%     Musculoskeletal: left Lower Extremity  · Skin warm dry there is scattered ecchymosis throughout left lower extremity  · TTP left lateral lower leg  · SILT s/s/sp/dp/t  +fhl/ehl, toes warm    Assessment:    89 y  o female isolated left fibula fracture after a fall  This is a stable injury that does not require surgery and patient would benefit from nonoperative management of this injury    Plan:  · WBAT LLE, no need for CAM boot as it interferes with her knee brace    · No diagnosis of acute blood loss anemia, will monitor and administer IVF/prbc as indicated   · PT/OT  · Pain control  · DVT ppx - recommend 28 days lovenox unless medically contrainidcated  · Dispo: Ortho will follow    Micah Shabazz MD

## 2022-02-05 NOTE — ASSESSMENT & PLAN NOTE
35-year-old female who lives at [facility] with past medical history hypertension, dementia, GERD, hyponatremia, ambulatory dysfunction, cognitive impairment, CAD, osteoarthritis, bursitis who presented to the ED after having a fall  Patient is found to have left fibula fracture that is nondisplaced and UTI for which she is getting admitted to hospitalist service  Patient is a very poor historian and appears confused  She is oriented to self and place only  Her left leg is in brace and patient will be evaluated by Orthopedic surgery for possible fixation of the left fibula fracture    We will control her pain for now and she will be evaluated by PT OT after cleared by Ortho     2/5/22: pt ot consults pending, ortho recommends conservative management

## 2022-02-05 NOTE — PROGRESS NOTES
[facility]  Progress Note - Madiha Romano 9/11/1925, 80 y o  female MRN: [id number]  Unit/Bed#: [facility] 830-01 Encounter: [id number]  Primary Care Provider: Abhi Rendon MD   Date and time admitted to hospital: 2/4/2022  9:54 AM    Hyponatremia  Assessment & Plan  Cont salt tablets    Mild cognitive impairment with memory loss  Assessment & Plan  -noted    Acute UTI  Assessment & Plan  Empiric Rocephin  Follow-up on urine culture-pending  Adjust antibiotic accordingly    Ambulatory dysfunction  Assessment & Plan  Patient has ambulatory dysfunction to begin with then presented after a fall  Now found to have left fibula fracture  Also UTI  PT/OT consult pending  Orthopedics recommend conservative management    Essential hypertension  Assessment & Plan  Continue home Cozaar    Hyperlipidemia  Assessment & Plan  Continue home medications    Generalized osteoarthritis of multiple sites  Assessment & Plan  Patient does not complain of any pain in any of the joints except the left leg pain for now  * Closed fracture of shaft of left fibula  Assessment & Plan  80year-old female who lives at [facility] with past medical history hypertension, dementia, GERD, hyponatremia, ambulatory dysfunction, cognitive impairment, CAD, osteoarthritis, bursitis who presented to the ED after having a fall  Patient is found to have left fibula fracture that is nondisplaced and UTI for which she is getting admitted to hospitalist service  Patient is a very poor historian and appears confused  She is oriented to self and place only  Her left leg is in brace and patient will be evaluated by Orthopedic surgery for possible fixation of the left fibula fracture    We will control her pain for now and she will be evaluated by PT OT after cleared by Ortho     2/5/22: pt ot consults pending, ortho recommends conservative management          VTE Pharmacologic Prophylaxis:   High Risk (Score >/= 5) - Pharmacological DVT Prophylaxis Ordered: heparin  Sequential Compression Devices Ordered  Patient Centered Rounds: I performed bedside rounds with nursing staff today  Discussions with Specialists or Other Care Team Provider: n/a    Education and Discussions with Family / Patient: Updated  (daughter) via phone  Time Spent for Care: 30 minutes  More than 50% of total time spent on counseling and coordination of care as described above  Current Length of Stay: 1 day(s)  Current Patient Status: Inpatient   Certification Statement: The patient will continue to require additional inpatient hospital stay due to pending pt ot ocnsult  Discharge Plan: Anticipate discharge in 48 hrs to discharge location to be determined pending rehab evaluations  Code Status: Level 3 - DNAR and DNI    Subjective:   Patient pleasantly confused which is her baseline  Objective:     Vitals:   Temp (24hrs), Av 3 °F (36 8 °C), Min:97 6 °F (36 4 °C), Max:99 2 °F (37 3 °C)    Temp:  [97 6 °F (36 4 °C)-99 2 °F (37 3 °C)] 97 6 °F (36 4 °C)  HR:  [71-82] 72  Resp:  [17-20] 17  BP: (148-165)/(60-65) 148/60  SpO2:  [94 %-99 %] 99 %  Body mass index is 24 89 kg/m²  Input and Output Summary (last 24 hours): Intake/Output Summary (Last 24 hours) at 2022 1717  Last data filed at 2022 1200  Gross per 24 hour   Intake 360 ml   Output 673 ml   Net -313 ml       Physical Exam:   Physical Exam  Vitals and nursing note reviewed  Constitutional:       General: She is not in acute distress  Appearance: She is well-developed  She is not ill-appearing, toxic-appearing or diaphoretic  HENT:      Head: Normocephalic and atraumatic  Eyes:      General: No scleral icterus  Conjunctiva/sclera: Conjunctivae normal    Cardiovascular:      Rate and Rhythm: Normal rate and regular rhythm  Heart sounds: No murmur heard  No friction rub  No gallop      Pulmonary:      Effort: Pulmonary effort is normal  No respiratory distress  Breath sounds: Normal breath sounds  No stridor  No wheezing, rhonchi or rales  Chest:      Chest wall: No tenderness  Abdominal:      General: There is no distension  Palpations: Abdomen is soft  There is no mass  Tenderness: There is no abdominal tenderness  There is no guarding or rebound  Hernia: No hernia is present  Musculoskeletal:         General: Tenderness present  No swelling  Cervical back: Neck supple  Comments: Left lower leg ttp   Skin:     General: Skin is warm and dry  Neurological:      Mental Status: She is alert  Additional Data:     Labs:  Results from last 7 days   Lab Units 02/05/22  0600   WBC Thousand/uL 13 73*   HEMOGLOBIN g/dL 12 7   HEMATOCRIT % 39 4   PLATELETS Thousands/uL 210   NEUTROS PCT % 64   LYMPHS PCT % 19   MONOS PCT % 15*   EOS PCT % 1     Results from last 7 days   Lab Units 02/04/22  1052   SODIUM mmol/L 135*   POTASSIUM mmol/L 3 6   CHLORIDE mmol/L 103   CO2 mmol/L 26   BUN mg/dL 22   CREATININE mg/dL 0 97   ANION GAP mmol/L 6   CALCIUM mg/dL 9 1   ALBUMIN g/dL 3 2*   TOTAL BILIRUBIN mg/dL 0 62   ALK PHOS U/L 75   ALT U/L 43   AST U/L 24   GLUCOSE RANDOM mg/dL 90                 Results from last 7 days   Lab Units 02/05/22  0600   PROCALCITONIN ng/ml 0 11       Lines/Drains:  Invasive Devices  Report    Peripheral Intravenous Line            Peripheral IV 02/04/22 Left Antecubital 1 day                  Telemetry:  Telemetry Orders (From admission, onward)             48 Hour Telemetry Monitoring  Continuous x 48 hours        References:    Telemetry Guidelines   Question:  Reason for 48 Hour Telemetry  Answer:  Acute MI, chest pain - R/O MI, or unstable angina                 Telemetry Reviewed: Normal Sinus Rhythm  Indication for Continued Telemetry Use: No indication for continued use  Will discontinue  Imaging: No pertinent imaging reviewed      Recent Cultures (last 7 days):   Results from last 7 days   Lab Units 02/04/22  1233   URINE CULTURE  >100,000 cfu/ml Citrobacter freundii*       Last 24 Hours Medication List:   Current Facility-Administered Medications   Medication Dose Route Frequency Provider Last Rate    acetaminophen  650 mg Oral Q6H PRN Verena Hernandez MD      cefTRIAXone  1,000 mg Intravenous Q24H Verena Hernandez MD 1,000 mg (02/04/22 1958)    heparin (porcine)  5,000 Units Subcutaneous [facility] Verena Hernandez MD      latanoprost  1 drop Both Eyes HS Verena Hernandez MD      losartan  50 mg Oral Daily Verena Hernandez MD      metoprolol succinate  25 mg Oral Daily Verena Hernandez MD      pantoprazole  20 mg Oral Daily Before Breakfast Verena Hernandez MD      sodium chloride  1 g Oral TID Verena Hernandez MD          Today, Patient Was Seen By: Payal Poon DO    **Please Note: This note may have been constructed using a voice recognition system  **

## 2022-02-05 NOTE — CONSULTS
Orthopedics   Baltazar Pham 80 y o  female MRN: [id number]  Unit/Bed#: [facility] 830-01      Chief Complaint:   Left lower leg pain    HPI:  80 y o  female, ambulates with a walker, status post fall complaining of left lower leg pain and inability to bear weight  Pain is worse with contact or movement  History limited by patient's dementia and was obtained via chart review  She sustained an unwitnessed fall today at [facility] assisted living facility  Unknown head strike or LOC  PMH significant for bilateral knee OA, CAD, dementia, and HTN  She takes 81mg aspirin daily  Review Of Systems:   · Skin: Normal  · Neuro: See HPI  · Musculoskeletal: See HPI  · 14 point review of systems negative except as stated above     Past Medical History:   Past Medical History:   Diagnosis Date    Arthritis     CAD (coronary artery disease)     Disease of thyroid gland     GERD (gastroesophageal reflux disease)     Hypertension     Mild cognitive impairment with memory loss 5/29/2020    Osteoarthritis     Osteoporosis     Vitamin D deficiency        Past Surgical History:   Past Surgical History:   Procedure Laterality Date    APPENDECTOMY      HYSTERECTOMY      [place] PARTIAL HIP REPLACEMENT Left 8/13/2018    Procedure: HEMIARTHROPLASTY HIP (BIPOLAR); Surgeon: Karena Garcia MD;  [facility];  Service: Orthopedics    ROTATOR CUFF REPAIR         Family History:  Family history reviewed and non-contributory  Family History   Problem Relation Age of Onset    Diabetes type II Father     Heart disease Brother     Hypertension Family     Arthritis Family        Social History:  Social History     Socioeconomic History    Marital status:       Spouse name: None    Number of children: None    Years of education: None    Highest education level: None   Occupational History    None   Tobacco Use    Smoking status: Former Smoker    Smokeless tobacco: Never Used   Vaping Use    Vaping Use: Never used   Substance and Sexual Activity    Alcohol use: Not Currently    Drug use: No    Sexual activity: None   Other Topics Concern    None   Social History Narrative    Daily coffee consumption - 4 cups     Social Determinants of Health     Financial Resource Strain: Not on file   Food Insecurity: Not on file   Transportation Needs: Not on file   Physical Activity: Not on file   Stress: Not on file   Social Connections: Not on file   Intimate Partner Violence: Not on file   Housing Stability: Not on file       Allergies:   No Known Allergies        Labs:  0   Lab Value Date/Time    HCT 36 9 02/04/2022 1052    HCT 34 8 05/28/2020 0557    HCT 33 8 (L) 05/27/2020 2213    HCT 37 1 11/24/2017 0812    HCT 37 0 11/14/2016 0736    HCT 34 3 (L) 09/07/2015 0600    HGB 11 7 02/04/2022 1052    HGB 10 7 (L) 05/28/2020 0557    HGB 10 9 (L) 05/27/2020 2213    HGB 12 3 11/24/2017 0812    HGB 12 3 11/14/2016 0736    HGB 11 2 (L) 09/07/2015 0600    INR 1 05 08/12/2018 1137    WBC 16 08 (H) 02/04/2022 1052    WBC 8 41 05/28/2020 0557    WBC 8 39 05/27/2020 2213    WBC 0-5 11/24/2017 0812    WBC 8 7 11/24/2017 0812    WBC 6 1 11/14/2016 0736       Meds:    Current Facility-Administered Medications:     acetaminophen (TYLENOL) tablet 650 mg, 650 mg, Oral, Q6H PRN, Yuly Galindo MD    cefTRIAXone (ROCEPHIN) 1,000 mg in dextrose 5 % 50 mL IVPB, 1,000 mg, Intravenous, Q24H, Yuly Galindo MD    heparin (porcine) subcutaneous injection 5,000 Units, 5,000 Units, Subcutaneous, Q8H [address] **AND** Platelet count, , , Once, Yuly Galindo MD    latanoprost (XALATAN) 0 005 % ophthalmic solution 1 drop, 1 drop, Both Eyes, HS, Yuly Galindo MD    [START ON 2/5/2022] losartan (COZAAR) tablet 50 mg, 50 mg, Oral, Daily, Yuly Galindo MD  [facility] metoprolol succinate (TOPROL-XL) 24 hr tablet 25 mg, 25 mg, Oral, Daily, MD Irina Delcid Courser  Taylor Rosa ON 2/5/2022] pantoprazole (PROTONIX) EC tablet 20 mg, 20 mg, Oral, Daily Before Breakfast, Teto Muller Amparo Allen MD    sodium chloride tablet 1 g, 1 g, Oral, TID, Saqib Blanco MD    Blood Culture:   No results found for: BLOODCX    Wound Culture:   No results found for: WOUNDCULT    Ins and Outs:  No intake/output data recorded  Physical Exam:   /60   Pulse 82   Temp 99 2 °F (37 3 °C)   Resp 20   Ht 5' 4" (1 626 m)   SpO2 94%   BMI 24 89 kg/m²   Gen: Resting comfortably in bed  HEENT: Eyes clear, moist mucus membranes, hearing intact  Respiratory: Bilateral chest rise  No audible wheezing found  Cardiovascular: No tachycardia   Musculoskeletal: left lower extremity  · Skin intact   · TTP over distal 1/3 of fibula   · No TTP medial ankle    · SILT s/s/sp/dp/t  · SLR intact and without pain   · Motor intact ankle dorsi/plantar flexion, EHL/FHL  · 2+ DP pulse    Tertiary: no tenderness over all other joints/long bones as except already stated  Radiology:   I personally reviewed the films  Orthogonal xrays of the left tib/fib and ankle show an isolated distal 1/3 fibular shaft fracture that is nondisplaced     _*_*_*_*_*_*_*_*_*_*_*_*_*_*_*_*_*_*_*_*_*_*_*_*_*_*_*_*_*_*_*_*_*_*_*_*_*_*_*_*_*    Assessment:  80 y  o female status post fall with isolated distal 1/3 fibular shaft fracture  Patient would benefit from nonoperative treatment     Plan:   · WBAT LLE in CAM boot   · PT/OT  · Pain control per primary team  · DVT ppx per primary team  · Body mass index is 24 89 kg/m²     · Dispo: Ortho will follow     Chandni Thomas MD

## 2022-02-05 NOTE — PHYSICAL THERAPY NOTE
PHYSICAL THERAPY EVALUATION  NAME:  Soheila Connor  DATE: 02/05/22    AGE:   80 y o  Mrn:   [id number]  ADMIT DX:  Cholelithiasis [K80 20]  LBBB (left bundle branch block) [I44 7]  Leukocytosis [D72 829]  UTI (urinary tract infection) [N39 0]  CAD (coronary artery disease) [I25 10]  Elevated blood pressure reading [R03 0]  Fatigue [R53 83]  Pulmonary nodules [R91 8]  Closed fracture of shaft of left fibula [S82 402A]  Closed left fibular fracture [S82 402A]  Fall, initial encounter [W19  XXXA]  Ambulatory dysfunction [R26 2]  Acute pain of left lower extremity [M79 605]  AMS (altered mental status) [R41 82]    Past Medical History:   Diagnosis Date    Arthritis     CAD (coronary artery disease)     Disease of thyroid gland     GERD (gastroesophageal reflux disease)     Hypertension     Mild cognitive impairment with memory loss 5/29/2020    Osteoarthritis     Osteoporosis     Vitamin D deficiency        Past Surgical History:   Procedure Laterality Date    APPENDECTOMY      HYSTERECTOMY      [place] PARTIAL HIP REPLACEMENT Left 8/13/2018    Procedure: HEMIARTHROPLASTY HIP (BIPOLAR); Surgeon: Charlie Candelario MD;  [facility];  Service: Orthopedics    ROTATOR CUFF REPAIR         Length Of Stay: 1    PHYSICAL THERAPY EVALUATION:        02/05/22 1014   Note Type   Note type Evaluation   Pain Assessment   Pain Assessment Tool 0-10   Pain Score 4   Pain Location/Orientation Orientation: Left; Location: Leg   Pain Onset/Description Onset: Ongoing;Frequency: Constant/Continuous; Descriptor: Aching   Effect of Pain on Daily Activities increased pain with activity    Patient's Stated Pain Goal No pain   Hospital Pain Intervention(s) Ambulation/increased activity;Repositioned   Restrictions/Precautions   Weight Bearing Precautions Per Order Yes   LLE Weight Bearing Per Order WBAT  (in custom KAFO per ortho )   Braces or Orthoses Other (Comment)  (custom KAFO at baseline )   Other Precautions Cognitive; Chair Alarm; Bed Alarm;Multiple lines; Fall Risk;Pain   Home Living   Type of Home Assisted living  [facility]   Home Equipment Walker   Additional Comments Patient questionable historian  Per patient's chart patient resides at [address] [place] [address]   Level of [address] assistance with ADLs and functional mobility   Lives With Facility staff   Receives Help From Other (Comment)  (facility staff)   ADL Assistance Independent   Falls in the last 6 months 1 to 4   Comments Patient reports use of a rolling walker for ambulation prior to admission   General   Family/Caregiver Present No   Cognition   Overall Cognitive Status Impaired   Arousal/Participation Alert   Attention Attends with cues to redirect   Orientation Level Oriented to person;Oriented to place; Disoriented to time;Disoriented to situation   Memory Decreased recall of recent events;Decreased short term memory   Following Commands Follows one step commands without difficulty   RUE Assessment   RUE Assessment WFL   LUE Assessment   LUE Assessment WFL   RLE Assessment   RLE Assessment WFL   Strength RLE   RLE Overall Strength 4/5   LLE Assessment   LLE Assessment X  (KAFO at baseline)   Strength LLE   LLE Overall Strength 3-/5   Bed Mobility   Supine to Sit 3  Moderate assistance   Additional items Assist x 1; Increased time required   Transfers   Sit to Stand 3  Moderate assistance   Additional items Assist x 2; Increased time required;Verbal cues   Stand to Sit 3  Moderate assistance   Additional items Assist x 2; Increased time required;Verbal cues   Stand pivot 3  Moderate assistance   Additional items Assist x 2; Increased time required;Verbal cues   Additional Comments VC and TC needed for hand placement and safety   b/l HHA utilized for transfers    Ambulation/Elevation   Gait pattern Not appropriate  (decreased standing tolerance at current time)   Endurance Deficit   Endurance Deficit Yes   Endurance Deficit Description fatigue and pain    Activity Tolerance   Activity Tolerance Patient limited by fatigue;Patient limited by pain   Medical Staff Made Aware Gabrielle Preston; Gabrielle Mendoza student; OT present for co evaluation due to pts unstable presentation, new traumatic injury, and decreased activity tolerance which all impact pts overall activity tolerance    Nurse Made Aware Patient appropriate to be seen and mobilized per nursing   Assessment   Prognosis Good   Problem List Decreased strength;Decreased range of motion;Decreased endurance; Impaired balance;Decreased mobility; Decreased cognition; Impaired judgement;Decreased safety awareness;Pain   Assessment Pt is 80 y o  female seen for PT evaluation s/p admit to [facility] on 2/4/2022  Two pt identifiers were used to confirm  Pt presented s/p fall  Pt was admitted with a primary dx of:  Closed fracture of shaft of left fibula, and other active problems including acute UTI, essential HTN, HLD, generalized osteoarthritis of multiple sites, hyponatremia  Ortho recommending WBAT to LLE in custom KAFO and non op management for fx at this time  PT now consulted for assessment of mobility and d/c needs  Pt with Up with assistance orders  Pts current co morbidities affecting treatment include:  Arthritis, CAD, disease of thyroid gland, HTN, mild cognitive impairment with memory loss, osteoarthritis, osteoporosis  Pts current clinical presentation is Unstable/ Unpredictable (high complexity) due to Ongoing medical management for primary dx, Decreased activity tolerance compared to baseline, Fall risk, Increased assistance needed from caregiver at current time, Cog status, Continuous pulse oximetry monitoring     Upon evaluation, pt currently is requiring Mod Ax1 for bed mobility; Mod Ax2 for sit <-> stand and stand pivot transfers   Pt presents at PT eval functioning below baseline and currently w/ overall mobility deficits 2* to: BLE weakness, decreased ROM, impaired balance, decreased endurance, pain, decreased activity tolerance compared to baseline, decreased safety awareness, impaired judgement, fall risk, decreased cognition, orthopedic restriction  Pt currently at a fall risk 2* to impairments listed above  Based on the aforementioned PT evaluation, pt will continue to benefit from skilled Acute PT interventions to address stated impairments; to maximize functional mobility; for ongoing pt/ family training; and DME needs  At conclusion of PT session pt returned back in chair and chair alarm engaged with phone and call bell within reach  Pt denies any further questions at this time  PT is currently recommending Rehab  PT will continue to follow during hospital stay  Goals   Patient Goals " to go home"   STG Expiration Date 02/15/22   Short Term Goal #1 In 10 days pt will complete: 1) Bed mobility skills with S to increase safety and independence as well as decrease caregiver burden  2) Functional sit <-> stand and stand pivot transfers with min Ax1 to promote increased independence, safety, and QOL  3) Improve balance grades by 1/2 grade to increase safety with all mobility and decrease fall risk  4) Improve BLE strength by 1/2 grade to help increase overall functional mobility and decrease fall risk  5) ambulation to be assessed when appropriate, PT to see at that time   Plan   Treatment/Interventions Functional transfer training;LE strengthening/ROM; Therapeutic exercise; Endurance training;Patient/family training;Equipment eval/education; Bed mobility;Continued evaluation;Spoke to nursing;OT   PT Frequency 3-5x/wk   Recommendation   PT Discharge Recommendation Post acute rehabilitation services   Equipment Recommended Wheelchair   Wheelchair Package Recommended Standard   AM-PAC Basic Mobility Inpatient   Turning in Bed Without Bedrails 2   Lying on Back to Sitting on Edge of Flat Bed 2   Moving Bed to Chair 1   Standing Up From Chair 1   Walk in Room 1   Climb 3-5 Stairs 1 Basic Mobility Inpatient Raw Score 8   Turning Head Towards Sound 4   Follow Simple Instructions 3   Low Function Basic Mobility Raw Score 15   Low Function Basic Mobility Standardized Score 23 9   Highest Level Of Mobility   [facility] Goal 3: Sit at edge of bed   Modified Jake Scale   Modified Forsyth Scale 4   Barthel Index   Feeding 10   Bathing 0   Grooming Score 5   Dressing Score 0   Bladder Score 5   Bowels Score 10   Toilet Use Score 5   Transfers (Bed/Chair) Score 5   Mobility (Level Surface) Score 0   Stairs Score 0   Barthel Index Score 40   Portions of the documentation may have been created using voice recognition software  Occasional wrong word or sound alike substitutions may have occurred due to the inherent limitations of the voice recognition software  Read the chart carefully and recognize, using context, where substitutions have occurred      Breanne Kelly, PT, DPT

## 2022-02-05 NOTE — PLAN OF CARE
Problem: PHYSICAL THERAPY ADULT  Goal: Performs mobility at highest level of function for planned discharge setting  See evaluation for individualized goals  Description: Treatment/Interventions: Functional transfer training,LE strengthening/ROM,Therapeutic exercise,Endurance training,Patient/family training,Equipment eval/education,Bed mobility,Continued evaluation,Spoke to nursing,OT  Equipment Recommended: Wheelchair       See flowsheet documentation for full assessment, interventions and recommendations  Note: Prognosis: Good  Problem List: Decreased strength,Decreased range of motion,Decreased endurance,Impaired balance,Decreased mobility,Decreased cognition,Impaired judgement,Decreased safety awareness,Pain  Assessment: Pt is 80 y o  female seen for PT evaluation s/p admit to [facility] on 2/4/2022  Two pt identifiers were used to confirm  Pt presented s/p fall  Pt was admitted with a primary dx of:  Closed fracture of shaft of left fibula, and other active problems including acute UTI, essential HTN, HLD, generalized osteoarthritis of multiple sites, hyponatremia  Ortho recommending WBAT to LLE in custom KAFO and non op management for fx at this time  PT now consulted for assessment of mobility and d/c needs  Pt with Up with assistance orders  Pts current co morbidities affecting treatment include:  Arthritis, CAD, disease of thyroid gland, HTN, mild cognitive impairment with memory loss, osteoarthritis, osteoporosis  Pts current clinical presentation is Unstable/ Unpredictable (high complexity) due to Ongoing medical management for primary dx, Decreased activity tolerance compared to baseline, Fall risk, Increased assistance needed from caregiver at current time, Cog status, Continuous pulse oximetry monitoring     Upon evaluation, pt currently is requiring Mod Ax1 for bed mobility; Mod Ax2 for sit <-> stand and stand pivot transfers   Pt presents at PT eval functioning below baseline and currently w/ overall mobility deficits 2* to: BLE weakness, decreased ROM, impaired balance, decreased endurance, pain, decreased activity tolerance compared to baseline, decreased safety awareness, impaired judgement, fall risk, decreased cognition, orthopedic restriction  Pt currently at a fall risk 2* to impairments listed above  Based on the aforementioned PT evaluation, pt will continue to benefit from skilled Acute PT interventions to address stated impairments; to maximize functional mobility; for ongoing pt/ family training; and DME needs  At conclusion of PT session pt returned back in chair and chair alarm engaged with phone and call bell within reach  Pt denies any further questions at this time  PT is currently recommending Rehab  PT will continue to follow during hospital stay  PT Discharge Recommendation: Post acute rehabilitation services          See flowsheet documentation for full assessment

## 2022-02-05 NOTE — PLAN OF CARE
Problem: OCCUPATIONAL THERAPY ADULT  Goal: Performs self-care activities at highest level of function for planned discharge setting  See evaluation for individualized goals  Description: Treatment Interventions: ADL retraining,Functional transfer training,UE strengthening/ROM,Patient/family training,Equipment evaluation/education      See flowsheet documentation for full assessment, interventions and recommendations  Note: Limitation: Decreased ADL status,Decreased Safe judgement during ADL,Decreased cognition,Decreased high-level ADLs,Decreased self-care trans  Prognosis: Fair  Assessment: Pt  is 80 y o  female admitted to [facility] on 2/4/2022 s/p Closed fracture of shaft of left fibula 2* fall at home  Pt is currently WBAT and wears a custom KAFO at baseline  PMH includes HTN, previous surgery and CAD, osteoarthritis  Pt  currently resides in an assisted living facility at [facility]  Prior to admission pt  reports she was independent in ADLs and functional mobility but required assistance from facility staff for IADLs  Pt  son reports that at baseline pt  requires the use of a RW for functional mobility  Currently, pt  requires mod A for functional mobility, transfers, and LB ADLs and min A for UB ADLs, IADLs, and functional mobility  Pt  demonstrates deficits in bathing, dressing, toileting, functional mobility/transfers, laundry , house maintenance, meal prep and cleaning due to pain activity tolerance, standing balance/tolerance and memory and difficulty performing ADLS and difficulty performing IADLS   OT d/c recommendations include post acute rehabilitation services due to her current functional status  Pt  would benefit from continued inpatient OT services focusing on the goals below  OT Discharge Recommendation: Post acute rehabilitation services  OT - OK to Discharge:  Yes

## 2022-02-05 NOTE — CONSULTS
Reason for Consult / Principal Problem:pre operative clearance  Physician Requesting Consult:  Michael Romo MD    Cardiologist: none  Assessment and Plan      Current Problem List   Principal Problem:    Closed fracture of shaft of left fibula  Active Problems:    Generalized osteoarthritis of multiple sites    Hyperlipidemia    Essential hypertension    Ambulatory dysfunction    Acute UTI    Mild cognitive impairment with memory loss    Hyponatremia    CAD (coronary artery disease)    Assessment/Plan:    1  Pre operative risk stratification - patient has an RCRI of 0 - she has no preoperative insulin - although there is included in the problem list a history of CAD - unclear how this diagnosis got added  Patient's chart reviewed and it a consultation done in 2015 by Dr Ronni Bernard did not state patient had this history  Son is unwaware of this history as well  Patient has no chest pain and hasn't complained to others regarding this  She does have a new LBBB which is somewhat concerning but no active chest pain - she also was noted to have basal hypokinesis on echo previously  · Patient's RCRI of 0  · Will order pre operative echo  · Unlikely that further workup would decrease her risk if she does require surgery - at this point plan may be conservative management  Subjective     CC: pre op clearance  HPI: Steffany Rater 80y o  year old female with a PMH significant for ambulatory dysfunction, HTN, HLD, osteoarthritis who we are being consulted for pre op risk stratification  The patient presented today and was found to have a fractured fibula  The patient is scheduled to undergo surgery tomorrow  On questioning the patient is A and O X 2  She reports no chest pain, shortness of breath or any other cardiac symptoms  However, given dementia her history was limited  Discussion with son reveals the pateint does not have 4 mets   He reports due to age and decreased mobility the patient must use a walker therefore an accurate assessment of her mets is difficult  He does report she has no previous history of chest pain, or any other cardiac abnormalities he is aware of  He reports no mention of cardiac symptoms or complaints from the patient previously  Chart review is notable for hypokinesis of the basal inferior wall in 2015 - EF of 55%  She does have a LBBB on EKG today which is new compared to sept 2021  Family History:   Family History   Problem Relation Age of Onset    Diabetes type II Father     Heart disease Brother     Hypertension Family     Arthritis Family      Historical Information   Past Medical History:   Diagnosis Date    Arthritis     CAD (coronary artery disease)     Disease of thyroid gland     GERD (gastroesophageal reflux disease)     Hypertension     Mild cognitive impairment with memory loss 5/29/2020    Osteoarthritis     Osteoporosis     Vitamin D deficiency      Past Surgical History:   Procedure Laterality Date    APPENDECTOMY      HYSTERECTOMY      [place] PARTIAL HIP REPLACEMENT Left 8/13/2018    Procedure: HEMIARTHROPLASTY HIP (BIPOLAR);   Surgeon: Janelle Abreu MD;  [facility];  Service: Orthopedics    ROTATOR CUFF REPAIR       Social History   Social History     Substance and Sexual Activity   Alcohol Use Not Currently     Social History     Substance and Sexual Activity   Drug Use No     Social History     Tobacco Use   Smoking Status Former Smoker   Smokeless Tobacco Never Used     Family History:   Family History   Problem Relation Age of Onset    Diabetes type II Father     Heart disease Brother     Hypertension Family     Arthritis Family        Review of Systems:  Review of Systems   Unable to perform ROS: Dementia           Scheduled Meds:  Current Facility-Administered Medications   Medication Dose Route Frequency Provider Last Rate    acetaminophen  650 mg Oral Q6H PRN Nini Prince MD      cefTRIAXone  1,000 mg Intravenous Q24H Opal Miller MD 1,000 mg (02/04/22 1958)    heparin (porcine)  5,000 Units Subcutaneous [facility] Opal Miller MD      latanoprost  1 drop Both Eyes HS Opal Miller MD      [START ON 2/5/2022] losartan  50 mg Oral Daily Opal Miller MD      [START ON 2/5/2022] metoprolol succinate  25 mg Oral Daily MD Charles Cedeño ON 2/5/2022] pantoprazole  20 mg Oral Daily Before Breakfast Opal Miller MD      sodium chloride  1 g Oral TID Opal Miller MD       Continuous Infusions:   PRN Meds:   acetaminophen  all current active meds have been reviewed, current meds:   Current Facility-Administered Medications   Medication Dose Route Frequency    acetaminophen (TYLENOL) tablet 650 mg  650 mg Oral Q6H PRN    cefTRIAXone (ROCEPHIN) 1,000 mg in dextrose 5 % 50 mL IVPB  1,000 mg Intravenous Q24H    heparin (porcine) subcutaneous injection 5,000 Units  5,000 Units Subcutaneous Q8H [facility]    latanoprost (XALATAN) 0 005 % ophthalmic solution 1 drop  1 drop Both Eyes HS    [START ON 2/5/2022] losartan (COZAAR) tablet 50 mg  50 mg Oral Daily    [START ON 2/5/2022] metoprolol succinate (TOPROL-XL) 24 hr tablet 25 mg  25 mg Oral Daily    [START ON 2/5/2022] pantoprazole (PROTONIX) EC tablet 20 mg  20 mg Oral Daily Before Breakfast    sodium chloride tablet 1 g  1 g Oral TID    and PTA meds:   Prior to Admission Medications   Prescriptions Last Dose Informant Patient Reported? Taking?    Cyanocobalamin (VITAMIN B12) 1000 MCG TBCR  Self Yes No   Sig: Take 1 tablet by mouth daily    D-Mannose 500 MG CAPS  Self Yes No   Sig: Take 1 tablet by mouth daily   Multiple Vitamins-Minerals (PRESERVISION/LUTEIN) CAPS  Self Yes No   Sig: Take 1 capsule by mouth 2 (two) times a day   Polyethyl Glyc-Propyl Glyc PF 0 4-0 3 % SOLN   Yes No   Sig: Apply 1 drop to eye every 2 (two) hours while awake   Pyridoxine HCl (VITAMIN B6) 200 MG TABS  Self Yes No   Sig: Take 0 5 tablets by mouth daily    acetaminophen (TYLENOL) 325 mg tablet  Self Yes No   Sig: Take 650 mg by mouth 3 (three) times a day    aspirin 81 MG tablet  Self Yes No   Sig: Take 1 tablet by mouth daily   calcitonin, salmon, (MIACALCIN) 200 units/act nasal spray  Self No No   Si spray and to alternate nostril q day   Patient taking differently: 1 spray into each nostril daily Alternate left and right nostril every other day   cholecalciferol (VITAMIN D3) 1,000 units tablet   Yes No   Sig: Take 2,000 Units by mouth daily   cycloSPORINE (RESTASIS) 0 05 % ophthalmic emulsion  Self Yes No   Sig: Administer 1 drop to both eyes 2 (two) times a day   dorzolamide-timolol (COSOPT) 22 3-6 8 MG/ML ophthalmic solution  Self Yes No   Sig: Administer 1 drop to both eyes daily   estradiol (VAGIFEM, YUVAFEM) 10 MCG TABS vaginal tablet   No No   Sig: Insert 1 tablet (10 mcg total) into the vagina 2 (two) times a week   ferrous sulfate 324 (65 Fe) mg  Self No No   Sig: Take 1 tablet (324 mg total) by mouth daily before breakfast   gabapentin (NEURONTIN) 100 mg capsule   Yes No   Sig: Take 100 mg by mouth 3 (three) times a day   latanoprost (XALATAN) 0 005 % ophthalmic solution  Self Yes No   Sig: Administer 1 drop to both eyes daily at bedtime   losartan (COZAAR) 50 mg tablet   No No   Sig: Take 1 tablet (50 mg total) by mouth daily   metoprolol succinate (TOPROL-XL) 50 mg 24 hr tablet  Self Yes No   pantoprazole (PROTONIX) 20 mg tablet  Self Yes No   Sig: Take 20 mg by mouth daily   sodium chloride 1 g tablet   Yes No   Sig: Take 1 g by mouth 3 (three) times a day      Facility-Administered Medications: None       No Known Allergies    Objective   Vitals: Temp (24hrs), Av °F (37 2 °C), Min:98 7 °F (37 1 °C), Max:99 2 °F (37 3 °C)  Current: Temperature: 99 2 °F (37 3 °C)  Patient Vitals for the past 24 hrs:   BP Temp Temp src Pulse Resp SpO2 Height   22 1848 163/60 99 2 °F (37 3 °C) -- 82 20 94 % --   22 1822 -- -- -- -- -- -- 5' 4" (1 626 m)   22 1556 139/58 -- -- 81 18 93 % -- 02/04/22 1200 155/68 -- -- 78 18 92 % --   02/04/22 1102 163/70 -- -- 76 18 94 % --   02/04/22 1000 147/64 -- -- 74 18 97 % --   02/04/22 0958 147/64 98 7 °F (37 1 °C) Oral 72 18 98 % --    Body mass index is 24 89 kg/m²  Orthostatic Blood Pressures      Most Recent Value   Blood Pressure 163/60 filed at 02/04/2022 4656   Patient Position - Orthostatic VS Lying filed at 02/04/2022 1556              Invasive Devices  Report    Peripheral Intravenous Line            Peripheral IV 02/04/22 Left Antecubital <1 day                Physical Exam:    General:  AO x2  Cardiac:  Normal rate  Lungs:  Normal resp  Expansion     Abdomen:  ND  Neuro: Grossly nonfocal  Psych:  Normal affect      Lab Results:   Results from last 7 days   Lab Units 02/04/22  1052   WBC Thousand/uL 16 08*   HEMOGLOBIN g/dL 11 7   HEMATOCRIT % 36 9   PLATELETS Thousands/uL 229   NEUTROS PCT % 67   MONOS PCT % 16*      Results from last 7 days   Lab Units 02/04/22  1052   SODIUM mmol/L 135*   POTASSIUM mmol/L 3 6   CHLORIDE mmol/L 103   CO2 mmol/L 26   BUN mg/dL 22   CREATININE mg/dL 0 97   CALCIUM mg/dL 9 1   ALK PHOS U/L 75   ALT U/L 43   AST U/L 24   EGFR ml/min/1 73sq m 49                 No results found for: PHART, BNH6CZM, PO2ART, WCC1SJS, R2VBZYRY, BEART, SOURCE  No components found for: HIV1X2  No results found for: HAV, HEPAIGM, HEPBIGM, HEPBCAB, HBEAG, HEPCAB  No results found for: SPEP, UPEP No results found for: HGBA1C  Lab Results   Component Value Date    CHOL 153 11/24/2017    CHOL 175 11/14/2016    CHOL 172 10/23/2015      Lab Results   Component Value Date    HDL 59 02/13/2019    HDL 65 11/24/2017    HDL 69 11/14/2016      Lab Results   Component Value Date    LDLCALC 58 02/13/2019    LDLCALC 72 11/24/2017    LDLCALC 94 11/14/2016      Lab Results   Component Value Date    TRIG 93 02/13/2019    TRIG 82 11/24/2017    TRIG 58 11/14/2016     No components found for: PROCAL

## 2022-02-05 NOTE — OCCUPATIONAL THERAPY NOTE
Occupational Therapy Evaluation     Patient Name: Madiha Romano  UVJMY'H Date: 2/5/2022  Problem List  Principal Problem:    Closed fracture of shaft of left fibula  Active Problems:    Generalized osteoarthritis of multiple sites    Hyperlipidemia    Essential hypertension    Ambulatory dysfunction    Acute UTI    Mild cognitive impairment with memory loss    Hyponatremia    CAD (coronary artery disease)    Past Medical History  Past Medical History:   Diagnosis Date    Arthritis     CAD (coronary artery disease)     Disease of thyroid gland     GERD (gastroesophageal reflux disease)     Hypertension     Mild cognitive impairment with memory loss 5/29/2020    Osteoarthritis     Osteoporosis     Vitamin D deficiency      Past Surgical History  Past Surgical History:   Procedure Laterality Date    APPENDECTOMY      HYSTERECTOMY      [place] PARTIAL HIP REPLACEMENT Left 8/13/2018    Procedure: HEMIARTHROPLASTY HIP (BIPOLAR); Surgeon: Ruddy Cowan MD;  [facility];  Service: Orthopedics    ROTATOR CUFF REPAIR               02/05/22 1013   OT Last Visit   OT Visit Date 02/05/22   Note Type   Note type Evaluation   Restrictions/Precautions   Weight Bearing Precautions Per Order Yes   RUE Weight Bearing Per Order WBAT   LUE Weight Bearing Per Order WBAT   RLE Weight Bearing Per Order WBAT   LLE Weight Bearing Per Order WBAT   Braces or Orthoses Other (Comment)  (Custom KAFO)   Other Precautions Cognitive; Chair Alarm; Bed Alarm;WBS   Pain Assessment   Pain Assessment Tool 0-10   Pain Score No Pain   Home Living   Type of Home Assisted living  [facility])   [id number]   Additional Comments Pts son reports that she uses a RW for functional mobility   Prior Function   Level of [place] Needs assistance with IADLs; Independent with ADLs and functional mobility   Lives With Facility staff   Receives Help From Other (Comment)  (Facility Staff)   ADL Assistance Independent   IADLs Needs assistance   Falls in the last 6 months 1 to 4   Vocational Retired   Comments Pt is a questionable historian but reports she was independent in ADLs pta   Lifestyle   Autonomy Pt reports that she was independent in ADLs and fucntional mobility but needs assistance with IADLs pta   Reciprocal Relationships Supportive son and faciltiy staff   Service to Others Retired   Intrinsic Gratification Reading   Subjective   Subjective Pt was pleasant and cooperative throughout session   ADL   Eating Assistance 7  Independent   Grooming Assistance 5  Supervision/Setup   UB Bathing Assistance 4  Minimal Assistance   LB [address] 3  Moderate Assistance   [address] 4  [address] 3  Moderate [address]  3  Moderate Assistance   Bed Mobility   Supine to Sit 3  Moderate assistance   Additional items Assist x 1; Increased time required;Verbal cues   Transfers   Sit to Stand 3  Moderate assistance   Additional items Assist x 2; Increased time required;Verbal cues   Stand to Sit 3  Moderate assistance   Additional items Assist x 1; Increased time required;Verbal cues   Stand pivot 3  Moderate assistance   Additional items Assist x 2; Increased time required;Verbal cues   Activity Tolerance   Activity Tolerance Patient limited by pain   Medical Staff Made Aware DPT Denis   Nurse Made Aware RN cleared for therapy   RUE Assessment   RUE Assessment WFL   LUE Assessment   LUE Assessment WFL   Cognition   Overall Cognitive Status Impaired   Arousal/Participation Alert; Responsive; Cooperative   Attention Attends with cues to redirect   Orientation Level Oriented to person;Oriented to place; Disoriented to time;Disoriented to situation   Memory Decreased recall of recent events;Decreased recall of biographical information;Decreased short term memory   Following Commands Follows one step commands without difficulty   Assessment Limitation Decreased ADL status; Decreased Safe judgement during ADL;Decreased cognition;Decreased high-level ADLs; Decreased self-care trans   Prognosis Fair   Assessment Pt  is 80 y o  female admitted to [facility] on 2/4/2022 s/p Closed fracture of shaft of left fibula 2* fall at home  Pt is currently WBAT and wears a custom KAFO at baseline  PMH includes HTN, previous surgery and CAD, osteoarthritis  Pt  currently resides in an assisted living facility at [facility]  Prior to admission pt  reports she was independent in ADLs and functional mobility but required assistance from facility staff for IADLs  Pt  son reports that at baseline pt  requires the use of a RW for functional mobility  Currently, pt  requires mod A for functional mobility, transfers, and LB ADLs and min A for UB ADLs, IADLs, and functional mobility  Pt  demonstrates deficits in bathing, dressing, toileting, functional mobility/transfers, laundry , house maintenance, meal prep and cleaning due to pain activity tolerance, standing balance/tolerance and memory and difficulty performing ADLS and difficulty performing IADLS   OT d/c recommendations include post acute rehabilitation services due to her current functional status  Pt  would benefit from continued inpatient OT services focusing on the goals below  Goals   Patient Goals To go home   LTG Time Frame 10-14   Long Term Goal #1 Please see below   Plan   Treatment Interventions ADL retraining;Functional transfer training;UE strengthening/ROM; Patient/family training;Equipment evaluation/education   Goal Expiration Date 02/19/22   OT Treatment Day 0   OT Frequency 3-5x/wk   Recommendation   OT Discharge Recommendation Post acute rehabilitation services   OT - OK to Discharge Yes   AM-PAC Daily Activity Inpatient   Lower Body Dressing 2   Bathing 2   Toileting 2   Upper Body Dressing 3   Grooming 3   Eating 4   Daily Activity Raw Score 16   Daily Activity Standardized Score (Calc for Raw Score >=11) 35 96   AM-formerly [facility] Applied Cognition Inpatient   Following a Speech/Presentation 3   Understanding Ordinary Conversation 3   Taking Medications 3   Remembering Where Things Are Placed or Put Away 2   Remembering List of 4-5 Errands 2   Taking Care of Complicated Tasks 2   Applied Cognition Raw Score 15   Applied Cognition Standardized Score 33 54     The patient's raw score on the AM-PAC Daily Activity inpatient short form is 16, standardized score is 35 96, less than 39 4  Patients at this level are likely to benefit from discharge to post-acute rehabilitation services  Please refer to the recommendation of the Occupational Therapist for safe discharge planning      Patient Goals  Pt  will complete UB and LB ADL tasks with supervision  Pt  will complete grooming tasks with supervision  Pt  will complete toilet transfers using DME with supervision  Pt  will demonstrate good safety and judgement when completing functional transfers  Pt  will use appropriate AD during functional mobility with [id number], OTS

## 2022-02-05 NOTE — ASSESSMENT & PLAN NOTE
Patient has ambulatory dysfunction to begin with then presented after a fall  Now found to have left fibula fracture  Also UTI    PT/OT consult pending  Orthopedics recommend conservative management

## 2022-02-06 ENCOUNTER — APPOINTMENT (INPATIENT)
Dept: NON INVASIVE DIAGNOSTICS | Facility: HOSPITAL | Age: 87
DRG: 563 | End: 2022-02-06
Payer: MEDICARE

## 2022-02-06 LAB
ANION GAP SERPL CALCULATED.3IONS-SCNC: 7 MMOL/L (ref 4–13)
AORTIC ROOT: 3.2 CM
APICAL FOUR CHAMBER EJECTION FRACTION: 53 %
BACTERIA UR CULT: ABNORMAL
BACTERIA UR CULT: ABNORMAL
BASOPHILS # BLD AUTO: 0.06 THOUSANDS/ΜL (ref 0–0.1)
BASOPHILS NFR BLD AUTO: 1 % (ref 0–1)
BUN SERPL-MCNC: 15 MG/DL (ref 5–25)
CALCIUM SERPL-MCNC: 9.6 MG/DL (ref 8.3–10.1)
CHLORIDE SERPL-SCNC: 99 MMOL/L (ref 100–108)
CO2 SERPL-SCNC: 24 MMOL/L (ref 21–32)
CREAT SERPL-MCNC: 0.7 MG/DL (ref 0.6–1.3)
E WAVE DECELERATION TIME: 190 MS
EOSINOPHIL # BLD AUTO: 0.12 THOUSAND/ΜL (ref 0–0.61)
EOSINOPHIL NFR BLD AUTO: 1 % (ref 0–6)
ERYTHROCYTE [DISTWIDTH] IN BLOOD BY AUTOMATED COUNT: 14.8 % (ref 11.6–15.1)
FRACTIONAL SHORTENING: 34 % (ref 28–44)
GFR SERPL CREATININE-BSD FRML MDRD: 73 ML/MIN/1.73SQ M
GLUCOSE SERPL-MCNC: 110 MG/DL (ref 65–140)
HCT VFR BLD AUTO: 40.3 % (ref 34.8–46.1)
HGB BLD-MCNC: 13 G/DL (ref 11.5–15.4)
IMM GRANULOCYTES # BLD AUTO: 0.1 THOUSAND/UL (ref 0–0.2)
IMM GRANULOCYTES NFR BLD AUTO: 1 % (ref 0–2)
INTERVENTRICULAR SEPTUM IN DIASTOLE (PARASTERNAL SHORT AXIS VIEW): 1 CM (ref 0.5–0.94)
LEFT ATRIUM AREA SYSTOLE SINGLE PLANE A4C: 17.3 CM2
LEFT ATRIUM SIZE: 4.2 CM
LEFT INTERNAL DIMENSION IN SYSTOLE: 3.1 CM (ref 2.1–4)
LEFT VENTRICULAR INTERNAL DIMENSION IN DIASTOLE: 4.7 CM (ref 3.93–5.85)
LEFT VENTRICULAR POSTERIOR WALL IN END DIASTOLE: 0.9 CM (ref 0.49–0.93)
LEFT VENTRICULAR STROKE VOLUME: 64 ML
LYMPHOCYTES # BLD AUTO: 2.39 THOUSANDS/ΜL (ref 0.6–4.47)
LYMPHOCYTES NFR BLD AUTO: 20 % (ref 14–44)
MCH RBC QN AUTO: 29.8 PG (ref 26.8–34.3)
MCHC RBC AUTO-ENTMCNC: 32.3 G/DL (ref 31.4–37.4)
MCV RBC AUTO: 92 FL (ref 82–98)
MONOCYTES # BLD AUTO: 1.81 THOUSAND/ΜL (ref 0.17–1.22)
MONOCYTES NFR BLD AUTO: 15 % (ref 4–12)
MRSA NOSE QL CULT: NORMAL
MV E'TISSUE VEL-SEP: 4 CM/S
MV PEAK A VEL: 1.32 M/S
MV PEAK E VEL: 64 CM/S
MV STENOSIS PRESSURE HALF TIME: 0 MS
NEUTROPHILS # BLD AUTO: 7.48 THOUSANDS/ΜL (ref 1.85–7.62)
NEUTS SEG NFR BLD AUTO: 62 % (ref 43–75)
NRBC BLD AUTO-RTO: 0 /100 WBCS
PLATELET # BLD AUTO: 203 THOUSANDS/UL (ref 149–390)
PMV BLD AUTO: 10.8 FL (ref 8.9–12.7)
POTASSIUM SERPL-SCNC: 3.8 MMOL/L (ref 3.5–5.3)
PROCALCITONIN SERPL-MCNC: 0.09 NG/ML
RBC # BLD AUTO: 4.36 MILLION/UL (ref 3.81–5.12)
RIGHT ATRIUM AREA SYSTOLE A4C: 6.6 CM2
RIGHT VENTRICLE ID DIMENSION: 1.5 CM
SL CV LV EF: 50
SL CV PED ECHO LEFT VENTRICLE DIASTOLIC VOLUME (MOD BIPLANE) 2D: 103 ML
SL CV PED ECHO LEFT VENTRICLE SYSTOLIC VOLUME (MOD BIPLANE) 2D: 39 ML
SODIUM SERPL-SCNC: 130 MMOL/L (ref 136–145)
WBC # BLD AUTO: 11.96 THOUSAND/UL (ref 4.31–10.16)
Z-SCORE OF INTERVENTRICULAR SEPTUM IN END DIASTOLE: 2.48
Z-SCORE OF LEFT VENTRICULAR DIMENSION IN END SYSTOLE: -0.2
Z-SCORE OF LEFT VENTRICULAR POSTERIOR WALL IN END DIASTOLE: 1.7

## 2022-02-06 PROCEDURE — 84145 PROCALCITONIN (PCT): CPT | Performed by: ORTHOPAEDIC SURGERY

## 2022-02-06 PROCEDURE — 99232 SBSQ HOSP IP/OBS MODERATE 35: CPT | Performed by: INTERNAL MEDICINE

## 2022-02-06 PROCEDURE — 93306 TTE W/DOPPLER COMPLETE: CPT

## 2022-02-06 PROCEDURE — 85025 COMPLETE CBC W/AUTO DIFF WBC: CPT | Performed by: INTERNAL MEDICINE

## 2022-02-06 PROCEDURE — 93306 TTE W/DOPPLER COMPLETE: CPT | Performed by: INTERNAL MEDICINE

## 2022-02-06 PROCEDURE — 80048 BASIC METABOLIC PNL TOTAL CA: CPT | Performed by: INTERNAL MEDICINE

## 2022-02-06 RX ADMIN — METOPROLOL SUCCINATE 25 MG: 25 TABLET, EXTENDED RELEASE ORAL at 09:19

## 2022-02-06 RX ADMIN — HEPARIN SODIUM 5000 UNITS: 5000 INJECTION INTRAVENOUS; SUBCUTANEOUS at 13:59

## 2022-02-06 RX ADMIN — HEPARIN SODIUM 5000 UNITS: 5000 INJECTION INTRAVENOUS; SUBCUTANEOUS at 22:35

## 2022-02-06 RX ADMIN — ACETAMINOPHEN 650 MG: 325 TABLET, FILM COATED ORAL at 23:57

## 2022-02-06 RX ADMIN — CEFTRIAXONE 1000 MG: 1 INJECTION, POWDER, FOR SOLUTION INTRAMUSCULAR; INTRAVENOUS at 18:04

## 2022-02-06 RX ADMIN — PANTOPRAZOLE SODIUM 20 MG: 20 TABLET, DELAYED RELEASE ORAL at 07:02

## 2022-02-06 RX ADMIN — SODIUM CHLORIDE TAB 1 GM 1 G: 1 TAB at 09:19

## 2022-02-06 RX ADMIN — ACETAMINOPHEN 650 MG: 325 TABLET, FILM COATED ORAL at 13:57

## 2022-02-06 RX ADMIN — LATANOPROST 1 DROP: 50 SOLUTION/ DROPS OPHTHALMIC at 22:36

## 2022-02-06 RX ADMIN — LOSARTAN POTASSIUM 50 MG: 50 TABLET, FILM COATED ORAL at 09:19

## 2022-02-06 RX ADMIN — SODIUM CHLORIDE TAB 1 GM 1 G: 1 TAB at 22:36

## 2022-02-06 RX ADMIN — HEPARIN SODIUM 5000 UNITS: 5000 INJECTION INTRAVENOUS; SUBCUTANEOUS at 07:02

## 2022-02-06 RX ADMIN — SODIUM CHLORIDE TAB 1 GM 1 G: 1 TAB at 17:59

## 2022-02-06 RX ADMIN — ACETAMINOPHEN 650 MG: 325 TABLET, FILM COATED ORAL at 07:02

## 2022-02-06 NOTE — PROGRESS NOTES
[facility]  Progress Note - Melisa Santos 9/11/1925, 80 y o  female MRN: [id number]  Unit/Bed#: [facility] 830-01 Encounter: [id number]  Primary Care Provider: Jeff Colon MD   Date and time admitted to hospital: 2/4/2022  9:54 AM    Hyponatremia  Assessment & Plan  Cont salt tablets  -worsening of hyponatremia likely secondary to pain    Mild cognitive impairment with memory loss  Assessment & Plan  -noted    Acute UTI  Assessment & Plan  -noted sensitivity to ceftriaxone for Citrobacter organism-although can be resistant due to amp c beta lactamase, overall patient improved and white count trended down/although mixed component with stress response to fall  , 3 days of therapy completed today with ceftriaxone  Ambulatory dysfunction  Assessment & Plan  Patient has ambulatory dysfunction to begin with then presented after a fall  Now found to have left fibula fracture  Also UTI  PT/OT consult recommending rehab placement, patient comes in [facility] where she can be discharged for rehab  Orthopedics recommend conservative management-weight bearing as tolerated to left lower extremity    Essential hypertension  Assessment & Plan  Continue home Cozaar    Hyperlipidemia  Assessment & Plan  Patient is not on a statin at baseline    Generalized osteoarthritis of multiple sites  Assessment & Plan  Patient does not complain of any pain in any of the joints except the left leg     * Closed fracture of shaft of left fibula  Assessment & Plan  80year-old female who lives at [facility] with past medical history hypertension, dementia, GERD, hyponatremia, ambulatory dysfunction, cognitive impairment, CAD, osteoarthritis, bursitis who presented to the ED after having a fall  Patient is found to have left fibula fracture that is nondisplaced and UTI for which she is getting admitted to hospitalist service  Patient is a very poor historian and appears confused    She is oriented to self and place only  Her left leg is in brace and patient will be evaluated by Orthopedic surgery for possible fixation of the left fibula fracture  We will control her pain for now and she will be evaluated by PT OT after cleared by Ortho  22: pt ot consults rec rehab placement, ortho recommends conservative management      VTE Pharmacologic Prophylaxis:   High Risk (Score >/= 5) - Pharmacological DVT Prophylaxis Ordered: heparin  Sequential Compression Devices Ordered  Patient Centered Rounds: I performed bedside rounds with nursing staff today  Discussions with Specialists or Other Care Team Provider: n/a    Education and Discussions with Family / Patient: Updated  (son) via phone  Time Spent for Care: 30 minutes  More than 50% of total time spent on counseling and coordination of care as described above  Current Length of Stay: 3 day(s)  Current Patient Status: Inpatient   Certification Statement: The patient will continue to require additional inpatient hospital stay due to Pending rehab placement  Discharge Plan: Anticipate discharge tomorrow to rehab facility  Code Status: Level 3 - DNAR and DNI    Subjective:   Patient has baseline dementia and is alert to self only, denies any acute complaints but limited review of systems secondary to mental status    Objective:     Vitals:   Temp (24hrs), Av 9 °F (37 2 °C), Min:98 1 °F (36 7 °C), Max:99 7 °F (37 6 °C)    Temp:  [98 1 °F (36 7 °C)-99 7 °F (37 6 °C)] 99 7 °F (37 6 °C)  HR:  [80-95] 95  Resp:  [20] 20  BP: (153-177)/(71-81) 176/80  SpO2:  [97 %] 97 %  Body mass index is 24 89 kg/m²  Input and Output Summary (last 24 hours): Intake/Output Summary (Last 24 hours) at 2022 0709  Last data filed at 2022 0501  Gross per 24 hour   Intake 240 ml   Output 600 ml   Net -360 ml       Physical Exam:   Physical Exam  Vitals and nursing note reviewed     Constitutional:       General: She is not in acute distress  Appearance: She is well-developed  She is not ill-appearing, toxic-appearing or diaphoretic  HENT:      Head: Normocephalic and atraumatic  Eyes:      General: No scleral icterus  Conjunctiva/sclera: Conjunctivae normal    Cardiovascular:      Rate and Rhythm: Normal rate and regular rhythm  Heart sounds: Murmur heard  No friction rub  No gallop  Pulmonary:      Effort: Pulmonary effort is normal  No respiratory distress  Breath sounds: Normal breath sounds  No stridor  No wheezing, rhonchi or rales  Chest:      Chest wall: No tenderness  Abdominal:      General: There is no distension  Palpations: Abdomen is soft  There is no mass  Tenderness: There is no abdominal tenderness  There is no guarding or rebound  Hernia: No hernia is present  Musculoskeletal:      Cervical back: Neck supple  Comments: Left leg slightly cooler than the right, pulses faint but palpable bilaterally  Tender to palpation along left lateral aspect of lower extremity, scattered ecchymosis   Skin:     General: Skin is warm and dry  Coloration: Skin is not jaundiced or pale  Findings: No bruising, erythema, lesion or rash  Neurological:      Mental Status: She is alert  She is disoriented        Comments: Baseline dementia          Additional Data:     Labs:  Results from last 7 days   Lab Units 02/07/22  0459   WBC Thousand/uL 12 66*   HEMOGLOBIN g/dL 12 3   HEMATOCRIT % 36 9   PLATELETS Thousands/uL 229   NEUTROS PCT % 63   LYMPHS PCT % 22   MONOS PCT % 12   EOS PCT % 1     Results from last 7 days   Lab Units 02/07/22  0459 02/06/22  0544 02/04/22  1052   SODIUM mmol/L 129*   < > 135*   POTASSIUM mmol/L 3 9   < > 3 6   CHLORIDE mmol/L 98*   < > 103   CO2 mmol/L 22   < > 26   BUN mg/dL 19   < > 22   CREATININE mg/dL 0 79   < > 0 97   ANION GAP mmol/L 9   < > 6   CALCIUM mg/dL 9 4   < > 9 1   ALBUMIN g/dL  --   --  3 2*   TOTAL BILIRUBIN mg/dL  --   --  0 62   ALK PHOS U/L  --   --  75   ALT U/L  --   --  43   AST U/L  --   --  24   GLUCOSE RANDOM mg/dL 107   < > 90    < > = values in this interval not displayed  Results from last 7 days   Lab Units 02/06/22  0544 02/05/22  0600   PROCALCITONIN ng/ml 0 09 0 11       Lines/Drains:  Invasive Devices  Report    Peripheral Intravenous Line            Peripheral IV 02/06/22 Right Forearm <1 day                      Imaging: No pertinent imaging reviewed  Recent Cultures (last 7 days):   Results from last 7 days   Lab Units 02/04/22  1233   URINE CULTURE  >100,000 cfu/ml Citrobacter freundii*  10,000-19,000 cfu/ml        Last 24 Hours Medication List:   Current Facility-Administered Medications   Medication Dose Route Frequency Provider Last Rate    acetaminophen  650 mg Oral Q6H PRN Blake Padron MD      heparin (porcine)  5,000 Units Subcutaneous [facility] Blake Padron MD      latanoprost  1 drop Both Eyes HS Blake Padron MD      losartan  50 mg Oral Daily Blake Padron MD      metoprolol succinate  25 mg Oral Daily Blake Padron MD      pantoprazole  20 mg Oral Daily Before Breakfast Blake Padron MD      sodium chloride  1 g Oral TID Blake Padron MD          Today, Patient Was Seen By: Angela Anthony DO    **Please Note: This note may have been constructed using a voice recognition system  **

## 2022-02-06 NOTE — PROGRESS NOTES
Cardiology Progress Note - Raghu Wills 80 y o  female MRN: [id number]    Unit/Bed#: [facility] 830-01 Encounter: [id number]      Assessment/Recommendations:  1  Left distal fibular shaft fracture: closed care advised by orthopedic surgery  2   HTN: relatively well controlled, continue home regimen  3   HLD: currently not on statin  4   New LBBB: echocardiogram pending, will review, once available  Subjective:   Patient seen and examined  No significant events overnight   ; pertinent negatives - chest pain, chest pressure/discomfort, dyspnea, irregular heart beat, lower extremity edema and palpitations  Objective:     Vitals: Blood pressure 153/71, pulse 81, temperature 98 °F (36 7 °C), temperature source Oral, resp  rate 16, height 5' 4" (1 626 m), SpO2 97 %  , Body mass index is 24 89 kg/m² ,   Orthostatic Blood Pressures      Most Recent Value   Blood Pressure 153/71 filed at 02/06/2022 0700   Patient Position - Orthostatic VS Lying filed at 02/06/2022 0700            Intake/Output Summary (Last 24 hours) at 2/6/2022 0834  Last data filed at 2/5/2022 1801  Gross per 24 hour   Intake 480 ml   Output 673 ml   Net -193 ml         Physical Exam:    GEN: Raghu Wills appears well, alert and oriented x 3, pleasant and cooperative   HEENT: pupils equal, round, and reactive to light; extraocular muscles intact  NECK: supple, no carotid bruits   HEART: regular rhythm, normal S1 and S2, +systolic murmur, no clicks, gallops or rubs   LUNGS: clear to auscultation bilaterally; no wheezes, rales, or rhonchi   ABDOMEN: normal bowel sounds, soft, no tenderness, no distention  EXTREMITIES: peripheral pulses normal; no clubbing, cyanosis, or edema  NEURO: no focal findings   SKIN: normal without suspicious lesions on exposed skin      Medications:      Current Facility-Administered Medications:     acetaminophen (TYLENOL) tablet 650 mg, 650 mg, Oral, Q6H PRN, Jeniffer Rider MD, 650 mg at 02/06/22 0702    cefTRIAXone (ROCEPHIN) 1,000 mg in dextrose 5 % 50 mL IVPB, 1,000 mg, Intravenous, Q24H, Zan Tavarez MD, Last Rate: 100 mL/hr at 02/05/22 1800, 1,000 mg at 02/05/22 1800    heparin (porcine) subcutaneous injection 5,000 Units, 5,000 Units, Subcutaneous, Q8H [facility], 5,000 Units at 02/06/22 0702 **AND** Platelet count, , , Once, Zan Tavarez MD    latanoprost (XALATAN) 0 005 % ophthalmic solution 1 drop, 1 drop, Both Eyes, HS, Zan Tavarez MD, 1 drop at 02/05/22 2215    losartan (COZAAR) tablet 50 mg, 50 mg, Oral, Daily, Zan Tavarez MD, 50 mg at 02/05/22 0847    metoprolol succinate (TOPROL-XL) 24 hr tablet 25 mg, 25 mg, Oral, Daily, Zan Tavarez MD, 25 mg at 02/05/22 0847    pantoprazole (PROTONIX) EC tablet 20 mg, 20 mg, Oral, Daily Before Breakfast, Zan Tavarez MD, 20 mg at 02/06/22 0702    sodium chloride tablet 1 g, 1 g, Oral, TID, Zan Tavarez MD, 1 g at 02/05/22 2215     Labs & Results:        Results from last 7 days   Lab Units 02/06/22  0544 02/05/22  0600 02/04/22  1052   WBC Thousand/uL 11 96* 13 73* 16 08*   HEMOGLOBIN g/dL 13 0 12 7 11 7   HEMATOCRIT % 40 3 39 4 36 9   PLATELETS Thousands/uL 203 210 229         Results from last 7 days   Lab Units 02/06/22  0544 02/04/22  1052   POTASSIUM mmol/L 3 8 3 6   CHLORIDE mmol/L 99* 103   CO2 mmol/L 24 26   BUN mg/dL 15 22   CREATININE mg/dL 0 70 0 97   CALCIUM mg/dL 9 6 9 1   ALK PHOS U/L  --  75   ALT U/L  --  43   AST U/L  --  24               Echo: pending    EKG personally reviewed by Maciel Connolly MD

## 2022-02-06 NOTE — CASE MANAGEMENT
Case Management Assessment & Discharge Planning Note    Patient name Annabelle Toussaint  Location [facility] 830/[facility] 695-78 MRN [id number]  : 1925 Date 2022       Current Admission Date: 2022  Current Admission Diagnosis:Closed fracture of shaft of left fibula   Patient Active Problem List    Diagnosis Date Noted    Closed fracture of shaft of left fibula 2022    CAD (coronary artery disease) 2022    Leukocytosis 09/10/2021    Hyponatremia 2021    Anemia 06/10/2021    ACP (advance care planning) 2021    Primary osteoarthritis of left knee 2021    Vascular dementia without behavioral disturbance ([facility] [address] ) 2021    Numbness and tingling in left hand 2020    CKD (chronic kidney disease) stage 3, GFR 30-59 ml/min ([facility] [address] ) 2020    Aftercare for healing traumatic closed fracture of left ulna 2020    Mild cognitive impairment with memory loss 2020    Syncope and collapse 2020    Closed nondisplaced fracture of styloid process of left ulna 2020    Herpes zoster keratoconjunctivitis 2020    Vitamin D deficiency 2020    Localized edema 2020    Acute UTI 2020    Functional diarrhea 2020    URI, acute 2019    Primary osteoarthritis of right knee 10/09/2019    Acute right-sided low back pain without sciatica 10/09/2019    Overweight 10/08/2019    Dysuria 2019    Pain, joint, ankle and foot, left 2019    Synovitis of left ankle 2019    Acute left ankle pain 2019    Acute cystitis without hematuria 2019    Gastroesophageal reflux disease with esophagitis 2019    Chronic pain of left knee 2019    Acute lateral meniscal injury of right knee 2019    Ambulatory dysfunction 2019    History of left hip hemiarthroplasty 10/16/2018    Trigger middle finger of right hand 10/16/2018    Unstable knee, left 10/16/2018    S/p left hip fracture 09/12/2018    Iron deficiency anemia secondary to inadequate dietary iron intake 09/12/2018    Fracture follow-up 08/28/2018    S/P hip hemiarthroplasty 08/14/2018    Acute blood loss anemia 08/14/2018    Preoperative clearance 08/12/2018    Hypertensive urgency 08/12/2018    Auditory complaints of both ears 07/30/2018    Sterile pyuria 05/15/2018    Impacted cerumen of both ears 04/10/2018    Generalized osteoarthritis of multiple sites 07/13/2015    Hyperlipidemia 08/23/2012    Essential hypertension 08/20/2012    Osteoporosis 08/20/2012      LOS (days): 2  Geometric Mean LOS (GMLOS) (days):   Days to GMLOS:     OBJECTIVE:    Risk of Unplanned Readmission Score: 13         Current admission status: Inpatient       Preferred Pharmacy:   [facility], [address] [place] [address] DR Rosanna MORALES [address] [place] [address]  Phone: [phone] Fax: [phone]    Primary Care Provider: Ernestina Aflaro MD    Primary Insurance: MEDICARE  Secondary Insurance: [facility]    ASSESSMENT:  [facility]   Primary Phone: [phone] (Mobile)               Advance Directives  Does patient have a [address]?: Yes  Does patient have Advance Directives?: Yes  Advance Directives: Living will              Patient Information  Admitted from[de-identified] Facility  Mental Status: Alert  During Assessment patient was accompanied by: Not accompanied during assessment  Primary Caregiver: Child  Caregiver's Name[de-identified] Higinio Lee Telephone Number[de-identified] [phone]  Support Systems: Friends/neighbors,Family members,Children  Home entry access options   Select all that apply : Ramp  Type of Current Residence: Apartment  Floor Level: 1  Upon entering residence, is there a bedroom on the main floor (no further steps)?: Yes  Upon entering residence, is there a bathroom on the main floor (no further steps)?: Yes  In the last 12 months, was there a time when you were not able to pay the mortgage or rent on time?: No  In the last 12 months, was there a time when you did not have a steady place to sleep or slept in a shelter (including now)?: No  Homeless/housing insecurity resource given?: N/A  Living Arrangements: Lives Alone    Activities of Daily Living Prior to Admission  Functional Status: Assistance  Completes ADLs independently?: No  Level of ADL dependence: Assistance  Ambulates independently?: No  Level of ambulatory dependence: Assistance  Does patient use assisted devices?: Yes  Assisted Devices (DME) used: Merlin Sovereign  Does patient currently own DME?: Yes  What DME does the patient currently own?: Lady Render  Does patient have a history of Outpatient Therapy (PT/OT)?: No  Does the patient have a history of Short-Term Rehab?: No  Does patient have a history of HHC?: Yes ([address])         Patient Information Continued  Income Source: Pension/group home  Within the past 12 months, you worried that your food would run out before you got the money to buy more : Never true  Within the past 12 months, the food you bought just didnt last and you didnt have money to get more : Never true  Food insecurity resource given?: N/A  Does patient receive dialysis treatments?: No  Does patient have a history of substance abuse?: No  Does patient have a history of Mental Health Diagnosis?: No         Means of Transportation  Means of Transport to Appts[de-identified] Family transport  In the past 12 months, has lack of transportation kept you from medical appointments or from getting medications?: No  In the past 12 months, has lack of transportation kept you from meetings, work, or from getting things needed for daily living?: No  Was application for public transport provided?: N/A        DISCHARGE DETAILS:    Discharge planning discussed with[de-identified] son  Freedom of Choice: Yes  Comments - [place] of Choice: [place] [facility]                Contacts  Patient Contacts: susi Alecia Moura  Relationship to Patient[de-identified] Family  Contact Method: Phone  Phone Number: [phone]  Reason/Outcome: Continuity of Care,Emergency Contact,Discharge Planning              Other Referral/Resources/Interventions Provided:  Referral Comments: [facility]erral [facility]         Treatment Team Recommendation: Short Term Rehab     CM reviewed d/c planning process including the following: identifying help at home, patient preference for d/c planning needs, Discharge Lounge, [facility] Meds to Bed program, availability of treatment team to discuss questions or concerns patient and/or family may have regarding understanding medications and recognizing signs and symptoms once discharged  CM also encouraged patient to follow up with all recommended appointments after discharge  Patient advised of importance for patient and family to participate in managing patients medical well being  Patient/caregiver received discharge checklist  Content reviewed  Patient/caregiver encouraged to participate in discharge plan of care prior to discharge home

## 2022-02-06 NOTE — ASSESSMENT & PLAN NOTE
Patient has ambulatory dysfunction to begin with then presented after a fall  Now found to have left fibula fracture  Also UTI    PT/OT consult recommending rehab placement, patient comes in [facility] where she can be discharged for rehab  Orthopedics recommend conservative management-weight bearing as tolerated to left lower extremity

## 2022-02-06 NOTE — ASSESSMENT & PLAN NOTE
2015- Room setup. Dr. Garcia and Dr. Hickman at bedside. RT at bedside, monitoring ETCO2 and O2 sats. Patient on monitor.   2019- Procedure start   2024- Procedure end   2029- Patient waking up.   2034- Patient arouses easily to voice. Alert and oriented x4.   2037- Patient more awake, reporting discomfort to throat. Declines any needs at this time.   2050- Patient awake, sitting up. Tolerating oral fluids. Continues to decline any needs. O2 titrated off at this time.   2053- Dr. Hickman at bedside, discussing procedure.   2054- Vital signs remain stable. Report to ED RN.    80-year-old female who lives at [facility] with past medical history hypertension, dementia, GERD, hyponatremia, ambulatory dysfunction, cognitive impairment, CAD, osteoarthritis, bursitis who presented to the ED after having a fall  Patient is found to have left fibula fracture that is nondisplaced and UTI for which she is getting admitted to hospitalist service  Patient is a very poor historian and appears confused  She is oriented to self and place only  Her left leg is in brace and patient will be evaluated by Orthopedic surgery for possible fixation of the left fibula fracture  We will control her pain for now and she will be evaluated by PT OT after cleared by Ortho      2/6/22: pt ot consults rec rehab placement, ortho recommends conservative management

## 2022-02-06 NOTE — DISCHARGE INSTRUCTIONS
Discharge Instructions - Orthopedics  Flash Butts 80 y o  female MRN: [id number]  Unit/Bed#: [facility] 830-01    Weight Bearing Status:                                           As tolerated    DVT prophylaxis  Recommend 28 days of lovenox    Pain:  Continue analgesics as directed    Splint Instructions:   Cont to wear  brace when ambulating    Appt Instructions: If you do not have your appointment, please call the clinic at [phone] t  Otherwise followup as scheduled     Contact the office sooner if you experience any increased numbness/tingling in the extremities

## 2022-02-07 ENCOUNTER — APPOINTMENT (INPATIENT)
Dept: RADIOLOGY | Facility: HOSPITAL | Age: 87
DRG: 563 | End: 2022-02-07
Attending: FAMILY MEDICINE
Payer: MEDICARE

## 2022-02-07 LAB
ANION GAP SERPL CALCULATED.3IONS-SCNC: 9 MMOL/L (ref 4–13)
BASOPHILS # BLD AUTO: 0.07 THOUSANDS/ΜL (ref 0–0.1)
BASOPHILS NFR BLD AUTO: 1 % (ref 0–1)
BUN SERPL-MCNC: 19 MG/DL (ref 5–25)
CALCIUM SERPL-MCNC: 9.4 MG/DL (ref 8.3–10.1)
CHLORIDE SERPL-SCNC: 98 MMOL/L (ref 100–108)
CO2 SERPL-SCNC: 22 MMOL/L (ref 21–32)
CREAT SERPL-MCNC: 0.79 MG/DL (ref 0.6–1.3)
EOSINOPHIL # BLD AUTO: 0.18 THOUSAND/ΜL (ref 0–0.61)
EOSINOPHIL NFR BLD AUTO: 1 % (ref 0–6)
ERYTHROCYTE [DISTWIDTH] IN BLOOD BY AUTOMATED COUNT: 14.8 % (ref 11.6–15.1)
FLUAV RNA RESP QL NAA+PROBE: NEGATIVE
FLUBV RNA RESP QL NAA+PROBE: NEGATIVE
GFR SERPL CREATININE-BSD FRML MDRD: 63 ML/MIN/1.73SQ M
GLUCOSE SERPL-MCNC: 107 MG/DL (ref 65–140)
HCT VFR BLD AUTO: 36.9 % (ref 34.8–46.1)
HGB BLD-MCNC: 12.3 G/DL (ref 11.5–15.4)
IMM GRANULOCYTES # BLD AUTO: 0.1 THOUSAND/UL (ref 0–0.2)
IMM GRANULOCYTES NFR BLD AUTO: 1 % (ref 0–2)
LYMPHOCYTES # BLD AUTO: 2.81 THOUSANDS/ΜL (ref 0.6–4.47)
LYMPHOCYTES NFR BLD AUTO: 22 % (ref 14–44)
MCH RBC QN AUTO: 29.9 PG (ref 26.8–34.3)
MCHC RBC AUTO-ENTMCNC: 33.3 G/DL (ref 31.4–37.4)
MCV RBC AUTO: 90 FL (ref 82–98)
MONOCYTES # BLD AUTO: 1.54 THOUSAND/ΜL (ref 0.17–1.22)
MONOCYTES NFR BLD AUTO: 12 % (ref 4–12)
NEUTROPHILS # BLD AUTO: 7.96 THOUSANDS/ΜL (ref 1.85–7.62)
NEUTS SEG NFR BLD AUTO: 63 % (ref 43–75)
NRBC BLD AUTO-RTO: 0 /100 WBCS
PLATELET # BLD AUTO: 229 THOUSANDS/UL (ref 149–390)
PMV BLD AUTO: 10.7 FL (ref 8.9–12.7)
POTASSIUM SERPL-SCNC: 3.9 MMOL/L (ref 3.5–5.3)
RBC # BLD AUTO: 4.11 MILLION/UL (ref 3.81–5.12)
RSV RNA RESP QL NAA+PROBE: NEGATIVE
SARS-COV-2 RNA RESP QL NAA+PROBE: NEGATIVE
SODIUM SERPL-SCNC: 129 MMOL/L (ref 136–145)
WBC # BLD AUTO: 12.66 THOUSAND/UL (ref 4.31–10.16)

## 2022-02-07 PROCEDURE — 0241U HB NFCT DS VIR RESP RNA 4 TRGT: CPT | Performed by: FAMILY MEDICINE

## 2022-02-07 PROCEDURE — 99239 HOSP IP/OBS DSCHRG MGMT >30: CPT | Performed by: FAMILY MEDICINE

## 2022-02-07 PROCEDURE — 99232 SBSQ HOSP IP/OBS MODERATE 35: CPT | Performed by: INTERNAL MEDICINE

## 2022-02-07 PROCEDURE — 85025 COMPLETE CBC W/AUTO DIFF WBC: CPT | Performed by: INTERNAL MEDICINE

## 2022-02-07 PROCEDURE — 80048 BASIC METABOLIC PNL TOTAL CA: CPT | Performed by: INTERNAL MEDICINE

## 2022-02-07 RX ORDER — METOPROLOL SUCCINATE 25 MG/1
25 TABLET, EXTENDED RELEASE ORAL DAILY
Refills: 0
Start: 2022-02-08

## 2022-02-07 RX ORDER — ACETAMINOPHEN 325 MG/1
975 TABLET ORAL EVERY 8 HOURS SCHEDULED
Status: DISCONTINUED | OUTPATIENT
Start: 2022-02-07 | End: 2022-02-07 | Stop reason: HOSPADM

## 2022-02-07 RX ADMIN — PANTOPRAZOLE SODIUM 20 MG: 20 TABLET, DELAYED RELEASE ORAL at 05:17

## 2022-02-07 RX ADMIN — HEPARIN SODIUM 5000 UNITS: 5000 INJECTION INTRAVENOUS; SUBCUTANEOUS at 15:18

## 2022-02-07 RX ADMIN — ACETAMINOPHEN 975 MG: 325 TABLET, FILM COATED ORAL at 15:18

## 2022-02-07 RX ADMIN — ACETAMINOPHEN 975 MG: 325 TABLET, FILM COATED ORAL at 08:26

## 2022-02-07 RX ADMIN — SODIUM CHLORIDE TAB 1 GM 1 G: 1 TAB at 17:40

## 2022-02-07 RX ADMIN — SODIUM CHLORIDE TAB 1 GM 1 G: 1 TAB at 08:26

## 2022-02-07 RX ADMIN — LOSARTAN POTASSIUM 50 MG: 50 TABLET, FILM COATED ORAL at 08:25

## 2022-02-07 RX ADMIN — HEPARIN SODIUM 5000 UNITS: 5000 INJECTION INTRAVENOUS; SUBCUTANEOUS at 05:11

## 2022-02-07 RX ADMIN — METOPROLOL SUCCINATE 25 MG: 25 TABLET, EXTENDED RELEASE ORAL at 08:25

## 2022-02-07 NOTE — CASE MANAGEMENT
Case Management Discharge Planning Note    Patient name Madiha Romano  Location [facility] 830/[facility] 948-76 MRN [id number]  : 1925 Date 2022       Current Admission Date: 2022  Current Admission Diagnosis:Closed fracture of shaft of left fibula   Patient Active Problem List    Diagnosis Date Noted    Closed fracture of shaft of left fibula 2022    CAD (coronary artery disease) 2022    Leukocytosis 09/10/2021    Hyponatremia 2021    Anemia 06/10/2021    ACP (advance care planning) 2021    Primary osteoarthritis of left knee 2021    Vascular dementia without behavioral disturbance ([facility] [address] ) 2021    Numbness and tingling in left hand 2020    CKD (chronic kidney disease) stage 3, GFR 30-59 ml/min ([facility] [address] ) 2020    Aftercare for healing traumatic closed fracture of left ulna 2020    Mild cognitive impairment with memory loss 2020    Syncope and collapse 2020    Closed nondisplaced fracture of styloid process of left ulna 2020    Herpes zoster keratoconjunctivitis 2020    Vitamin D deficiency 2020    Localized edema 2020    Acute UTI 2020    Functional diarrhea 2020    URI, acute 2019    Primary osteoarthritis of right knee 10/09/2019    Acute right-sided low back pain without sciatica 10/09/2019    Overweight 10/08/2019    Dysuria 2019    Pain, joint, ankle and foot, left 2019    Synovitis of left ankle 2019    Acute left ankle pain 2019    Acute cystitis without hematuria 2019    Gastroesophageal reflux disease with esophagitis 2019    Chronic pain of left knee 2019    Acute lateral meniscal injury of right knee 2019    Ambulatory dysfunction 2019    History of left hip hemiarthroplasty 10/16/2018    Trigger middle finger of right hand 10/16/2018    Unstable knee, left 10/16/2018    S/p left hip fracture 2018    Iron deficiency anemia secondary to inadequate dietary iron intake 09/12/2018    Fracture follow-up 08/28/2018    S/P hip hemiarthroplasty 08/14/2018    Acute blood loss anemia 08/14/2018    Preoperative clearance 08/12/2018    Hypertensive urgency 08/12/2018    Auditory complaints of both ears 07/30/2018    Sterile pyuria 05/15/2018    Impacted cerumen of both ears 04/10/2018    Generalized osteoarthritis of multiple sites 07/13/2015    Hyperlipidemia 08/23/2012    Essential hypertension 08/20/2012    Osteoporosis 08/20/2012      LOS (days): 3  Geometric Mean LOS (GMLOS) (days):   Days to GMLOS:     OBJECTIVE:  Risk of Unplanned Readmission Score: 13         Current admission status: Inpatient   Preferred Pharmacy:   [facility], [address] [place] [address] DR Marte [address]  Phone: [phone] Fax: [phone]    Primary Care Provider: Eliel Alvarez MD    Primary Insurance: MEDICARE  Secondary Insurance: [employer]    DISCHARGE DETAILS:    IMM reviewed with son    PT aware of DC    Transport request via [address] left for son with transport time                                                                                        IMM Given (Date):: 02/07/22  IMM Given to[de-identified] Family     Additional Comments: DC today to [facility]

## 2022-02-07 NOTE — RESTORATIVE TECHNICIAN NOTE
Restorative Technician Note      Patient Name: Anthony Whitaker     Restorative Tech Visit Date: 02/07/22  Note Type: Mobility  Patient Position Upon Consult: Supine  Activity Performed: Dangled  Patient Position at End of Consult: Seated edge of bed;  All needs within reach; Bed/Chair alarm activated    Mayra Marti  DPT, Restorative Technician

## 2022-02-07 NOTE — RESTORATIVE TECHNICIAN NOTE
Restorative Technician Note      Patient Name: Jannie Abreu     Restorative Tech Visit Date: 02/07/22  Note Type: Mobility  Patient Position Upon Consult: Bedside chair  Activity Performed: Stood; Other (Comment) (Pt required Ax2 to stand  Pt had BM (nurse aware)  Pt was cleaned with a change of linen  Nurse notified and asked to provide pt with Tawanda Villanueva )  Patient Position at End of Consult: Bedside chair; Bed/Chair alarm activated;  All needs within reach    Clarissa Villagran  DPT, Restorative Technician

## 2022-02-07 NOTE — PROGRESS NOTES
Cardiology Progress Note - Team 1  Caleb Moore 80 y o  female MRN: [id number]  Unit/Bed#: [facility] 830-01 Encounter: [id number]      Assessment/Plan:  1  Left distal fibular shaft fracture  - s/p fall  - orthopedic surgery following - recommend conservative management  - PT/OT eval - recommending rehab placement ([facility] [facility]  - continue pain control management    2  Hypertension  - last documented blood pressure 130/80; now stable  - home antihypertensive regimen - Toprol XL 25 mg p o  Daily, losartan 50 mg p o  Daily; continue    3  Hyperlipidemia  - lipid panel 02/13/2019 - / triglycerides 93/ HDL 59/ LDL 58  - not managed on statin outpatient  - continue cardiac diet    4  New LBBB  - presenting EKG - NSR, LBBB, HR 71   - echo yesterday - LVEF 46%, systolic function mildly reduced, grade 1 DD, hypokinesis of mid anterior septal and apical septal segments  - could consider outpatient stress test for ischemic evaluation given new RWMA on echo, but not necessarily warranted given age/comorbidities/no anginal symptoms    Subjective:   Patient seen and examined  No significant events overnight  Patient is sitting up comfortably in her chair  Denies any chest pain, palpitations, shortness of breath, or diaphoresis  States that her pain is under control  Objective:   Vitals: Blood pressure 130/80, pulse (!) 109, temperature 99 7 °F (37 6 °C), resp  rate 20, height 5' 4" (1 626 m), weight 65 8 kg (145 lb), SpO2 97 %  , Body mass index is 24 89 kg/m² ,   Orthostatic Blood Pressures      Most Recent Value   Blood Pressure 130/80 filed at 02/07/2022 5453   Patient Position - Orthostatic VS Lying filed at 02/06/2022 0700          Intake/Output Summary (Last 24 hours) at 2/7/2022 1009  Last data filed at 2/7/2022 0501  Gross per 24 hour   Intake 240 ml   Output 600 ml   Net -360 ml     Physical Exam:  GEN: Caleb Moore appears well, alert and oriented x 3, pleasant and cooperative HEENT: pupils equal, round, and reactive to light; extraocular muscles intact  NECK: supple, no carotid bruits   HEART: regular rhythm, normal S1 and S2, murmur, clicks, gallops or rubs   LUNGS: clear to auscultation bilaterally; no wheezes, rales, or rhonchi   ABDOMEN: normal bowel sounds, soft, no tenderness, no distention  EXTREMITIES: peripheral pulses normal; no clubbing, cyanosis, or edema    Medications:    Current Facility-Administered Medications:     acetaminophen (TYLENOL) tablet 975 mg, 975 mg, Oral, Q8H [address], Asa Banai, DO, 975 mg at 02/07/22 0826    heparin (porcine) subcutaneous injection 5,000 Units, 5,000 Units, Subcutaneous, Q8H [address], 5,000 Units at 02/07/22 0511 **AND** Platelet count, , , Once, Presley Anguiano MD    latanoprost (XALATAN) 0 005 % ophthalmic solution 1 drop, 1 drop, Both Eyes, HS, Presley Anguiano MD, 1 drop at 02/06/22 2236    losartan (COZAAR) tablet 50 mg, 50 mg, Oral, Daily, Presley Anguiano MD, 50 mg at 02/07/22 0825    metoprolol succinate (TOPROL-XL) 24 hr tablet 25 mg, 25 mg, Oral, Daily, Presley Anguiano MD, 25 mg at 02/07/22 0825    pantoprazole (PROTONIX) EC tablet 20 mg, 20 mg, Oral, Daily Before Breakfast, Presley Anguiano MD, 20 mg at 02/07/22 0517    sodium chloride tablet 1 g, 1 g, Oral, TID, Presley Anguiano MD, 1 g at 02/07/22 0826     Labs & Results:      Results from last 7 days   Lab Units 02/07/22  0459 02/06/22  0544 02/05/22  0600   WBC Thousand/uL 12 66* 11 96* 13 73*   HEMOGLOBIN g/dL 12 3 13 0 12 7   HEMATOCRIT % 36 9 40 3 39 4   PLATELETS Thousands/uL 229 203 210         Results from last 7 days   Lab Units 02/07/22  0459 02/06/22  0544 02/04/22  1052   POTASSIUM mmol/L 3 9 3 8 3 6   CHLORIDE mmol/L 98* 99* 103   CO2 mmol/L 22 24 26   BUN mg/dL 19 15 22   CREATININE mg/dL 0 79 0 70 0 97   CALCIUM mg/dL 9 4 9 6 9 1   ALK PHOS U/L  --   --  75   ALT U/L  --   --  43   AST U/L  --   --  24

## 2022-02-07 NOTE — ASSESSMENT & PLAN NOTE
-noted sensitivity to ceftriaxone for Citrobacter organism-although can be resistant due to amp c beta lactamase, overall patient improved and white count trended down/although mixed component with stress response to fall  , 3 days of therapy completed today with ceftriaxone

## 2022-02-07 NOTE — ASSESSMENT & PLAN NOTE
42-year-old female who lives at [facility] with past medical history hypertension, dementia, GERD, hyponatremia, ambulatory dysfunction, cognitive impairment, CAD, osteoarthritis, bursitis who presented to the ED after having a fall  Patient is found to have left fibula fracture that is nondisplaced and UTI for which she is getting admitted to hospitalist service     2/6/22: pt ot consults rec rehab placement, ortho recommends conservative management  patient will be discharged to Rehab

## 2022-02-07 NOTE — RESTORATIVE TECHNICIAN NOTE
Restorative Technician Note      Patient Name: Brice Aguilar     Restorative Tech Visit Date: 02/07/22  Note Type: Mobility  Patient Position Upon Consult: Seated edge of bed  Activity Performed: Transferred; Other (Comment); Stood (Ax2 to bedside chair; no AD; multiple STS Ax2 to wash and clean at bedside chair )  Patient Position at End of Consult: Bedside chair;  All needs within reach; Bed/Chair alarm activated    Darrick Innocent  DPT, Restorative Technician

## 2022-02-07 NOTE — DISCHARGE SUMMARY
[facility]  Discharge- Raghu Wills 9/11/1925, 80 y o  female MRN: [id number]  Unit/Bed#: [facility] 830-01 Encounter: [id number]  Primary Care Provider: Judith Cox MD   Date and time admitted to hospital: 2/4/2022  9:54 AM    * Closed fracture of shaft of left fibula  Assessment & Plan  80year-old female who lives at [facility] with past medical history hypertension, dementia, GERD, hyponatremia, ambulatory dysfunction, cognitive impairment, CAD, osteoarthritis, bursitis who presented to the ED after having a fall  Patient is found to have left fibula fracture that is nondisplaced and UTI for which she is getting admitted to hospitalist service  2/6/22: pt ot consults rec rehab placement, ortho recommends conservative management  patient will be discharged to Rehab    Hyponatremia  Assessment & Plan  Cont salt tablets  -worsening of hyponatremia likely secondary to pain    Mild cognitive impairment with memory loss  Assessment & Plan  -noted    Acute UTI  Assessment & Plan  -noted sensitivity to ceftriaxone for Citrobacter organism-although can be resistant due to amp c beta lactamase, overall patient improved and white count trended down/although mixed component with stress response to fall  , 3 days of therapy completed today with ceftriaxone  Ambulatory dysfunction  Assessment & Plan  Patient has ambulatory dysfunction to begin with then presented after a fall  Now found to have left fibula fracture  Also UTI    PT/OT consult recommending rehab placement, patient comes in [facility] where she can be discharged for rehab  Orthopedics recommend conservative management-weight bearing as tolerated to left lower extremity    Essential hypertension  Assessment & Plan  Continue home Cozaar    Hyperlipidemia  Assessment & Plan  Patient is not on a statin at baseline    Generalized osteoarthritis of multiple sites  Assessment & Plan  Patient does not complain of any pain in any of the joints except the left leg        Discharging Physician / Practitioner: Adolfo Dunn MD  PCP: Eron Zhang MD  Admission Date:   Admission Orders (From admission, onward)     Ordered        02/04/22 1545  Inpatient Admission  Once                      Discharge Date: 02/07/22    Medical Problems             Resolved Problems  Date Reviewed: 2/7/2022    None                Consultations During Hospital Stay:  · Othopedic    Procedures Performed:   · None     Significant Findings / Test Results:   Left Ankle Xray: IMPRESSION:   Acute nondisplaced, non angulated fracture of the distal left fibular diaphysis  CT Chest: IMPRESSION:     No acute posttraumatic abnormality detected      Gallstones without surrounding inflammation      3-4 mm pulmonary nodules as described  Considerations related to the patient's age and/or comorbidities may be used to entertain any follow-up recommendations  XR tibia/fibula:   IMPRESSION:     Acute fibular shaft fracture without angulation or displacement           Incidental Findings:   · none        Complications:  none    Reason for Admission: fall, fracture    Hospital Course:     Los Ware is a 80 y o  female patient who originally presented to the hospital on 2/4/2022 after a fall, found to have left fibula fracture that is non displaced and Acute UTI  She completed 3 days of antibiotics  Orthopedic surgery consulted who recommended conservative management  PT/OT recommended rehab  patient is medically clear for discharge to rehab         Please see above list of diagnoses and related plan for additional information  Condition at Discharge: good     Discharge Day Visit / Exam:     Subjective:  patient sitting on bedside sofa comfortable     No complains    Vitals: Blood Pressure: 140/60 (02/07/22 1517)  Pulse: (!) 109 (02/07/22 0824)  Temperature: 97 7 °F (36 5 °C) (02/07/22 1517)  Temp Source: Oral (02/06/22 0700)  Respirations: 16 (02/07/22 1517)  Height: 5' 4" (162 6 cm) (02/06/22 0815)  Weight - Scale: 65 8 kg (145 lb) (02/06/22 0815)  SpO2: 96 % (02/07/22 0925)  Exam:   Physical Exam  Constitutional:       Appearance: She is well-developed  HENT:      Head: Normocephalic and atraumatic  Pulmonary:      Effort: Pulmonary effort is normal  No respiratory distress  Breath sounds: Normal breath sounds  Musculoskeletal:      Cervical back: Normal range of motion  Skin:     General: Skin is warm and dry  Discussion with Family: Son at bedside    Discharge instructions/Information to patient and family:   See after visit summary for information provided to patient and family  Provisions for Follow-Up Care:  See after visit summary for information related to follow-up care and any pertinent home health orders  Disposition:     Other [facility]    ·      Planned Readmission: no     Discharge Statement:  I spent 45 minutes discharging the patient  This time was spent on the day of discharge  I had direct contact with the patient on the day of discharge  Greater than 50% of the total time was spent examining patient, answering all patient questions, arranging and discussing plan of care with patient as well as directly providing post-discharge instructions  Additional time then spent on discharge activities  Discharge Medications:  See after visit summary for reconciled discharge medications provided to patient and family        ** Please Note: This note has been constructed using a voice recognition system **

## 2022-02-07 NOTE — ASSESSMENT & PLAN NOTE
Patient has ambulatory dysfunction to begin with then presented after a fall  Now found to have left fibula fracture  Also UTI    PT/OT consult recommending rehab placement, patient comes in [facility] where she can be discharged for rehab  Orthopedics recommend conservative management-weight bearing as tolerated to left lower extremity

## 2022-02-07 NOTE — PROGRESS NOTES
Pt continually complains of needing to urinate  Pt has purewick in but because of her confusion/forgetfulness she has a difficult time with this concept  Pt voiding small amounts of mando urine at a time  Bladder scanned for 242ml    Reported to MD

## 2022-02-08 VITALS
HEIGHT: 64 IN | WEIGHT: 145 LBS | BODY MASS INDEX: 24.75 KG/M2 | HEART RATE: 108 BPM | RESPIRATION RATE: 18 BRPM | DIASTOLIC BLOOD PRESSURE: 78 MMHG | OXYGEN SATURATION: 94 % | TEMPERATURE: 98 F | SYSTOLIC BLOOD PRESSURE: 140 MMHG

## 2022-02-08 NOTE — PROGRESS NOTES
Pt discharged  All paperwork sent with ambulance drivers  D/belinda saline lock with cath intact   Attempted to call facility without an answer

## 2022-02-09 ENCOUNTER — TELEPHONE (OUTPATIENT)
Dept: OBGYN CLINIC | Facility: HOSPITAL | Age: 87
End: 2022-02-09

## 2022-02-09 NOTE — TELEPHONE ENCOUNTER
Left message for someone to call back to schedule this pt for around 3-8 w/Dr Ward for follow up fx lt ankle

## 2022-03-08 ENCOUNTER — OFFICE VISIT (OUTPATIENT)
Dept: OBGYN CLINIC | Facility: HOSPITAL | Age: 87
End: 2022-03-08

## 2022-03-08 ENCOUNTER — HOSPITAL ENCOUNTER (OUTPATIENT)
Dept: RADIOLOGY | Facility: HOSPITAL | Age: 87
Discharge: HOME | End: 2022-03-08
Attending: ORTHOPAEDIC SURGERY
Payer: COMMERCIAL

## 2022-03-08 VITALS
BODY MASS INDEX: 24.89 KG/M2 | DIASTOLIC BLOOD PRESSURE: 74 MMHG | SYSTOLIC BLOOD PRESSURE: 120 MMHG | HEIGHT: 64 IN | HEART RATE: 96 BPM

## 2022-03-08 DIAGNOSIS — Z09 FRACTURE FOLLOW-UP: ICD-10-CM

## 2022-03-08 DIAGNOSIS — Z09 FRACTURE FOLLOW-UP: Primary | ICD-10-CM

## 2022-03-08 PROCEDURE — 73610 X-RAY EXAM OF ANKLE: CPT

## 2022-03-08 PROCEDURE — 99024 POSTOP FOLLOW-UP VISIT: CPT | Performed by: ORTHOPAEDIC SURGERY

## 2022-03-08 NOTE — PROGRESS NOTES
Assessment:   Diagnosis ICD-10-CM Associated Orders   1  Fracture follow-up  Z09 XR ankle 3+ vw left       Plan:  Patient can weight bear with brace as tolerated  May continue therapy  To do next visit:  Return in about 2 months (around 5/8/2022)  The above stated was discussed in layman's terms and the patient expressed understanding  All questions were answered to the patient's satisfaction  Scribe Attestation    I,:  Yogesh David am acting as a scribe while in the presence of the attending physician :       I,:  Lianna Kohler MD personally performed the services described in this documentation    as scribed in my presence :             Subjective:   Flash Butts is a 80 y o  female who presents with son today  She has mild tenderness over distal fibula  Originally presented to hospital 2/4/2022 after a fall, and was found to have a left fibular fracture that is non displaced  Conservative management along with PT/OT were recommended at the time  She states she has no pain, her son states she can walk with a walker with out pain  He also states she is being moved back to her apartment after in a few days  Review of systems negative unless otherwise specified in HPI  Review of Systems    Past Medical History:   Diagnosis Date    Arthritis     CAD (coronary artery disease)     Disease of thyroid gland     GERD (gastroesophageal reflux disease)     Hypertension     Mild cognitive impairment with memory loss 5/29/2020    Osteoarthritis     Osteoporosis     Vitamin D deficiency        Past Surgical History:   Procedure Laterality Date    APPENDECTOMY      HYSTERECTOMY      GA PARTIAL HIP REPLACEMENT Left 8/13/2018    Procedure: HEMIARTHROPLASTY HIP (BIPOLAR);   Surgeon: Janelle Abreu MD;  [facility]: [facility];  Service: Orthopedics    ROTATOR CUFF REPAIR         Family History   Problem Relation Age of Onset    Diabetes type II Father     Heart disease Brother    [place] Hypertension Family     Arthritis Family        Social History     Occupational History    Not on file   Tobacco Use    Smoking status: Former Smoker    Smokeless tobacco: Never Used   Vaping Use    Vaping Use: Never used   Substance and Sexual Activity    Alcohol use: Not Currently    Drug use: No    Sexual activity: Not on file         Current Outpatient Medications:     acetaminophen (TYLENOL) 325 mg tablet, Take 650 mg by mouth 3 (three) times a day , Disp: , Rfl:     aspirin 81 MG tablet, Take 1 tablet by mouth daily, Disp: , Rfl:     calcitonin, salmon, (MIACALCIN) 200 units/act nasal spray, 1 spray and to alternate nostril q day (Patient taking differently: 1 spray into each nostril daily Alternate left and right nostril every other day), Disp: 1 Act, Rfl: 5    cholecalciferol (VITAMIN D3) 1,000 units tablet, Take 2,000 Units by mouth daily, Disp: , Rfl:     Cyanocobalamin (VITAMIN B12) 1000 MCG TBCR, Take 1 tablet by mouth daily , Disp: , Rfl:     cycloSPORINE (RESTASIS) 0 05 % ophthalmic emulsion, Administer 1 drop to both eyes 2 (two) times a day, Disp: , Rfl:     D-Mannose 500 MG CAPS, Take 1 tablet by mouth daily, Disp: , Rfl:     dorzolamide-timolol (COSOPT) 22 3-6 8 MG/ML ophthalmic solution, Administer 1 drop to both eyes daily, Disp: , Rfl: 2    estradiol (VAGIFEM, YUVAFEM) 10 MCG TABS vaginal tablet, Insert 1 tablet (10 mcg total) into the vagina 2 (two) times a week, Disp: 60 tablet, Rfl: 1    ferrous sulfate 324 (65 Fe) mg, Take 1 tablet (324 mg total) by mouth daily before breakfast, Disp: 30 tablet, Rfl: 4    gabapentin (NEURONTIN) 100 mg capsule, Take 100 mg by mouth 3 (three) times a day, Disp: , Rfl:     latanoprost (XALATAN) 0 005 % ophthalmic solution, Administer 1 drop to both eyes daily at bedtime, Disp: , Rfl: 1    losartan (COZAAR) 50 mg tablet, Take 1 tablet (50 mg total) by mouth daily, Disp: 90 tablet, Rfl: 2    metoprolol succinate (TOPROL-XL) 25 mg 24 hr tablet, Take 1 tablet (25 mg total) by mouth daily, Disp: , Rfl: 0    Multiple Vitamins-Minerals (PRESERVISION/LUTEIN) CAPS, Take 1 capsule by mouth 2 (two) times a day, Disp: , Rfl:     pantoprazole (PROTONIX) 20 mg tablet, Take 20 mg by mouth daily, Disp: , Rfl:     Polyethyl Glyc-Propyl Glyc PF 0 4-0 3 % SOLN, Apply 1 drop to eye every 2 (two) hours while awake, Disp: , Rfl:     Pyridoxine HCl (VITAMIN B6) 200 MG TABS, Take 0 5 tablets by mouth daily , Disp: , Rfl:     sodium chloride 1 g tablet, Take 1 g by mouth 3 (three) times a day, Disp: , Rfl:     No Known Allergies         Vitals:    03/08/22 1330   BP: 120/74   Pulse: 96       Objective:                    Left Ankle Exam   Swelling: none    Other   Erythema: absent  Sensation: normal    Comments:  Patient has symmetric ankle range of motion  Mild point tenderness over distal fibula  Diagnostics, reviewed and taken today if performed as documented: The attending physician has personally reviewed the pertinent films in PACS and interpretation is as follows:    Left fibula fracture that is nondisplaced  Procedures, if performed today:    None performed      Portions of the record may have been created with voice recognition software  Occasional wrong word or "sound a like" substitutions may have occurred due to the inherent limitations of voice recognition software  Read the chart carefully and recognize, using context, where substitutions have occurred

## 2022-03-08 NOTE — PATIENT INSTRUCTIONS
Diagnosis ICD-10-CM Associated Orders   1  Fracture follow-up  Z09 XR ankle 3+ vw left     WBAT with brace  Continue therapy  Remove brace for hygiene  Return in about 2 months (around 5/8/2022)

## 2022-04-04 ENCOUNTER — APPOINTMENT (EMERGENCY)
Dept: RADIOLOGY | Facility: HOSPITAL | Age: 87
DRG: 092 | End: 2022-04-04
Payer: MEDICARE

## 2022-04-04 ENCOUNTER — HOSPITAL ENCOUNTER (EMERGENCY)
Facility: HOSPITAL | Age: 87
Discharge: HOME | DRG: 092 | End: 2022-04-05
Attending: EMERGENCY MEDICINE | Admitting: EMERGENCY MEDICINE
Payer: MEDICARE

## 2022-04-04 VITALS
SYSTOLIC BLOOD PRESSURE: 182 MMHG | DIASTOLIC BLOOD PRESSURE: 75 MMHG | RESPIRATION RATE: 27 BRPM | OXYGEN SATURATION: 98 % | HEART RATE: 88 BPM | TEMPERATURE: 98 F

## 2022-04-04 DIAGNOSIS — I10 ESSENTIAL HYPERTENSION: ICD-10-CM

## 2022-04-04 DIAGNOSIS — R26.2 AMBULATORY DYSFUNCTION: ICD-10-CM

## 2022-04-04 DIAGNOSIS — F03.90 DEMENTIA (HCC): ICD-10-CM

## 2022-04-04 DIAGNOSIS — S82.402A CLOSED FRACTURE OF SHAFT OF LEFT FIBULA: ICD-10-CM

## 2022-04-04 DIAGNOSIS — R29.6 UNWITNESSED FALL: Primary | ICD-10-CM

## 2022-04-04 LAB
ATRIAL RATE: 88 BPM
P AXIS: 59 DEGREES
PR INTERVAL: 160 MS
QRS AXIS: 99 DEGREES
QRSD INTERVAL: 122 MS
QT INTERVAL: 388 MS
QTC INTERVAL: 469 MS
T WAVE AXIS: 23 DEGREES
VENTRICULAR RATE: 88 BPM

## 2022-04-04 PROCEDURE — 73590 X-RAY EXAM OF LOWER LEG: CPT

## 2022-04-04 PROCEDURE — 99284 EMERGENCY DEPT VISIT MOD MDM: CPT

## 2022-04-04 PROCEDURE — 72125 CT NECK SPINE W/O DYE: CPT

## 2022-04-04 PROCEDURE — G1004 CDSM NDSC: HCPCS

## 2022-04-04 PROCEDURE — 99284 EMERGENCY DEPT VISIT MOD MDM: CPT | Performed by: EMERGENCY MEDICINE

## 2022-04-04 PROCEDURE — 93005 ELECTROCARDIOGRAM TRACING: CPT

## 2022-04-04 PROCEDURE — 93010 ELECTROCARDIOGRAM REPORT: CPT | Performed by: INTERNAL MEDICINE

## 2022-04-04 PROCEDURE — 70450 CT HEAD/BRAIN W/O DYE: CPT

## 2022-04-05 ENCOUNTER — APPOINTMENT (EMERGENCY)
Dept: NON INVASIVE DIAGNOSTICS | Facility: HOSPITAL | Age: 87
DRG: 092 | End: 2022-04-05
Payer: MEDICARE

## 2022-04-05 ENCOUNTER — HOSPITAL ENCOUNTER (INPATIENT)
Facility: HOSPITAL | Age: 87
LOS: 1 days | Discharge: NURSING, REHAB OR LONG-TERM CARE | DRG: 092 | End: 2022-04-06
Attending: EMERGENCY MEDICINE | Admitting: INTERNAL MEDICINE
Payer: MEDICARE

## 2022-04-05 ENCOUNTER — APPOINTMENT (EMERGENCY)
Dept: RADIOLOGY | Facility: HOSPITAL | Age: 87
DRG: 092 | End: 2022-04-05
Payer: MEDICARE

## 2022-04-05 DIAGNOSIS — R26.2 AMBULATORY DYSFUNCTION: ICD-10-CM

## 2022-04-05 DIAGNOSIS — N30.00 ACUTE CYSTITIS WITHOUT HEMATURIA: ICD-10-CM

## 2022-04-05 DIAGNOSIS — S82.202A: Primary | ICD-10-CM

## 2022-04-05 LAB
ANION GAP SERPL CALCULATED.3IONS-SCNC: 4 MMOL/L (ref 4–13)
BACTERIA UR QL AUTO: ABNORMAL /HPF
BASOPHILS # BLD AUTO: 0.08 THOUSANDS/ΜL (ref 0–0.1)
BASOPHILS NFR BLD AUTO: 1 % (ref 0–1)
BILIRUB UR QL STRIP: NEGATIVE
BUN SERPL-MCNC: 11 MG/DL (ref 5–25)
CALCIUM SERPL-MCNC: 9.8 MG/DL (ref 8.3–10.1)
CHLORIDE SERPL-SCNC: 103 MMOL/L (ref 100–108)
CLARITY UR: CLEAR
CO2 SERPL-SCNC: 27 MMOL/L (ref 21–32)
COLOR UR: COLORLESS
CREAT SERPL-MCNC: 0.75 MG/DL (ref 0.6–1.3)
EOSINOPHIL # BLD AUTO: 0.2 THOUSAND/ΜL (ref 0–0.61)
EOSINOPHIL NFR BLD AUTO: 2 % (ref 0–6)
ERYTHROCYTE [DISTWIDTH] IN BLOOD BY AUTOMATED COUNT: 14.4 % (ref 11.6–15.1)
GFR SERPL CREATININE-BSD FRML MDRD: 67 ML/MIN/1.73SQ M
GLUCOSE SERPL-MCNC: 110 MG/DL (ref 65–140)
GLUCOSE UR STRIP-MCNC: NEGATIVE MG/DL
HCT VFR BLD AUTO: 37.9 % (ref 34.8–46.1)
HGB BLD-MCNC: 12 G/DL (ref 11.5–15.4)
HGB UR QL STRIP.AUTO: NEGATIVE
IMM GRANULOCYTES # BLD AUTO: 0.04 THOUSAND/UL (ref 0–0.2)
IMM GRANULOCYTES NFR BLD AUTO: 0 % (ref 0–2)
KETONES UR STRIP-MCNC: NEGATIVE MG/DL
LEUKOCYTE ESTERASE UR QL STRIP: ABNORMAL
LYMPHOCYTES # BLD AUTO: 3.03 THOUSANDS/ΜL (ref 0.6–4.47)
LYMPHOCYTES NFR BLD AUTO: 28 % (ref 14–44)
MCH RBC QN AUTO: 29.6 PG (ref 26.8–34.3)
MCHC RBC AUTO-ENTMCNC: 31.7 G/DL (ref 31.4–37.4)
MCV RBC AUTO: 94 FL (ref 82–98)
MONOCYTES # BLD AUTO: 1.34 THOUSAND/ΜL (ref 0.17–1.22)
MONOCYTES NFR BLD AUTO: 12 % (ref 4–12)
NEUTROPHILS # BLD AUTO: 6.17 THOUSANDS/ΜL (ref 1.85–7.62)
NEUTS SEG NFR BLD AUTO: 57 % (ref 43–75)
NITRITE UR QL STRIP: NEGATIVE
NON-SQ EPI CELLS URNS QL MICRO: ABNORMAL /HPF
NRBC BLD AUTO-RTO: 0 /100 WBCS
PH UR STRIP.AUTO: 7 [PH]
PLATELET # BLD AUTO: 260 THOUSANDS/UL (ref 149–390)
PMV BLD AUTO: 9.9 FL (ref 8.9–12.7)
POTASSIUM SERPL-SCNC: 4.3 MMOL/L (ref 3.5–5.3)
PROT UR STRIP-MCNC: NEGATIVE MG/DL
RBC # BLD AUTO: 4.05 MILLION/UL (ref 3.81–5.12)
RBC #/AREA URNS AUTO: ABNORMAL /HPF
SODIUM SERPL-SCNC: 134 MMOL/L (ref 136–145)
SP GR UR STRIP.AUTO: 1 (ref 1–1.03)
UROBILINOGEN UR STRIP-ACNC: <2 MG/DL
WBC # BLD AUTO: 10.86 THOUSAND/UL (ref 4.31–10.16)
WBC #/AREA URNS AUTO: ABNORMAL /HPF

## 2022-04-05 PROCEDURE — 99223 1ST HOSP IP/OBS HIGH 75: CPT | Performed by: INTERNAL MEDICINE

## 2022-04-05 PROCEDURE — 85025 COMPLETE CBC W/AUTO DIFF WBC: CPT | Performed by: PHYSICIAN ASSISTANT

## 2022-04-05 PROCEDURE — 93971 EXTREMITY STUDY: CPT

## 2022-04-05 PROCEDURE — 73564 X-RAY EXAM KNEE 4 OR MORE: CPT

## 2022-04-05 PROCEDURE — 81001 URINALYSIS AUTO W/SCOPE: CPT | Performed by: PHYSICIAN ASSISTANT

## 2022-04-05 PROCEDURE — 99284 EMERGENCY DEPT VISIT MOD MDM: CPT | Performed by: PHYSICIAN ASSISTANT

## 2022-04-05 PROCEDURE — 87186 SC STD MICRODIL/AGAR DIL: CPT | Performed by: PHYSICIAN ASSISTANT

## 2022-04-05 PROCEDURE — 87077 CULTURE AEROBIC IDENTIFY: CPT | Performed by: PHYSICIAN ASSISTANT

## 2022-04-05 PROCEDURE — 73700 CT LOWER EXTREMITY W/O DYE: CPT

## 2022-04-05 PROCEDURE — 73610 X-RAY EXAM OF ANKLE: CPT

## 2022-04-05 PROCEDURE — 99285 EMERGENCY DEPT VISIT HI MDM: CPT

## 2022-04-05 PROCEDURE — 80048 BASIC METABOLIC PNL TOTAL CA: CPT | Performed by: PHYSICIAN ASSISTANT

## 2022-04-05 PROCEDURE — 87086 URINE CULTURE/COLONY COUNT: CPT | Performed by: PHYSICIAN ASSISTANT

## 2022-04-05 PROCEDURE — 93971 EXTREMITY STUDY: CPT | Performed by: SURGERY

## 2022-04-05 PROCEDURE — 36415 COLL VENOUS BLD VENIPUNCTURE: CPT | Performed by: PHYSICIAN ASSISTANT

## 2022-04-05 PROCEDURE — NC001 PR NO CHARGE: Performed by: ORTHOPAEDIC SURGERY

## 2022-04-05 RX ORDER — ASPIRIN 81 MG/1
81 TABLET ORAL DAILY
Status: DISCONTINUED | OUTPATIENT
Start: 2022-04-06 | End: 2022-04-06 | Stop reason: HOSPADM

## 2022-04-05 RX ORDER — DORZOLAMIDE HYDROCHLORIDE AND TIMOLOL MALEATE 20; 5 MG/ML; MG/ML
1 SOLUTION/ DROPS OPHTHALMIC DAILY
Status: DISCONTINUED | OUTPATIENT
Start: 2022-04-06 | End: 2022-04-06 | Stop reason: HOSPADM

## 2022-04-05 RX ORDER — SODIUM CHLORIDE 1000 MG
1 TABLET, SOLUBLE MISCELLANEOUS 3 TIMES DAILY
Status: DISCONTINUED | OUTPATIENT
Start: 2022-04-05 | End: 2022-04-06 | Stop reason: HOSPADM

## 2022-04-05 RX ORDER — ONDANSETRON 2 MG/ML
4 INJECTION INTRAMUSCULAR; INTRAVENOUS EVERY 6 HOURS PRN
Status: DISCONTINUED | OUTPATIENT
Start: 2022-04-05 | End: 2022-04-06 | Stop reason: HOSPADM

## 2022-04-05 RX ORDER — LATANOPROST 50 UG/ML
1 SOLUTION/ DROPS OPHTHALMIC
Status: DISCONTINUED | OUTPATIENT
Start: 2022-04-05 | End: 2022-04-06 | Stop reason: HOSPADM

## 2022-04-05 RX ORDER — CEPHALEXIN 500 MG/1
500 CAPSULE ORAL EVERY 8 HOURS SCHEDULED
Status: DISCONTINUED | OUTPATIENT
Start: 2022-04-05 | End: 2022-04-05

## 2022-04-05 RX ORDER — MELATONIN
2000 DAILY
Status: DISCONTINUED | OUTPATIENT
Start: 2022-04-06 | End: 2022-04-06 | Stop reason: HOSPADM

## 2022-04-05 RX ORDER — ESTRADIOL 0.1 MG/G
1 CREAM VAGINAL 2 TIMES WEEKLY
Status: DISCONTINUED | OUTPATIENT
Start: 2022-04-07 | End: 2022-04-06 | Stop reason: HOSPADM

## 2022-04-05 RX ORDER — CEPHALEXIN 500 MG/1
500 CAPSULE ORAL EVERY 6 HOURS
Status: DISCONTINUED | OUTPATIENT
Start: 2022-04-05 | End: 2022-04-06 | Stop reason: HOSPADM

## 2022-04-05 RX ORDER — CALCITONIN SALMON 200 [IU]/.09ML
1 SPRAY, METERED NASAL DAILY
Status: DISCONTINUED | OUTPATIENT
Start: 2022-04-06 | End: 2022-04-06 | Stop reason: HOSPADM

## 2022-04-05 RX ORDER — LOSARTAN POTASSIUM 50 MG/1
50 TABLET ORAL DAILY
Status: DISCONTINUED | OUTPATIENT
Start: 2022-04-06 | End: 2022-04-06 | Stop reason: HOSPADM

## 2022-04-05 RX ORDER — ACETAMINOPHEN 325 MG/1
650 TABLET ORAL ONCE
Status: COMPLETED | OUTPATIENT
Start: 2022-04-05 | End: 2022-04-05

## 2022-04-05 RX ORDER — PANTOPRAZOLE SODIUM 20 MG/1
20 TABLET, DELAYED RELEASE ORAL
Status: DISCONTINUED | OUTPATIENT
Start: 2022-04-06 | End: 2022-04-06 | Stop reason: HOSPADM

## 2022-04-05 RX ORDER — DOCUSATE SODIUM 100 MG/1
100 CAPSULE, LIQUID FILLED ORAL 2 TIMES DAILY PRN
Status: DISCONTINUED | OUTPATIENT
Start: 2022-04-05 | End: 2022-04-06 | Stop reason: HOSPADM

## 2022-04-05 RX ORDER — MAGNESIUM HYDROXIDE/ALUMINUM HYDROXICE/SIMETHICONE 120; 1200; 1200 MG/30ML; MG/30ML; MG/30ML
30 SUSPENSION ORAL EVERY 6 HOURS PRN
Status: DISCONTINUED | OUTPATIENT
Start: 2022-04-05 | End: 2022-04-06 | Stop reason: HOSPADM

## 2022-04-05 RX ORDER — METOPROLOL SUCCINATE 25 MG/1
25 TABLET, EXTENDED RELEASE ORAL DAILY
Status: DISCONTINUED | OUTPATIENT
Start: 2022-04-06 | End: 2022-04-06 | Stop reason: HOSPADM

## 2022-04-05 RX ORDER — ACETAMINOPHEN 325 MG/1
650 TABLET ORAL 3 TIMES DAILY
Status: DISCONTINUED | OUTPATIENT
Start: 2022-04-05 | End: 2022-04-06 | Stop reason: HOSPADM

## 2022-04-05 RX ORDER — FERROUS SULFATE 325(65) MG
325 TABLET ORAL
Status: DISCONTINUED | OUTPATIENT
Start: 2022-04-06 | End: 2022-04-06 | Stop reason: HOSPADM

## 2022-04-05 RX ORDER — GABAPENTIN 100 MG/1
100 CAPSULE ORAL 3 TIMES DAILY
Status: DISCONTINUED | OUTPATIENT
Start: 2022-04-05 | End: 2022-04-06 | Stop reason: HOSPADM

## 2022-04-05 RX ADMIN — CEPHALEXIN 500 MG: 500 CAPSULE ORAL at 21:40

## 2022-04-05 RX ADMIN — GABAPENTIN 100 MG: 100 CAPSULE ORAL at 21:40

## 2022-04-05 RX ADMIN — ACETAMINOPHEN 650 MG: 325 TABLET ORAL at 13:22

## 2022-04-05 RX ADMIN — SODIUM CHLORIDE TAB 1 GM 1 G: 1 TAB at 22:05

## 2022-04-05 RX ADMIN — ACETAMINOPHEN 650 MG: 325 TABLET ORAL at 21:40

## 2022-04-05 NOTE — ED NOTES
Patient waiting for transport back to [place]  Spoke with [facility], will try to transport patient back when BLS truck is available        Matheus Davila RN  04/05/22 4826

## 2022-04-05 NOTE — ED NOTES
Pt sleeping; in NAD; awaiting transport     [facility]  04/05/22 1092

## 2022-04-05 NOTE — ED PROVIDER NOTES
History  Chief Complaint   Patient presents with    Leg Pain     From [facility]  Fall yesterday - LLE pain  Facility requesting more imaging  No other complaints  Patient is a 79 y/o female with a PMHx of CAD, HTN, dementia and osteoporosis, presenting to the ED for evaluation of left lower leg pain  Patient was seen in the ED yesterday after a fall  She was noted to have pain with weight-bearing on the left lower extremity and x-rays were obtained  Official interpretation was negative for any acute fracture  Patient returned to assisted living facility early this morning  She continued to have pain in the left lower extremity and difficulty bearing weight and was subsequently sent back to the ED for further imaging  Of note, patient had a fall back in February where she sustained a closed fracture of the shaft of left fibula which was managed conservatively  At baseline, patient ambulates with a walker  Patient denies any chest pain or shortness of breath  She does admit to some suprapubic discomfort which son states she complains of when she has a UTI  She denies any fevers, chills or other urinary complaints  Prior to Admission Medications   Prescriptions Last Dose Informant Patient Reported? Taking?    Cyanocobalamin (VITAMIN B12) 1000 MCG TBCR  Self Yes No   Sig: Take 1 tablet by mouth daily    D-Mannose 500 MG CAPS  Self Yes No   Sig: Take 1 tablet by mouth daily   Multiple Vitamins-Minerals (PRESERVISION/LUTEIN) CAPS  Self Yes No   Sig: Take 1 capsule by mouth 2 (two) times a day   Polyethyl Glyc-Propyl Glyc PF 0 4-0 3 % SOLN   Yes No   Sig: Apply 1 drop to eye every 2 (two) hours while awake   Pyridoxine HCl (VITAMIN B6) 200 MG TABS  Self Yes No   Sig: Take 0 5 tablets by mouth daily    acetaminophen (TYLENOL) 325 mg tablet  Self Yes No   Sig: Take 650 mg by mouth 3 (three) times a day    aspirin 81 MG tablet  Self Yes No   Sig: Take 1 tablet by mouth daily   calcitonin, su, (MIACALCIN) 200 units/act nasal spray  Self No No   Si spray and to alternate nostril q day   Patient taking differently: 1 spray into each nostril daily Alternate left and right nostril every other day   cholecalciferol (VITAMIN D3) 1,000 units tablet   Yes No   Sig: Take 2,000 Units by mouth daily   cycloSPORINE (RESTASIS) 0 05 % ophthalmic emulsion  Self Yes No   Sig: Administer 1 drop to both eyes 2 (two) times a day   dorzolamide-timolol (COSOPT) 22 3-6 8 MG/ML ophthalmic solution  Self Yes No   Sig: Administer 1 drop to both eyes daily   estradiol (VAGIFEM, YUVAFEM) 10 MCG TABS vaginal tablet   No No   Sig: Insert 1 tablet (10 mcg total) into the vagina 2 (two) times a week   ferrous sulfate 324 (65 Fe) mg  Self No No   Sig: Take 1 tablet (324 mg total) by mouth daily before breakfast   gabapentin (NEURONTIN) 100 mg capsule   Yes No   Sig: Take 100 mg by mouth 3 (three) times a day   latanoprost (XALATAN) 0 005 % ophthalmic solution  Self Yes No   Sig: Administer 1 drop to both eyes daily at bedtime   losartan (COZAAR) 50 mg tablet   No No   Sig: Take 1 tablet (50 mg total) by mouth daily   metoprolol succinate (TOPROL-XL) 25 mg 24 hr tablet   No No   Sig: Take 1 tablet (25 mg total) by mouth daily   pantoprazole (PROTONIX) 20 mg tablet  Self Yes No   Sig: Take 20 mg by mouth daily   sodium chloride 1 g tablet   Yes No   Sig: Take 1 g by mouth 3 (three) times a day      Facility-Administered Medications: None       Past Medical History:   Diagnosis Date    Arthritis     CAD (coronary artery disease)     Disease of thyroid gland     GERD (gastroesophageal reflux disease)     Hypertension     Mild cognitive impairment with memory loss 2020    Osteoarthritis     Osteoporosis     Vitamin D deficiency        Past Surgical History:   Procedure Laterality Date    APPENDECTOMY      HYSTERECTOMY      LA PARTIAL HIP REPLACEMENT Left 2018    Procedure: HEMIARTHROPLASTY HIP (BIPOLAR); Surgeon: Arleen Plunkett MD;  Location: [facility];  Service: Orthopedics    ROTATOR CUFF REPAIR         Family History   Problem Relation Age of Onset    Diabetes type II Father     Heart disease Brother     Hypertension Family     Arthritis Family      I have reviewed and agree with the history as documented  E-Cigarette/Vaping    E-Cigarette Use Never User      E-Cigarette/Vaping Substances    Nicotine No     THC No     CBD No     Flavoring No     Other No     Unknown No      Social History     Tobacco Use    Smoking status: Former Smoker    Smokeless tobacco: Never Used   Vaping Use    Vaping Use: Never used   Substance Use Topics    Alcohol use: Not Currently    Drug use: No       Review of Systems   Constitutional: Negative for appetite change, chills, fatigue and fever  HENT: Negative for congestion, rhinorrhea, sinus pressure, sinus pain and sore throat  Eyes: Negative for photophobia and visual disturbance  Respiratory: Negative for cough, shortness of breath and wheezing  Cardiovascular: Negative for chest pain, palpitations and leg swelling  Gastrointestinal: Negative for abdominal pain, blood in stool, constipation, diarrhea, nausea and vomiting  Genitourinary: Negative for difficulty urinating, dysuria, flank pain, frequency, hematuria and urgency  Musculoskeletal: Positive for myalgias (left lower leg pain)  Negative for arthralgias, back pain, joint swelling and neck pain  Neurological: Negative for dizziness, syncope, weakness, light-headedness and headaches  All other systems reviewed and are negative  Physical Exam  Physical Exam  Vitals and nursing note reviewed  Constitutional:       General: She is awake  Appearance: Normal appearance  She is well-developed  She is not toxic-appearing or diaphoretic  HENT:      Head: Normocephalic and atraumatic        Right Ear: External ear normal       Left Ear: External ear normal       Nose: Nose normal  Mouth/Throat:      Lips: Pink  Mouth: Mucous membranes are moist    Eyes:      General: Lids are normal  No scleral icterus  Conjunctiva/sclera: Conjunctivae normal       Pupils: Pupils are equal, round, and reactive to light  Cardiovascular:      Rate and Rhythm: Normal rate and regular rhythm  Pulses: Normal pulses  Radial pulses are 2+ on the right side and 2+ on the left side  Heart sounds: Normal heart sounds, S1 normal and S2 normal    Pulmonary:      Effort: Pulmonary effort is normal  No accessory muscle usage  Breath sounds: Normal breath sounds  No stridor  No decreased breath sounds, wheezing, rhonchi or rales  Abdominal:      General: Abdomen is flat  Bowel sounds are normal  There is no distension  Palpations: Abdomen is soft  Tenderness: There is no abdominal tenderness  There is no right CVA tenderness, left CVA tenderness, guarding or rebound  Musculoskeletal:      Cervical back: Full passive range of motion without pain and neck supple  No signs of trauma  No pain with movement  Left knee: No effusion  Tenderness present over the medial joint line  Right lower leg: No edema  Left lower leg: Tenderness and bony tenderness present  No edema  Left ankle: No swelling or deformity  Tenderness present  Legs:       Comments: Tenderness over anterior tibia as well as posterior calf tenderness  No obvious deformity or swelling  PT/DP pulses 2+  Lymphadenopathy:      Cervical: No cervical adenopathy  Skin:     General: Skin is warm and dry  Capillary Refill: Capillary refill takes less than 2 seconds  Coloration: Skin is not cyanotic, jaundiced or pale  Neurological:      Mental Status: She is alert and oriented to person, place, and time  GCS: GCS eye subscore is 4  GCS verbal subscore is 5  GCS motor subscore is 6        Gait: Gait normal    Psychiatric:         Mood and Affect: Mood normal          Speech: Speech normal          Behavior: Behavior is cooperative           Vital Signs  ED Triage Vitals [04/05/22 1209]   Temperature Pulse Respirations Blood Pressure SpO2   98 1 °F (36 7 °C) (!) 108 (!) 24 170/75 95 %      Temp src Heart Rate Source Patient Position - Orthostatic VS BP Location FiO2 (%)   -- -- -- -- --      Pain Score       No Pain           Vitals:    04/05/22 1209 04/05/22 1300 04/05/22 1415 04/05/22 1430   BP: 170/75      Pulse: (!) 108 (!) 112 92 90         Visual Acuity      ED Medications  Medications   acetaminophen (TYLENOL) tablet 650 mg (650 mg Oral Given 4/5/22 1322)       Diagnostic Studies  Results Reviewed     Procedure Component Value Units Date/Time    Basic metabolic panel [[id number]]  (Abnormal) Collected: 04/05/22 1643    Lab Status: Final result Specimen: Blood from Arm, Left Updated: 04/05/22 1710     Sodium 134 mmol/L      Potassium 4 3 mmol/L      Chloride 103 mmol/L      CO2 27 mmol/L      ANION GAP 4 mmol/L      BUN 11 mg/dL      Creatinine 0 75 mg/dL      Glucose 110 mg/dL      Calcium 9 8 mg/dL      eGFR 67 ml/min/1 73sq m     Narrative:      [place] guidelines for Chronic Kidney Disease (CKD):     Stage 1 with normal or high GFR (GFR > 90 mL/min/1 73 square meters)    Stage 2 Mild CKD (GFR = 60-89 mL/min/1 73 square meters)    Stage 3A Moderate CKD (GFR = 45-59 mL/min/1 73 square meters)    Stage 3B Moderate CKD (GFR = 30-44 mL/min/1 73 square meters)    Stage 4 Severe CKD (GFR = 15-29 mL/min/1 73 square meters)    Stage 5 End Stage CKD (GFR <15 mL/min/1 73 square meters)  Note: GFR calculation is accurate only with a steady state creatinine    CBC and differential [[id number]]  (Abnormal) Collected: 04/05/22 1643    Lab Status: Final result Specimen: Blood from Arm, Left Updated: 04/05/22 1656     WBC 10 86 Thousand/uL      RBC 4 05 Million/uL      Hemoglobin 12 0 g/dL      Hematocrit 37 9 %      MCV 94 fL      MCH 29 6 pg      MCHC 31 7 g/dL RDW 14 4 %      MPV 9 9 fL      Platelets 163 Thousands/uL      nRBC 0 /100 WBCs      Neutrophils Relative 57 %      Immat GRANS % 0 %      Lymphocytes Relative 28 %      Monocytes Relative 12 %      Eosinophils Relative 2 %      Basophils Relative 1 %      Neutrophils Absolute 6 17 Thousands/µL      Immature Grans Absolute 0 04 Thousand/uL      Lymphocytes Absolute 3 03 Thousands/µL      Monocytes Absolute 1 34 Thousand/µL      Eosinophils Absolute 0 20 Thousand/µL      Basophils Absolute 0 08 Thousands/µL     Urine Microscopic [[id number]]  (Abnormal) Collected: 04/05/22 1525    Lab Status: Final result Specimen: Urine, Clean Catch Updated: 04/05/22 1537     RBC, UA 1-2 /hpf      WBC, UA Innumerable /hpf      Epithelial Cells Occasional /hpf      Bacteria, UA Occasional /hpf     Urine culture [[id number]] Collected: 04/05/22 1525    Lab Status: In process Specimen: Urine, Clean Catch Updated: 04/05/22 1537    UA w Reflex to Microscopic w Reflex to Culture [[id number]]  (Abnormal) Collected: 04/05/22 1525    Lab Status: Final result Specimen: Urine, Clean Catch Updated: 04/05/22 1536     Color, UA Colorless     Clarity, UA Clear     Specific [place], UA 1 005     pH, UA 7 0     Leukocytes, UA Large     Nitrite, UA Negative     Protein, UA Negative mg/dl      Glucose, UA Negative mg/dl      Ketones, UA Negative mg/dl      Urobilinogen, UA <2 0 mg/dl      Bilirubin, UA Negative     Blood, UA Negative                 CT lower extremity wo contrast left   Final Result by Elinor Shields MD (04/05 9386)      1  Incomplete lateral tibial fracture   2  Healing fibular fracture         Workstation performed: [id number]         XR ankle 3+ views LEFT   Final Result by Rosario Jewell MD (04/05 5176)      Nondisplaced fracture of the left tibial shaft is new since March 8, 2022  Interval partial healing of the distal fibular fracture  The study was marked in [facility] for immediate notification        Workstation performed: [id number]         XR knee 4+ views left injury   Final Result by Leonela Lubin MD (04/05 1428)      No acute osseous abnormality  Tricompartmental osteoarthrosis most severe at the lateral femorotibial compartments  Workstation performed: [id number]         VAS lower limb venous duplex study, unilateral/limited   Final Result by Helen Martinez DO (04/05 1612)                 Procedures  Procedures         ED Course  ED Course as of 04/05/22 1746   Tue Apr 05, 2022   1331 Duplex negative for DVT  SBIRT 20yo+      Most Recent Value   SBIRT (24 yo +)    In order to provide better care to our patients, we are screening all of our patients for alcohol and drug use  Would it be okay to ask you these screening questions? No Filed at: 04/05/2022 1441   Initial Alcohol Screen: US AUDIT-C     1  How often do you have a drink containing alcohol? 0 Filed at: 04/05/2022 1441   2  How many drinks containing alcohol do you have on a typical day you are drinking? 0 Filed at: 04/05/2022 1441   3a  Male UNDER 65: How often do you have five or more drinks on one occasion? 0 Filed at: 04/05/2022 1441   3b  FEMALE Any Age, or MALE 65+: How often do you have 4 or more drinks on one occassion? 0 Filed at: 04/05/2022 1441   Audit-C Score 0 Filed at: 04/05/2022 1441   RUFUS: How many times in the past year have you    Used an illegal drug or used a prescription medication for non-medical reasons? Never Filed at: 04/05/2022 1441                    MDM  Number of Diagnoses or Management Options  Fracture of tibial shaft, left, closed  Diagnosis management comments: Patient is a 81 y/o female with a PMHx of CAD, HTN, dementia and osteoporosis, presenting to the ED for evaluation of left lower leg pain  Venous duplex negative for DVT  X-ray of tibia/fibula from yesterday interpreted by radiologist as normal   Obtained x-rays of knee and ankle due to continued pain    X-rays today show a new tibia shaft fracture  Discussed with orthopedics regarding management/disposition  They are requesting a CT scan which was ordered  Patient signed out to resident pending disposition and Ortho recommendations  Patient will likely require admission for ambulatory dysfunction  Amount and/or Complexity of Data Reviewed  Clinical lab tests: ordered and reviewed  Tests in the radiology section of CPT®: ordered and reviewed    Patient Progress  Patient progress: stable      Disposition  Final diagnoses:   Fracture of tibial shaft, left, closed     Time reflects when diagnosis was documented in both MDM as applicable and the Disposition within this note     Time User Action Codes Description Comment    4/5/2022  3:14 PM Marlo Pulido Add [S82 [id number]] Fracture of tibial shaft, left, closed       ED Disposition     None      Follow-up Information    None         Patient's Medications   Discharge Prescriptions    No medications on file       No discharge procedures on file      PDMP Review     None          ED Provider  Electronically Signed by           Thuy Maciel PA-C  04/05/22 1223

## 2022-04-05 NOTE — ED ATTENDING ATTESTATION
4/4/2022  ISayra MD, saw and evaluated the patient  I have discussed the patient with the resident/non-physician practitioner and agree with the resident's/non-physician practitioner's findings, Plan of Care, and MDM as documented in the resident's/non-physician practitioner's note, except where noted  All available labs and Radiology studies were reviewed  I was present for key portions of any procedure(s) performed by the resident/non-physician practitioner and I was immediately available to provide assistance  At this point I agree with the current assessment done in the Emergency Department  I have conducted an independent evaluation of this patient a history and physical is as follows:    ED Course         Critical Care Time  Procedures    79 yo female from assisted living, had unwitnessed fall and may have laid on floor for 40 minutes  No head strike, no loc  Pt with hx of dementia  Pt on asa  No obvious trauma noted  Pt currently with no complaints  Vss, afebrile, lungs cta, rrr, abdomen soft nontender, left leg unable to bear weight  Pt with previous left leg injury  Ct head, ekg  Xray left leg

## 2022-04-05 NOTE — ED NOTES
Patient states she is wet  Patient changed and placed in room 14 for placement of pure wick until patient is picked up for transport back to facility       Denise Presley RN  04/05/22 4985

## 2022-04-05 NOTE — DISCHARGE INSTRUCTIONS
Please have patient return to the emergency department if she develops any new or concerning symptoms  Consider more frequent changes of briefs given patient's history of UTIs

## 2022-04-05 NOTE — ED PROVIDER NOTES
History  Chief Complaint   Patient presents with    Fall     per facility, patient had an unwittnessed fall  c/o leg pain  per ems, no signs of head strike  h/o dementia  on ASA     80year-old female history of dementia, ambulatory dysfunction presents for evaluation after suspected fall at assisted living facility  Given significant dementia, history obtained from EMS, nursing facility, and son who arrives later during initial evaluation  Patient was reportedly in her usual state of health, had dinner at about 6:00 p m , rang her call bell at about 640 and was found on the floor  There was a small broken table nearby and the patient's head was resting against TV stand  There were no obvious injuries noted at the time  She was noted to seem uncomfortable bearing weight on the left lower extremity  Here in the ED patient has no complaints, cannot explain why she is here  States that she feels well, denies any pain  She is oriented to self, thinks the year is 2020, is unsure which hospital she is at  She does not remember what she used to do for living  During initial evaluation of patient son arrives who is the power of   He explains history of recent fall with fibula fracture, rehab stay and return to assisted living facility  States that she is supposed to wear a leg brace but is concerned that she may not always use it  He endorses a history of UTIs but she has not had any recent complaints to staff  Son believes that normally she will speak up if she has dysuria for example  He states that she is at her baseline mental status right now  Saw her earlier today with same  ROS - unable to complete secondary to dementia    Triage vital signs reviewed    Physical Exam  Const: alert, no acute distress, non-toxic appearing, no diaphoresis   Appears stated age, well-developed, frail appearing  Eyes: no conjunctival injection, discharge or icterus  Head: normocephalic, atraumatic  Ears: auricles normal   Nose: normal  Mouth/throat: clear, moist   Neck: normal ROM, supple and without rigidity, nontender  CV: normal rate  Extremities warm and well-perfused   Resp: breathing unlabored, non-stridulous, CTA   Abdomen: soft, non-tender, non-distended  MSK: no gross deformities appreciated  Extremities are non-ttp and have full ROM  Chest wall and pelvis stable and non-ttp  No vertebral ttp and no step-offs  Skin: warm and dry with rapid capillary refill  Neuro: CNs II-XII grossly intact  Oriented to self  Sensation and motor function of extremities grossly intact  Psych: pleasantly demented, cooperative            Prior to Admission Medications   Prescriptions Last Dose Informant Patient Reported? Taking?    Cyanocobalamin (VITAMIN B12) 1000 MCG TBCR  Self Yes No   Sig: Take 1 tablet by mouth daily    D-Mannose 500 MG CAPS  Self Yes No   Sig: Take 1 tablet by mouth daily   Multiple Vitamins-Minerals (PRESERVISION/LUTEIN) CAPS  Self Yes No   Sig: Take 1 capsule by mouth 2 (two) times a day   Polyethyl Glyc-Propyl Glyc PF 0 4-0 3 % SOLN   Yes No   Sig: Apply 1 drop to eye every 2 (two) hours while awake   Pyridoxine HCl (VITAMIN B6) 200 MG TABS  Self Yes No   Sig: Take 0 5 tablets by mouth daily    acetaminophen (TYLENOL) 325 mg tablet  Self Yes No   Sig: Take 650 mg by mouth 3 (three) times a day    aspirin 81 MG tablet  Self Yes No   Sig: Take 1 tablet by mouth daily   calcitonin, salmon, (MIACALCIN) 200 units/act nasal spray  Self No No   Si spray and to alternate nostril q day   Patient taking differently: 1 spray into each nostril daily Alternate left and right nostril every other day   cholecalciferol (VITAMIN D3) 1,000 units tablet   Yes No   Sig: Take 2,000 Units by mouth daily   cycloSPORINE (RESTASIS) 0 05 % ophthalmic emulsion  Self Yes No   Sig: Administer 1 drop to both eyes 2 (two) times a day   dorzolamide-timolol (COSOPT) 22 3-6 8 MG/ML ophthalmic solution  Self Yes No   Sig: Administer 1 drop to both eyes daily   estradiol (VAGIFEM, YUVAFEM) 10 MCG TABS vaginal tablet   No No   Sig: Insert 1 tablet (10 mcg total) into the vagina 2 (two) times a week   ferrous sulfate 324 (65 Fe) mg  Self No No   Sig: Take 1 tablet (324 mg total) by mouth daily before breakfast   gabapentin (NEURONTIN) 100 mg capsule   Yes No   Sig: Take 100 mg by mouth 3 (three) times a day   latanoprost (XALATAN) 0 005 % ophthalmic solution  Self Yes No   Sig: Administer 1 drop to both eyes daily at bedtime   losartan (COZAAR) 50 mg tablet   No No   Sig: Take 1 tablet (50 mg total) by mouth daily   metoprolol succinate (TOPROL-XL) 25 mg 24 hr tablet   No No   Sig: Take 1 tablet (25 mg total) by mouth daily   pantoprazole (PROTONIX) 20 mg tablet  Self Yes No   Sig: Take 20 mg by mouth daily   sodium chloride 1 g tablet   Yes No   Sig: Take 1 g by mouth 3 (three) times a day      Facility-Administered Medications: None       Past Medical History:   Diagnosis Date    Arthritis     CAD (coronary artery disease)     Disease of thyroid gland     GERD (gastroesophageal reflux disease)     Hypertension     Mild cognitive impairment with memory loss 5/29/2020    Osteoarthritis     Osteoporosis     Vitamin D deficiency        Past Surgical History:   Procedure Laterality Date    APPENDECTOMY      HYSTERECTOMY      MD PARTIAL HIP REPLACEMENT Left 8/13/2018    Procedure: HEMIARTHROPLASTY HIP (BIPOLAR); Surgeon: Josr Logan MD;  Location:  MAIN OR;  Service: Orthopedics    ROTATOR CUFF REPAIR         Family History   Problem Relation Age of Onset    Diabetes type II Father     Heart disease Brother     Hypertension Family     Arthritis Family      I have reviewed and agree with the history as documented      E-Cigarette/Vaping    E-Cigarette Use Never User      E-Cigarette/Vaping Substances    Nicotine No     THC No     CBD No     Flavoring No     Other No     Unknown No      Social History     Tobacco Use    Smoking status: Former Smoker    Smokeless tobacco: Never Used   Vaping Use    Vaping Use: Never used   Substance Use Topics    Alcohol use: Not Currently    Drug use: No        Review of Systems    Physical Exam  ED Triage Vitals   Temperature Pulse Respirations Blood Pressure SpO2   04/04/22 1958 04/04/22 1948 04/04/22 1948 04/04/22 1948 04/04/22 1948   98 °F (36 7 °C) 95 20 (!) 185/75 97 %      Temp Source Heart Rate Source Patient Position - Orthostatic VS BP Location FiO2 (%)   04/04/22 1958 04/04/22 1948 04/04/22 1948 04/04/22 1948 --   Oral Monitor Sitting Right arm       Pain Score       04/04/22 1948       No Pain             Orthostatic Vital Signs  Vitals:    04/04/22 1948 04/04/22 2000   BP: (!) 185/75 (!) 182/75   Pulse: 95 88   Patient Position - Orthostatic VS: Sitting Lying       Physical Exam    ED Medications  Medications - No data to display    Diagnostic Studies  Results Reviewed     None                 CT head without contrast   Final Result by Germán Garza MD (04/04 2152)      No acute intracranial abnormality  Workstation performed: [id number]         CT cervical spine without contrast   Final Result by Germán Garza MD (04/04 2200)      No cervical spine fracture or traumatic malalignment  Multilevel degenerative changes  Workstation performed: [id number]         XR tibia fibula 2 views LEFT    (Results Pending)         Procedures  Procedures      ED Course  ED Course as of 04/05/22 0035   Mon Apr 04, 2022 2024 EKG shows NSR, rate of 88, mild right axis deviation, normal intervals, no obvious ST elevations or depressions  Prior with axis deviation  Subtle changes but overall similar prior  MDM  Number of Diagnoses or Management Options  Ambulatory dysfunction  Closed fracture of shaft of left fibula  Dementia ([address] )  Essential hypertension  Unwitnessed fall  Diagnosis management comments:  With shared decision making with son will proceed with EKG, CT head and neck, x-rays of left tib-fib  Will defer urine as appears asymptomatic, no specific concern  With unremarkable workup will proceed with ambulatory trial   CT scans without acute abnormality  Fibula appears to be healing on wet read  With son present, ambulatory trial at baseline with brace and walker  Per paperwork ok for short distances as such  With shared decision will discharge to assisted living facility  PCP follow-up, return precautions discussed  Patient and son appreciative and in agreement with plan  Disposition  Final diagnoses:   Unwitnessed fall   Closed fracture of shaft of left fibula   Ambulatory dysfunction   Essential hypertension   Dementia ([facility] [address] )     Time reflects when diagnosis was documented in both MDM as applicable and the Disposition within this note     Time User Action Codes Description Comment    4/4/2022  9:56 PM Katheren Pesa Add [R29 6] Unwitnessed fall     4/4/2022  9:57 PM Katheren Pesa Add [S82 402A] Closed fracture of shaft of left fibula     4/4/2022  9:57 PM Katheren Pesa Add [R26 2] Ambulatory dysfunction     4/4/2022  9:57 PM Vonzell Buddle T Add [I10] Essential hypertension     4/4/2022  9:57 PM Katheren Pesa Add [F03 90] Dementia [facility])       ED Disposition     ED Disposition Condition Date/Time Comment    Discharge Stable Mon Apr 4, 2022 10:15 PM Florentino Flow discharge to home/self care              Follow-up Information     Follow up With Specialties Details Why Contact Info Additional [address], MD Geriatric Medicine Schedule an appointment as soon as possible for a visit  For follow-up [address] [facility] Jose Gao   49        [facility] 34 [facility] Emergency Department Emergency Medicine   [address] [id number]  [address] [facility] 64 [facility] Emergency Department, [address] [place], [place], [address] [phone]          Patient's Medications   Discharge Prescriptions    No medications on file     No discharge procedures on file  PDMP Review     None           ED Provider  Attending physically available and evaluated Jose Luis Tristan  I managed the patient along with the ED Attending      Electronically Signed by         Shelton Mckenzie MD  04/05/22 4904

## 2022-04-05 NOTE — CONSULTS
Orthopedics   Bethany Aguirre 80 y o  female MRN: [id number]  Unit/Bed#: Cat Scan      Chief Complaint:   left leg pain    HPI:   80 y o  female who uses a walker to ambulate status post mechanical unwitnessed fall at [address] [facility] living where she lives, complaining of left leg pain that is worse with weight bearing  She has a history of multiple falls requiring emergency department visits  She has been seeing Dr Martha Chao for a healing left fibular shaft fracture that was managed non-operatively  She brings an long AFO with her, that she has used for her left fibula fracture  Pain is made worse with motion or contact to the area and improves with rest  Denies numbness or tingling  She has a history of dementia and history was obtained by son at bedside and he reports she was seen yesterday for a fall as well, where images were taken of the left leg  Review Of Systems:   · Skin: Normal  · Neuro: See HPI  · Musculoskeletal: See HPI  · 14 point review of systems negative except as stated above     Past Medical History:   Past Medical History:   Diagnosis Date    Arthritis     CAD (coronary artery disease)     Disease of thyroid gland     GERD (gastroesophageal reflux disease)     Hypertension     Mild cognitive impairment with memory loss 5/29/2020    Osteoarthritis     Osteoporosis     Vitamin D deficiency        Past Surgical History:   Past Surgical History:   Procedure Laterality Date    APPENDECTOMY      HYSTERECTOMY      AL PARTIAL HIP REPLACEMENT Left 8/13/2018    Procedure: HEMIARTHROPLASTY HIP (BIPOLAR);   Surgeon: Monica Valdovinos MD;  [facility];  Service: Orthopedics    ROTATOR CUFF REPAIR         Family History:  Family history reviewed and non-contributory  Family History   Problem Relation Age of Onset    Diabetes type II Father     Heart disease Brother     Hypertension Family     Arthritis Family        Social History:  Social History     Socioeconomic History    Marital status:      Spouse name: Not on file    Number of children: Not on file    Years of education: Not on file    Highest education level: Not on file   Occupational History    Not on file   Tobacco Use    Smoking status: Former Smoker    Smokeless tobacco: Never Used   Vaping Use    Vaping Use: Never used   Substance and Sexual Activity    Alcohol use: Not Currently    Drug use: No    Sexual activity: Not on file   Other Topics Concern    Not on file   Social History Narrative    Daily coffee consumption - 4 cups     Social Determinants of Health     Financial Resource Strain: Not on file   Food Insecurity: No Food Insecurity    Worried About Running Out of Food in the Last Year: Never true    William of Food in the Last Year: Never true   Transportation Needs: No Transportation Needs    Lack of Transportation (Medical): No    Lack of Transportation (Non-Medical): No   Physical Activity: Not on file   Stress: Not on file   Social Connections: Not on file   Intimate Partner Violence: Not on file   Housing Stability: Unknown    Unable to Pay for Housing in the Last Year: No    Number of Places Lived in the Last Year: Not on file    Unstable Housing in the Last Year: No       Allergies:   No Known Allergies        Labs:  0   Lab Value Date/Time    HCT 36 9 02/07/2022 0459    HCT 40 3 02/06/2022 0544    HCT 39 4 02/05/2022 0600    HCT 37 1 11/24/2017 0812    HCT 37 0 11/14/2016 0736    HCT 34 3 (L) 09/07/2015 0600    HGB 12 3 02/07/2022 0459    HGB 13 0 02/06/2022 0544    HGB 12 7 02/05/2022 0600    HGB 12 3 11/24/2017 0812    HGB 12 3 11/14/2016 0736    HGB 11 2 (L) 09/07/2015 0600    INR 1 05 08/12/2018 1137    WBC 12 66 (H) 02/07/2022 0459    WBC 11 96 (H) 02/06/2022 0544    WBC 13 73 (H) 02/05/2022 0600    WBC 0-5 11/24/2017 0812    WBC 8 7 11/24/2017 0812    WBC 6 1 11/14/2016 0736       Meds:  No current facility-administered medications for this encounter      Current Outpatient Medications:     acetaminophen (TYLENOL) 325 mg tablet, Take 650 mg by mouth 3 (three) times a day , Disp: , Rfl:     aspirin 81 MG tablet, Take 1 tablet by mouth daily, Disp: , Rfl:     calcitonin, salmon, (MIACALCIN) 200 units/act nasal spray, 1 spray and to alternate nostril q day (Patient taking differently: 1 spray into each nostril daily Alternate left and right nostril every other day), Disp: 1 Act, Rfl: 5    cholecalciferol (VITAMIN D3) 1,000 units tablet, Take 2,000 Units by mouth daily, Disp: , Rfl:     Cyanocobalamin (VITAMIN B12) 1000 MCG TBCR, Take 1 tablet by mouth daily , Disp: , Rfl:     cycloSPORINE (RESTASIS) 0 05 % ophthalmic emulsion, Administer 1 drop to both eyes 2 (two) times a day, Disp: , Rfl:     D-Mannose 500 MG CAPS, Take 1 tablet by mouth daily, Disp: , Rfl:     dorzolamide-timolol (COSOPT) 22 3-6 8 MG/ML ophthalmic solution, Administer 1 drop to both eyes daily, Disp: , Rfl: 2    estradiol (VAGIFEM, YUVAFEM) 10 MCG TABS vaginal tablet, Insert 1 tablet (10 mcg total) into the vagina 2 (two) times a week, Disp: 60 tablet, Rfl: 1    ferrous sulfate 324 (65 Fe) mg, Take 1 tablet (324 mg total) by mouth daily before breakfast, Disp: 30 tablet, Rfl: 4    gabapentin (NEURONTIN) 100 mg capsule, Take 100 mg by mouth 3 (three) times a day, Disp: , Rfl:     latanoprost (XALATAN) 0 005 % ophthalmic solution, Administer 1 drop to both eyes daily at bedtime, Disp: , Rfl: 1    losartan (COZAAR) 50 mg tablet, Take 1 tablet (50 mg total) by mouth daily, Disp: 90 tablet, Rfl: 2    metoprolol succinate (TOPROL-XL) 25 mg 24 hr tablet, Take 1 tablet (25 mg total) by mouth daily, Disp: , Rfl: 0    Multiple Vitamins-Minerals (PRESERVISION/LUTEIN) CAPS, Take 1 capsule by mouth 2 (two) times a day, Disp: , Rfl:     pantoprazole (PROTONIX) 20 mg tablet, Take 20 mg by mouth daily, Disp: , Rfl:     Polyethyl Glyc-Propyl Glyc PF 0 4-0 3 % SOLN, Apply 1 drop to eye every 2 (two) hours while awake, Disp: , Rfl:     Pyridoxine HCl (VITAMIN B6) 200 MG TABS, Take 0 5 tablets by mouth daily , Disp: , Rfl:     sodium chloride 1 g tablet, Take 1 g by mouth 3 (three) times a day, Disp: , Rfl:     Blood Culture:   No results found for: BLOODCX    Wound Culture:   No results found for: WOUNDCULT    Ins and Outs:  No intake/output data recorded  Physical Exam:   /75   Pulse 90   Temp 98 1 °F (36 7 °C)   Resp (!) 24   SpO2 97%   Gen: Alert and oriented to person, place, time  HEENT: EOMI, eyes clear, moist mucus membranes, hearing intact  Respiratory: Bilateral chest rise  No audible wheezing found  Cardiovascular: Regular Rate and Rhythm  Abdomen: soft nontender/nondistended  Musculoskeletal: left lower extremity  · Skin intact  No tenting or swelling   · Tender to palpation over distal tibial shaft   · Sensation intact dp/sp/tib/larissa/saph  · Intact ankle DF/PF, fhl/ehl  · Musculature of lower leg soft and compressible   · No pain with passive stretch  · Palpable DP pulse    Radiology:   I personally reviewed the films  X-rays and CT scan of left tib/fib shows undisplaced distal tibial shaft stress fracture with a distal fibular fracture that has undergone interval healing with callus formation     _*_*_*_*_*_*_*_*_*_*_*_*_*_*_*_*_*_*_*_*_*_*_*_*_*_*_*_*_*_*_*_*_*_*_*_*_*_*_*_*_*    Assessment:  80 y o  female status post mechanical fall that was unwitnessed with left tibial shaft fracture that is non-displaced  Patient may be managed non-operatively due to the nature of the fracture  Plan:   · WBAT left lower extremity in long leg brace that patient came in with   · There is no height or weight on file to calculate BMI  Al Coyle Recommend behavior modifications, nutrition and physical activity    · Dispo: Ortho will follow if patient gets admitted for ambulatory dysfunction   · Case seen in conjunction with senior resident on call      Carmella Allen MD

## 2022-04-06 VITALS
HEART RATE: 78 BPM | WEIGHT: 125.22 LBS | RESPIRATION RATE: 18 BRPM | TEMPERATURE: 97.3 F | OXYGEN SATURATION: 93 % | SYSTOLIC BLOOD PRESSURE: 138 MMHG | HEIGHT: 60 IN | BODY MASS INDEX: 24.58 KG/M2 | DIASTOLIC BLOOD PRESSURE: 62 MMHG

## 2022-04-06 PROBLEM — S82.202A: Status: ACTIVE | Noted: 2022-04-06

## 2022-04-06 LAB
ANION GAP SERPL CALCULATED.3IONS-SCNC: 6 MMOL/L (ref 4–13)
BASOPHILS # BLD AUTO: 0.09 THOUSANDS/ΜL (ref 0–0.1)
BASOPHILS NFR BLD AUTO: 1 % (ref 0–1)
BUN SERPL-MCNC: 11 MG/DL (ref 5–25)
CALCIUM SERPL-MCNC: 9.6 MG/DL (ref 8.3–10.1)
CHLORIDE SERPL-SCNC: 104 MMOL/L (ref 100–108)
CO2 SERPL-SCNC: 26 MMOL/L (ref 21–32)
CREAT SERPL-MCNC: 0.64 MG/DL (ref 0.6–1.3)
EOSINOPHIL # BLD AUTO: 0.29 THOUSAND/ΜL (ref 0–0.61)
EOSINOPHIL NFR BLD AUTO: 3 % (ref 0–6)
ERYTHROCYTE [DISTWIDTH] IN BLOOD BY AUTOMATED COUNT: 14.6 % (ref 11.6–15.1)
FLUAV RNA RESP QL NAA+PROBE: NEGATIVE
FLUBV RNA RESP QL NAA+PROBE: NEGATIVE
GFR SERPL CREATININE-BSD FRML MDRD: 75 ML/MIN/1.73SQ M
GLUCOSE SERPL-MCNC: 105 MG/DL (ref 65–140)
HCT VFR BLD AUTO: 36 % (ref 34.8–46.1)
HGB BLD-MCNC: 11.6 G/DL (ref 11.5–15.4)
IMM GRANULOCYTES # BLD AUTO: 0.04 THOUSAND/UL (ref 0–0.2)
IMM GRANULOCYTES NFR BLD AUTO: 0 % (ref 0–2)
LYMPHOCYTES # BLD AUTO: 2.86 THOUSANDS/ΜL (ref 0.6–4.47)
LYMPHOCYTES NFR BLD AUTO: 30 % (ref 14–44)
MAGNESIUM SERPL-MCNC: 1.7 MG/DL (ref 1.6–2.6)
MCH RBC QN AUTO: 29.7 PG (ref 26.8–34.3)
MCHC RBC AUTO-ENTMCNC: 32.2 G/DL (ref 31.4–37.4)
MCV RBC AUTO: 92 FL (ref 82–98)
MONOCYTES # BLD AUTO: 1.26 THOUSAND/ΜL (ref 0.17–1.22)
MONOCYTES NFR BLD AUTO: 13 % (ref 4–12)
NEUTROPHILS # BLD AUTO: 4.9 THOUSANDS/ΜL (ref 1.85–7.62)
NEUTS SEG NFR BLD AUTO: 53 % (ref 43–75)
NRBC BLD AUTO-RTO: 0 /100 WBCS
PHOSPHATE SERPL-MCNC: 3.4 MG/DL (ref 2.3–4.1)
PLATELET # BLD AUTO: 228 THOUSANDS/UL (ref 149–390)
PMV BLD AUTO: 9.6 FL (ref 8.9–12.7)
POTASSIUM SERPL-SCNC: 4.1 MMOL/L (ref 3.5–5.3)
RBC # BLD AUTO: 3.9 MILLION/UL (ref 3.81–5.12)
RSV RNA RESP QL NAA+PROBE: NEGATIVE
SARS-COV-2 RNA RESP QL NAA+PROBE: NEGATIVE
SODIUM SERPL-SCNC: 136 MMOL/L (ref 136–145)
TSH SERPL DL<=0.05 MIU/L-ACNC: 4.02 UIU/ML (ref 0.45–4.5)
WBC # BLD AUTO: 9.44 THOUSAND/UL (ref 4.31–10.16)

## 2022-04-06 PROCEDURE — 99238 HOSP IP/OBS DSCHRG MGMT 30/<: CPT | Performed by: PHYSICIAN ASSISTANT

## 2022-04-06 PROCEDURE — 99222 1ST HOSP IP/OBS MODERATE 55: CPT | Performed by: ORTHOPAEDIC SURGERY

## 2022-04-06 PROCEDURE — 0241U HB NFCT DS VIR RESP RNA 4 TRGT: CPT | Performed by: PHYSICIAN ASSISTANT

## 2022-04-06 PROCEDURE — 85025 COMPLETE CBC W/AUTO DIFF WBC: CPT | Performed by: INTERNAL MEDICINE

## 2022-04-06 PROCEDURE — 83735 ASSAY OF MAGNESIUM: CPT | Performed by: INTERNAL MEDICINE

## 2022-04-06 PROCEDURE — NC001 PR NO CHARGE: Performed by: ORTHOPAEDIC SURGERY

## 2022-04-06 PROCEDURE — 80048 BASIC METABOLIC PNL TOTAL CA: CPT | Performed by: INTERNAL MEDICINE

## 2022-04-06 PROCEDURE — 84443 ASSAY THYROID STIM HORMONE: CPT | Performed by: INTERNAL MEDICINE

## 2022-04-06 PROCEDURE — 84100 ASSAY OF PHOSPHORUS: CPT | Performed by: INTERNAL MEDICINE

## 2022-04-06 PROCEDURE — 97167 OT EVAL HIGH COMPLEX 60 MIN: CPT

## 2022-04-06 PROCEDURE — 97163 PT EVAL HIGH COMPLEX 45 MIN: CPT

## 2022-04-06 RX ORDER — CEPHALEXIN 500 MG/1
500 CAPSULE ORAL EVERY 6 HOURS
Qty: 16 CAPSULE | Refills: 0 | Status: SHIPPED | OUTPATIENT
Start: 2022-04-06 | End: 2022-04-10

## 2022-04-06 RX ORDER — OXYCODONE HYDROCHLORIDE 5 MG/1
5 TABLET ORAL EVERY 6 HOURS PRN
Qty: 20 TABLET | Refills: 0 | Status: SHIPPED | OUTPATIENT
Start: 2022-04-06 | End: 2022-04-16

## 2022-04-06 RX ORDER — OXYCODONE HYDROCHLORIDE 5 MG/1
2.5 TABLET ORAL EVERY 4 HOURS PRN
Status: DISCONTINUED | OUTPATIENT
Start: 2022-04-06 | End: 2022-04-06 | Stop reason: HOSPADM

## 2022-04-06 RX ADMIN — Medication 1 TABLET: at 08:09

## 2022-04-06 RX ADMIN — GLYCERIN, HYPROMELLOSE, POLYETHYLENE GLYCOL 1 DROP: .2; .2; 1 LIQUID OPHTHALMIC at 08:10

## 2022-04-06 RX ADMIN — LOSARTAN POTASSIUM 50 MG: 50 TABLET, FILM COATED ORAL at 08:09

## 2022-04-06 RX ADMIN — Medication 325 MG: at 08:09

## 2022-04-06 RX ADMIN — GLYCERIN, HYPROMELLOSE, POLYETHYLENE GLYCOL 1 DROP: .2; .2; 1 LIQUID OPHTHALMIC at 15:24

## 2022-04-06 RX ADMIN — METOPROLOL SUCCINATE 25 MG: 25 TABLET, FILM COATED, EXTENDED RELEASE ORAL at 08:09

## 2022-04-06 RX ADMIN — SODIUM CHLORIDE TAB 1 GM 1 G: 1 TAB at 15:24

## 2022-04-06 RX ADMIN — ACETAMINOPHEN 650 MG: 325 TABLET ORAL at 08:09

## 2022-04-06 RX ADMIN — CEPHALEXIN 500 MG: 500 CAPSULE ORAL at 10:26

## 2022-04-06 RX ADMIN — CEPHALEXIN 500 MG: 500 CAPSULE ORAL at 15:24

## 2022-04-06 RX ADMIN — DORZOLAMIDE HYDROCHLORIDE AND TIMOLOL MALEATE 1 DROP: 20; 5 SOLUTION OPHTHALMIC at 08:10

## 2022-04-06 RX ADMIN — CYANOCOBALAMIN TAB 500 MCG 1000 MCG: 500 TAB at 08:09

## 2022-04-06 RX ADMIN — CALCITONIN SALMON 1 SPRAY: 200 SPRAY, METERED NASAL at 08:10

## 2022-04-06 RX ADMIN — ENOXAPARIN SODIUM 40 MG: 40 INJECTION SUBCUTANEOUS at 08:10

## 2022-04-06 RX ADMIN — PANTOPRAZOLE SODIUM 20 MG: 20 TABLET, DELAYED RELEASE ORAL at 05:20

## 2022-04-06 RX ADMIN — GABAPENTIN 100 MG: 100 CAPSULE ORAL at 08:09

## 2022-04-06 RX ADMIN — Medication 2000 UNITS: at 08:09

## 2022-04-06 RX ADMIN — CEPHALEXIN 500 MG: 500 CAPSULE ORAL at 05:20

## 2022-04-06 RX ADMIN — ASPIRIN 81 MG: 81 TABLET, COATED ORAL at 08:09

## 2022-04-06 RX ADMIN — ACETAMINOPHEN 650 MG: 325 TABLET ORAL at 15:24

## 2022-04-06 RX ADMIN — SODIUM CHLORIDE TAB 1 GM 1 G: 1 TAB at 08:10

## 2022-04-06 RX ADMIN — GABAPENTIN 100 MG: 100 CAPSULE ORAL at 15:24

## 2022-04-06 NOTE — ASSESSMENT & PLAN NOTE
· Hypertensive urgency on admission, now improved   · Continue home dose metoprolol succinate 25 mg daily and losartan 50 mg daily  · Ensure adequate pain control

## 2022-04-06 NOTE — CASE MANAGEMENT
Case Management Assessment & Discharge Planning Note    Patient name Nam Montes  Location /-54 MRN [id number]  : 1925 Date 2022       Current Admission Date: 2022  Current Admission Diagnosis:Ambulatory dysfunction   Patient Active Problem List    Diagnosis Date Noted    Nondisplaced fracture of left tibia 2022    Closed fracture of shaft of left fibula 2022    CAD (coronary artery disease) 2022    Leukocytosis 09/10/2021    Hyponatremia 2021    Anemia 06/10/2021    ACP (advance care planning) 2021    Primary osteoarthritis of left knee 2021    Vascular dementia without behavioral disturbance (HCC) 2021    Numbness and tingling in left hand 2020    CKD (chronic kidney disease) stage 3, GFR 30-59 ml/min ([facility] [address] ) 2020    Aftercare for healing traumatic closed fracture of left ulna 2020    Mild cognitive impairment with memory loss 2020    Syncope and collapse 2020    Closed nondisplaced fracture of styloid process of left ulna 2020    Herpes zoster keratoconjunctivitis 2020    Vitamin D deficiency 2020    Localized edema 2020    Acute UTI 2020    Functional diarrhea 2020    URI, acute 2019    Primary osteoarthritis of right knee 10/09/2019    Acute right-sided low back pain without sciatica 10/09/2019    Overweight 10/08/2019    Dysuria 2019    Pain, joint, ankle and foot, left 2019    Synovitis of left ankle 2019    Acute left ankle pain 2019    Acute cystitis without hematuria 2019    Gastroesophageal reflux disease with esophagitis 2019    Chronic pain of left knee 2019    Acute lateral meniscal injury of right knee 2019    Ambulatory dysfunction 2019    History of left hip hemiarthroplasty 10/16/2018    Trigger middle finger of right hand 10/16/2018    Unstable knee, left 10/16/2018    S/p left hip fracture 09/12/2018    Iron deficiency anemia secondary to inadequate dietary iron intake 09/12/2018    Fracture follow-up 08/28/2018    S/P hip hemiarthroplasty 08/14/2018    Acute blood loss anemia 08/14/2018    Preoperative clearance 08/12/2018    Hypertensive urgency 08/12/2018    Auditory complaints of both ears 07/30/2018    Sterile pyuria 05/15/2018    Impacted cerumen of both ears 04/10/2018    Generalized osteoarthritis of multiple sites 07/13/2015    Hyperlipidemia 08/23/2012    Essential hypertension 08/20/2012    Osteoporosis 08/20/2012      LOS (days): 1  Geometric Mean LOS (GMLOS) (days): 3 00  Days to GMLOS:2 3     OBJECTIVE:    Risk of Unplanned Readmission Score: 19      Current admission status: Inpatient     Preferred Pharmacy:   [address] [place] [address] DR Marte [address]  Phone: [phone] Fax: [phone]    Primary Care Provider: Bear Harris MD    Primary Insurance: MEDICARE  Secondary Insurance: [facility]    ASSESSMENT:  [facility] Phone: [phone] (Mobile)               Advance Directives  Does patient have a [address]?: Yes  Does patient have Advance Directives?: Yes  Advance Directives: Living will,Power of  for health care,Power of  for finance  Primary Contact: Rafal Doherty, susi     Patient Information  Admitted from[de-identified] Home  Mental Status: Confused (CM attempted to complete CM open with pt, however she presented with confusion and responded "I don't know" when inquiring where she lives )  During Assessment patient was accompanied by: Not accompanied during assessment  Assessment information provided by[de-identified] Son  Primary Caregiver: Self  Support Systems: Friends/neighbors,Children,Family members  What city do you live in?: [place], [address] entry access options   Select all that apply : No steps to enter home  Type of Current Residence: Apartment,Other (Comment) (Assisted Living Apartment @ [address])  Upon entering residence, is there a bedroom on the main floor (no further steps)?: Yes  Upon entering residence, is there a bathroom on the main floor (no further steps)?: Yes  In the last 12 months, was there a time when you were not able to pay the mortgage or rent on time?: No  In the last 12 months, was there a time when you did not have a steady place to sleep or slept in a shelter (including now)?: No  Homeless/housing insecurity resource given?: N/A  Living Arrangements: Other (Comment) ([facility])    Activities of Daily Living Prior to Admission  Functional Status: Assistance  Completes ADLs independently?: No  Level of ADL dependence: Assistance  Ambulates independently?: No  Level of ambulatory dependence: Assistance  Does patient use assisted devices?: Yes  Assisted Devices (DME) used:  [facility] Chair  Does patient currently own DME?: Yes  What DME does the patient currently own?: [facility] Chair  Does patient have a history of Outpatient Therapy (PT/OT)?: No  Does the patient have a history of Short-Term Rehab?: Yes ([facility])  Does patient have a history of HHC?: Yes  Does patient currently have [place] ?: Yes    Current Home Health Care  Type of Current Home Care Services: [facility] [address] [facility] Other (please enter name in comment) ([address] [facility] staff)  [address] [facility] Provider[de-identified] PCP    Patient Information Continued  Income Source: Pension/[employer]  Within the past 12 months, you worried that your food would run out before you got the money to buy more : Never true  Within the past 12 months, the food you bought just didnt last and you didnt have money to get more : Never true  Food insecurity resource given?: N/A  Does patient receive dialysis treatments?: No  Does patient have a history of substance abuse?: No  Does patient have a history of Mental Health Diagnosis?: No    Means of Transportation  Means of Transport to Appts[de-identified] Other (Comment) (Facility)  In the past 12 months, has lack of transportation kept you from medical appointments or from getting medications?: No  In the past 12 months, has lack of transportation kept you from meetings, work, or from getting things needed for daily living?: No  Was application for public transport provided?: N/A    DISCHARGE DETAILS:    Discharge planning discussed with[de-identified] Son via phone; Pt at bedside; [address] staff  [place] of Choice: Yes  Comments - Freedom of Choice: Son in agreement with STR placement at [facility]  Son declined additional choices  CM contacted family/caregiver?: Yes  Were Treatment Team discharge recommendations reviewed with patient/caregiver?: Yes  Did patient/caregiver verbalize understanding of patient care needs?: Yes  Were patient/caregiver advised of the risks associated with not following Treatment Team discharge recommendations?: Yes    Contacts  Patient Contacts: son Kathy Heath  Relationship to Patient[de-identified] Family  Contact Method: Phone  Phone Number: [phone]  Reason/Outcome: Continuity of [facility]         Is the patient interested in [address] at discharge?: No    DME Referral Provided  Referral made for DME?: No    Other Referral/Resources/Interventions Provided:  Interventions: Short Term Rehab  Referral Comments: CM contacted Letitia Rivas [phone], DON at [facility]) at [facility] [address] SNF to inquire about pt's return -- Due to current recommendation for post-acute rehabilitation, Letitia Rivas states that pt is required to transfer to their SNF section to receive STR prior to returning to [facility]  CM spoke with son who is in agreement  Referral placed and accepted by Tyson Claros  in admissions  Covid test requested and pending      Treatment Team Recommendation: Short Term Rehab  Discharge Destination Plan[de-identified] Short Term Rehab  Transport at Discharge : BLS Ambulance  Dispatcher Contacted: Yes  Number/Name of Dispatcher: [facility]  Transported by [employer] and Unit #): [facility]  ETA of Transport (Date): 04/06/22  ETA of Transport (Time): 1630     Transfer Mode: Stretcher      Additional Comments: Son, provider, facility, and nursing aware of 4:30pm p/u time today  Covid test remains pending  No IMM required due to LOS <72hrs  CM will continue to follow to d/c       Accepting Facility Name, [address] [place] [address]   Receiving Facility/Agency Phone Number -- [phone]   Facility/Agency Fax Number -- [phone]

## 2022-04-06 NOTE — PLAN OF CARE
Problem: Potential for Falls  Goal: Patient will remain free of falls  Description: INTERVENTIONS:  - Educate patient/family on patient safety including physical limitations  - Instruct patient to call for assistance with activity   - Consult OT/PT to assist with strengthening/mobility   - Keep Call bell within reach  - Keep bed low and locked with side rails adjusted as appropriate  - Keep care items and personal belongings within reach  - Initiate and maintain comfort rounds  - Make Fall Risk Sign visible to staff  - Offer Toileting every  Hours, in advance of need  - Initiate/Maintainalarm  - Obtain necessary fall risk management equipment:   - Apply yellow socks and bracelet for high fall risk patients  - Consider moving patient to room near nurses station  Outcome: Progressing     Problem: MOBILITY - ADULT  Goal: Maintain or return to baseline ADL function  Description: INTERVENTIONS:  -  Assess patient's ability to carry out ADLs; assess patient's baseline for ADL function and identify physical deficits which impact ability to perform ADLs (bathing, care of mouth/teeth, toileting, grooming, dressing, etc )  - Assess/evaluate cause of self-care deficits   - Assess range of motion  - Assess patient's mobility; develop plan if impaired  - Assess patient's need for assistive devices and provide as appropriate  - Encourage maximum independence but intervene and supervise when necessary  - Involve family in performance of ADLs  - Assess for home care needs following discharge   - Consider OT consult to assist with ADL evaluation and planning for discharge  - Provide patient education as appropriate  Outcome: Progressing  Goal: Maintains/Returns to pre admission functional level  Description: INTERVENTIONS:  - Perform BMAT or MOVE assessment daily    - Set and communicate daily mobility goal to care team and patient/family/caregiver     - Collaborate with rehabilitation services on mobility goals if consulted  - Perform Range of Motion  times a day  - Reposition patient every  hours    - Dangle patient  times a day  - Stand patient  times a day  - Ambulate patient  times a day  - Out of bed to chair  times a day   - Out of bed for meals times a day  - Out of bed for toileting  - Record patient progress and toleration of activity level   Outcome: Progressing     Problem: PAIN - ADULT  Goal: Verbalizes/displays adequate comfort level or baseline comfort level  Description: Interventions:  - Encourage patient to monitor pain and request assistance  - Assess pain using appropriate pain scale  - Administer analgesics based on type and severity of pain and evaluate response  - Implement non-pharmacological measures as appropriate and evaluate response  - Consider cultural and social influences on pain and pain management  - Notify physician/advanced practitioner if interventions unsuccessful or patient reports new pain  Outcome: Progressing     Problem: INFECTION - ADULT  Goal: Absence or prevention of progression during hospitalization  Description: INTERVENTIONS:  - Assess and monitor for signs and symptoms of infection  - Monitor lab/diagnostic results  - Monitor all insertion sites, i e  indwelling lines, tubes, and drains  - Monitor endotracheal if appropriate and nasal secretions for changes in amount and color  - [place] appropriate cooling/warming therapies per order  - Administer medications as ordered  - Instruct and encourage patient and family to use good hand hygiene technique  - Identify and instruct in appropriate isolation precautions for identified infection/condition  Outcome: Progressing  Goal: Absence of fever/infection during neutropenic period  Description: INTERVENTIONS:  - Monitor WBC    Outcome: Progressing     Problem: SAFETY ADULT  Goal: Patient will remain free of falls  Description: INTERVENTIONS:  - Educate patient/family on patient safety including physical limitations  - Instruct patient to call for assistance with activity   - Consult OT/PT to assist with strengthening/mobility   - Keep Call bell within reach  - Keep bed low and locked with side rails adjusted as appropriate  - Keep care items and personal belongings within reach  - Initiate and maintain comfort rounds  - Make Fall Risk Sign visible to staff  - Offer Toileting every  Hours, in advance of need  - Initiate/Maintain alarm  - Obtain necessary fall risk management equipment:   - Apply yellow socks and bracelet for high fall risk patients  - Consider moving patient to room near nurses station  Outcome: Progressing  Goal: Maintain or return to baseline ADL function  Description: INTERVENTIONS:  -  Assess patient's ability to carry out ADLs; assess patient's baseline for ADL function and identify physical deficits which impact ability to perform ADLs (bathing, care of mouth/teeth, toileting, grooming, dressing, etc )  - Assess/evaluate cause of self-care deficits   - Assess range of motion  - Assess patient's mobility; develop plan if impaired  - Assess patient's need for assistive devices and provide as appropriate  - Encourage maximum independence but intervene and supervise when necessary  - Involve family in performance of ADLs  - Assess for home care needs following discharge   - Consider OT consult to assist with ADL evaluation and planning for discharge  - Provide patient education as appropriate  Outcome: Progressing  Goal: Maintains/Returns to pre admission functional level  Description: INTERVENTIONS:  - Perform BMAT or MOVE assessment daily    - Set and communicate daily mobility goal to care team and patient/family/caregiver  - Collaborate with rehabilitation services on mobility goals if consulted  - Perform Range of Motion  times a day  - Reposition patient every  hours    - Dangle patient  times a day  - Stand patient  times a day  - Ambulate patient  times a day  - Out of bed to chair  times a day   - Out of bed for [place] times a day  - Out of bed for toileting  - Record patient progress and toleration of activity level   Outcome: Progressing     Problem: DISCHARGE PLANNING  Goal: Discharge to home or other facility with appropriate resources  Description: INTERVENTIONS:  - Identify barriers to discharge w/patient and caregiver  - Arrange for needed discharge resources and transportation as appropriate  - Identify discharge learning needs (meds, wound care, etc )  - Arrange for interpretive services to assist at discharge as needed  - Refer to Case Management Department for coordinating discharge planning if the patient needs post-hospital services based on physician/advanced practitioner order or complex needs related to functional status, cognitive ability, or social support system  Outcome: Progressing     Problem: Knowledge Deficit  Goal: Patient/family/caregiver demonstrates understanding of disease process, treatment plan, medications, and discharge instructions  Description: Complete learning assessment and assess knowledge base    Interventions:  - Provide teaching at level of understanding  - Provide teaching via preferred learning methods  Outcome: Progressing

## 2022-04-06 NOTE — CONSULTS
Please see consult note written by Thomas Slade MD dated today for complete consult      Aung Segovia MD

## 2022-04-06 NOTE — ASSESSMENT & PLAN NOTE
· Patient with fall yesterday resulting in nondisplaced left tibial fracture, see plan below   · PT/OT recommending rehab, CM following   · Fall precautions

## 2022-04-06 NOTE — ASSESSMENT & PLAN NOTE
· UA with pyuria with bacteria; culture is pending  · Started on Keflex 500 mg q day  · Patient is unreliable historian

## 2022-04-06 NOTE — ASSESSMENT & PLAN NOTE
Patient with fall yesterday incurring a left tibial fracture nondisplaced and is being manage conservatively  Patient is here for further assessment of ambulatory dysfunction; occupational physical therapy consult with case management  It is written in orthopedic note that if the patient is admitted for ambulatory dysfunction they would remain as consult for in hospital follow-up  Patient needs to wear long leg brace during ambulation  Will get metabolic profile with CBC in the morning

## 2022-04-06 NOTE — ASSESSMENT & PLAN NOTE
Lab Results   Component Value Date    EGFR 67 04/05/2022    EGFR 63 02/07/2022    EGFR 73 02/06/2022    CREATININE 0 75 04/05/2022    CREATININE 0 79 02/07/2022    CREATININE 0 70 02/06/2022   Will continue to monitor creatinine

## 2022-04-06 NOTE — OCCUPATIONAL THERAPY NOTE
Occupational Therapy Evaluation     Patient Name: Clau Poon  RHWRJ'S Date: 4/6/2022  Problem List  Principal Problem:    Ambulatory dysfunction  Active Problems:    Essential hypertension    Osteoporosis    Iron deficiency anemia secondary to inadequate dietary iron intake    Acute cystitis without hematuria    CKD (chronic kidney disease) stage 3, GFR 30-59 ml/min ([facility])    Closed fracture of shaft of left fibula    CAD (coronary artery disease)    Nondisplaced fracture of left tibia    Past Medical History  Past Medical History:   Diagnosis Date    Arthritis     CAD (coronary artery disease)     Disease of thyroid gland     GERD (gastroesophageal reflux disease)     Hypertension     Mild cognitive impairment with memory loss 5/29/2020    Osteoarthritis     Osteoporosis     Vitamin D deficiency      Past Surgical History  Past Surgical History:   Procedure Laterality Date    APPENDECTOMY      HYSTERECTOMY      WA PARTIAL HIP REPLACEMENT Left 8/13/2018    Procedure: HEMIARTHROPLASTY HIP (BIPOLAR); Surgeon: Natasha Izquierdo MD;  Location:  MAIN OR;  Service: Orthopedics    ROTATOR CUFF REPAIR           04/06/22 0835   OT Last Visit   OT Visit Date 04/06/22   Note Type   Note type Evaluation   Restrictions/Precautions   Weight Bearing Precautions Per Order Yes   LLE Weight Bearing Per Order WBAT  (IN LONG LEG BRACE- PT WEARS AT BASELINE )   Braces or Orthoses   (LONG LEG BRACE/AFO)   Other Precautions Cognitive; Chair Alarm; Bed Alarm;WBS;Multiple lines; Fall Risk;Pain;Hard of hearing   Pain Assessment   Pain Assessment Tool 0-10   Pain Score No Pain   Home Living   Type of Home Assisted living  ([facility])   Home Layout One level   Bathroom Shower/Tub Walk-in shower   Bathroom Toilet Raised   Bathroom Equipment Grab bars in shower; Shower chair;Grab bars around toilet   [facility]; Wheelchair-manual   Prior Function   Level of [place] Needs assistance with ADLs and functional mobility; Needs assistance with IADLs   Lives With Facility staff   Receives Help From Personal care attendant   ADL Assistance Needs assistance   IADLs Needs assistance   Falls in the last 6 months 1 to 4   Vocational Retired   Lifestyle   [employer] PT [address] ADLS/IADLS AT BASELINE    [facility]    Service to Others RETIRED;    Intrinsic Gratification ENJOYS READING    Psychosocial   Psychosocial (WDL) WDL   ADL   Eating Assistance 5  Supervision/Setup   Grooming Assistance 5  Supervision/Setup   UB Bathing Assistance 4  Minimal Assistance   LB Bathing Assistance 2  Maximal Assistance   UB Dressing Assistance 4  Minimal Assistance   LB Dressing Assistance 2  Maximal [address]  2  Maximal Assistance   Functional Assistance 2  Maximal Assistance   Bed Mobility   Supine to Sit 5  Supervision   Additional items Increased time required;Verbal cues;LE management   Sit to Supine Unable to assess  (PT LEFT OOB WITH ALARM ON AND ALL NEEDS IN REACH )   Transfers   Sit to Stand 3  Moderate assistance   Additional items Assist x 2; Increased time required;Verbal cues   Stand to Sit 3  Moderate assistance   Additional items Assist x 2; Increased time required;Verbal cues   Stand pivot 3  Moderate assistance   Additional items Assist x 2; Increased time required;Verbal cues   Balance   Static Sitting Fair -   Static Standing Poor -   Ambulatory Poor -   Activity Tolerance   Activity Tolerance Patient limited by fatigue   Medical Staff Made Aware PT SEEN FOR CO-SESSION WITH SKILLED PHYSICAL THERAPIST 2' CLINICALLY UNSTABLE PRESENTATION, TRAUMATIC INJURIES, NEW PRECAUTIONS/LIMITATIONS, LIMITED ACTIVITY TOLERANCE AND PRESENT IMPAIRMENTS WHICH ARE A REGRESSION FROM THE PT'S BASELINE AND IMPACTING OVERALL OCCUPATIONAL PERFORMANCE      Nurse Made Aware APPROPRIATE TO SEE    RUE Assessment RUE Assessment WFL   LUE Assessment   LUE Assessment WFL   Cognition   Overall Cognitive Status Impaired   Arousal/Participation Alert; Cooperative   Attention Attends with cues to redirect   Orientation Level Oriented to person;Oriented to place; Disoriented to time;Disoriented to situation   Memory Decreased recall of precautions;Decreased recall of recent events;Decreased short term memory   Following Commands Follows one step commands with increased time or repetition   Comments PT WITH BASELINE MEMORY LOSS- ORIENTED TO PERSON/PLACE ONLY  POOR HISTORIAN ALTHOUGH PLEASANT AND COOPERATIVE T/O SESSION  ALARM ON FOR SAFETY  Assessment   Limitation Decreased ADL status; Decreased Safe judgement during ADL;Decreased cognition;Decreased endurance;Decreased self-care trans;Decreased high-level ADLs   Prognosis Fair   Assessment 81 YO Female SEEN FOR INITIAL OCCUPATIONAL THERAPY EVALUATION S/P FALL ON 4/4 WITH DX INCLUDING L TIBIA FX, MANAGED CONSERVATIVELY  PT READMITTED FROM FACILITY 2' PAIN UPON WEIGHT BEARING  PER ORTHO, CONT WBAT ON LLE IN LONG LEG BRACE/KAFO  PROBLEMS LIST INCLUDES Arthritis, CAD (coronary artery disease), Disease of thyroid gland, GERD (gastroesophageal reflux disease), Hypertension, Mild cognitive impairment with memory loss, Osteoarthritis, Osteoporosis, and Vitamin D deficiency  PT IS A QUESTIONABLE HISTORIAN; FROM [facility] WHERE IT IS BELIEVED SHE REQUIRES ASSIST WITH ADLS/IADLS HOWEVER ASSUME FACILITY HAS HAD A DIFFICULT TIME CARING FOR PT IF THEY SENT THE PT BACK TO hospitals FOR FURTHER EVALUATION/AMBULATORY DYSFUNCTION  PT CURRENTLY REQUIRES OVERALL MIN A WITH UB ADLS, MAX A WITH LB ADLS, AND MOD A X2 WITH SIT<->STAND TRANSFERS/SPT  PT IS LIMITED 2' PAIN, FATIGUE, IMPAIRED BALANCE, FALL RISK , LIMITED SAFETY AWARENESS/INSIGHT/JUDGEMENT, DISORIENTATION, WB RESTRICTIONS, ORTHOPEDIC RESTRICTIONS, OVERALL WEAKNESS/DECONDITIONING  and OVERALL LIMITED ACTIVITY TOLERANCE   PT EDUCATED ON CARRY OVER OF WB STATUS, DEEP BREATHING TECHNIQUES T/O ACTIVITY and CONTINUE PARTICIPATION IN SELF-CARE/MOBILITY WITH STAFF WHILE IN THE HOSPITAL   The patient's raw score on the AM-PAC Daily Activity inpatient short form is 16, standardized score is 35 96, less than 39 4  Patients at this level are likely to benefit from discharge to post-acute rehabilitation services  Please refer to the recommendation of the Occupational Therapist for safe discharge planning  FROM AN OCCUPATIONAL THERAPY PERSPECTIVE, PT WOULD BENEFIT FROM ADDITIONAL OT SERVICES IN AN INPT REHAB SETTING UPON D/C  WILL CONT TO FOLLOW TO ADDRESS THE BELOW DESCRIBED GOALS  Goals   Patient Goals TO GET OOB    LTG Time Frame 10-14   Long Term Goal #1 SEE BELOW    Plan   Treatment Interventions ADL retraining;Functional transfer training; Endurance training;Cognitive reorientation;Patient/family training;Equipment evaluation/education; Compensatory technique education; Energy conservation; Activityengagement   Goal Expiration Date 04/20/22   OT Frequency 2-3x/wk   Recommendation   OT Discharge Recommendation Post acute rehabilitation services   OT - OK to Discharge Yes   AM-PAC Daily Activity Inpatient   Lower Body Dressing 2   Bathing 2   Toileting 2   Upper Body Dressing 3   Grooming 3   Eating 4   Daily Activity Raw Score 16   Daily Activity Standardized Score (Calc for Raw Score >=11) 35 96   AM-PAC Applied Cognition Inpatient   Following a Speech/Presentation 3   Understanding Ordinary Conversation 4   Taking Medications 2   Remembering Where Things Are Placed or Put Away 2   Remembering List of 4-5 Errands 2   Taking Care of Complicated Tasks 2   Applied Cognition Raw Score 15   Applied Cognition Standardized Score 33 54   Modified Day Scale   Modified Day Scale 4       OCCUPATIONAL THERAPY GOALS TO BE MET WITHIN 14 DAYS:    -Pt will increase bed mobility to MIN A to participate in functional activities    -Pt will improve functional mobility and transfers to MIN A on/off all surfaces including toileting in order to lessen caregiver burden   -Pt will increase independence in all ADLS to MIN A in order to lessen caregiver burden   -Pt will improve activity tolerance to G for 20 min txment sessions in order to increase participation in self-care tasks      Documentation completed by Edwige Jimenez, [facility], OTR/L  [facility] OF THE [facility] Certified ID# [id number]

## 2022-04-06 NOTE — ASSESSMENT & PLAN NOTE
· Orthopedics following with plan for conservative management   · Weight-bearing as tolerated as per Orthopedics; needs to wear long leg brace during ambulation    · PT/OT recommending rehab, CM following   · Analgesics as needed

## 2022-04-06 NOTE — ASSESSMENT & PLAN NOTE
Patient: Yancy Benavides Date: 2021   : 1966 Attending: Gigi Govea MD   55 year old male      Current Complaint: MRSA septicemia; acute ischemia L index finger    All noted  Tmax 99.2    Review of Systems:  General:  No chills.    Respiratory:  No cough or shortness of breath.  Cardiovascular:  No chest pain or palpitations.  Gastrointestinal:  No diarrhea, nausea or vomiting.  Genitourinary:  No dysuria or frequency.  Skin:  No rash.  Musculoskeletal:  Denies significant pain.    Reviewed Pertinent: Medications, Surgical History, Radiology and Labs: Vanco, Clindamycin, Ceftazidime    Vital Last Value 24 Hour Range   Temperature 98.2 °F (36.8 °C) (21 1254) Temp  Min: 98.2 °F (36.8 °C)  Max: 99.2 °F (37.3 °C)   Pulse 75 (21 1254) Pulse  Min: 71  Max: 75   Respiratory 16 (21 125) Resp  Min: 16  Max: 16   Non-Invasive  Blood Pressure 120/62 (21 1254) BP  Min: 117/56  Max: 120/62   Pulse Oximetry 100 % (21 125) SpO2  Min: 100 %  Max: 100 %   Arterial   Blood Pressure   No data recorded     Vital Today Admit   Weight 115.3 kg (254 lb 3.2 oz) (21) Weight: 111.9 kg (246 lb 11.1 oz) (21)   Height N/A Height: 5' 9\" (175.3 cm) (21)   BMI N/A BMI (Calculated): 36.43 (21)     Weight over the past 48 Hours:  Patient Vitals for the past 48 hrs:   Weight   21 0900 120.2 kg (265 lb)   21 115.3 kg (254 lb 3.2 oz)      Intake/Output:  Last Stool Occurrence: 1 (21)    No intake/output data recorded.    I/O last 3 completed shifts:  In: 1822.4 [P.O.:360; I.V.:312.4; IV Piggyback:1150]  Out: 4000 [Other:4000]    Physical Exam:  VITAL SIGNS:    Visit Vitals  /62 (BP Location: RUE - Right upper extremity, Patient Position: Sitting)   Pulse 75   Temp 98.2 °F (36.8 °C) (Oral)   Resp 16   Ht 5' 9\" (1.753 m)   Wt 115.3 kg (254 lb 3.2 oz)   SpO2 100%   BMI 37.54 kg/m²     General:  Restful.  HEENT:  Anicteric.   Currently urine shows pyuria with bacteria; culture is pending  Started on Keflex 500 mg q day    Cardiovascular:  Regular rate and rhythm.  Pulmonary:  Clear to auscultation.  Abdomen:  Soft, non-tender, non-distended.  Genitourinary:  No suprapubic tenderness.  Extremities:  L bandaged after amputation.  Skin:  No rash elsewhere.    Laboratory Results:    Lab Results   Component Value Date    SODIUM 131 (L) 11/08/2021    POTASSIUM 4.6 11/08/2021    BUN 76 (H) 11/08/2021    CREATININE 8.53 (H) 11/08/2021    GLUCOSE 141 (H) 11/08/2021    WBC 5.3 11/02/2021    HGB 7.0 (L) 11/08/2021     11/02/2021    INR 1.2 11/02/2021     08/06/2021    PTT 27 11/02/2021    BILIRUBIN 0.6 11/02/2021    AST 27 11/02/2021    GPT 29 11/02/2021    ALKPT 208 (H) 11/02/2021    ALBUMIN 3.4 (L) 11/02/2021     Urinalysis:    Lab Results   Component Value Date    USPG 1.018 01/30/2021    UTPELC 42 (H) 11/07/2017    UWBC Large (A) 01/30/2021    URBC Moderate (A) 01/30/2021    UBILI Negative 01/30/2021    UPH 5.0 01/30/2021    UBACTR LARGE (A) 11/13/2017     Cultures:  Blood (11/2,3) - MRSA (Vanco JENELLE = 1)    Blood (11/4) - no growth    Imaging:  US - no LUE arterial thrombus detected    Assessment:   1. MRSA septicemia - third episode since Jan '21 (prior two episodes Jan and Jun '21 due to infected Permcaths; this episode likely due to ischemic L index finger)  2. Acute ischemia L index finger - etiology/duration unclear - S/P amputation 11/9  3. ESRD on KD (LUE AF graft - does not look infected)  4. Recent scrotal abscess (EntC and Proteus) Sept '21- resolved/healed  5. History of osteomyelitis R thumb Jun '21 (MRSA and Proteus) - had I&D Jun '21/completion amputation Aug '21  6. Anxiety/agitation - occurs frequently when he is septic or out of homeostatic equilibrium - better    Plan:  1. Continue current Vanco   2. Await final Cx' from the finger  3. Await HOLLY Preston MD (ID - pager [phone])

## 2022-04-06 NOTE — ASSESSMENT & PLAN NOTE
· Hemoglobin within normal limits   · Continue ferrous sulfate 325 mg daily  · Cyanocobalamin 1 mg daily

## 2022-04-06 NOTE — PROGRESS NOTES
Progress Note - Orthopedics   Florentino Flow 80 y o  female MRN: [id number]  Unit/Bed#: -01      Subjective:    80 y  o female with L tib fib fx  Doing well this AM, no complaints       Labs:  0   Lab Value Date/Time    HCT 37 9 04/05/2022 1643    HCT 36 9 02/07/2022 0459    HCT 40 3 02/06/2022 0544    HCT 37 1 11/24/2017 0812    HCT 37 0 11/14/2016 0736    HCT 34 3 (L) 09/07/2015 0600    HGB 12 0 04/05/2022 1643    HGB 12 3 02/07/2022 0459    HGB 13 0 02/06/2022 0544    HGB 12 3 11/24/2017 0812    HGB 12 3 11/14/2016 0736    HGB 11 2 (L) 09/07/2015 0600    INR 1 05 08/12/2018 1137    WBC 10 86 (H) 04/05/2022 1643    WBC 12 66 (H) 02/07/2022 0459    WBC 11 96 (H) 02/06/2022 0544    WBC 0-5 11/24/2017 0812    WBC 8 7 11/24/2017 0812    WBC 6 1 11/14/2016 0736       Meds:    Current Facility-Administered Medications:     acetaminophen (TYLENOL) tablet 650 mg, 650 mg, Oral, TID, Jude Nick MD, 650 mg at 04/05/22 2140    aluminum-magnesium hydroxide-simethicone (MYLANTA) oral suspension 30 mL, 30 mL, Oral, Q6H PRN, Jude Nick MD    aspirin (ECOTRIN LOW STRENGTH) EC tablet 81 mg, 81 mg, Oral, Daily, Jude Nick MD    calcitonin (salmon) (MIACALCIN) 200 units/act nasal spray 1 spray, 1 spray, Alternating Nares, Daily, Jude Nick MD    cephalexin (KEFLEX) capsule 500 mg, 500 mg, Oral, Q6H, Jude Nick MD, 500 mg at 04/06/22 8012    cholecalciferol (VITAMIN D3) tablet 2,000 Units, 2,000 Units, Oral, Daily, Jude Nick MD    cyanocobalamin (VITAMIN B-12) tablet 1,000 mcg, 1,000 mcg, Oral, Daily, Jude Nick MD    docusate sodium (COLACE) capsule 100 mg, 100 mg, Oral, BID PRN, Jude Nick MD    dorzolamide-timolol (COSOPT) 22 3-6 8 MG/ML ophthalmic solution 1 drop, 1 drop, Both Eyes, Daily, Jude Nick MD    enoxaparin (LOVENOX) subcutaneous injection 40 mg, 40 mg, Subcutaneous, Daily, Jude Nick MD    [START ON 4/7/2022] estradiol (ESTRACE) vaginal cream 1 g, 1 g, Vaginal, Once per day on Mon Thu, Ariana Gillette MD    ferrous sulfate tablet 325 mg, 325 mg, Oral, Daily With Breakfast, Ariana Gillette MD    gabapentin (NEURONTIN) capsule 100 mg, 100 mg, Oral, TID, Ariana Gillette MD, 100 mg at 04/05/22 2140    glycerin-hypromellose- (ARTIFICIAL TEARS) ophthalmic solution 1 drop, 1 drop, Ophthalmic, TID, Ariana Gillette MD    latanoprost (XALATAN) 0 005 % ophthalmic solution 1 drop, 1 drop, Both Eyes, HS, Ariana Gillette MD    losartan (COZAAR) tablet 50 mg, 50 mg, Oral, Daily, Ariana Gillette MD    metoprolol succinate (TOPROL-XL) 24 hr tablet 25 mg, 25 mg, Oral, Daily, Ariana Gillette MD    multivitamin-minerals (CENTRUM) tablet 1 tablet, 1 tablet, Oral, Daily, Ariana Gillette MD    ondansetron [facility]) injection 4 mg, 4 mg, Intravenous, Q6H PRN, Ariana Gillette MD    pantoprazole (PROTONIX) EC tablet 20 mg, 20 mg, Oral, Early Morning, Ariana Gillette MD, 20 mg at 04/06/22 8018    sodium chloride tablet 1 g, 1 g, Oral, TID, Ariana Gillette MD, 1 g at 04/05/22 2205    Blood Culture:   No results found for: BLOODCX    Wound Culture:   No results found for: WOUNDCULT    Ins and Outs:  I/O last 24 hours: In: -   Out: 150 [Urine:150]          Physical:  Vitals:    04/06/22 0100   BP: (!) 174/72   Pulse: 80   Resp: 18   Temp: (!) 97 4 °F (36 3 °C)   SpO2: 93%     Musculoskeletal: left Lower Extremity  · Skin intact  · TTP tib shaft  · Sensation intact to light touch larissa/saph/sp/dp/tib  · Motor intact EHL/FHL, ankle DF/PF  · 2+ DP pulse    Assessment:    89 y  o female with L tib fib fx  No plan for any surgical intervention        Plan:  · WBAT LLE in brace  · PT/OT  · Pain control  · DVT PPX  · Dispo: Ortho will follow    Aurea Shafer MD

## 2022-04-06 NOTE — DISCHARGE SUMMARY
[facility]  [facility][birth year][facility] 80 [facility]  [facility] MRN: [id number]  Unit/Bed#: -Sunitha Encounter: [id number]  Primary Care Provider: Rene Santiago MD   Date and time admitted to hospital: 4/5/2022 12:01 PM    * Ambulatory dysfunction  Assessment & Plan  · Patient with fall yesterday resulting in nondisplaced left tibial fracture, see plan below   · PT/OT recommending rehab, CM following   · Fall precautions     Nondisplaced fracture of left tibia  Assessment & Plan  · Orthopedics following with plan for conservative management   · Weight-bearing as tolerated as per Orthopedics; needs to wear long leg brace during ambulation  · PT/OT recommending rehab, CM following   · Analgesics as needed     Closed fracture of shaft of left fibula  Assessment & Plan  · Orthopedics following with plan for conservative management   · Weight-bearing as tolerated as per Orthopedics; needs to wear long leg brace during ambulation    · PT/OT recommending rehab, CM following   · Analgesics as needed     Acute cystitis without hematuria  Assessment & Plan  · UA with pyuria with bacteria; culture is pending  · Started on Keflex 500 mg q day  · Patient is unreliable historian     Essential hypertension  Assessment & Plan  · Hypertensive urgency on admission, now improved   · Continue home dose metoprolol succinate 25 mg daily and losartan 50 mg daily  · Ensure adequate pain control     CKD (chronic kidney disease) stage 3, GFR 30-59 ml/min [facility])  Assessment & Plan  Lab Results   Component Value Date    EGFR 75 04/06/2022    EGFR 67 04/05/2022    EGFR 63 02/07/2022    CREATININE 0 64 04/06/2022    CREATININE 0 75 04/05/2022    CREATININE 0 79 02/07/2022   · Renal function stable within baseline   · Avoid nephrotoxins and hypotension   · Monitor as needed     Iron deficiency anemia secondary to inadequate dietary iron intake  Assessment & Plan  · Hemoglobin within normal limits   · Continue ferrous sulfate 325 mg daily  · Cyanocobalamin 1 mg daily    Osteoporosis  Assessment & Plan  · On vitamin-D supplementation  · Continue Calcitonin  CAD (coronary artery disease)  Assessment & Plan  · Currently without chest discomfort or complaints  · Continue with ASA, metoprolol    Medical Problems             Resolved Problems  Date Reviewed: 4/6/2022    None              Discharging Physician / Practitioner: Sharyn Long PA-C  PCP: Bear Harris MD  Admission Date:   Admission Orders (From admission, onward)     Ordered        04/05/22 2039  Inpatient Admission  Once                      Discharge Date: 04/06/22    Consultations During Hospital Stay:  · Pulmonary     Procedures Performed:   · None    Significant Findings / Test Results:   · Outlined above     Incidental Findings:   · None      Test Results Pending at Discharge (will require follow up): · None      Outpatient Tests Requested: Follow-up with PCP within 1 week    Complications:  none    Reason for Admission: generalized weakness, abdominal pain     Hospital Course:   Cal Vaz is a 80 y o  female patient who originally presented to the hospital on 4/5/2022 due to fall, which resulted in nondisplaced left tibial/fibula fracture  Patient was evaluated by orthopedics who recommended non-operative management  Patient cleared for weight baring as tolerated  Pain was well controlled and BP improved  She was discharged to [address] [place], [place] [address] for rehabilitation  Please see above list of diagnoses and related plan for additional information  Condition at Discharge: fair    Discharge Day Visit / Exam:   Subjective:  Patient sleeping comfortably in chair, arouses easily to voice  She offers no complaints at this time, specifically denies pain  She is oriented to place but does not know why she is here  States year is 2024      Vitals: Blood Pressure: 146/72 (04/06/22 0756)  Pulse: 78 (04/06/22 0809)  Temperature: (!) 97 3 °F (36 3 °C) (04/06/22 7300)  Temp Source: Oral (04/06/22 0100)  Respirations: 18 (04/06/22 0756)  Height: 5' (152 4 cm) (04/06/22 0100)  Weight - Scale: 56 8 kg (125 lb 3 5 oz) (04/06/22 0100)  SpO2: 93 % (04/06/22 0100)  Exam:   Physical Exam  Vitals and nursing note reviewed  Constitutional:       General: She is not in acute distress  Cardiovascular:      Rate and Rhythm: Normal rate and regular rhythm  Pulmonary:      Effort: Pulmonary effort is normal  No respiratory distress  Breath sounds: No wheezing or rales  Musculoskeletal:      Right lower leg: No edema  Left lower leg: No edema  Skin:     General: Skin is warm and dry  Coloration: Skin is not pale  Findings: No erythema  Neurological:      General: No focal deficit present  Mental Status: She is alert  She is disoriented  Comments: Oriented to self and place, says year 2024, disoriented to situation         Discussion with Family: CM updated family with regard for plan to discahrge to rehab today  Discharge instructions/Information to patient and family:   See after visit summary for information provided to patient and family  Provisions for Follow-Up Care:  See after visit summary for information related to follow-up care and any pertinent home health orders  Disposition:   Home    Planned Readmission: no     Discharge Statement:  I spent 25 minutes discharging the patient  This time was spent on the day of discharge  I had direct contact with the patient on the day of discharge  Greater than 50% of the total time was spent examining patient, answering all patient questions, arranging and discussing plan of care with patient as well as directly providing post-discharge instructions  Additional time then spent on discharge activities  Discharge Medications:  See after visit summary for reconciled discharge medications provided to patient and/or family        **Please Note: This note may have been constructed using a voice recognition system**

## 2022-04-06 NOTE — PLAN OF CARE
Problem: PHYSICAL THERAPY ADULT  Goal: Performs mobility at highest level of function for planned discharge setting  See evaluation for individualized goals  Description: Treatment/Interventions: Functional transfer training,LE strengthening/ROM,Therapeutic exercise,Endurance training,Cognitive reorientation,Equipment eval/education,Bed mobility,Gait training,Spoke to nursing,OT          See flowsheet documentation for full assessment, interventions and recommendations  Note: Prognosis: Fair  Problem List: Decreased strength,Decreased range of motion,Decreased endurance,Impaired balance,Decreased mobility,Decreased cognition,Impaired hearing,Pain,Orthopedic restrictions  Assessment: Pt presented to [facility] on 4/6/22 for initial PT eval  PT reported to [facility] ED on 4/5/22 with a complaint of pain and inability to WB on LLE due to a previous fall on 4/4/22 and increased pain  The pts primary diagnosis is ambulatory dysfunction and closed fx of shaft of L fibula and is WBAT and to be wearing long leg brace during ambulation as per ortho  Pts PMH reveals arthritis, CAD, disease of thyroid, GERD, HTN, MCI with memory loss, osteoarthritis, osteoporosis, and vitamin D deficiency  Pt is considered to be high complexity due to Ongoing medical management for primary dx, Increased reliance on more restrictive AD compared to baseline, Decreased activity tolerance compared to baseline, Fall risk, Increased assistance needed from caregiver at current time, Cog status, Current WBS, Continuous pulse oximetry monitoring   Pt presented to initial PT eval with the following limitations; decreased strength, decreased balance, decreased endurance, increased pain, new WBS, increased reliance on a restrictive device, gait deviations and altered cognition  The pt needed ModAx2 with b/l  HHA in order to stand pivot into bedside chair  Pt was left OOB with all needs within reach and no further questions or concerns   Pt will continue to benefit from skilled PT during hospital stay in order to address the above limitations  PT recommends d/c to rehab  (Simultaneous filing  User may not have seen previous data )  Barriers to Discharge: Decreased caregiver support        PT Discharge Recommendation: Post acute rehabilitation services          See flowsheet documentation for full assessment

## 2022-04-06 NOTE — PLAN OF CARE
Problem: OCCUPATIONAL THERAPY ADULT  Goal: Performs self-care activities at highest level of function for planned discharge setting  See evaluation for individualized goals  Description: Treatment Interventions: ADL retraining,Functional transfer training,Endurance training,Cognitive reorientation,Patient/family training,Equipment evaluation/education,Compensatory technique education,Energy conservation,Activityengagement          See flowsheet documentation for full assessment, interventions and recommendations  Note: Limitation: Decreased ADL status,Decreased Safe judgement during ADL,Decreased cognition,Decreased endurance,Decreased self-care trans,Decreased high-level ADLs  Prognosis: Fair  Assessment: 81 YO Female SEEN FOR INITIAL OCCUPATIONAL THERAPY EVALUATION S/P FALL ON 4/4 WITH DX INCLUDING L TIBIA FX, MANAGED CONSERVATIVELY  PT READMITTED FROM FACILITY 2' PAIN UPON WEIGHT BEARING  PER ORTHO, CONT WBAT ON LLE IN LONG LEG BRACE/KAFO  PROBLEMS LIST INCLUDES Arthritis, CAD (coronary artery disease), Disease of thyroid gland, GERD (gastroesophageal reflux disease), Hypertension, Mild cognitive impairment with memory loss, Osteoarthritis, Osteoporosis, and Vitamin D deficiency  PT IS A QUESTIONABLE HISTORIAN; FROM [facility] WHERE IT IS BELIEVED SHE REQUIRES ASSIST WITH ADLS/IADLS HOWEVER ASSUME FACILITY HAS HAD A DIFFICULT TIME CARING FOR PT IF THEY SENT THE PT BACK TO hospitals FOR FURTHER EVALUATION/AMBULATORY DYSFUNCTION  PT CURRENTLY REQUIRES OVERALL MIN A WITH UB ADLS, MAX A WITH LB ADLS, AND MOD A X2 WITH SIT<->STAND TRANSFERS/SPT  PT IS LIMITED 2' PAIN, FATIGUE, IMPAIRED BALANCE, FALL RISK , LIMITED SAFETY AWARENESS/INSIGHT/JUDGEMENT, DISORIENTATION, WB RESTRICTIONS, ORTHOPEDIC RESTRICTIONS, OVERALL WEAKNESS/DECONDITIONING  and OVERALL LIMITED ACTIVITY TOLERANCE   PT EDUCATED ON CARRY OVER OF WB STATUS, DEEP BREATHING TECHNIQUES T/O ACTIVITY and CONTINUE PARTICIPATION IN SELF-CARE/MOBILITY WITH STAFF [address]   The patient's raw score on the AM-PAC Daily Activity inpatient short form is 16, standardized score is 35 96, less than 39 4  Patients at this level are likely to benefit from discharge to post-acute rehabilitation services  Please refer to the recommendation of the Occupational Therapist for safe discharge planning  FROM AN OCCUPATIONAL THERAPY PERSPECTIVE, PT WOULD BENEFIT FROM ADDITIONAL OT SERVICES IN AN INPT REHAB SETTING UPON D/C  WILL CONT TO FOLLOW TO ADDRESS THE BELOW DESCRIBED GOALS  OT Discharge Recommendation: Post acute rehabilitation services  OT - OK to Discharge:  Yes

## 2022-04-06 NOTE — DISCHARGE INSTRUCTIONS
Discharge Instructions - Orthopedics  Ojse Luis Huge 80 y o  female MRN: [id number]  Unit/Bed#: -01    Weight Bearing Status: You may bear weight on  Your left leg, only with your brace on    DVT prophylaxis  DVT prophylaxis for 28 days at primary team discretion     Pain:  Continue analgesics as directed    Dressing Instructions:   Please keep clean, dry and intact until follow up     Appt Instructions: If you do not have your appointment, please call the clinic at [phone] to follow up with Dr Abdelrahman Clemens in two weeks   Otherwise followup as scheduled     Contact the office sooner if you experience any increased numbness/tingling in the extremities        Miscellaneous:  ***

## 2022-04-06 NOTE — ASSESSMENT & PLAN NOTE
Lab Results   Component Value Date    EGFR 75 04/06/2022    EGFR 67 04/05/2022    EGFR 63 02/07/2022    CREATININE 0 64 04/06/2022    CREATININE 0 75 04/05/2022    CREATININE 0 79 02/07/2022   · Renal function stable within baseline   · Avoid nephrotoxins and hypotension   · Monitor as needed

## 2022-04-06 NOTE — H&P
Dari Saxena 73 9/11/1925, 80 y o  female MRN: [id number]  Unit/Bed#: ED 29 Encounter: [id number]  Primary Care Provider: Cathie Jefferson MD   Date and time admitted to hospital: 4/5/2022 12:01 PM    * Ambulatory dysfunction  Assessment & Plan  Patient with fall yesterday incurring a left tibial fracture nondisplaced and is being manage conservatively  Patient is here for further assessment of ambulatory dysfunction; occupational physical therapy consult with case management  It is written in orthopedic note that if the patient is admitted for ambulatory dysfunction they would remain as consult for in hospital follow-up  Patient needs to wear long leg brace during ambulation  Will get metabolic profile with CBC in the morning  Essential hypertension  Assessment & Plan  Continue metoprolol succinate 25 mg daily  Losartan 50 mg daily  CAD (coronary artery disease)  Assessment & Plan  Currently without chest discomfort or complaints  Metoprolol succinate 25 mg daily to continue  Closed fracture of shaft of left fibula  Assessment & Plan  Orthopedics to continue following; being managed conservatively  Weight-bearing as tolerated as per Orthopedics; needs to wear long leg brace during ambulation  Case management with physical therapy occupational therapy consult  CKD (chronic kidney disease) stage 3, GFR 30-59 ml/min [facility])  Assessment & Plan  Lab Results   Component Value Date    EGFR 67 04/05/2022    EGFR 63 02/07/2022    EGFR 73 02/06/2022    CREATININE 0 75 04/05/2022    CREATININE 0 79 02/07/2022    CREATININE 0 70 02/06/2022   Will continue to monitor creatinine  Acute cystitis without hematuria  Assessment & Plan  Currently urine shows pyuria with bacteria; culture is pending  Started on Keflex 500 mg q day      Iron deficiency anemia secondary to inadequate dietary iron intake  Assessment & Plan  Continue ferrous sulfate 325 mg daily   Cyanocobalamin 1 mg daily  Osteoporosis  Assessment & Plan  On vitamin-D supplementation  Continue Calcitonin  VTE Prophylaxis: Enoxaparin (Lovenox)  / sequential compression device   Code Status: Level 3 - DNAR and DNI as per outpatient DNI DNR orders  POLST: There is no POLST form on file for this patient (pre-hospital)    Anticipated Length of Stay:  Patient will be admitted on an Inpatient basis with an anticipated length of stay of  greater than 2 midnights  Justification for Hospital Stay: Please see detailed plans noted above  Chief Complaint:     Status post fall; ambulatory dysfunction  History of Present Illness:  Naz Barber is a 80 y o  female who has past medical history significant for iron deficiency anemia, osteoporosis, ambulatory dysfunction, frequent UTIs, CAD, vascular dementia, osteoarthritis  The patient was seen yesterday status post fall and was diagnosed to have a tibial fracture however nondisplaced and currently being managed conservatively  Unfortunately the patient was complaining of severe pain upon weight-bearing with difficulty of ambulation hence the [facility]  Patient was seen by Orthopedics and at this point do recommend that the patient continues weight-bearing as tolerated as long as the patient wears the long leg brace  Other than that, the patient denies any shortness of breath, no chest pain, no abdominal pain or nausea vomiting  She does not complain of any fever  She does not even complain of hypogastric discomfort  Incidentally the urine showed the presence of pyuria with bacteria  Patient is therefore started on Keflex for presumptive UTI  Urine cultures pending  Currently, patient is comfortable and lying flat on bed without any complaints  Patient denies any shortness of breath      Review of Systems:    Constitutional:  Denies fever or chills   Eyes:  Denies change in visual acuity   HENT:  Denies nasal congestion or sore throat   Respiratory:  Denies cough or shortness of breath   Cardiovascular:  Denies chest pain or edema   GI:  Denies abdominal pain, nausea, vomiting, bloody stools or diarrhea   :  Denies dysuria   Musculoskeletal:  Denies back pain or joint pain as long as patient is lying flat on bed  Integument:  Denies rash   Neurologic:  Denies headache, focal weakness or sensory changes   Endocrine:  Denies polyuria or polydipsia   Lymphatic:  Denies swollen glands   Psychiatric:  Denies depression or anxiety     Past Medical and Surgical History:   Past Medical History:   Diagnosis Date    Arthritis     CAD (coronary artery disease)     Disease of thyroid gland     GERD (gastroesophageal reflux disease)     Hypertension     Mild cognitive impairment with memory loss 5/29/2020    Osteoarthritis     Osteoporosis     Vitamin D deficiency      Past Surgical History:   Procedure Laterality Date    APPENDECTOMY      HYSTERECTOMY      NC PARTIAL HIP REPLACEMENT Left 8/13/2018    Procedure: HEMIARTHROPLASTY HIP (BIPOLAR); Surgeon: Emily Prajapati MD;  [facility]: [facility];  Service: Orthopedics    ROTATOR CUFF REPAIR         Meds/Allergies:  (Not in a hospital admission)      Allergies: No Known Allergies  History:  Marital Status:    Occupation:  She used to be   Patient Pre-hospital Living Situation:  Lives in senior living center  Patient Pre-hospital Level of Mobility:  Generally ambulatory but needing assistance; currently needing long leg brace  Needs assistance    Patient Pre-hospital Diet Restrictions:  Regular diet  Substance Use History:   Social History     Substance and Sexual Activity   Alcohol Use Not Currently     Social History     Tobacco Use   Smoking Status Former Smoker   Smokeless Tobacco Never Used     Social History     Substance and Sexual Activity   Drug Use No       Family History:  Family History   Problem Relation Age of Onset    Diabetes type II Father     Heart disease Brother     Hypertension Family     Arthritis Family        Physical Exam:     Vitals:   Blood Pressure: 170/75 (04/05/22 1209)  Pulse: 90 (04/05/22 1430)  Temperature: 98 1 °F (36 7 °C) (04/05/22 1209)  Respirations: (!) 24 (04/05/22 1209)  SpO2: 97 % (04/05/22 1430)    Constitutional:  Well developed, well nourished, no acute distress, non-toxic appearance   Eyes:  PERRL, conjunctiva normal   HENT:  Atraumatic, external ears normal, nose normal, oropharynx moist, no pharyngeal exudates  Neck- normal range of motion, no tenderness, supple   Respiratory:  No respiratory distress, normal breath sounds, no rales, no wheezing   Cardiovascular:  Normal rate, normal rhythm, no murmurs, no gallops, no rubs   GI:  Soft, nondistended, normal bowel sounds, nontender, no organomegaly, no mass, no rebound, no guarding   :  No costovertebral angle tenderness   Musculoskeletal:  No edema, left lower leg pain when palpated, otherwise without of use deformities  Back- no tenderness  Integument:  Well hydrated, no rash   Lymphatic:  No lymphadenopathy noted   Neurologic:  Alert &awake, communicative, CN 2-12 normal, normal motor function, normal sensory function, no focal deficits noted   Psychiatric:  Speech and behavior appropriate for age      Lab Results: I have personally reviewed pertinent reports  Results from last 7 days   Lab Units 04/05/22  1643   WBC Thousand/uL 10 86*   HEMOGLOBIN g/dL 12 0   HEMATOCRIT % 37 9   PLATELETS Thousands/uL 260   NEUTROS PCT % 57   LYMPHS PCT % 28   MONOS PCT % 12   EOS PCT % 2     Results from last 7 days   Lab Units 04/05/22  1643   POTASSIUM mmol/L 4 3   CHLORIDE mmol/L 103   CO2 mmol/L 27   BUN mg/dL 11   CREATININE mg/dL 0 75   CALCIUM mg/dL 9 8               Imaging: I have personally reviewed pertinent reports        XR knee 4+ views left injury    Result Date: 4/5/2022  Narrative: LEFT KNEE INDICATION:   fall yesterday, pain  COMPARISON:  Radiographs of the left knee July 20, 2017  Correlation with radiographs of the left tibia and fibula April 4, 2022 VIEWS:  XR KNEE 4+ VW LEFT INJURY FINDINGS: There is no acute fracture or dislocation  There is no joint effusion  Callus formation along the proximal fibula likely represents sequelae of remote injury  Severe osteoarthrosis of the lateral femorotibial compartment with joint space narrowing and subchondral sclerosis  Moderate osteoarthrosis of the medial femorotibial and patellar femoral compartments  No lytic or blastic osseous lesion  There are atherosclerotic calcifications  Soft tissues are otherwise unremarkable  Impression: No acute osseous abnormality  Tricompartmental osteoarthrosis most severe at the lateral femorotibial compartments  Workstation performed: [id number]     XR tibia fibula 2 views LEFT    Result Date: 4/5/2022  Narrative: LEFT TIBIA AND FIBULA INDICATION:  History of fracture, repeat fall  COMPARISON:  Left ankle 3/8/2022, left tibia fibula 2/4/2022 VIEWS:  XR TIBIA FIBULA 2 VW LEFT Images: 4 FINDINGS: Bony demineralization  Anatomic alignment of healing distal fibula shaft fracture  Old, healed proximal fibular fracture  Degenerative changes of the knee noted  Small plantar calcaneal spur  No lytic or blastic osseous lesion  There are atherosclerotic calcifications  Soft tissues are otherwise unremarkable  Impression: No acute osseous abnormality  Continued healing of distal fibular fracture in anatomic alignment  Workstation performed: [id number]     XR ankle 3+ views LEFT    Result Date: 4/5/2022  Narrative: LEFT ANKLE INDICATION:   fall yesterday, pain  COMPARISON:  Radiographs of the left ankle March 8, 2022 VIEWS:  XR ANKLE 3+ VW LEFT FINDINGS: Interval callus formation along the distal fibular fracture  Fracture fragments are near-anatomic alignment   Linear sclerosis along the lateral margin of the distal tibial shaft is new since March 8, 2022  There is subtle cortical discontinuity evident on the oblique view  The sclerosis is at the same level as the fibular fracture  There is adjacent possible  subtle periosteal reaction, which appears new since March 8, 2022  No lytic or blastic osseous lesion  Soft tissues are unremarkable  Impression: Nondisplaced fracture of the left tibial shaft is new since March 8, 2022  Interval partial healing of the distal fibular fracture  The study was marked in [facility] for immediate notification  Workstation performed: [id number]     XR ankle 3+ vw left    Result Date: 3/12/2022  Narrative: LEFT ANKLE INDICATION:   Z09: Encounter for follow-up examination after completed treatment for conditions other than malignant neoplasm  COMPARISON:  Left ankle x-ray 2/4/2022 VIEWS:  XR ANKLE 3+ VW LEFT Images: 3 FINDINGS: Stable alignment of distal fibular fracture with bone callus formation  Degenerative changes of ankle joint  Calcaneal spur  No lytic or blastic osseous lesion  Soft tissues are unremarkable  Impression: Healing distal fibular fracture with stable alignment  Workstation performed: [id number]     CT head without contrast    Result Date: 4/4/2022  Narrative: CT BRAIN - WITHOUT CONTRAST INDICATION:   Head trauma, minor (Age >= 65y) unwitnessed fall  COMPARISON:  Head CT 2/4/2022  TECHNIQUE:  CT examination of the brain was performed  In addition to axial images, sagittal and coronal 2D reformatted images were created and submitted for interpretation  Radiation dose length product (DLP) for this visit:  820 79 mGy-cm   This examination, like all CT scans performed in the [facility], was performed utilizing techniques to minimize radiation dose exposure, including the use of iterative  reconstruction and automated exposure control  IMAGE QUALITY:  Diagnostic   FINDINGS: PARENCHYMA: Decreased attenuation is noted in periventricular and subcortical white matter demonstrating an appearance that is statistically most likely to represent mild to moderate microangiopathic change; this appearance is similar when compared to most recent prior examination  No CT signs of acute infarction  No intracranial mass, mass effect or midline shift  No acute parenchymal hemorrhage  VENTRICLES AND EXTRA-AXIAL SPACES:  Normal for the patient's age  VISUALIZED ORBITS AND PARANASAL SINUSES:  Bilateral lens replacement  CALVARIUM AND EXTRACRANIAL SOFT TISSUES:  Normal      Impression: No acute intracranial abnormality  Workstation performed: [id number]     CT cervical spine without contrast    Result Date: 4/4/2022  Narrative: CT CERVICAL SPINE - WITHOUT CONTRAST INDICATION:   Neck trauma (Age >= 65y) fall  COMPARISON:  CT cervical spine 2/4/2022 TECHNIQUE:  CT examination of the cervical spine was performed without intravenous contrast   Contiguous axial images were obtained  Sagittal and coronal reconstructions were performed  Radiation dose length product (DLP) for this visit:  268 79 mGy-cm   This examination, like all CT scans performed in the [facility], was performed utilizing techniques to minimize radiation dose exposure, including the use of iterative  reconstruction and automated exposure control  IMAGE QUALITY:  Diagnostic  FINDINGS: ALIGNMENT:  Normal alignment of the cervical spine  No subluxation  VERTEBRAL BODIES:  No fracture  DEGENERATIVE CHANGES:  Moderate multilevel cervical degenerative changes are noted  No critical central canal stenosis  PREVERTEBRAL AND PARASPINAL SOFT TISSUES: 8 mm hypodense nodule in the right lobe of the thyroid    Incidental discovery of one or more thyroid nodule(s) measuring less than 1 5 cm and without suspicious features is noted in this patient who is above 28years old; according to guidelines published in the February 2015 white paper on incidental thyroid nodules in the Journal of the [employer] of Radiology [facility]), no further evaluation is recommended  THORACIC INLET:  Biapical pleural-parenchymal changes  Impression: No cervical spine fracture or traumatic malalignment  Multilevel degenerative changes  Workstation performed: [id number]     VAS lower limb venous duplex study, unilateral/limited    Result Date: 4/5/2022  Narrative:  [facility] REPORT CLINICAL: Indications: Patient presents with left lower extremity pain x few days  Operative History: Bilateral vein stripping Risk Factors The patient has history of HTN, Hyperlipidemia and previous smoking (quit >10yrs ago)  She has no history of Obesity or Diabetes  CONCLUSION:  Impression: RIGHT LOWER LIMB LIMITED: Evaluation shows no evidence of thrombus in the common femoral vein  Doppler evaluation shows a normal response to augmentation maneuvers  LEFT LOWER LIMB: No evidence of acute or chronic deep vein thrombosis No evidence of superficial thrombophlebitis noted  Doppler evaluation shows a normal response to augmentation maneuvers  Popliteal, posterior tibial and anterior tibial arterial Doppler waveforms are triphasic  Technical findings were given to Oscar Watt PA-C  SIGNATURE: Electronically Signed by: Darryle Misty on 2022-04-05 04:12:44 PM    CT lower extremity wo contrast left    Result Date: 4/5/2022  Narrative: CT left tibia-fibula without IV contrast INDICATION: new left tibia fracture  COMPARISON: X-rays yesterday and 2/4/22 TECHNIQUE: CT examination of the above was performed  This examination, like all CT scans performed in the [facility], was performed utilizing techniques to minimize radiation dose exposure, including the use of iterative reconstruction  and automated exposure control software  Sagittal and coronal two dimensional reconstructed images were also submitted for interpretation   Rad dose  291 mGy-cm FINDINGS: OSSEOUS STRUCTURES:  Healing distal fibular diaphyseal fracture There is an incomplete fracture along the lateral aspect of the distal tibial diaphysis at the same level as the talar fracture, seen best on series 400/31, with no significant displacement  Depression is noted in the posterior aspect of the lateral tibial plateau, likely from old trauma Severe osteopenia VISUALIZED MUSCULATURE:  Unremarkable  SOFT TISSUES:  Baker's cyst Atherosclerosis Venous varicosities OTHER PERTINENT FINDINGS:  None  Impression: 1  Incomplete lateral tibial fracture 2  Healing fibular fracture Workstation performed: [id number]         ** Please Note: Dragon 360 Dictation voice to text software was used in the creation of this document   **

## 2022-04-06 NOTE — ASSESSMENT & PLAN NOTE
Orthopedics to continue following; being managed conservatively  Weight-bearing as tolerated as per Orthopedics; needs to wear long leg brace during ambulation  Case management with physical therapy occupational therapy consult

## 2022-04-07 LAB
BACTERIA UR CULT: ABNORMAL
BACTERIA UR CULT: ABNORMAL

## 2022-04-25 NOTE — PHYSICIAN ADVISOR
Current patient class: Inpatient  The patient is currently on Hospital Day: 2 at [address] [place] [address]      The patient was admitted to the hospital  on 4/5/22 at  8:39 PM for the following diagnosis:  Leg pain [M79 606]  Fracture of tibial shaft, left, closed [S82 202A]  Ambulatory dysfunction [R26 2]     After review of the relevant documentation, labs, vital signs and test results, this is a PROVIDER LIABLE case  In this particular case the patient was admitted to the hospital as an inpatient  The patient however failed to satisfy the 2 midnight benchmark and closer scrutiny of the case was warranted  After review of the patient presentation and relevant labs the patient was most appropriate for observation or outpatient class at the time of admission  Given that this patient has already been discharged prior to this review they become a provider liable case  Rationale is as follows: The patient presented after a fall and was diagnosed with a tibial fracture which would be managed conservatively  The patient returned to the hospital from the senior living facility because she had pain upon weight-bearing  The patient was admitted as an inpatient for ambulatory dysfunction  The patient's pain improved after one midnight and she was able to be discharged and would receive rehabilitation  Based on the diagnosis and length of stay, observation class was appropriate      The patients vitals on arrival were   ED Triage Vitals   Temperature Pulse Respirations Blood Pressure SpO2   04/05/22 1209 04/05/22 1209 04/05/22 1209 04/05/22 1209 04/05/22 1209   98 1 °F (36 7 °C) (!) 108 (!) 24 170/75 95 %      Temp Source Heart Rate Source Patient Position - Orthostatic VS BP Location FiO2 (%)   04/06/22 0100 04/05/22 2100 04/05/22 2100 04/05/22 2100 --   Oral Monitor Lying Left arm       Pain Score       04/05/22 1209       No Pain           Past Medical History:   Diagnosis Date  Arthritis     CAD (coronary artery disease)     Disease of thyroid gland     GERD (gastroesophageal reflux disease)     Hypertension     Mild cognitive impairment with memory loss 5/29/2020    Osteoarthritis     Osteoporosis     Vitamin D deficiency      Past Surgical History:   Procedure Laterality Date    APPENDECTOMY      HYSTERECTOMY      [place] PARTIAL HIP REPLACEMENT Left 8/13/2018    Procedure: HEMIARTHROPLASTY HIP (BIPOLAR); Surgeon: Anastasia Garcia MD;  [facility];  Service: Orthopedics    ROTATOR CUFF REPAIR             Consults have been placed to:   IP CONSULT TO ORTHOPEDIC SURGERY  IP CONSULT TO ORTHOPEDIC SURGERY    Vitals:    04/06/22 0100 04/06/22 0756 04/06/22 0809 04/06/22 1207   BP: (!) 174/72 146/72  138/62   BP Location: Left arm      Pulse: 80  78    Resp: 18 18     Temp: (!) 97 4 °F (36 3 °C) (!) 97 3 °F (36 3 °C)     TempSrc: Oral      SpO2: 93%      Weight: 56 8 kg (125 lb 3 5 oz)      Height: 5' (1 524 m)          Most recent labs:    No results for input(s): WBC, HGB, HCT, PLT, K, NA, CALCIUM, BUN, CREATININE, LIPASE, AMYLASE, INR, TROPONINI, CKTOTAL, AST, ALT, ALKPHOS, BILITOT in the last 72 hours  Scheduled Meds:  Continuous Infusions:No current facility-administered medications for this encounter  PRN Meds:      Surgical procedures (if appropriate):

## 2022-04-27 NOTE — PHYSICIAN ADVISOR
Current patient class: Inpatient  The patient is currently on Hospital Day: 2 at [address] [place] [address]      The patient was admitted to the hospital  on 4/5/22 at  8:39 PM for the following diagnosis:  Leg pain [M79 606]  Fracture of tibial shaft, left, closed [S82 202A]  Ambulatory dysfunction [R26 2]     After review of the relevant documentation, labs, vital signs and test results, this is a PROVIDER LIABLE case  In this particular case the patient was admitted to the hospital as an inpatient  The patient however failed to satisfy the 2 midnight benchmark and closer scrutiny of the case was warranted  After review of the patient presentation and relevant labs the patient was most appropriate for observation or outpatient class at the time of admission  Given that this patient has already been discharged prior to this review they become a provider liable case  Rationale is as follows: The patient is a 80 yrs   Female who presented to the ED at 4/5/2022 12:01 PM with a chief complaint of Leg Pain (From [facility]  Fall yesterday - LLE pain  Facility requesting more imaging  No other complaints ) Patient was seen 4/4/22 for fall  Patient who presented to the hospital on 4/5/2022 for continued LLE pain s/p fall  Ct scan showed nondisplaced left tibial/fibula fracture  Patient was evaluated by orthopedics who recommended non-operative management  Patient cleared for weight baring as tolerated  She was discharged for rehabilitation on hospital day 2        The patients vitals on arrival were   ED Triage Vitals   Temperature Pulse Respirations Blood Pressure SpO2   04/05/22 1209 04/05/22 1209 04/05/22 1209 04/05/22 1209 04/05/22 1209   98 1 °F (36 7 °C) (!) 108 (!) 24 170/75 95 %      Temp Source Heart Rate Source Patient Position - Orthostatic VS BP Location FiO2 (%)   04/06/22 0100 04/05/22 2100 04/05/22 2100 04/05/22 2100 --   Oral Monitor Lying Left arm Pain Score       04/05/22 1209       No Pain           Past Medical History:   Diagnosis Date    Arthritis     CAD (coronary artery disease)     Disease of thyroid gland     GERD (gastroesophageal reflux disease)     Hypertension     Mild cognitive impairment with memory loss 5/29/2020    Osteoarthritis     Osteoporosis     Vitamin D deficiency      Past Surgical History:   Procedure Laterality Date    APPENDECTOMY      HYSTERECTOMY      [place] PARTIAL HIP REPLACEMENT Left 8/13/2018    Procedure: HEMIARTHROPLASTY HIP (BIPOLAR); Surgeon: Nely Patel MD;  [facility];  Service: Orthopedics    ROTATOR CUFF REPAIR             Consults have been placed to:   IP CONSULT TO ORTHOPEDIC SURGERY  IP CONSULT TO ORTHOPEDIC SURGERY    Vitals:    04/06/22 0100 04/06/22 0756 04/06/22 0809 04/06/22 1207   BP: (!) 174/72 146/72  138/62   BP Location: Left arm      Pulse: 80  78    Resp: 18 18     Temp: (!) 97 4 °F (36 3 °C) (!) 97 3 °F (36 3 °C)     TempSrc: Oral      SpO2: 93%      Weight: 56 8 kg (125 lb 3 5 oz)      Height: 5' (1 524 m)          Most recent labs:    No results for input(s): WBC, HGB, HCT, PLT, K, NA, CALCIUM, BUN, CREATININE, LIPASE, AMYLASE, INR, TROPONINI, CKTOTAL, AST, ALT, ALKPHOS, BILITOT in the last 72 hours  Scheduled Meds:  Continuous Infusions:No current facility-administered medications for this encounter  PRN Meds:      Surgical procedures (if appropriate):

## 2022-05-03 ENCOUNTER — OFFICE VISIT (OUTPATIENT)
Dept: OBGYN CLINIC | Facility: HOSPITAL | Age: 87
End: 2022-05-03
Payer: MEDICARE

## 2022-05-03 ENCOUNTER — HOSPITAL ENCOUNTER (OUTPATIENT)
Dept: RADIOLOGY | Facility: HOSPITAL | Age: 87
Discharge: HOME | End: 2022-05-03
Attending: ORTHOPAEDIC SURGERY
Payer: MEDICARE

## 2022-05-03 VITALS
BODY MASS INDEX: 24.46 KG/M2 | DIASTOLIC BLOOD PRESSURE: 66 MMHG | HEIGHT: 60 IN | SYSTOLIC BLOOD PRESSURE: 150 MMHG | HEART RATE: 69 BPM

## 2022-05-03 DIAGNOSIS — S82.225D CLOSED NONDISPLACED TRANSVERSE FRACTURE OF SHAFT OF LEFT TIBIA WITH ROUTINE HEALING, SUBSEQUENT ENCOUNTER: Primary | ICD-10-CM

## 2022-05-03 DIAGNOSIS — M17.12 PRIMARY OSTEOARTHRITIS OF LEFT KNEE: ICD-10-CM

## 2022-05-03 DIAGNOSIS — Z09 FRACTURE FOLLOW-UP: ICD-10-CM

## 2022-05-03 PROCEDURE — 99024 POSTOP FOLLOW-UP VISIT: CPT | Performed by: ORTHOPAEDIC SURGERY

## 2022-05-03 PROCEDURE — 73590 X-RAY EXAM OF LOWER LEG: CPT

## 2022-05-03 PROCEDURE — 20610 DRAIN/INJ JOINT/BURSA W/O US: CPT | Performed by: ORTHOPAEDIC SURGERY

## 2022-05-03 RX ORDER — BUPIVACAINE HYDROCHLORIDE 2.5 MG/ML
2 INJECTION, SOLUTION INFILTRATION; PERINEURAL
Status: COMPLETED | OUTPATIENT
Start: 2022-05-03 | End: 2022-05-03

## 2022-05-03 RX ORDER — BETAMETHASONE SODIUM PHOSPHATE AND BETAMETHASONE ACETATE 3; 3 MG/ML; MG/ML
12 INJECTION, SUSPENSION INTRA-ARTICULAR; INTRALESIONAL; INTRAMUSCULAR; SOFT TISSUE
Status: COMPLETED | OUTPATIENT
Start: 2022-05-03 | End: 2022-05-03

## 2022-05-03 RX ORDER — LIDOCAINE HYDROCHLORIDE 10 MG/ML
2 INJECTION, SOLUTION INFILTRATION; PERINEURAL
Status: COMPLETED | OUTPATIENT
Start: 2022-05-03 | End: 2022-05-03

## 2022-05-03 RX ADMIN — BETAMETHASONE SODIUM PHOSPHATE AND BETAMETHASONE ACETATE 12 MG: 3; 3 INJECTION, SUSPENSION INTRA-ARTICULAR; INTRALESIONAL; INTRAMUSCULAR; SOFT TISSUE at 10:39

## 2022-05-03 RX ADMIN — LIDOCAINE HYDROCHLORIDE 2 ML: 10 INJECTION, SOLUTION INFILTRATION; PERINEURAL at 10:39

## 2022-05-03 RX ADMIN — BUPIVACAINE HYDROCHLORIDE 2 ML: 2.5 INJECTION, SOLUTION INFILTRATION; PERINEURAL at 10:39

## 2022-05-03 NOTE — PROGRESS NOTES
Orthopedics   Victor M Harrell 80 y o  female MRN: [id number]    Chief Complaint:   left knee and lower leg pain    HPI:   80 y  o female who presents for follow up of hospital evaluation for left tib-fib fractures  She fell on 4/5 at [facility] on her left side, sustaining a left tib-fib fracture  She had a known healing fibula fracture at the same level  She was treated non-operatively and allowed to be WBAT using her custom AFO  She was discharged back to her assisted living center  Since then she notes minimal pain with no new injuries  She has not been using it much but has no current complaints  Review Of Systems:   · Skin: Normal  · Neuro: See HPI  · Musculoskeletal: See HPI  · 14 point review of systems negative except as stated above     Past Medical History:   Past Medical History:   Diagnosis Date    Arthritis     CAD (coronary artery disease)     Disease of thyroid gland     GERD (gastroesophageal reflux disease)     Hypertension     Mild cognitive impairment with memory loss 5/29/2020    Osteoarthritis     Osteoporosis     Vitamin D deficiency        Past Surgical History:   Past Surgical History:   Procedure Laterality Date    APPENDECTOMY      HYSTERECTOMY      [place] PARTIAL HIP REPLACEMENT Left 8/13/2018    Procedure: HEMIARTHROPLASTY HIP (BIPOLAR); Surgeon: Kriss Harris MD;  [facility];  Service: Orthopedics    ROTATOR CUFF REPAIR         Family History:  Family history reviewed and non-contributory  Family History   Problem Relation Age of Onset    Diabetes type II Father     Heart disease Brother     Hypertension Family     Arthritis Family        Social History:  Social History     Socioeconomic History    Marital status:       Spouse name: None    Number of children: None    Years of education: None    Highest education level: None   Occupational History    None   Tobacco Use    Smoking status: Former Smoker    Smokeless tobacco: Never Used Vaping Use    Vaping Use: Never used   Substance and Sexual Activity    Alcohol use: Not Currently    Drug use: No    Sexual activity: None   Other Topics Concern    None   Social History Narrative    Daily coffee consumption - 4 cups     Social Determinants of Health     Financial Resource Strain: Not on file   Food Insecurity: No Food Insecurity    Worried About Running Out of Food in the Last Year: Never true    William of Food in the Last Year: Never true   Transportation Needs: No Transportation Needs    Lack of Transportation (Medical): No    Lack of Transportation (Non-Medical):  No   Physical Activity: Not on file   Stress: Not on file   Social Connections: Not on file   Intimate Partner Violence: Not on file   Housing Stability: Unknown    Unable to Pay for Housing in the Last Year: No    Number of Places Lived in the Last Year: Not on file    Unstable Housing in the Last Year: No       Allergies:   No Known Allergies        Labs:  0   Lab Value Date/Time    HCT 36 0 04/06/2022 0634    HCT 37 9 04/05/2022 1643    HCT 36 9 02/07/2022 0459    HCT 37 1 11/24/2017 0812    HCT 37 0 11/14/2016 0736    HCT 34 3 (L) 09/07/2015 0600    HGB 11 6 04/06/2022 0634    HGB 12 0 04/05/2022 1643    HGB 12 3 02/07/2022 0459    HGB 12 3 11/24/2017 0812    HGB 12 3 11/14/2016 0736    HGB 11 2 (L) 09/07/2015 0600    INR 1 05 08/12/2018 1137    WBC 9 44 04/06/2022 0634    WBC 10 86 (H) 04/05/2022 1643    WBC 12 66 (H) 02/07/2022 0459    WBC 0-5 11/24/2017 0812    WBC 8 7 11/24/2017 0812    WBC 6 1 11/14/2016 0736       Meds:    Current Outpatient Medications:     acetaminophen (TYLENOL) 325 mg tablet, Take 650 mg by mouth 3 (three) times a day , Disp: , Rfl:     aspirin 81 MG tablet, Take 1 tablet by mouth daily, Disp: , Rfl:     calcitonin, salmon, (MIACALCIN) 200 units/act nasal spray, 1 spray and to alternate nostril q day (Patient taking differently: 1 spray into each nostril daily Alternate left and right nostril every other day), Disp: 1 Act, Rfl: 5    cholecalciferol (VITAMIN D3) 1,000 units tablet, Take 2,000 Units by mouth daily, Disp: , Rfl:     Cyanocobalamin (VITAMIN B12) 1000 MCG TBCR, Take 1 tablet by mouth daily , Disp: , Rfl:     cycloSPORINE (RESTASIS) 0 05 % ophthalmic emulsion, Administer 1 drop to both eyes 2 (two) times a day, Disp: , Rfl:     D-Mannose 500 MG CAPS, Take 1 tablet by mouth daily, Disp: , Rfl:     dorzolamide-timolol (COSOPT) 22 3-6 8 MG/ML ophthalmic solution, Administer 1 drop to both eyes daily, Disp: , Rfl: 2    enoxaparin (LOVENOX) 40 mg/0 4 mL, Inject 0 4 mL (40 mg total) under the skin daily, Disp: 12 mL, Rfl: 0    estradiol (VAGIFEM, YUVAFEM) 10 MCG TABS vaginal tablet, Insert 1 tablet (10 mcg total) into the vagina 2 (two) times a week, Disp: 60 tablet, Rfl: 1    ferrous sulfate 324 (65 Fe) mg, Take 1 tablet (324 mg total) by mouth daily before breakfast, Disp: 30 tablet, Rfl: 4    gabapentin (NEURONTIN) 100 mg capsule, Take 100 mg by mouth 3 (three) times a day, Disp: , Rfl:     latanoprost (XALATAN) 0 005 % ophthalmic solution, Administer 1 drop to both eyes daily at bedtime, Disp: , Rfl: 1    losartan (COZAAR) 50 mg tablet, Take 1 tablet (50 mg total) by mouth daily, Disp: 90 tablet, Rfl: 2    metoprolol succinate (TOPROL-XL) 25 mg 24 hr tablet, Take 1 tablet (25 mg total) by mouth daily, Disp: , Rfl: 0    Multiple Vitamins-Minerals (PRESERVISION/LUTEIN) CAPS, Take 1 capsule by mouth 2 (two) times a day, Disp: , Rfl:     pantoprazole (PROTONIX) 20 mg tablet, Take 20 mg by mouth daily, Disp: , Rfl:     Polyethyl Glyc-Propyl Glyc PF 0 4-0 3 % SOLN, Apply 1 drop to eye every 2 (two) hours while awake, Disp: , Rfl:     Pyridoxine HCl (VITAMIN B6) 200 MG TABS, Take 0 5 tablets by mouth daily , Disp: , Rfl:     sodium chloride 1 g tablet, Take 1 g by mouth 3 (three) times a day, Disp: , Rfl:     Physical Exam:   /66   Pulse 69   Ht 5' (1 524 m)   BMI 24 46 kg/m²   Gen: Alert and oriented to person, place, time  HEENT: EOMI, eyes clear, moist mucus membranes, hearing intact  Respiratory: Bilateral chest rise  No audible wheezing found  Cardiovascular: No audible murmurs  Abdomen: soft  Musculoskeletal: left lower extremity  · Skin intact  · Tender to palpation over Medial knee and medial lower leg  · No effusion  · PROM 0-90 Flexion-Extension  · Can perform straight leg raise  · Stable to varus/valgus stress, negative lachmans, posterior draw  · 2+ DP pulse    Radiology:   I personally reviewed the films  X-rays left tib-fib shows healing transverse same level tibia and fibula shaft fractures with evidence of callus formation and maintained acceptable alignment    Procedures, if performed today:    Large joint arthrocentesis: L knee  Universal Protocol:  Consent given by: patient  Time out: Immediately prior to procedure a "time out" was called to verify the correct patient, procedure, equipment, support staff and site/side marked as required    Site marked: the operative site was marked  Supporting Documentation  Indications: pain and diagnostic evaluation   Procedure Details  Location: knee - L knee  Preparation: Patient was prepped and draped in the usual sterile fashion  Needle size: 22 G  Ultrasound guidance: no  Approach: anteromedial  Medications administered: 2 mL bupivacaine 0 25 %; 2 mL lidocaine 1 %; 12 mg betamethasone acetate-betamethasone sodium phosphate 6 (3-3) mg/mL    Patient tolerance: patient tolerated the procedure well with no immediate complications  Dressing:  Sterile dressing applied          Assessment:  80 y o  female with healing left tib-fib shaft fracture on 4/5/2022 and left knee osteoarthritis    Plan:   · Continue WBAT LLE  · CSI given to left knee  · PT-stretching and ROM  · Pain control-OTC Pain meds  · FU 3 months for repeat Efren Mac MD

## 2022-05-05 ENCOUNTER — TELEPHONE (OUTPATIENT)
Dept: OBGYN CLINIC | Facility: OTHER | Age: 87
End: 2022-05-05

## 2022-05-05 NOTE — TELEPHONE ENCOUNTER
Patients son Terry Lindsey called in to see if nurse or Dr Kathryn Barber can give him a call with what he outcome of her appointment was on 05-03      C/b # [phone]

## 2022-05-05 NOTE — TELEPHONE ENCOUNTER
Please call patient's son and report that mom's fracture is healed, and she had an injection to her left knee but not the right knee at her visit  She will be seen again in 3 months    Thank you  FARZANEH

## 2022-06-07 ENCOUNTER — HOME CARE VISIT (OUTPATIENT)
Dept: HOME HEALTH SERVICES | Facility: HOME HEALTHCARE | Age: 87
End: 2022-06-07

## 2022-06-08 NOTE — CASE COMMUNICATION
Seeking approval for [facility], Patient Chani Valencia 79 YO female 9/11 1925 Dxs: HTN, CAD, Vascular dementia, Fx of left ulna, Fxed left fibula, History of UTIs, CKD Stage 3, Ambulatory dysfunction  She presented to [facility] 4/5/2022 due to fall, which resulted in nondisplaced left tibial/fibula fracture  Orthopedics recommended non-operative management  Patient cleared for weight bearing as tolerated  She was discharged to [address] for rehabilit ation  PPS 50%  Most recent albumin  2/4/22 3 2  With evaluation today: 6/8/22 She is oriented to person and place  No weight loss  She has maintained between 122-128 lbs  Was 128 6 on 6/4  Main reason why hospice now is son wants her to have more care at facility  He would prefer to not have her taken to the hospital  He claims she has abdominal/pain issues that are not being managed  She was pleasant and denied pain now

## 2022-06-09 RX ORDER — ONDANSETRON 4 MG/1
TABLET, FILM COATED ORAL
COMMUNITY
Start: 2022-05-29

## 2022-06-13 NOTE — TELEPHONE ENCOUNTER
Received a call from Otto Ward that a typo was made on the scanned petition. Corrected and sent again. Reviewed results of recent UA which shows presence of nitrites, was urine culture sent? Patient likely colonized with bacteria given history  Is she having symptoms such as gross hematuria, fever, etc?  Will start antibiotic if patient having additional symptoms, otherwise monitor given hx recurrent UTI

## 2022-06-27 ENCOUNTER — OFFICE VISIT (OUTPATIENT)
Dept: GASTROENTEROLOGY | Facility: MEDICAL CENTER | Age: 87
End: 2022-06-27
Payer: MEDICARE

## 2022-06-27 VITALS — DIASTOLIC BLOOD PRESSURE: 53 MMHG | SYSTOLIC BLOOD PRESSURE: 93 MMHG | HEART RATE: 76 BPM | TEMPERATURE: 97.9 F

## 2022-06-27 DIAGNOSIS — R10.9 ABDOMINAL CRAMPING: Primary | ICD-10-CM

## 2022-06-27 PROCEDURE — 99203 OFFICE O/P NEW LOW 30 MIN: CPT | Performed by: INTERNAL MEDICINE

## 2022-06-27 RX ORDER — DICYCLOMINE HYDROCHLORIDE 10 MG/1
10 CAPSULE ORAL 2 TIMES DAILY
Qty: 90 CAPSULE | Refills: 3 | Status: SHIPPED | OUTPATIENT
Start: 2022-06-27

## 2022-06-27 NOTE — PROGRESS NOTES
[facility] - Outpatient Consultation  Havery Kussmaul 80 y o  female MRN: [id number]  Encounter: [id number]    HPI:    Havery Kussmaul is a 80 y o  female who presents with complaint of upper abdominal pain  She lives at a nursing facility  She is in the office with her son today  She is very hard of hearing  She provided extremely limited history  Her son tried to fill in some missing information, but that was also incomplete  According to the patient's son, she has been complaining of intermittent abdominal pain for the past 2 months  Speaking to the patient, the pain seems to be in the lower abdomen  There does not appear to be a clear correlation to eating  Her workup has consisted of an abdominal ultrasound which showed a 1 6 cm gallstones but no CBD dilation or cholecystitis  Her LFTs are normal, CBC shows anemia  According to her son, she does not appear to be constipated and has occasional incontinence  She has been receiving oxycodone p r n  For her abdominal pain  REVIEW OF SYSTEMS:  CONSTITUTIONAL: Denies any fever, chills, rigors, and weight loss  HEENT: No earache or tinnitus  Denies hearing loss or visual disturbances  CARDIOVASCULAR: No chest pain or palpitations  RESPIRATORY: Denies any cough, hemoptysis, shortness of breath or dyspnea on exertion  GASTROINTESTINAL: As noted in the History of Present Illness  GENITOURINARY: No problems with urination  Denies any hematuria or dysuria  NEUROLOGIC: No dizziness or vertigo, denies headaches  MUSCULOSKELETAL: Denies any muscle or joint pain  SKIN: Denies skin rashes or itching  ENDOCRINE: Denies excessive thirst  Denies intolerance to heat or cold  PSYCHOSOCIAL: Denies depression or anxiety  Denies any recent memory loss       Historical Information   Past Medical History:   Diagnosis Date    Arthritis     CAD (coronary artery disease)     Disease of thyroid gland     GERD (gastroesophageal reflux disease)     Hypertension     Mild cognitive impairment with memory loss 5/29/2020    Osteoarthritis     Osteoporosis     Vitamin D deficiency      Past Surgical History:   Procedure Laterality Date    APPENDECTOMY      HYSTERECTOMY      OR PARTIAL HIP REPLACEMENT Left 8/13/2018    Procedure: HEMIARTHROPLASTY HIP (BIPOLAR);   Surgeon: Vena Blizzard, MD;  Location: [facility];  Service: Orthopedics    ROTATOR CUFF REPAIR       Social History   Social History     Substance and Sexual Activity   Alcohol Use Not Currently     Social History     Substance and Sexual Activity   Drug Use No     Social History     Tobacco Use   Smoking Status Former Smoker   Smokeless Tobacco Never Used     Family History   Problem Relation Age of Onset    Diabetes type II Father     Heart disease Brother     Hypertension Family     Arthritis Family        Meds/Allergies       Current Outpatient Medications:     dicyclomine (BENTYL) 10 mg capsule    acetaminophen (TYLENOL) 325 mg tablet    aspirin 81 MG tablet    calcitonin, salmon, (MIACALCIN) 200 units/act nasal spray    cholecalciferol (VITAMIN D3) 1,000 units tablet    Cyanocobalamin (VITAMIN B12) 1000 MCG TBCR    cycloSPORINE (RESTASIS) 0 05 % ophthalmic emulsion    D-Mannose 500 MG CAPS    dorzolamide-timolol (COSOPT) 22 3-6 8 MG/ML ophthalmic solution    enoxaparin (LOVENOX) 40 mg/0 4 mL    estradiol (VAGIFEM, YUVAFEM) 10 MCG TABS vaginal tablet    ferrous sulfate 324 (65 Fe) mg    gabapentin (NEURONTIN) 100 mg capsule    latanoprost (XALATAN) 0 005 % ophthalmic solution    losartan (COZAAR) 50 mg tablet    metoprolol succinate (TOPROL-XL) 25 mg 24 hr tablet    Multiple Vitamins-Minerals (PRESERVISION/LUTEIN) CAPS    ondansetron (ZOFRAN) 4 mg tablet    pantoprazole (PROTONIX) 20 mg tablet    Polyethyl Glyc-Propyl Glyc PF 0 4-0 3 % SOLN    Pyridoxine HCl (VITAMIN B6) 200 MG TABS    sodium chloride 1 g tablet    No Known Allergies    Objective   Blood pressure 93/53, pulse 76, temperature 97 9 °F (36 6 °C)  There is no height or weight on file to calculate BMI  PHYSICAL EXAM:    General Appearance:   Alert, cooperative, no distress   HEENT:   Normocephalic, atraumatic, anicteric  Neck:  Supple, symmetrical, trachea midline   Lungs:   Clear to auscultation bilaterally; no rales, rhonchi or wheezing; respirations unlabored    Heart[de-identified]   Regular rate and rhythm; no murmur, rub, or gallop  Abdomen:   Soft, mild lower abdominal tenderness to palpation, non-distended; normal bowel sounds; no masses, no organomegaly    Genitalia:   Deferred    Rectal:   Deferred    Extremities:  No cyanosis, clubbing or edema    Pulses:  2+ and symmetric    Skin:  No jaundice, rashes, or lesions    Lymph nodes:  No palpable cervical lymphadenopathy        Lab Results:   No visits with results within 1 Day(s) from this visit     Latest known visit with results is:   Admission on 04/05/2022, Discharged on 04/06/2022   Component Date Value    Color, UA 04/05/2022 Colorless     Clarity, UA 04/05/2022 Clear     Specific Gravity, UA 04/05/2022 1 005     pH, UA 04/05/2022 7 0     Leukocytes, UA 04/05/2022 Large (A)    Nitrite, UA 04/05/2022 Negative     Protein, UA 04/05/2022 Negative     Glucose, UA 04/05/2022 Negative     Ketones, UA 04/05/2022 Negative     Urobilinogen, UA 04/05/2022 <2 0     Bilirubin, UA 04/05/2022 Negative     Occult Blood, UA 04/05/2022 Negative     RBC, UA 04/05/2022 1-2     WBC, UA 04/05/2022 Innumerable (A)    Epithelial Cells 04/05/2022 Occasional     Bacteria, UA 04/05/2022 Occasional     Urine Culture 04/05/2022 >100,000 cfu/ml Klebsiella variicola (A)    Urine Culture 04/05/2022 <10,000 cfu/ml      WBC 04/05/2022 10 86 (A)    RBC 04/05/2022 4 05     Hemoglobin 04/05/2022 12 0     Hematocrit 04/05/2022 37 9     MCV 04/05/2022 94     MCH 04/05/2022 29 6     MCHC 04/05/2022 31 7     RDW 04/05/2022 14 4  MPV 04/05/2022 9 9     Platelets 33/51/8812 260     nRBC 04/05/2022 0     Neutrophils Relative 04/05/2022 57     Immat GRANS % 04/05/2022 0     Lymphocytes Relative 04/05/2022 28     Monocytes Relative 04/05/2022 12     Eosinophils Relative 04/05/2022 2     Basophils Relative 04/05/2022 1     Neutrophils Absolute 04/05/2022 6 17     Immature Grans Absolute 04/05/2022 0 04     Lymphocytes Absolute 04/05/2022 3 03     Monocytes Absolute 04/05/2022 1 34 (A)    Eosinophils Absolute 04/05/2022 0 20     Basophils Absolute 04/05/2022 0 08     Sodium 04/05/2022 134 (A)    Potassium 04/05/2022 4 3     Chloride 04/05/2022 103     CO2 04/05/2022 27     ANION GAP 04/05/2022 4     BUN 04/05/2022 11     Creatinine 04/05/2022 0 75     Glucose 04/05/2022 110     Calcium 04/05/2022 9 8     eGFR 04/05/2022 67     TSH 3RD GENERATON 04/06/2022 4 020     Sodium 04/06/2022 136     Potassium 04/06/2022 4 1     Chloride 04/06/2022 104     CO2 04/06/2022 26     ANION GAP 04/06/2022 6     BUN 04/06/2022 11     Creatinine 04/06/2022 0 64     Glucose 04/06/2022 105     Calcium 04/06/2022 9 6     eGFR 04/06/2022 75     Magnesium 04/06/2022 1 7     Phosphorus 04/06/2022 3 4     WBC 04/06/2022 9 44     RBC 04/06/2022 3 90     Hemoglobin 04/06/2022 11 6     Hematocrit 04/06/2022 36 0     MCV 04/06/2022 92     MCH 04/06/2022 29 7     MCHC 04/06/2022 32 2     RDW 04/06/2022 14 6     MPV 04/06/2022 9 6     Platelets 23/58/3519 228     nRBC 04/06/2022 0     Neutrophils Relative 04/06/2022 53     Immat GRANS % 04/06/2022 0     Lymphocytes Relative 04/06/2022 30     Monocytes Relative 04/06/2022 13 (A)    Eosinophils Relative 04/06/2022 3     Basophils Relative 04/06/2022 1     Neutrophils Absolute 04/06/2022 4 90     Immature Grans Absolute 04/06/2022 0 04     Lymphocytes Absolute 04/06/2022 2 86     Monocytes Absolute 04/06/2022 1 26 (A)    Eosinophils Absolute 04/06/2022 0 29     Basophils Absolute 04/06/2022 0 09     SARS-CoV-2 04/06/2022 Negative     INFLUENZA A PCR 04/06/2022 Negative     INFLUENZA B PCR 04/06/2022 Negative     RSV PCR 04/06/2022 Negative        Lab Results   Component Value Date    WBC 9 44 04/06/2022    HGB 11 6 04/06/2022    HCT 36 0 04/06/2022    MCV 92 04/06/2022     04/06/2022       Lab Results   Component Value Date     11/24/2017    SODIUM 136 04/06/2022    K 4 1 04/06/2022     04/06/2022    CO2 26 04/06/2022    ANIONGAP 10 09/03/2015    AGAP 6 04/06/2022    BUN 11 04/06/2022    CREATININE 0 64 04/06/2022    GLUC 105 04/06/2022    GLUF 98 05/28/2020    CALCIUM 9 6 04/06/2022    AST 24 02/04/2022    ALT 43 02/04/2022    ALKPHOS 75 02/04/2022    PROT 6 6 11/24/2017    TP 6 4 02/04/2022    BILITOT 0 2 11/24/2017    TBILI 0 62 02/04/2022    EGFR 75 04/06/2022       No results found for: CRP    Lab Results   Component Value Date    XMW5OGCZGSOL 4 020 04/06/2022    TSH 4 210 07/24/2018       No results found for: IRON, TIBC, FERRITIN    Radiology Results:   No results found  ______________________________________________________________________  ASSESSMENT AND PLAN:    Jaquelin Enamorado is a 80 y o  female with intermittent mid to lower abdominal pain  She provides limited history  Her LFTs are normal   She does have a gallstone, but there is no evidence of biliary obstruction and her pain does not sound biliary in nature  I am quite reluctant to recommend endoscopic evaluation given her age and, overall, benign exam   I did make a phone call to her nursing home and left a voicemail message for the nurse practitioner, in hopes of obtaining some additional history  I discussed my plan with her son and put it in writing in the paperwork provided  1  I recommend she stop oxycodone  2  We are going to start dicyclomine 10 mg twice daily to help with abdominal cramping  3   If I obtain history consistent with constipation after speaking to the nurse practitioner, we can also try MiraLax  I would like to see her again in 3 months  1  Abdominal cramping        No orders of the defined types were placed in this encounter

## 2022-07-05 ENCOUNTER — TELEPHONE (OUTPATIENT)
Dept: OTHER | Facility: OTHER | Age: 87
End: 2022-07-05

## 2022-07-05 NOTE — TELEPHONE ENCOUNTER
Patient is requesting a call back from a nurse regarding relaxer medication given to the patient at her last office visit

## 2022-07-27 DIAGNOSIS — Z48.89 AFTERCARE FOLLOWING SURGERY: Primary | ICD-10-CM

## 2022-10-11 PROBLEM — N30.00 ACUTE CYSTITIS WITHOUT HEMATURIA: Status: RESOLVED | Noted: 2019-06-24 | Resolved: 2022-10-11

## 2022-10-12 PROBLEM — N39.0 ACUTE UTI: Status: RESOLVED | Noted: 2020-02-14 | Resolved: 2022-10-12

## 2023-01-31 ENCOUNTER — TELEPHONE (OUTPATIENT)
Dept: OBGYN CLINIC | Facility: HOSPITAL | Age: 88
End: 2023-01-31

## 2023-01-31 NOTE — TELEPHONE ENCOUNTER
Caller: Son    Doctor: Dr Lynne Beebe    Reason for call: Called to verify last time patient had CSI of knees  Verified last date was 5/3/23        Call back#: [phone]

## 2023-02-01 ENCOUNTER — OFFICE VISIT (OUTPATIENT)
Dept: OBGYN CLINIC | Facility: HOSPITAL | Age: 88
End: 2023-02-01

## 2023-02-01 ENCOUNTER — TELEPHONE (OUTPATIENT)
Dept: OBGYN CLINIC | Facility: HOSPITAL | Age: 88
End: 2023-02-01

## 2023-02-01 VITALS — SYSTOLIC BLOOD PRESSURE: 114 MMHG | DIASTOLIC BLOOD PRESSURE: 69 MMHG | HEART RATE: 83 BPM

## 2023-02-01 DIAGNOSIS — M17.0 PRIMARY OSTEOARTHRITIS OF BOTH KNEES: Primary | ICD-10-CM

## 2023-02-01 RX ORDER — BUPIVACAINE HYDROCHLORIDE 2.5 MG/ML
4 INJECTION, SOLUTION INFILTRATION; PERINEURAL
Status: COMPLETED | OUTPATIENT
Start: 2023-02-01 | End: 2023-02-01

## 2023-02-01 RX ORDER — BETAMETHASONE SODIUM PHOSPHATE AND BETAMETHASONE ACETATE 3; 3 MG/ML; MG/ML
12 INJECTION, SUSPENSION INTRA-ARTICULAR; INTRALESIONAL; INTRAMUSCULAR; SOFT TISSUE
Status: COMPLETED | OUTPATIENT
Start: 2023-02-01 | End: 2023-02-01

## 2023-02-01 RX ADMIN — BUPIVACAINE HYDROCHLORIDE 4 ML: 2.5 INJECTION, SOLUTION INFILTRATION; PERINEURAL at 15:32

## 2023-02-01 RX ADMIN — BETAMETHASONE SODIUM PHOSPHATE AND BETAMETHASONE ACETATE 12 MG: 3; 3 INJECTION, SUSPENSION INTRA-ARTICULAR; INTRALESIONAL; INTRAMUSCULAR; SOFT TISSUE at 15:32

## 2023-02-01 NOTE — TELEPHONE ENCOUNTER
Caller: Palomo Arroyo - patients son    Doctor: Lydia Guerra    Reason for call: Patients son wants to get patient in to get Bila knee injections, patients son doesn't want to wait for an appt  He would like to get her in asap, she is in the nursing home and is under hospice care, they are having trouble taking care of her because of her knees being so bad  Please advise      Call back#: [phone]

## 2023-02-01 NOTE — TELEPHONE ENCOUNTER
Spoke w/son to offer appt  He is going to check on availability of Curtistine Lax with nursing home and call back

## 2023-02-01 NOTE — PROGRESS NOTES
Assessment:   Diagnosis ICD-10-CM Associated Orders   1  Primary osteoarthritis of both knees  M17 0           Plan:  She was offered, accepted, performed an injection(s) of cortisone to her Bilateral knee for symptomatic relief of pain and inflammation  Patient tolerated the treatment(s) well  Ice and post injection protocol advised  Weightbearing activities as tolerated  She will be seen for follow-up in in 3 months for re-evaluation and consideration for repeat injections as necessary  Patient expresses understanding and is in agreement with this treatment plan  The patient was given the opportunity to ask questions or present concerns  To do next visit:  Return in about 3 months (around 5/1/2023) for Recheck  The above stated was discussed in layman's terms and the patient expressed understanding  All questions were answered to the patient's satisfaction  Scribe Attestation    I,:  Tammy Blanchard am acting as a scribe while in the presence of the attending physician :       I,:  Sneha Arzola MD personally performed the services described in this documentation    as scribed in my presence :             Subjective:   Bk Cagle is a 80 y o  female who presents today for a repeat evaluation of her bilateral knee due to chronic pain, known primary osteoarthritis  Patient is doing well but reports pain with prolonged sedentary positions and weight-bearing activities especially ambulating stairs  The patient states that She is point tender along the medial and lateral joint line  Patient would like an injection of cortisone to her bilateral knee  Patients presents today using a stretcher for ambulatory assistance  She denies any recent bruising, numbness, tingling, or feelings of instability  She also denies any fevers, chills or shortness of breath  Review of systems negative unless otherwise specified in HPI  Review of Systems   Constitutional: Negative for chills and fever     HENT: Negative for ear pain and sore throat  Eyes: Negative for pain and visual disturbance  Respiratory: Negative for cough and shortness of breath  Cardiovascular: Negative for chest pain and palpitations  Gastrointestinal: Negative for abdominal pain and vomiting  Genitourinary: Negative for dysuria and hematuria  Musculoskeletal: Negative for arthralgias and back pain  Skin: Negative for color change and rash  Neurological: Negative for seizures and syncope  All other systems reviewed and are negative  Past Medical History:   Diagnosis Date   • Arthritis    • CAD (coronary artery disease)    • Disease of thyroid gland    • GERD (gastroesophageal reflux disease)    • Hypertension    • Mild cognitive impairment with memory loss 5/29/2020   • Osteoarthritis    • Osteoporosis    • Vitamin D deficiency        Past Surgical History:   Procedure Laterality Date   • APPENDECTOMY     • HYSTERECTOMY     • [place] HEMIARTHROPLASTY HIP PARTIAL Left 8/13/2018    Procedure: HEMIARTHROPLASTY HIP (BIPOLAR);   Surgeon: Gina Meza MD;  Location:  MAIN OR;  Service: Orthopedics   • ROTATOR CUFF REPAIR         Family History   Problem Relation Age of Onset   • Diabetes type II Father    • Heart disease Brother    • Hypertension Family    • Arthritis Family        Social History     Occupational History   • Not on file   Tobacco Use   • Smoking status: Former   • Smokeless tobacco: Never   Vaping Use   • Vaping Use: Never used   Substance and Sexual Activity   • Alcohol use: Not Currently   • Drug use: No   • Sexual activity: Not on file         Current Outpatient Medications:   •  acetaminophen (TYLENOL) 325 mg tablet, Take 650 mg by mouth 3 (three) times a day , Disp: , Rfl:   •  aspirin 81 MG tablet, Take 1 tablet by mouth daily, Disp: , Rfl:   •  calcitonin, salmon, (MIACALCIN) 200 units/act nasal spray, 1 spray and to alternate nostril q day (Patient taking differently: 1 spray into each nostril daily Alternate left and right nostril every other day), Disp: 1 Act, Rfl: 5  •  cholecalciferol (VITAMIN D3) 1,000 units tablet, Take 2,000 Units by mouth daily, Disp: , Rfl:   •  Cyanocobalamin (VITAMIN B12) 1000 MCG TBCR, Take 1 tablet by mouth daily , Disp: , Rfl:   •  cycloSPORINE (RESTASIS) 0 05 % ophthalmic emulsion, Administer 1 drop to both eyes 2 (two) times a day, Disp: , Rfl:   •  D-Mannose 500 MG CAPS, Take 1 tablet by mouth daily, Disp: , Rfl:   •  dicyclomine (BENTYL) 10 mg capsule, Take 1 capsule (10 mg total) by mouth 2 (two) times a day, Disp: 90 capsule, Rfl: 3  •  dorzolamide-timolol (COSOPT) 22 3-6 8 MG/ML ophthalmic solution, Administer 1 drop to both eyes daily, Disp: , Rfl: 2  •  enoxaparin (LOVENOX) 40 mg/0 4 mL, Inject 0 4 mL (40 mg total) under the skin daily, Disp: 12 mL, Rfl: 0  •  estradiol (VAGIFEM, YUVAFEM) 10 MCG TABS vaginal tablet, Insert 1 tablet (10 mcg total) into the vagina 2 (two) times a week, Disp: 60 tablet, Rfl: 1  •  ferrous sulfate 324 (65 Fe) mg, Take 1 tablet (324 mg total) by mouth daily before breakfast, Disp: 30 tablet, Rfl: 4  •  gabapentin (NEURONTIN) 100 mg capsule, Take 100 mg by mouth 3 (three) times a day, Disp: , Rfl:   •  latanoprost (XALATAN) 0 005 % ophthalmic solution, Administer 1 drop to both eyes daily at bedtime, Disp: , Rfl: 1  •  losartan (COZAAR) 50 mg tablet, Take 1 tablet (50 mg total) by mouth daily, Disp: 90 tablet, Rfl: 2  •  metoprolol succinate (TOPROL-XL) 25 mg 24 hr tablet, Take 1 tablet (25 mg total) by mouth daily, Disp: , Rfl: 0  •  Multiple Vitamins-Minerals (PRESERVISION/LUTEIN) CAPS, Take 1 capsule by mouth 2 (two) times a day, Disp: , Rfl:   •  ondansetron (ZOFRAN) 4 mg tablet, , Disp: , Rfl:   •  pantoprazole (PROTONIX) 20 mg tablet, Take 20 mg by mouth daily, Disp: , Rfl:   •  Polyethyl Glyc-Propyl Glyc PF 0 4-0 3 % SOLN, Apply 1 drop to eye every 2 (two) hours while awake, Disp: , Rfl:   •  Pyridoxine HCl (VITAMIN B6) 200 MG TABS, Take 0 5 tablets by mouth daily , Disp: , Rfl:   •  sodium chloride 1 g tablet, Take 1 g by mouth 3 (three) times a day, Disp: , Rfl:     No Known Allergies       Vitals:    02/01/23 1507   BP: 114/69   Pulse: 83       Objective:                    Ortho Exam  bilateral knee(s) -   Patient ambulates with antalgic gait pattern  Uses stretcher as assistive device  Genu Varus anatomical deformity  Skin is warm and dry to touch with no signs of erythema, ecchymosis, or infection  No soft tissue swelling or effusion noted  ROM (5° - 115°)  MMT: 4/5 throughout  TTP over medial joint line, TTP over lateral joint line, TTP over pes anserine bursa, no popliteal fullness appreciated on exam   Flexor and extensor mechanisms are intact   Knee is stable to varus and valgus stress  - Lachman's  - Anterior Drawer, - Posterior Drawer  - Medial Rosana's, - Lateral Rosana's  - Pivot Shift  Patella tracks centrally with palpable crepitus  Calf compartments are soft and supple  - Johanna's sign  2+ DP and PT pulses with brisk capillary refill to the toes  Sural, saphenous, tibial, superficial, and deep peroneal motor and sensory distributions intact  Sensation light touch intact distally    Diagnostics, reviewed and taken today if performed as documented:    None performed    Procedures, if performed today:    Large joint arthrocentesis: bilateral knee  Universal Protocol:  Consent: Verbal consent obtained  Risks and benefits: risks, benefits and alternatives were discussed  Consent given by: patient  Time out: Immediately prior to procedure a "time out" was called to verify the correct patient, procedure, equipment, support staff and site/side marked as required    Timeout called at: 2/1/2023 3:30 PM   Patient understanding: patient states understanding of the procedure being performed  Site marked: the operative site was marked  Patient identity confirmed: verbally with patient    Supporting Documentation  Indications: pain and diagnostic evaluation   Procedure Details  Location: knee - bilateral knee  Needle size: 22 G  Ultrasound guidance: no  Approach: anterolateral    Medications (Right): 4 mL bupivacaine 0 25 %; 12 mg betamethasone acetate-betamethasone sodium phosphate 6 (3-3) mg/mLMedications (Left): 4 mL bupivacaine 0 25 %; 12 mg betamethasone acetate-betamethasone sodium phosphate 6 (3-3) mg/mL   Patient tolerance: patient tolerated the procedure well with no immediate complications  Dressing:  Sterile dressing applied      Portions of the record may have been created with voice recognition software  Occasional wrong word or "sound a like" substitutions may have occurred due to the inherent limitations of voice recognition software  Read the chart carefully and recognize, using context, where substitutions have occurred

## 2023-02-06 NOTE — ASSESSMENT & PLAN NOTE
León Salazar,     This patient is having significant difficulties with transportation, I.e. to the pharmacy and to clinic due to financial constraints. Would you be able to look in to some transportation vouchers for her?     Thanks,   Dr. Gray    Stable , no behavioral issues noted  Patients needs assistance with alI ADL's  Will continue supportive care  Maintain sleep Keny Kraft cycle  Maintain good po  hydration, avoid constipation

## 2024-06-27 NOTE — PROGRESS NOTES
Assessment:  1  Chronic pain of both knees     2  Primary osteoarthritis of both knees         Plan:  The patient was provided with bilateral knee steroid injections  The patient tolerated the procedure well  The patient should follow up in 3 months  To do next visit:  Return in about 3 months (around 12/14/2021)  The above stated was discussed in layman's terms and the patient expressed understanding  All questions were answered to the patient's satisfaction  Scribe Attestation    I,:  Kamini Stafford am acting as a scribe while in the presence of the attending physician :       I,:  Meena Patiño MD personally performed the services described in this documentation    as scribed in my presence :             Subjective:   Hermelindo Santo is a 80 y o  female who presents for follow up of bilateral knees  She is s/p bilateral knee steroid injections with benefit, 6/15/2021  Today she complains of return of bilateral knee pain  Prolonged weight bearing and repetitive bending aggravates while rest alleviates  She does reside at [facility]  Review of systems negative unless otherwise specified in HPI    Past Medical History:   Diagnosis Date    CAD (coronary artery disease)     Hypertension     Mild cognitive impairment with memory loss 5/29/2020    Osteoarthritis     Osteoporosis     Vitamin D deficiency        Past Surgical History:   Procedure Laterality Date    APPENDECTOMY      HYSTERECTOMY      [place] PARTIAL HIP REPLACEMENT Left 8/13/2018    Procedure: HEMIARTHROPLASTY HIP (BIPOLAR);   Surgeon: Meena Patiño MD;  Location: [facility];  Service: Orthopedics    ROTATOR CUFF REPAIR         Family History   Problem Relation Age of Onset    Diabetes type II Father     Heart disease Brother        Social History     Occupational History    Not on file   Tobacco Use    Smoking status: Former Smoker    Smokeless tobacco: Never Used   Vaping Use    Vaping Use: Pt advised to f/u with referring physician   Never used   Substance and Sexual Activity    Alcohol use: Not Currently    Drug use: No    Sexual activity: Not on file         Current Outpatient Medications:     acetaminophen (TYLENOL) 325 mg tablet, Take 650 mg by mouth 3 (three) times a day , Disp: , Rfl:     aspirin 81 MG tablet, Take 1 tablet by mouth daily, Disp: , Rfl:     calcitonin, salmon, (MIACALCIN) 200 units/act nasal spray, 1 spray and to alternate nostril q day (Patient taking differently: 1 spray into each nostril daily Alternate left and right nostril every other day), Disp: 1 Act, Rfl: 5    calcium carbonate (TUMS) 500 mg chewable tablet, Chew 1 tablet continuous as needed for indigestion or heartburn, Disp: , Rfl:     cholecalciferol (VITAMIN D3) 1,000 units tablet, Take 2,000 Units by mouth daily, Disp: , Rfl:     Cyanocobalamin (VITAMIN B12) 1000 MCG TBCR, Take 1 tablet by mouth daily , Disp: , Rfl:     cycloSPORINE (RESTASIS) 0 05 % ophthalmic emulsion, Administer 1 drop to both eyes 2 (two) times a day, Disp: , Rfl:     D-Mannose 500 MG CAPS, Take 1 tablet by mouth daily, Disp: , Rfl:     dorzolamide-timolol (COSOPT) 22 3-6 8 MG/ML ophthalmic solution, INSTILL 1 DROP INTO LEFT EYE TWICE A DAY APPROXIMATELY 12 HOURS APART, Disp: , Rfl: 2    estradiol (VAGIFEM, YUVAFEM) 10 MCG TABS vaginal tablet, Insert 1 tablet (10 mcg total) into the vagina 2 (two) times a week, Disp: 60 tablet, Rfl: 1    fenofibrate (TRICOR) 145 mg tablet, Take 145 mg by mouth, Disp: , Rfl:     ferrous sulfate 324 (65 Fe) mg, Take 1 tablet (324 mg total) by mouth daily before breakfast, Disp: 30 tablet, Rfl: 4    gabapentin (NEURONTIN) 100 mg capsule, Take 100 mg by mouth 3 (three) times a day, Disp: , Rfl:     Ketotifen Fumarate (REFRESH EYE ITCH RELIEF OP), Administer 1 drop into the left eye every 2 (two) hours, Disp: , Rfl:     latanoprost (XALATAN) 0 005 % ophthalmic solution, INSTILL 1 DROP IN THE AFFECTED EYE(S) IN THE EVENING, Disp: , Rfl: 1   losartan (COZAAR) 50 mg tablet, Take 1 tablet (50 mg total) by mouth daily, Disp: 90 tablet, Rfl: 2    metoprolol succinate (TOPROL-XL) 50 mg 24 hr tablet, , Disp: , Rfl:     Multiple Vitamins-Minerals (PRESERVISION/LUTEIN) CAPS, Take 1 capsule by mouth 2 (two) times a day, Disp: , Rfl:     pantoprazole (PROTONIX) 20 mg tablet, Take 20 mg by mouth daily, Disp: , Rfl:     Polyethyl Glyc-Propyl Glyc PF 0 4-0 3 % SOLN, Apply 1 drop to eye every 2 (two) hours while awake, Disp: , Rfl:     polyvinyl alcohol (LIQUIFILM TEARS) 1 4 % ophthalmic solution, Administer 1 drop to both eyes 4 (four) times a day, Disp: , Rfl:     Pyridoxine HCl (VITAMIN B6) 200 MG TABS, Take 0 5 tablets by mouth daily , Disp: , Rfl:     No Known Allergies         Vitals:    09/14/21 1033   BP: 133/67   Pulse: 63       Objective:  Physical exam  · General: Awake, Alert, Oriented  · Eyes: Pupils equal, round and reactive to light  · Heart: regular rate and rhythm  · Lungs: No audible wheezing  · Abdomen: soft                    Ortho Exam  Bilateral knees:  Global tenderness  No erythema or ecchymosis  No effusion or swelling  Normal strength  Good ROM with crepitus   Calf compartments soft and supple  Sensation intact  Toes are warm sensate and mobile        Diagnostics, reviewed and taken today if performed as documented:    None performed    Procedures, if performed today:    Large joint arthrocentesis: R knee  Universal Protocol:  Consent: Verbal consent obtained  Risks and benefits: risks, benefits and alternatives were discussed  Consent given by: patient  Time out: Immediately prior to procedure a "time out" was called to verify the correct patient, procedure, equipment, support staff and site/side marked as required    Timeout called at: 9/14/2021 10:53 AM   Patient understanding: patient states understanding of the procedure being performed  Site marked: the operative site was marked  Patient identity confirmed: verbally with patient    Supporting Documentation  Indications: pain   Procedure Details  Location: knee - R knee  Preparation: Patient was prepped and draped in the usual sterile fashion  Needle size: 22 G  Ultrasound guidance: no  Approach: anterolateral  Medications administered: 12 mg betamethasone acetate-betamethasone sodium phosphate 6 (3-3) mg/mL; 2 mL bupivacaine 0 25 %; 2 mL lidocaine 1 %    Patient tolerance: patient tolerated the procedure well with no immediate complications  Dressing:  Sterile dressing applied    Large joint arthrocentesis: L knee  Universal Protocol:  Consent: Verbal consent obtained  Risks and benefits: risks, benefits and alternatives were discussed  Consent given by: patient  Time out: Immediately prior to procedure a "time out" was called to verify the correct patient, procedure, equipment, support staff and site/side marked as required  Timeout called at: 9/14/2021 10:53 AM   Patient understanding: patient states understanding of the procedure being performed  Site marked: the operative site was marked  Patient identity confirmed: verbally with patient    Supporting Documentation  Indications: pain   Procedure Details  Location: knee - L knee  Preparation: Patient was prepped and draped in the usual sterile fashion  Needle size: 22 G  Ultrasound guidance: no  Approach: anterolateral  Medications administered: 12 mg betamethasone acetate-betamethasone sodium phosphate 6 (3-3) mg/mL; 2 mL bupivacaine 0 25 %; 2 mL lidocaine 1 %    Patient tolerance: patient tolerated the procedure well with no immediate complications  Dressing:  Sterile dressing applied              Portions of the record may have been created with voice recognition software  Occasional wrong word or "sound a like" substitutions may have occurred due to the inherent limitations of voice recognition software  Read the chart carefully and recognize, using context, where substitutions have occurred

## (undated) DEVICE — STOCKINETTE REGULAR

## (undated) DEVICE — INTENDED FOR TISSUE SEPARATION, AND OTHER PROCEDURES THAT REQUIRE A SHARP SURGICAL BLADE TO PUNCTURE OR CUT.: Brand: BARD-PARKER SAFETY BLADES SIZE 10, STERILE

## (undated) DEVICE — SUT VICRYL PLUS 2-0 CTB-1 27 IN VCPB259H

## (undated) DEVICE — CAPIT HIP BIPOLAR HEAD POR PRIMARY

## (undated) DEVICE — GLOVE INDICATOR PI UNDERGLOVE SZ 8.5 BLUE

## (undated) DEVICE — GLOVE SRG BIOGEL 8

## (undated) DEVICE — REM POLYHESIVE ADULT PATIENT RETURN ELECTRODE: Brand: VALLEYLAB

## (undated) DEVICE — CAPIT HIP STEM POR PRIMARY

## (undated) DEVICE — 2108 SERIES SAGITTAL BLADE (18.6 X 0.64 X 61.1MM)

## (undated) DEVICE — SUT VICRYL PLUS 1 CTB-1 36 IN VCPB947H

## (undated) DEVICE — TRAY FOLEY 16FR URIMETER SURESTEP

## (undated) DEVICE — THE SIMPULSE SOLO SYSTEM WITH ULTREX RETRACTABLE SPLASH SHIELD TIP: Brand: SIMPULSE SOLO

## (undated) DEVICE — SPONGE PVP SCRUB WING STERILE

## (undated) DEVICE — BETHLEHEM TOTAL HIP, KIT: Brand: CARDINAL HEALTH

## (undated) DEVICE — CAUTERY TIP POLISHER: Brand: DEVON

## (undated) DEVICE — HOOD: Brand: FLYTE, SURGICOOL

## (undated) DEVICE — BUTTON SWITCH PENCIL HOLSTER: Brand: VALLEYLAB

## (undated) DEVICE — SCD SEQUENTIAL COMPRESSION COMFORT SLEEVE MEDIUM KNEE LENGTH: Brand: KENDALL SCD

## (undated) DEVICE — CHLORAPREP HI-LITE 26ML ORANGE

## (undated) DEVICE — DRESSING MEPILEX AG BORDER 4 X 12 IN